# Patient Record
Sex: MALE | Race: BLACK OR AFRICAN AMERICAN
[De-identification: names, ages, dates, MRNs, and addresses within clinical notes are randomized per-mention and may not be internally consistent; named-entity substitution may affect disease eponyms.]

---

## 2018-04-25 ENCOUNTER — HOSPITAL ENCOUNTER (INPATIENT)
Dept: HOSPITAL 74 - JER | Age: 63
LOS: 5 days | Discharge: HOME | DRG: 545 | End: 2018-04-30
Attending: INTERNAL MEDICINE | Admitting: INTERNAL MEDICINE
Payer: COMMERCIAL

## 2018-04-25 VITALS — BODY MASS INDEX: 25.2 KG/M2

## 2018-04-25 DIAGNOSIS — I25.10: ICD-10-CM

## 2018-04-25 DIAGNOSIS — M05.79: ICD-10-CM

## 2018-04-25 DIAGNOSIS — M05.772: Primary | ICD-10-CM

## 2018-04-25 DIAGNOSIS — J43.9: ICD-10-CM

## 2018-04-25 DIAGNOSIS — Z91.14: ICD-10-CM

## 2018-04-25 DIAGNOSIS — Z95.1: ICD-10-CM

## 2018-04-25 DIAGNOSIS — J96.21: ICD-10-CM

## 2018-04-25 DIAGNOSIS — E78.5: ICD-10-CM

## 2018-04-25 DIAGNOSIS — J45.901: ICD-10-CM

## 2018-04-25 DIAGNOSIS — I11.0: ICD-10-CM

## 2018-04-25 DIAGNOSIS — Z87.891: ICD-10-CM

## 2018-04-25 DIAGNOSIS — I50.33: ICD-10-CM

## 2018-04-25 DIAGNOSIS — I25.2: ICD-10-CM

## 2018-04-25 DIAGNOSIS — I45.2: ICD-10-CM

## 2018-04-25 DIAGNOSIS — M05.771: ICD-10-CM

## 2018-04-25 DIAGNOSIS — F41.9: ICD-10-CM

## 2018-04-25 LAB
ALBUMIN SERPL-MCNC: 3.1 G/DL (ref 3.4–5)
ALP SERPL-CCNC: 160 U/L (ref 45–117)
ALT SERPL-CCNC: 19 U/L (ref 12–78)
ANION GAP SERPL CALC-SCNC: 6 MMOL/L (ref 8–16)
AST SERPL-CCNC: 19 U/L (ref 15–37)
BASOPHILS # BLD: 0.3 % (ref 0–2)
BILIRUB SERPL-MCNC: 0.8 MG/DL (ref 0.2–1)
BNP SERPL-MCNC: 110.88 PG/ML (ref 5–125)
BUN SERPL-MCNC: 10 MG/DL (ref 7–18)
CALCIUM SERPL-MCNC: 8.8 MG/DL (ref 8.5–10.1)
CHLORIDE SERPL-SCNC: 99 MMOL/L (ref 98–107)
CO2 SERPL-SCNC: 31 MMOL/L (ref 21–32)
CREAT SERPL-MCNC: 0.9 MG/DL (ref 0.7–1.3)
DEPRECATED RDW RBC AUTO: 14.9 % (ref 11.9–15.9)
EOSINOPHIL # BLD: 0.3 % (ref 0–4.5)
GLUCOSE SERPL-MCNC: 73 MG/DL (ref 74–106)
HCT VFR BLD CALC: 34.8 % (ref 35.4–49)
HGB BLD-MCNC: 11.5 GM/DL (ref 11.7–16.9)
INR BLD: 1.12 (ref 0.82–1.09)
LYMPHOCYTES # BLD: 12.7 % (ref 8–40)
MAGNESIUM SERPL-MCNC: 2 MG/DL (ref 1.8–2.4)
MCH RBC QN AUTO: 26.9 PG (ref 25.7–33.7)
MCHC RBC AUTO-ENTMCNC: 32.9 G/DL (ref 32–35.9)
MCV RBC: 81.6 FL (ref 80–96)
MONOCYTES # BLD AUTO: 7.5 % (ref 3.8–10.2)
NEUTROPHILS # BLD: 79.2 % (ref 42.8–82.8)
PLATELET # BLD AUTO: 469 K/MM3 (ref 134–434)
PMV BLD: 7 FL (ref 7.5–11.1)
POTASSIUM SERPLBLD-SCNC: 4.5 MMOL/L (ref 3.5–5.1)
PROT SERPL-MCNC: 7.7 G/DL (ref 6.4–8.2)
PT PNL PPP: 12.7 SEC (ref 9.7–13)
RBC # BLD AUTO: 4.27 M/MM3 (ref 4–5.6)
SODIUM SERPL-SCNC: 136 MMOL/L (ref 136–145)
WBC # BLD AUTO: 12.3 K/MM3 (ref 4–10)

## 2018-04-25 PROCEDURE — G0378 HOSPITAL OBSERVATION PER HR: HCPCS

## 2018-04-25 NOTE — PDOC
History of Present Illness





- General


Chief Complaint: Edema


Stated Complaint: EDEMA


Time Seen by Provider: 04/25/18 16:46





- History of Present Illness


Initial Comments: 





04/25/18 20:12


The patient is a 62 year old male with a history of HTN, HLD, COPD, CHF who 

presents for evaluation of lower extremity swelling and pain.  The patient 

reports worsening lower extremity edema and pain with difficulty walking over 

the past few days prompting his presentation to the ED for evaluation.  The 

patient reports that he is on 40mg Lasix for chronic lower extremity edema, but 

notes that it has been helping to improve his symptoms.  He otherwise denies 

fevers, chills, SOB, chest pain, nausea, vomiting, abdominal pain, or changes 

with urination or bowel movements.





Past History





- Past Medical History


Allergies/Adverse Reactions: 


 Allergies











Allergy/AdvReac Type Severity Reaction Status Date / Time


 


Penicillins Allergy Severe Rash Verified 04/25/18 16:46


 


seafood Allergy Severe Rash Uncoded 04/25/18 16:46











Home Medications: 


Ambulatory Orders





Albuterol 2.5/Ipratropium 0.5 [Duoneb -] 1 amp NEB QIDR  amp 11/30/16 


Budesonide/Formeterol Fumarate [SYMBICORT 80/4.5mcg -] 1 puff IH BID  inhaler 11 /30/16 


Clopidogrel Bisulfate [Plavix -] 75 mg PO DAILY  tablet 11/30/16 


Furosemide [Lasix -] 40 mg PO DAILY  tablet 11/30/16 


predniSONE [Deltasone -] 10 mg PO DAILY 02/28/17 


Ramipril 5 mg PO DAILY 07/02/17 


Simvastatin 40 mg PO DAILY 07/02/17 


Acetaminophen W/ Codeine #3 [Tylenol # 3 -] 1 tab PO BID 04/25/18 








Anemia: No


Asthma: No


Cancer: No


Cardiac Disorders: Yes (MI, stent, BYPASS)


CVA: No


COPD: Yes (2L)


CHF: Yes


Dementia: No


Diabetes: No


GI Disorders: No


 Disorders: No


HTN: Yes


Hypercholesterolemia: Yes


Liver Disease: No


Psychiatric Problems: Yes (anxiety)


Seizures: No


Thyroid Disease: No





- Surgical History


Abdominal Surgery: No


Appendectomy: No


Cardiac Surgery: Yes (Stent, cardiaccath)


Cholecystectomy: No


Lung Surgery: No


Neurologic Surgery: No


Orthopedic Surgery: Yes (KNEE)





- Immunization History


Immunization Up to Date: Yes





- Suicide/Smoking/Psychosocial Hx


Smoking Status: No


Smoking History: Former smoker


Have you smoked in the past 12 months: No


Number of Cigarettes Smoked Daily: 0


If you are a former smoker, when did you quit?: 3YRS


Information on smoking cessation initiated: No


Hx Alcohol Use: No


Drug/Substance Use Hx: No


Substance Use Type: None


Hx Substance Use Treatment: No





**Review of Systems





- Review of Systems


Comments:: 





04/25/18 20:15


Constitutional: No fevers, chills, fatigue, malaise


HEENT: No Rhinorrhea, nasal congestion, visual changes


Cardiovascular: No chest pain, syncope, palpitations, lightheadedness


Respiratory: No Cough, SOB, Hemoptysis,


Gastrointestinal: No Abdominal pain, Nausea, Vomiting, Constipation, Diarrhea, 

Melena


Genitourinary: No Dysuria, Frequency, Urgency, Hesitancy, Hematuria, Flank pain


Musculoskeletal: No Myalgia, arthralgia


Skin: Lower Extremity Swelling and Pain.  No rashes, itching, bruising, pallor


Neurologic: No Headache, Dizziness, Numbness, Weakness, or Tingling


Psychiatric: No Hallucinations. No SI or HI








*Physical Exam





- Vital Signs


 Last Vital Signs











Temp Pulse Resp BP Pulse Ox


 


 98.3 F   122 H  22   140/45   98 


 


 04/25/18 16:47  04/25/18 16:47  04/25/18 16:47  04/25/18 16:47  04/25/18 16:47














- Physical Exam


Comments: 





04/25/18 20:16


General Appearance: Nourished. No Apparent Distress


HEENT: EOMI, DESTINY. No Pharyngeal Erythema, Tonsillar Exudate, Tonsillar Erythema


Neck: No Cervical Lymphadenopathy


Respiratory/Chest: Lungs Clear, Normal Breath Sounds. No Crackles, Rales, 

Rhonchi, Wheezing


Cardiovascular: Regular Rhythm, Regular Rate. No Murmur, Gallops, Rubs


Gastrointestinal/Abdominal: Normal Bowel Sounds, Soft. No Guarding, Rebound, 

Tenderness


Musculoskeletal: No CVA Tenderness


Extremity: 2+ Pitting Edema in the lower extremities right leg worse then the 

left leg.  Tenderness to palpation along the right lower extremity. Normal 

Capillary Refill


Integumentary: Normal Color, Dry, Warm


Neurologic: Fully Oriented, Alert, Normal Mood/Affect, Normal Response,





**Heart Score/ECG Review


  ** #1


ECG reviewed & interpreted by me at: 20:19 (Sinus Tachycardia to 116 with a 

RBBB and Left posterior fascicular block)


General ECG Interpretation: Sinus Rhythm, No acute ischemic changes


Compared to previous ECG there are: No significant change





ED Treatment Course





- LABORATORY


CBC & Chemistry Diagram: 


 04/25/18 18:10





 04/25/18 18:10





- RADIOLOGY


Radiology Studies Ordered: 














 Category Date Time Status


 


 DUPLEX VASCUL US-2LEGS [US] Stat Ultrasound  04/25/18 18:52 Ordered














Medical Decision Making





- Medical Decision Making





04/25/18 20:20


The patient is a 62 year old male with a history of HTN, HLD, COPD, CHF who 

presents for evaluation of lower extremity swelling and pain.  Differential 

includes but is not limited to: ACS, CHF, DVT, Infectious, Metabolic 

Derangement.  Given the patient's history we will obtain a cbc, cmp, bnp, 

troponin, chest plain film, ekg, and DVT US  to evaluate further for possible 

etiologies.  We will treat in the meantime with lasix here in the ED.  We will 

continue to monitor and reassess.  Likely observation admission for chf vs. 

fluid overload.





04/25/18 22:41


CBC demonstrates an elevated wbc to 12.5.  CMP, bnp, troponin, are 

unremarkable.  Chest plain film demonstrates hyperinflation, but no acute 

pathology as preliminarily read by ER physician.  DVT US does not demonstrate 

evidence of DVT as read by our radiologist.  Given the patient's worsening 

lower extremity edema with pain, we believe he requires observation admission 

for further management.  We discussed the case with Dr. Whitaker who accepted the 

patient for admission.  We discussed the results and plan with the patient who 

voiced understanding and is agreeable.





*DC/Admit/Observation/Transfer


Diagnosis at time of Disposition: 


Edema


Qualifiers:


 Edema type: unspecified Qualified Code(s): R60.9 - Edema, unspecified





Diastolic CHF


Qualifiers:


 Heart failure chronicity: unspecified Qualified Code(s): I50.30 - Unspecified 

diastolic (congestive) heart failure








- Discharge Dispostion


Condition at time of disposition: Stable


Admit: Yes





- Referrals


Referrals: 


Keanu Whitaker MD [Primary Care Provider] - 





- Patient Instructions





- Post Discharge Activity

## 2018-04-25 NOTE — PDOC
Rapid Medical Evaluation


Time Seen by Provider: 04/25/18 16:46


Medical Evaluation: 


 Allergies











Allergy/AdvReac Type Severity Reaction Status Date / Time


 


Penicillins Allergy Severe Rash Verified 07/08/17 16:34


 


seafood Allergy Severe Rash Uncoded 07/08/17 16:34











04/25/18 16:46


I have performed a brief in-person evaluation of this patient. 


The patient presents with a chief complaint of: hx of HTN, COPD, emphysema, CAD

, CHF, having b/l leg swelling, R leg pain "feels like there's a nail in my foot

" x 2 days 


Pertinent physical exam findings: audible wheezing, dyspnea on exertion, tachy 

to 122


I have ordered the following: labs, ekg, cxr


The patient will proceed to the ED for further evaluation.








**Discharge Disposition





- Diagnosis


 Edema








- Referrals





- Patient Instructions





- Post Discharge Activity

## 2018-04-26 RX ADMIN — RAMIPRIL SCH MG: 5 CAPSULE ORAL at 10:18

## 2018-04-26 RX ADMIN — ALBUTEROL SULFATE PRN AMP: 2.5 SOLUTION RESPIRATORY (INHALATION) at 16:39

## 2018-04-26 RX ADMIN — ACETAMINOPHEN PRN MG: 325 TABLET ORAL at 17:09

## 2018-04-26 RX ADMIN — CEFAZOLIN SCH MLS/HR: 1 INJECTION, POWDER, FOR SOLUTION INTRAVENOUS at 21:48

## 2018-04-26 RX ADMIN — CEFAZOLIN SCH MLS/HR: 1 INJECTION, POWDER, FOR SOLUTION INTRAVENOUS at 10:18

## 2018-04-26 RX ADMIN — FUROSEMIDE SCH MG: 10 INJECTION, SOLUTION INTRAVENOUS at 10:18

## 2018-04-26 RX ADMIN — ALBUTEROL SULFATE PRN AMP: 2.5 SOLUTION RESPIRATORY (INHALATION) at 09:58

## 2018-04-26 RX ADMIN — HYDROCORTISONE SODIUM SUCCINATE SCH MG: 100 INJECTION, POWDER, FOR SOLUTION INTRAMUSCULAR; INTRAVENOUS at 10:18

## 2018-04-26 RX ADMIN — ALBUTEROL SULFATE PRN AMP: 2.5 SOLUTION RESPIRATORY (INHALATION) at 20:20

## 2018-04-26 RX ADMIN — ACETAMINOPHEN PRN MG: 325 TABLET ORAL at 13:12

## 2018-04-26 RX ADMIN — ATORVASTATIN CALCIUM SCH MG: 20 TABLET, FILM COATED ORAL at 21:48

## 2018-04-26 RX ADMIN — HYDROCORTISONE SODIUM SUCCINATE SCH MG: 100 INJECTION, POWDER, FOR SOLUTION INTRAMUSCULAR; INTRAVENOUS at 17:10

## 2018-04-26 NOTE — EKG
Test Reason : 

Blood Pressure : ***/*** mmHG

Vent. Rate : 116 BPM     Atrial Rate : 116 BPM

   P-R Int : 140 ms          QRS Dur : 126 ms

    QT Int : 354 ms       P-R-T Axes : 113 123 095 degrees

   QTc Int : 492 ms

 

*** SUSPECT ARM LEAD REVERSAL, INTERPRETATION ASSUMES NO REVERSAL

SINUS TACHYCARDIA

RIGHT BUNDLE BRANCH BLOCK

LEFT POSTERIOR FASCICULAR BLOCK

*** BIFASCICULAR BLOCK ***

ABNORMAL ECG

WHEN COMPARED WITH ECG OF 08-JUL-2017 16:50,

NO SIGNIFICANT CHANGE WAS FOUND

Confirmed by MIRIAM RODRIGUEZ MD ([id number]) on 4/26/2018 12:58:21 PM

 

Referred By:             Confirmed By:MIRIAM RODRIGUEZ MD

## 2018-04-26 NOTE — HP
DATE OF ADMISSION:  04/26/2018

 

This is a 62-year-old male known to have COPD, CHF, coronary artery disease,

diabetes, and arthritis.  Came to the emergency room yesterday with complaints of

short of breath, right leg swelling, and inability to walk.  All the symptoms are

getting progressively worse.  He had multiple admissions in the past, coronary artery

bypass graft which was done about 4 years ago.  He lives alone, has grown-up

children.

 

ALLERGIES:  No known allergies.

 

SOCIAL HISTORY:  Not a smoker now.

 

PHYSICAL EXAMINATION:

General:  At present, he is awake and talking, in severe distress.

Vital Signs:  Blood pressure is 120/70, temperature 98, pulse 106, respirations 20.

HEENT:  Unremarkable.

Lungs:  Bilateral wheeze present.  Air entry is poor.

Heart:  S1, S2 normal.  No S3 or S4.

Abdomen:  Soft.

Musculoskeletal:  There is tenderness all over the body.  Right leg is swollen. 

There is no calf tenderness.

Neurological:  Examination is grossly normal.

 

Chest x-ray negative.

 

DVT studies negative.

 

LABORATORY REPORTS:  WBC 13.3, hemoglobin 11.5.  Chemistry:  Electrolytes are normal.

 Blood sugar 73.  B peptide is normal, 110.

 

IMPRESSION:

1.  Acute exacerbation of chronic obstructive pulmonary disease.

2.  Congestive heart failure.

3.  Exacerbation of arthritis.

 

PLAN:  IV steroids, rheumatology consult to Dr. Chambers, continue his present

medications.  Will follow.

 

 

ARNOL SAPP M.D.

 

[id number]

DD: 04/26/2018 09:20

DT: 04/26/2018 10:36

Job #:  [id number]

## 2018-04-27 RX ADMIN — HYDROCORTISONE SODIUM SUCCINATE SCH MG: 100 INJECTION, POWDER, FOR SOLUTION INTRAMUSCULAR; INTRAVENOUS at 01:05

## 2018-04-27 RX ADMIN — ALBUTEROL SULFATE PRN AMP: 2.5 SOLUTION RESPIRATORY (INHALATION) at 19:41

## 2018-04-27 RX ADMIN — HYDROCORTISONE SODIUM SUCCINATE SCH MG: 100 INJECTION, POWDER, FOR SOLUTION INTRAMUSCULAR; INTRAVENOUS at 18:20

## 2018-04-27 RX ADMIN — CEFAZOLIN SCH MLS/HR: 1 INJECTION, POWDER, FOR SOLUTION INTRAVENOUS at 11:36

## 2018-04-27 RX ADMIN — FUROSEMIDE SCH MG: 10 INJECTION, SOLUTION INTRAVENOUS at 11:36

## 2018-04-27 RX ADMIN — CLOPIDOGREL BISULFATE SCH MG: 75 TABLET, FILM COATED ORAL at 11:36

## 2018-04-27 RX ADMIN — RAMIPRIL SCH MG: 5 CAPSULE ORAL at 11:36

## 2018-04-27 RX ADMIN — ACETAMINOPHEN PRN MG: 325 TABLET ORAL at 01:06

## 2018-04-27 RX ADMIN — ALBUTEROL SULFATE PRN AMP: 2.5 SOLUTION RESPIRATORY (INHALATION) at 07:50

## 2018-04-27 RX ADMIN — CEFAZOLIN SCH MLS/HR: 1 INJECTION, POWDER, FOR SOLUTION INTRAVENOUS at 21:59

## 2018-04-27 RX ADMIN — ATORVASTATIN CALCIUM SCH MG: 20 TABLET, FILM COATED ORAL at 21:59

## 2018-04-27 RX ADMIN — ALBUTEROL SULFATE PRN AMP: 2.5 SOLUTION RESPIRATORY (INHALATION) at 23:17

## 2018-04-27 RX ADMIN — HYDROCORTISONE SODIUM SUCCINATE SCH MG: 100 INJECTION, POWDER, FOR SOLUTION INTRAMUSCULAR; INTRAVENOUS at 11:37

## 2018-04-27 RX ADMIN — ACETAMINOPHEN AND CODEINE PHOSPHATE PRN TAB: 300; 30 TABLET ORAL at 14:51

## 2018-04-27 NOTE — PN
Progress Note, Physician


Chief Complaint: 





SOB better


History of Present Illness: 





Admitted with SOB and difficulty in walking


SOB better,Rt calf pain and tenderness persists





- Current Medication List


Current Medications: 


Active Medications





Acetaminophen (Tylenol -)  650 mg PO Q4H PRN


   PRN Reason: PAIN LEVEL 6-10


   Stop: 04/29/18 11:48


   Last Admin: 04/27/18 01:06 Dose:  650 mg


Albuterol Sulfate (Ventolin 0.083% Nebulizer Soln -)  1 amp NEB Q4H PRN


   PRN Reason: SHORT OF BREATH/WHEEZING


   Last Admin: 04/26/18 20:20 Dose:  1 amp


Atorvastatin Calcium (Lipitor -)  20 mg PO HS [facility]


   Last Admin: 04/26/18 21:48 Dose:  20 mg


Clopidogrel Bisulfate (Plavix -)  75 mg PO DAILY [facility]


Furosemide (Lasix Injection -)  80 mg IVPB DAILY [facility]


   Last Admin: 04/26/18 10:18 Dose:  80 mg


Hydrocortisone Sodium Succinate (Solu-Cortef -)  100 mg IVPB Q8H [facility]


   Last Admin: 04/27/18 01:05 Dose:  100 mg


Cefazolin Sodium 1 gm/ (Dextrose)  50 mls @ 100 mls/hr IVPB BID [facility]


   Last Admin: 04/26/18 21:48 Dose:  100 mls/hr


Oxycodone HCl (Roxicodone -)  10 mg PO Q4H PRN


   PRN Reason: PAIN LEVEL 6-10


   Last Admin: 04/27/18 01:05 Dose:  10 mg


Ramipril (Altace -)  5 mg PO DAILY [facility]


   Last Admin: 04/26/18 10:18 Dose:  5 mg











- Objective


Vital Signs: 


 Vital Signs











Temperature  98.1 F   04/27/18 08:29


 


Pulse Rate  108 H  04/27/18 08:29


 


Respiratory Rate  20   04/27/18 08:29


 


Blood Pressure  130/69   04/27/18 08:29


 


O2 Sat by Pulse Oximetry (%)  98   04/26/18 21:00











Constitutional: Yes: Mild Distress


Eyes: Yes: WNL


HENT: Yes: WNL, Other


Cardiovascular: Yes: WNL


Respiratory: Yes: On Nasal O2


Gastrointestinal: Yes: WNL


...Rectal Exam: Yes: Deferred


Genitourinary: Yes: WNL


Musculoskeletal: Yes: Muscle Pain


Extremities: Yes: Calf Tenderness


Edema: No


Integumentary: Yes: WNL


Neurological: Yes: Alert


Labs: 


 CBC, BMP





 04/25/18 18:10 





 04/25/18 18:10 





 INR, PTT











INR  1.12  (0.82-1.09)   04/25/18  18:10    














Assessment/Plan





DC percoset


Tylenol #3 for pain

## 2018-04-27 NOTE — CONSULT
Consult


Consult Specialty:: Rheumatology





- History of Present Illness


History of Present Illness: 


62 year old male with a history of HTN, HLD, COPD, chronic low back paion, 

coronary artery disease (s/p CABG), CHF  and rheumatoid arthritis, admitted 

with  pain and edema and pain in lower limbs. 





The patient has history of sero-positive rheumatoid arthritis since 2006.  I 

saw him once in my office in 2008,  at that time he had very active disease, 

however he could not follow-up with me due to insurance problems.  Apparently 

he did not follow-up with other rheumatologists and was not treated with 

DMARDs.  The patient has been coping with the pain, however in the last few 

months  he has had progression of pain in knees and hands and difficulty in 

walking.  The pain is accompanied by 1  hour morning stiffness. 








- History Source


History Provided By: Patient, Medical Record





- Past Medical History


Cardio/Vascular: Yes: CAD, HTN, Hyperlipdemia


Pulmonary: Yes: COPD


Musculoskeletal: Yes: Chronic low back pain


Rheumatology: Yes: Rheumatoid Arthritis





- Past Surgical History


Past Surgical History: Yes: CABG, Stent





- Alcohol/Substance Use


Hx Alcohol Use: No


History of Substance Use: reports: None





- Smoking History


Smoking history: Former smoker


Have you smoked in the past 12 months: No


Aproximately how many cigarettes per day: 0


If you are a former smoker, when did you quit?: 3YRS





Home Medications





- Allergies


Allergies/Adverse Reactions: 


 Allergies











Allergy/AdvReac Type Severity Reaction Status Date / Time


 


Penicillins Allergy Severe Rash Verified 04/25/18 16:46


 


seafood Allergy Severe Rash Uncoded 04/25/18 16:46














- Home Medications


Home Medications: 


Ambulatory Orders





Albuterol 2.5/Ipratropium 0.5 [Duoneb -] 1 amp NEB QIDR  amp 11/30/16 


Budesonide/Formeterol Fumarate [SYMBICORT 80/4.5mcg -] 1 puff IH BID  inhaler 11 /30/16 


Clopidogrel Bisulfate [Plavix -] 75 mg PO DAILY  tablet 11/30/16 


Furosemide [Lasix -] 40 mg PO DAILY  tablet 11/30/16 


predniSONE [Deltasone -] 10 mg PO DAILY 02/28/17 


Ramipril 5 mg PO DAILY 07/02/17 


Simvastatin 40 mg PO DAILY 07/02/17 


Acetaminophen W/ Codeine #3 [Tylenol # 3 -] 1 tab PO BID 04/25/18 











Family Disease History





- Family Disease History


Family Disease History: Heart Disease: Grandparent, Other: Brother (Lupus)





Review of Systems





- Review of Systems


Constitutional: reports: Malaise


Eyes: reports: No Symptoms


HENT: reports: No Symptoms


Neck: reports: No Symptoms


Cardiovascular: reports: Shortness of Breath


Respiratory: reports: SOB


Gastrointestinal: reports: No Symptoms


Musculoskeletal: reports: Other (See HPI)





Physical Exam


Vital Signs: 


 Vital Signs











Temperature  97.8 F   04/27/18 15:23


 


Pulse Rate  116 H  04/27/18 15:23


 


Respiratory Rate  20   04/27/18 15:23


 


Blood Pressure  110/60   04/27/18 15:23


 


O2 Sat by Pulse Oximetry (%)  98   04/27/18 09:00











Constitutional: Yes: Moderate Distress


Eyes: Yes: WNL


HENT: Yes: WNL


Neck: Yes: WNL


Cardiovascular: Yes: WNL


Respiratory: Yes: Rales


Gastrointestinal: Yes: WNL


Musculoskeletal: Yes: Other (15 swollen joints  (Swelling of both wrists, in 

the right hand swelling of the 2nd, 3rd and 5th MCPs and 1st, 2nd and 3rd PIPs.

  In the left hand swelling of the 2nd MPC and all PIPs.  Both knees were 

swollen))


Labs: 


 CBC, BMP





 04/25/18 18:10 





 04/25/18 18:10 











Problem List





- Problems


(1) Rheumatoid arthritis


Assessment/Plan: 


Sero-positive rheumatoid arthritis, disease very active/


As the patient has significant COPD, and CHF, he is not candidate for 

Methotrextae of biological DMARDs.


Plan:  X rays hands and kenes.


Start Sulfasalazine 500 gm PO BID for 1 week, then 1 gr BID.











Code(s): M06.9 - RHEUMATOID ARTHRITIS, UNSPECIFIED   





(2) Rheumatoid arthritis involving ankle with positive rheumatoid factor


Code(s): M05.779 - RHEU ARTHRIT W RHEU FACTOR OF UNSP ANK/FT W/O ORG/SYS INVOLV

   





(3) Rheumatoid arthritis of multiple sites without organ or system involvement 

with positive rheumatoid factor


Code(s): M05.79 - RHEU ARTHRITIS W RHEU FACTOR MULT SITE W/O ORG/SYS INVOLV

## 2018-04-28 LAB
ALBUMIN SERPL-MCNC: 2.7 G/DL (ref 3.4–5)
ALP SERPL-CCNC: 134 U/L (ref 45–117)
ALT SERPL-CCNC: 13 U/L (ref 12–78)
ANION GAP SERPL CALC-SCNC: 3 MMOL/L (ref 8–16)
AST SERPL-CCNC: 15 U/L (ref 15–37)
BILIRUB SERPL-MCNC: 0.4 MG/DL (ref 0.2–1)
BUN SERPL-MCNC: 19 MG/DL (ref 7–18)
CALCIUM SERPL-MCNC: 8.8 MG/DL (ref 8.5–10.1)
CHLORIDE SERPL-SCNC: 97 MMOL/L (ref 98–107)
CO2 SERPL-SCNC: 36 MMOL/L (ref 21–32)
CREAT SERPL-MCNC: 0.9 MG/DL (ref 0.7–1.3)
DEPRECATED RDW RBC AUTO: 14.5 % (ref 11.9–15.9)
GLUCOSE SERPL-MCNC: 114 MG/DL (ref 74–106)
HCT VFR BLD CALC: 32.7 % (ref 35.4–49)
HGB BLD-MCNC: 11.1 GM/DL (ref 11.7–16.9)
MCH RBC QN AUTO: 27.6 PG (ref 25.7–33.7)
MCHC RBC AUTO-ENTMCNC: 33.9 G/DL (ref 32–35.9)
MCV RBC: 81.4 FL (ref 80–96)
PLATELET # BLD AUTO: 471 K/MM3 (ref 134–434)
PMV BLD: 6.9 FL (ref 7.5–11.1)
POTASSIUM SERPLBLD-SCNC: 4.6 MMOL/L (ref 3.5–5.1)
PROT SERPL-MCNC: 7.2 G/DL (ref 6.4–8.2)
RBC # BLD AUTO: 4.02 M/MM3 (ref 4–5.6)
SODIUM SERPL-SCNC: 136 MMOL/L (ref 136–145)
WBC # BLD AUTO: 16.7 K/MM3 (ref 4–10)

## 2018-04-28 RX ADMIN — CEFAZOLIN SCH MLS/HR: 1 INJECTION, POWDER, FOR SOLUTION INTRAVENOUS at 10:17

## 2018-04-28 RX ADMIN — METHYLPREDNISOLONE SODIUM SUCCINATE SCH MG: 40 INJECTION, POWDER, FOR SOLUTION INTRAMUSCULAR; INTRAVENOUS at 17:29

## 2018-04-28 RX ADMIN — CLOPIDOGREL BISULFATE SCH MG: 75 TABLET, FILM COATED ORAL at 10:21

## 2018-04-28 RX ADMIN — FUROSEMIDE SCH MG: 10 INJECTION, SOLUTION INTRAVENOUS at 10:40

## 2018-04-28 RX ADMIN — ATORVASTATIN CALCIUM SCH MG: 20 TABLET, FILM COATED ORAL at 21:20

## 2018-04-28 RX ADMIN — HYDROCORTISONE SODIUM SUCCINATE SCH MG: 100 INJECTION, POWDER, FOR SOLUTION INTRAMUSCULAR; INTRAVENOUS at 01:40

## 2018-04-28 RX ADMIN — ACETAMINOPHEN AND CODEINE PHOSPHATE PRN TAB: 300; 30 TABLET ORAL at 07:07

## 2018-04-28 RX ADMIN — ALBUTEROL SULFATE PRN AMP: 2.5 SOLUTION RESPIRATORY (INHALATION) at 08:30

## 2018-04-28 RX ADMIN — CEFAZOLIN SCH MLS/HR: 1 INJECTION, POWDER, FOR SOLUTION INTRAVENOUS at 21:22

## 2018-04-28 RX ADMIN — ACETAMINOPHEN AND CODEINE PHOSPHATE PRN TAB: 300; 30 TABLET ORAL at 17:29

## 2018-04-28 RX ADMIN — ALBUTEROL SULFATE PRN AMP: 2.5 SOLUTION RESPIRATORY (INHALATION) at 20:05

## 2018-04-28 RX ADMIN — METHYLPREDNISOLONE SODIUM SUCCINATE SCH MG: 40 INJECTION, POWDER, FOR SOLUTION INTRAMUSCULAR; INTRAVENOUS at 10:32

## 2018-04-28 RX ADMIN — RAMIPRIL SCH MG: 5 CAPSULE ORAL at 10:25

## 2018-04-28 NOTE — PN
Progress Note, Physician


Chief Complaint: 





Feels better





- Current Medication List


Current Medications: 


Active Medications





Acetaminophen (Tylenol -)  650 mg PO Q4H PRN


   PRN Reason: PAIN LEVEL 6-10


   Stop: 04/29/18 11:48


   Last Admin: 04/27/18 01:06 Dose:  650 mg


Acetaminophen/Codeine Phosphate (Tylenol # 3 -)  2 tab PO Q4H PRN


   PRN Reason: PAIN LEVEL 4 - 6


   Last Admin: 04/28/18 07:07 Dose:  2 tab


Albuterol Sulfate (Ventolin 0.083% Nebulizer Soln -)  1 amp NEB Q4H PRN


   PRN Reason: SHORT OF BREATH/WHEEZING


   Last Admin: 04/28/18 08:30 Dose:  1 amp


Atorvastatin Calcium (Lipitor -)  20 mg PO HS [facility]


   Last Admin: 04/27/18 21:59 Dose:  20 mg


Clopidogrel Bisulfate (Plavix -)  75 mg PO DAILY [facility]


   Last Admin: 04/27/18 11:36 Dose:  75 mg


Furosemide (Lasix Injection -)  80 mg IVPB DAILY [facility]


   Last Admin: 04/27/18 11:36 Dose:  80 mg


Cefazolin Sodium 1 gm/ (Dextrose)  50 mls @ 100 mls/hr IVPB BID [facility]


   Last Admin: 04/27/18 21:59 Dose:  100 mls/hr


Methylprednisolone Sodium Succinate (Solu-Medrol -)  40 mg IVPUSH Q8H-IV [facility]


Ramipril (Altace -)  5 mg PO DAILY [facility]


   Last Admin: 04/27/18 11:36 Dose:  5 mg


Sulfasalazine (Azulfidine En-Tabs -)  500 mg PO BID [facility]


   Last Admin: 04/27/18 21:59 Dose:  500 mg











- Objective


Vital Signs: 


 Vital Signs











Temperature  97.7 F   04/28/18 06:46


 


Pulse Rate  99 H  04/28/18 06:46


 


Respiratory Rate  19   04/28/18 06:46


 


Blood Pressure  100/60   04/28/18 06:46


 


O2 Sat by Pulse Oximetry (%)  98   04/27/18 21:00











Constitutional: Yes: No Distress


Eyes: Yes: WNL


HENT: Yes: WNL


Neck: Yes: WNL


Cardiovascular: Yes: WNL


Respiratory: Yes: WNL, On Nasal O2


Gastrointestinal: Yes: WNL


...Rectal Exam: Yes: Deferred


Edema: LLE: 1+, RLE: 1+


Neurological: Yes: Alert


Psychiatric: Yes: Alert


Labs: 


 CBC, BMP





 04/28/18 06:27 





 04/28/18 06:27 





 INR, PTT











INR  1.12  (0.82-1.09)   04/25/18  18:10    














Assessment/Plan





reduce IV steroids

## 2018-04-29 LAB
APPEARANCE UR: CLEAR
BILIRUB UR STRIP.AUTO-MCNC: NEGATIVE MG/DL
COLOR UR: (no result)
KETONES UR QL STRIP: NEGATIVE
LEUKOCYTE ESTERASE UR QL STRIP.AUTO: NEGATIVE
NITRITE UR QL STRIP: NEGATIVE
PH UR: 6 [PH] (ref 5–8)
PROT UR QL STRIP: NEGATIVE
PROT UR QL STRIP: NEGATIVE
RBC # UR STRIP: NEGATIVE /UL
SP GR UR: 1.02 (ref 1–1.03)
UROBILINOGEN UR STRIP-MCNC: (no result) MG/DL (ref 0.2–1)

## 2018-04-29 RX ADMIN — RAMIPRIL SCH MG: 5 CAPSULE ORAL at 11:08

## 2018-04-29 RX ADMIN — METHYLPREDNISOLONE SODIUM SUCCINATE SCH MG: 40 INJECTION, POWDER, FOR SOLUTION INTRAMUSCULAR; INTRAVENOUS at 17:21

## 2018-04-29 RX ADMIN — CEFAZOLIN SCH MLS/HR: 1 INJECTION, POWDER, FOR SOLUTION INTRAVENOUS at 23:00

## 2018-04-29 RX ADMIN — ALBUTEROL SULFATE PRN AMP: 2.5 SOLUTION RESPIRATORY (INHALATION) at 23:19

## 2018-04-29 RX ADMIN — ACETAMINOPHEN AND CODEINE PHOSPHATE PRN TAB: 300; 30 TABLET ORAL at 18:39

## 2018-04-29 RX ADMIN — ALBUTEROL SULFATE PRN AMP: 2.5 SOLUTION RESPIRATORY (INHALATION) at 17:13

## 2018-04-29 RX ADMIN — NAPROXEN SCH: 250 TABLET ORAL at 17:11

## 2018-04-29 RX ADMIN — RANITIDINE HYDROCHLORIDE SCH MG: 150 SOLUTION ORAL at 11:43

## 2018-04-29 RX ADMIN — METHYLPREDNISOLONE SODIUM SUCCINATE SCH MG: 40 INJECTION, POWDER, FOR SOLUTION INTRAMUSCULAR; INTRAVENOUS at 02:16

## 2018-04-29 RX ADMIN — ACETAMINOPHEN AND CODEINE PHOSPHATE PRN TAB: 300; 30 TABLET ORAL at 11:23

## 2018-04-29 RX ADMIN — CLOPIDOGREL BISULFATE SCH MG: 75 TABLET, FILM COATED ORAL at 11:08

## 2018-04-29 RX ADMIN — CEFAZOLIN SCH MLS/HR: 1 INJECTION, POWDER, FOR SOLUTION INTRAVENOUS at 11:09

## 2018-04-29 RX ADMIN — NAPROXEN SCH MG: 250 TABLET ORAL at 11:43

## 2018-04-29 RX ADMIN — ATORVASTATIN CALCIUM SCH MG: 20 TABLET, FILM COATED ORAL at 22:59

## 2018-04-29 RX ADMIN — FUROSEMIDE SCH MG: 10 INJECTION, SOLUTION INTRAVENOUS at 11:09

## 2018-04-29 RX ADMIN — ALBUTEROL SULFATE PRN AMP: 2.5 SOLUTION RESPIRATORY (INHALATION) at 02:52

## 2018-04-29 RX ADMIN — ALBUTEROL SULFATE PRN AMP: 2.5 SOLUTION RESPIRATORY (INHALATION) at 08:10

## 2018-04-29 RX ADMIN — METHYLPREDNISOLONE SODIUM SUCCINATE SCH MG: 40 INJECTION, POWDER, FOR SOLUTION INTRAMUSCULAR; INTRAVENOUS at 11:09

## 2018-04-29 NOTE — PN
Progress Note, Physician


Chief Complaint: 





Patient still c/o Rt  knee and calf pain , less SOB





- Current Medication List


Current Medications: 


Active Medications





Acetaminophen (Tylenol -)  650 mg PO Q4H PRN


   PRN Reason: PAIN LEVEL 6-10


   Stop: 04/29/18 11:48


   Last Admin: 04/27/18 01:06 Dose:  650 mg


Acetaminophen/Codeine Phosphate (Tylenol # 3 -)  2 tab PO Q4H PRN


   PRN Reason: PAIN LEVEL 4 - 6


   Last Admin: 04/28/18 17:29 Dose:  2 tab


Albuterol Sulfate (Ventolin 0.083% Nebulizer Soln -)  1 amp NEB Q4H PRN


   PRN Reason: SHORT OF BREATH/WHEEZING


   Last Admin: 04/29/18 02:52 Dose:  1 amp


Atorvastatin Calcium (Lipitor -)  20 mg PO HS [facility]


   Last Admin: 04/28/18 21:20 Dose:  20 mg


Clopidogrel Bisulfate (Plavix -)  75 mg PO DAILY [facility]


   Last Admin: 04/28/18 10:21 Dose:  75 mg


Furosemide (Lasix Injection -)  80 mg IVPB DAILY [facility]


   Last Admin: 04/28/18 10:40 Dose:  80 mg


Cefazolin Sodium 1 gm/ (Dextrose)  50 mls @ 100 mls/hr IVPB BID [facility]


   Last Admin: 04/28/18 21:22 Dose:  100 mls/hr


Methylprednisolone Sodium Succinate (Solu-Medrol -)  40 mg IVPUSH Q8H-IV [facility]


   Last Admin: 04/29/18 02:16 Dose:  40 mg


Ramipril (Altace -)  5 mg PO DAILY [facility]


   Last Admin: 04/28/18 10:25 Dose:  5 mg


Sulfasalazine (Azulfidine En-Tabs -)  500 mg PO BID [facility]


   Last Admin: 04/28/18 21:26 Dose:  500 mg











- Objective


Vital Signs: 


 Vital Signs











Temperature  98.2 F   04/29/18 06:00


 


Pulse Rate  90   04/29/18 06:00


 


Respiratory Rate  20   04/29/18 06:00


 


Blood Pressure  106/65   04/29/18 06:00


 


O2 Sat by Pulse Oximetry (%)  95   04/28/18 21:00











Labs: 


 CBC, BMP





 04/28/18 06:27 





 04/28/18 06:27 





 INR, PTT











INR  1.12  (0.82-1.09)   04/25/18  18:10

## 2018-04-29 NOTE — PN
Progress Note, Physician


Chief Complaint: 





Patient still c/o Rt  knee and calf pain , less SOB


History of Present Illness: 





62 yrs old man multiple Co-morbidities including advanced COPD on Home O2, HTN, 

CAD s/p CABG, RA non complint with meds due to insurance issue, lives alone 

admitted with worsening joints swelling and pain with SOB so far w/u consistent 

with RA flare and imagong shows Rt LE Hematoma.





- Current Medication List


Current Medications: 


Active Medications





Acetaminophen (Tylenol -)  650 mg PO Q4H PRN


   PRN Reason: PAIN LEVEL 6-10


   Stop: 04/29/18 11:48


   Last Admin: 04/27/18 01:06 Dose:  650 mg


Acetaminophen/Codeine Phosphate (Tylenol # 3 -)  2 tab PO Q4H PRN


   PRN Reason: PAIN LEVEL 4 - 6


   Last Admin: 04/28/18 17:29 Dose:  2 tab


Albuterol Sulfate (Ventolin 0.083% Nebulizer Soln -)  1 amp NEB Q4H PRN


   PRN Reason: SHORT OF BREATH/WHEEZING


   Last Admin: 04/29/18 02:52 Dose:  1 amp


Atorvastatin Calcium (Lipitor -)  20 mg PO HS [facility]


   Last Admin: 04/28/18 21:20 Dose:  20 mg


Clopidogrel Bisulfate (Plavix -)  75 mg PO DAILY [facility]


   Last Admin: 04/28/18 10:21 Dose:  75 mg


Furosemide (Lasix Injection -)  80 mg IVPB DAILY [facility]


   Last Admin: 04/28/18 10:40 Dose:  80 mg


Cefazolin Sodium 1 gm/ (Dextrose)  50 mls @ 100 mls/hr IVPB BID [facility]


   Last Admin: 04/28/18 21:22 Dose:  100 mls/hr


Methylprednisolone Sodium Succinate (Solu-Medrol -)  40 mg IVPUSH Q8H-IV [facility]


   Last Admin: 04/29/18 02:16 Dose:  40 mg


Ramipril (Altace -)  5 mg PO DAILY [facility]


   Last Admin: 04/28/18 10:25 Dose:  5 mg


Sulfasalazine (Azulfidine En-Tabs -)  500 mg PO BID [facility]


   Last Admin: 04/28/18 21:26 Dose:  500 mg











- Objective


Vital Signs: 


 Vital Signs











Temperature  98.2 F   04/29/18 06:00


 


Pulse Rate  90   04/29/18 06:00


 


Respiratory Rate  20   04/29/18 06:00


 


Blood Pressure  106/65   04/29/18 06:00


 


O2 Sat by Pulse Oximetry (%)  95   04/28/18 21:00

















Middle aged man sick looking c/o Rt calf pain





HEENT: Mm moist, no anemia, PERRLA EOMI





NECK: No JVd No Bruit





CHEST: CTA B/L





CVS: S1S2 r no m/g/r





ABD: No distention non tender Bs +





EXT: Rt ankle, knee swelling + Tender, Rt Calf tenderness and swelling





CNS: AOX3 non focal 














Labs: 


 CBC, BMP





 04/28/18 06:27 





 04/28/18 06:27 





 INR, PTT











INR  1.12  (0.82-1.09)   04/25/18  18:10    














- ....Imaging


Other: Report Reviewed (LE Doppler Suspected Rt Calf hematoma)





Problem List





- Problems


(1) Rheumatoid arthritis involving ankle with positive rheumatoid factor


Assessment/Plan: 


admitted with RA flare evaluted by Rhematologist will cont current management.


Code(s): M05.779 - RHEU ARTHRIT W RHEU FACTOR OF UNSP ANK/FT W/O ORG/SYS INVOLV

   


Qualifiers: 


   Laterality: bilateral   Qualified Code(s): M05.771 - Rheumatoid arthritis 

with rheumatoid factor of right ankle and foot without organ or systems 

involvement; M05.772 - Rheumatoid arthritis with rheumatoid factor of left 

ankle and foot without organ or systems involvement; M05.772 - Rheumatoid 

arthritis with rheumatoid factor of left ankle and foot without organ or 

systems involvement; M05.772 - Rheumatoid arthritis with rheumatoid factor of 

left ankle and foot without organ or systems involvement; M05.772 - Rheumatoid 

arthritis with rheumatoid factor of left ankle and foot without organ or 

systems involvement   





(2) Acute on chronic respiratory failure with hypoxemia


Assessment/Plan: 


Advance COPD  worsening SOB improving with Nebs and V Steroids


Code(s): J96.21 - ACUTE AND CHRONIC RESPIRATORY FAILURE WITH HYPOXIA   





(3) History of coronary artery bypass graft


Assessment/Plan: 


At present compensated denies any chest pain  no acute St T changes


Code(s): Z95.1 - PRESENCE OF AORTOCORONARY BYPASS GRAFT   





(4) HTN (hypertension)


Assessment/Plan: 


Well controlled cont Home meds


Code(s): I10 - ESSENTIAL (PRIMARY) HYPERTENSION   


Qualifiers: 


   Hypertension type: essential hypertension   Qualified Code(s): I10 - 

Essential (primary) hypertension   





(5) Chronic pain


Assessment/Plan: 


Optimize pain control


Code(s): G89.29 - OTHER CHRONIC PAIN   





(6) Lower extremity edema


Assessment/Plan: 


Rt LE swelling ultrasound suspected hematoma will F/U with Vascular consult 


Code(s): R60.0 - LOCALIZED EDEMA

## 2018-04-30 VITALS — HEART RATE: 113 BPM | DIASTOLIC BLOOD PRESSURE: 74 MMHG | TEMPERATURE: 98.5 F | SYSTOLIC BLOOD PRESSURE: 131 MMHG

## 2018-04-30 RX ADMIN — METHYLPREDNISOLONE SODIUM SUCCINATE SCH MG: 40 INJECTION, POWDER, FOR SOLUTION INTRAMUSCULAR; INTRAVENOUS at 02:32

## 2018-04-30 RX ADMIN — NAPROXEN SCH MG: 250 TABLET ORAL at 10:12

## 2018-04-30 RX ADMIN — RANITIDINE HYDROCHLORIDE SCH MG: 150 SOLUTION ORAL at 10:15

## 2018-04-30 RX ADMIN — METHYLPREDNISOLONE SODIUM SUCCINATE SCH MG: 40 INJECTION, POWDER, FOR SOLUTION INTRAMUSCULAR; INTRAVENOUS at 10:16

## 2018-04-30 RX ADMIN — FUROSEMIDE SCH MG: 10 INJECTION, SOLUTION INTRAVENOUS at 10:16

## 2018-04-30 RX ADMIN — RAMIPRIL SCH MG: 5 CAPSULE ORAL at 10:14

## 2018-04-30 RX ADMIN — CLOPIDOGREL BISULFATE SCH MG: 75 TABLET, FILM COATED ORAL at 10:15

## 2018-04-30 RX ADMIN — CEFAZOLIN SCH MLS/HR: 1 INJECTION, POWDER, FOR SOLUTION INTRAVENOUS at 11:32

## 2018-04-30 RX ADMIN — ALBUTEROL SULFATE PRN AMP: 2.5 SOLUTION RESPIRATORY (INHALATION) at 10:40

## 2018-04-30 RX ADMIN — ALBUTEROL SULFATE PRN AMP: 2.5 SOLUTION RESPIRATORY (INHALATION) at 07:37

## 2018-04-30 NOTE — DS
Physical Examination


Vital Signs: 


 Vital Signs











Temperature  97.8 F   04/30/18 05:30


 


Pulse Rate  87   04/30/18 05:30


 


Respiratory Rate  20   04/29/18 21:16


 


Blood Pressure  120/68   04/30/18 05:30


 


O2 Sat by Pulse Oximetry (%)  100   04/29/18 21:00











Findings/Remarks: 





Admitted with exacerbation of br asthma and joint pains


Doing well,DC home on PO prednisone


Constitutional: Yes: No Distress


Eyes: Yes: WNL


HENT: Yes: WNL


Neck: Yes: WNL


Cardiovascular: Yes: WNL


Respiratory: Yes: WNL


Gastrointestinal: Yes: WNL


...Rectal Exam: Yes: Deferred


Renal/: Yes: WNL


Breast(s): Yes: WNL


Musculoskeletal: Yes: WNL


Edema: Yes


Edema: RLE: 1+


Neurological: Yes: Alert


Psychiatric: Yes: Alert


Labs: 


 CBC, BMP





 04/28/18 06:27 





 04/28/18 06:27 











Discharge Summary


Reason For Visit: CONGESTIVE HEART FAILURE, EDEMA


Current Active Problems





Chronic pain (Acute)


Congestive cardiac failure (Acute)


Diastolic CHF (Acute)


Edema (Acute)


Lower extremity edema (Acute)


Rheumatoid arthritis (Acute)


Rheumatoid arthritis involving ankle with positive rheumatoid factor (Acute)


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 

positive rheumatoid factor (Acute)








Condition: Stable





- Instructions


Referrals: 


Keanu Whitaker MD [Primary Care Provider] - 





- Home Medications


Comprehensive Discharge Medication List: 


Ambulatory Orders





Albuterol 2.5/Ipratropium 0.5 [Duoneb -] 1 amp NEB QIDR  amp 11/30/16 


Budesonide/Formeterol Fumarate [SYMBICORT 80/4.5mcg -] 1 puff IH BID  inhaler 11 /30/16 


Clopidogrel Bisulfate [Plavix -] 75 mg PO DAILY  tablet 11/30/16 


Furosemide [Lasix -] 40 mg PO DAILY  tablet 11/30/16 


predniSONE [Deltasone -] 10 mg PO DAILY 02/28/17 


Ramipril 5 mg PO DAILY 07/02/17 


Simvastatin 40 mg PO DAILY 07/02/17 


Acetaminophen W/ Codeine #3 [Tylenol # 3 -] 1 tab PO BID 04/25/18

## 2018-05-11 ENCOUNTER — HOSPITAL ENCOUNTER (INPATIENT)
Dept: HOSPITAL 74 - JER | Age: 63
LOS: 6 days | Discharge: HOME | DRG: 545 | End: 2018-05-17
Attending: INTERNAL MEDICINE | Admitting: INTERNAL MEDICINE
Payer: COMMERCIAL

## 2018-05-11 VITALS — BODY MASS INDEX: 24.3 KG/M2

## 2018-05-11 DIAGNOSIS — L02.415: ICD-10-CM

## 2018-05-11 DIAGNOSIS — F41.9: ICD-10-CM

## 2018-05-11 DIAGNOSIS — M06.9: Primary | ICD-10-CM

## 2018-05-11 DIAGNOSIS — I25.10: ICD-10-CM

## 2018-05-11 DIAGNOSIS — Z99.81: ICD-10-CM

## 2018-05-11 DIAGNOSIS — M25.461: ICD-10-CM

## 2018-05-11 DIAGNOSIS — J43.9: ICD-10-CM

## 2018-05-11 DIAGNOSIS — Z95.1: ICD-10-CM

## 2018-05-11 DIAGNOSIS — M23.8X1: ICD-10-CM

## 2018-05-11 DIAGNOSIS — E78.5: ICD-10-CM

## 2018-05-11 DIAGNOSIS — M54.9: ICD-10-CM

## 2018-05-11 DIAGNOSIS — M66.0: ICD-10-CM

## 2018-05-11 DIAGNOSIS — J96.21: ICD-10-CM

## 2018-05-11 DIAGNOSIS — I50.32: ICD-10-CM

## 2018-05-11 DIAGNOSIS — Z87.891: ICD-10-CM

## 2018-05-11 DIAGNOSIS — I11.0: ICD-10-CM

## 2018-05-11 LAB
ALBUMIN SERPL-MCNC: 2.9 G/DL (ref 3.4–5)
ALP SERPL-CCNC: 216 U/L (ref 45–117)
ALT SERPL-CCNC: 30 U/L (ref 12–78)
ANION GAP SERPL CALC-SCNC: 7 MMOL/L (ref 8–16)
AST SERPL-CCNC: 33 U/L (ref 15–37)
BASOPHILS # BLD: 0.3 % (ref 0–2)
BILIRUB SERPL-MCNC: 0.5 MG/DL (ref 0.2–1)
BUN SERPL-MCNC: 17 MG/DL (ref 7–18)
CALCIUM SERPL-MCNC: 8.4 MG/DL (ref 8.5–10.1)
CHLORIDE SERPL-SCNC: 101 MMOL/L (ref 98–107)
CO2 SERPL-SCNC: 29 MMOL/L (ref 21–32)
CREAT SERPL-MCNC: 0.9 MG/DL (ref 0.7–1.3)
DEPRECATED RDW RBC AUTO: 15.3 % (ref 11.9–15.9)
EOSINOPHIL # BLD: 0.5 % (ref 0–4.5)
GLUCOSE SERPL-MCNC: 108 MG/DL (ref 74–106)
HCT VFR BLD CALC: 33.4 % (ref 35.4–49)
HGB BLD-MCNC: 11.1 GM/DL (ref 11.7–16.9)
INR BLD: 1.1 (ref 0.82–1.09)
LYMPHOCYTES # BLD: 8.9 % (ref 8–40)
MCH RBC QN AUTO: 27 PG (ref 25.7–33.7)
MCHC RBC AUTO-ENTMCNC: 33.3 G/DL (ref 32–35.9)
MCV RBC: 81.1 FL (ref 80–96)
MONOCYTES # BLD AUTO: 6.7 % (ref 3.8–10.2)
NEUTROPHILS # BLD: 83.6 % (ref 42.8–82.8)
PLATELET # BLD AUTO: 391 K/MM3 (ref 134–434)
PMV BLD: 7.1 FL (ref 7.5–11.1)
POTASSIUM SERPLBLD-SCNC: 4.2 MMOL/L (ref 3.5–5.1)
PROT SERPL-MCNC: 6.7 G/DL (ref 6.4–8.2)
PT PNL PPP: 12.4 SEC (ref 9.7–13)
RBC # BLD AUTO: 4.12 M/MM3 (ref 4–5.6)
SODIUM SERPL-SCNC: 137 MMOL/L (ref 136–145)
WBC # BLD AUTO: 12.6 K/MM3 (ref 4–10)

## 2018-05-11 NOTE — PDOC
Rapid Medical Evaluation


Chief Complaint: Wound


Time Seen by Provider: 05/11/18 20:45


Medical Evaluation: 


 Allergies











Allergy/AdvReac Type Severity Reaction Status Date / Time


 


Penicillins Allergy Severe Rash Verified 04/25/18 16:46


 


seafood Allergy Severe Rash Uncoded 04/25/18 16:46











05/11/18 20:45


I have performed a brief-in person evaluation of this patient.





The patient presents with a chief complaint of:Right leg abscess





Rx by Dr. Keanu Whitaker [phone]


Pt seen by Dr. Whitaker today @1730 hrs and was sent to the ER to have 


abscess I&D by Dr. Mi





RX: Abscess right leg calf. Needs surgical consult by Dr. Mi. May need I&D 

under anesthesia. Pt is on Plavix.





Pertinent physical exam findings: Right Calf swelling/erythema/+pain





I have ordered the following: US duplex right calf, CBC/cmp/Pt





The patient will proceed to the ED for further evaluation.





05/11/18 20:51


I called Dr Mi from triage to ascertain if Dr Mi has been notified

## 2018-05-12 PROCEDURE — 0Y9H3ZX DRAINAGE OF RIGHT LOWER LEG, PERCUTANEOUS APPROACH, DIAGNOSTIC: ICD-10-PCS | Performed by: SPECIALIST

## 2018-05-12 RX ADMIN — ACETAMINOPHEN AND CODEINE PHOSPHATE PRN TAB: 300; 30 TABLET ORAL at 17:33

## 2018-05-12 RX ADMIN — PREDNISONE SCH MG: 10 TABLET ORAL at 21:04

## 2018-05-12 RX ADMIN — ALBUTEROL SULFATE PRN AMP: 2.5 SOLUTION RESPIRATORY (INHALATION) at 13:14

## 2018-05-12 RX ADMIN — CLOPIDOGREL BISULFATE SCH MG: 75 TABLET, FILM COATED ORAL at 21:04

## 2018-05-12 RX ADMIN — ALBUTEROL SULFATE PRN AMP: 2.5 SOLUTION RESPIRATORY (INHALATION) at 23:00

## 2018-05-12 RX ADMIN — FUROSEMIDE SCH MG: 40 TABLET ORAL at 21:04

## 2018-05-12 RX ADMIN — BUDESONIDE AND FORMOTEROL FUMARATE DIHYDRATE SCH PUFF: 80; 4.5 AEROSOL RESPIRATORY (INHALATION) at 21:05

## 2018-05-12 RX ADMIN — ACETAMINOPHEN AND CODEINE PHOSPHATE PRN TAB: 300; 30 TABLET ORAL at 11:57

## 2018-05-12 RX ADMIN — RAMIPRIL SCH MG: 5 CAPSULE ORAL at 21:04

## 2018-05-12 NOTE — HP
DATE OF ADMISSION:

 

DATE OF DICTATION:  05/12/2018

 

HISTORY:  This is a 62-year-old male known to have advanced COPD, coronary artery

disease, status post _____ who was recently admitted here with exacerbation of

bronchial asthma and pneumonia now came to the office with swelling of the right leg.

 Needle aspiration showed pus so admitted with a diagnosis of abscess right leg.

 

PHYSICAL EXAMINATION: 

Vital Signs:  His blood pressure is 120/80, pulse 100, respirations 20, temperature

98.

HEENT:  Unremarkable.

Neck:  Supple.  No JVD.

Lungs:  No wheezing.

Heart:  S1, S2 normal.  No S3 or S4.

Abdomen:  Soft. 

Extremities:  Right leg, there is tenderness and swelling mostly in the calf area.

Neurologic:  Grossly normal.

 

LAB REPORTS:  WBC 12.6, hemoglobin 11.1.  Chemistry and electrolytes are normal. 

Blood sugar 108.  Creatinine 0.9.

 

IMPRESSION:  

1.  Abscess right calf.

2.  Chronic obstructive pulmonary disease.

 

PLAN:  IV antibiotics.  Surgical consult Dr. Mi.

 

 

 

ARNOL SAPP M.D.

 

[id number]

DD: 05/12/2018 08:56

DT: 05/12/2018 09:23

Job #:  [id number]

## 2018-05-12 NOTE — PDOC
History of Present Illness





- General


History Source: Patient


Exam Limitations: No Limitations





- History of Present Illness


Initial Comments: 





05/12/18 01:54


Patient is a 62 year old male with a significant past medical history of HTN, 

CAD, Hyperlipidemia, COPD Emphysema GOLD stage 4 on Home O2 CAD chronic back 

pain, who was sent to the ED by his PCP for surgical consult. Patient reports 

experiencing right calf edema that began earlier this week.  He reports going 

to his PCP for evaluation, who randell puss from his right calf using a needle.  

Patient reports PCP advised going to the ED to be admitted and be evaluation by 

surgery to receive an Incision and drainage for  his right leg. 





Denies chest pain, Sob. Denies fevers, chills. Denies fevers, chills. Denies 

contact with sick individuals, out of state travelling. Denies any other 

symptoms. 





Allergies: Seafood. 


Social History: Former smoker. No alcohol. No illicit drugs. 


Surgical History: CABG, Stent


PMD: Dr. Whitaker, Dr. Mi








<Fly Arrington - Last Filed: 05/12/18 01:54>





<Trish Ramos - Last Filed: 05/12/18 06:43>





- General


Chief Complaint: Wound


Stated Complaint: ABCESS ON LEG


Time Seen by Provider: 05/11/18 20:45





Past History





<Fly Arrington - Last Filed: 05/12/18 01:54>





- Past Medical History


Anemia: No


Asthma: No


Cancer: No


Cardiac Disorders: Yes (MI, stent, BYPASS)


CVA: No


COPD: Yes (2L)


CHF: Yes


Dementia: No


Diabetes: No


GI Disorders: No


 Disorders: No


HTN: Yes


Hypercholesterolemia: Yes


Liver Disease: No


Psychiatric Problems: Yes (anxiety)


Seizures: No


Thyroid Disease: No





- Surgical History


Abdominal Surgery: No


Appendectomy: No


Cardiac Surgery: Yes (Stent, cardiaccath)


Cholecystectomy: No


Lung Surgery: No


Neurologic Surgery: No


Orthopedic Surgery: Yes (KNEE)





- Immunization History


Immunization Up to Date: Yes





- Suicide/Smoking/Psychosocial Hx


Smoking Status: No


Smoking History: Never smoked


Have you smoked in the past 12 months: No


Number of Cigarettes Smoked Daily: 0


If you are a former smoker, when did you quit?: 3YRS


Information on smoking cessation initiated: No


Hx Alcohol Use: No


Drug/Substance Use Hx: No


Substance Use Type: None


Hx Substance Use Treatment: No





<RamosTrish - Last Filed: 05/12/18 06:43>





- Past Medical History


Allergies/Adverse Reactions: 


 Allergies











Allergy/AdvReac Type Severity Reaction Status Date / Time


 


Penicillins Allergy Severe Rash Verified 05/11/18 20:51


 


seafood Allergy Severe Rash Uncoded 05/11/18 20:51











Home Medications: 


Ambulatory Orders





Albuterol 2.5/Ipratropium 0.5 [Duoneb -] 1 amp NEB QIDR  amp 11/30/16 


Budesonide/Formeterol Fumarate [SYMBICORT 80/4.5mcg -] 1 puff IH BID  inhaler 11 /30/16 


Clopidogrel Bisulfate [Plavix -] 75 mg PO DAILY  tablet 11/30/16 


Furosemide [Lasix -] 40 mg PO DAILY  tablet 11/30/16 


predniSONE [Deltasone -] 10 mg PO DAILY 02/28/17 


Ramipril 5 mg PO DAILY 07/02/17 


Simvastatin 40 mg PO DAILY 07/02/17 


Acetaminophen W/ Codeine #3 [Tylenol # 3 -] 1 tab PO BID 04/25/18 











**Review of Systems





- Review of Systems


Able to Perform ROS?: Yes


Comments:: 





05/12/18 01:54


GENERAL/CONSTITUTIONAL: No fever or chills. No weakness.


HEAD, EYES, EARS, NOSE AND THROAT: No change in vision. No ear pain or 

discharge. No sore throat.


CARDIOVASCULAR: No chest pain or shortness of breath.


RESPIRATORY: No cough, wheezing, or hemoptysis.


GASTROINTESTINAL: No nausea, vomiting, diarrhea or constipation.


GENITOURINARY: No dysuria, frequency, or change in urination.


MUSCULOSKELETAL: +Right calf pain. 


 No neck or back pain.


SKIN: No rash


NEUROLOGIC: No headache, vertigo, loss of consciousness, or change in strength/

sensation.


ENDOCRINE: No increased thirst. No abnormal weight change.


HEMATOLOGIC/LYMPHATIC: No anemia, easy bleeding, or history of blood clots.


ALLERGIC/IMMUNOLOGIC: No hives or skin allergy.











<Fly Arrington - Last Filed: 05/12/18 01:54>





*Physical Exam





- Vital Signs


 Last Vital Signs











Temp Pulse Resp BP Pulse Ox


 


 97.8 F   122 H  16   101/53   96 


 


 05/11/18 20:44  05/11/18 20:44  05/11/18 20:44  05/11/18 20:44  05/11/18 20:44














- Physical Exam


Comments: 





05/12/18 01:55


GENERAL: Awake, alert, and fully oriented, in no acute distress


HEAD: No signs of trauma


EYES: PERRLA, EOMI, sclera anicteric, conjunctiva clear


ENT: Auricles normal inspection, hearing grossly normal, nares patent, 

oropharynx clear without exudates. Moist mucosa


NECK: Normal ROM, supple, no lymphadenopathy, JVD, or masses


LUNGS: Breath sounds equal, clear to auscultation bilaterally.  No wheezes, and 

no crackles


HEART: Regular rate and rhythm, normal S1 and S2, no murmurs, rubs or gallops


ABDOMEN: Soft, nontender, normoactive bowel sounds.  No guarding, no rebound.  

No masses


EXTREMITIES: +Right calf edema. +Right calf tenderness. No redness. +No hematoma


Normal range of motion, no edema.  No clubbing or cyanosis. No cords, erythema, 

or tenderness


NEUROLOGICAL: Cranial nerves II through XII grossly intact.  Normal speech, 

normal gait


SKIN: Warm, Dry, normal turgor, no rashes or lesions noted.








<Fly Arrington - Last Filed: 05/12/18 01:54>





- Vital Signs


 Last Vital Signs











Temp Pulse Resp BP Pulse Ox


 


 97.8 F   122 H  16   101/53   96 


 


 05/11/18 20:44  05/11/18 20:44  05/11/18 20:44  05/11/18 20:44  05/11/18 20:44














<Trish Ramos - Last Filed: 05/12/18 06:43>





ED Treatment Course





- LABORATORY


CBC & Chemistry Diagram: 


 05/11/18 21:13





 05/11/18 21:13





- ADDITIONAL ORDERS


Additional order review: 


 Laboratory  Results











  05/11/18 05/11/18





  21:13 21:13


 


PT with INR   12.40


 


INR   1.10


 


Sodium  137 


 


Potassium  4.2 


 


Chloride  101 


 


Carbon Dioxide  29 


 


Anion Gap  7 L 


 


BUN  17 


 


Creatinine  0.9 


 


Creat Clearance w eGFR  > 60 


 


Random Glucose  108 H 


 


Calcium  8.4 L 


 


Total Bilirubin  0.5  D 


 


AST  33  D 


 


ALT  30  D 


 


Alkaline Phosphatase  216 H D 


 


Total Protein  6.7 


 


Albumin  2.9 L 








 











  05/11/18





  21:13


 


RBC  4.12


 


MCV  81.1


 


MCHC  33.3


 


RDW  15.3


 


MPV  7.1 L


 


Neutrophils %  83.6 H


 


Lymphocytes %  8.9  D


 


Monocytes %  6.7


 


Eosinophils %  0.5


 


Basophils %  0.3














<Fly Arrington - Last Filed: 05/12/18 01:54>





- LABORATORY


CBC & Chemistry Diagram: 


 05/11/18 21:13





 05/11/18 21:13





- ADDITIONAL ORDERS


Additional order review: 


 Laboratory  Results











  05/11/18 05/11/18





  21:13 21:13


 


PT with INR   12.40


 


INR   1.10


 


Sodium  137 


 


Potassium  4.2 


 


Chloride  101 


 


Carbon Dioxide  29 


 


Anion Gap  7 L 


 


BUN  17 


 


Creatinine  0.9 


 


Creat Clearance w eGFR  > 60 


 


Random Glucose  108 H 


 


Calcium  8.4 L 


 


Total Bilirubin  0.5  D 


 


AST  33  D 


 


ALT  30  D 


 


Alkaline Phosphatase  216 H D 


 


Total Protein  6.7 


 


Albumin  2.9 L 








 











  05/11/18





  21:13


 


RBC  4.12


 


MCV  81.1


 


MCHC  33.3


 


RDW  15.3


 


MPV  7.1 L


 


Neutrophils %  83.6 H


 


Lymphocytes %  8.9  D


 


Monocytes %  6.7


 


Eosinophils %  0.5


 


Basophils %  0.3














<Trish Ramos - Last Filed: 05/12/18 06:43>





Medical Decision Making





- Medical Decision Making





05/12/18 06:42


Pt comes with abscess in his calf.  Sent by PMD to get it drained by surgery 

service. Consult placed for surg and pt started on clindamycin as he is PCN 

allergic.





<Trish Ramos - Last Filed: 05/12/18 06:43>





*DC/Admit/Observation/Transfer





- Attestations


Scribe Attestion: 





05/12/18 01:55





Documentation prepared by Fly Arrington, acting as medical scribe for Trish Ramos MD/DO.





<Fly Arrington - Last Filed: 05/12/18 01:54>





- Discharge Dispostion


Decision to Admit order: Yes





<Trish Ramos - Last Filed: 05/12/18 06:43>


Diagnosis at time of Disposition: 


 Abscess of calf








- Discharge Dispostion


Condition at time of disposition: Guarded

## 2018-05-12 NOTE — PROC
Procedure Note


Procedure: 


 Date : 5/11/2018


 Place : Bed side.


 Planned : Aspiration of ? abscess right leg .


 Anesdthesia: Local 1%b lidocaine


  Surgeon : Dr. Shmuel Marquez


 Date 5/11/2018


 Procedure:


 After explaining to the patient, consent was obtained for incision and 

drainage of ? abscess right leg.


 Time out was called.


 The right calf was cleansed with betadine. 1% lidocaine 10 ml was injected 

around the swelling in the right calf,with a no. 15 gauge needle.


   With a no.18 gauge needle, inserted through the anesthetised skin, was 

introduced into the swelling in the calf , in different direction.


 No pus was obtained. The aspirated fluid  ( minimal , clear) was sent for 

culture and antibiotic sensitivity.


 Patient tolerated well. Tape applied.


 Impression : No acute purulent infection .


 Will request MRI as suggested by radiologist.

## 2018-05-12 NOTE — CONSULT
Consult


Consult Specialty:: Surgery


Referred by:: Sherman





- History of Present Illness


Chief Complaint: c/O swelling in right leg for one week,.  H/O CAD, chronic 

emphsema, hyperlipidemia, hypertension , oxygen dependent.





- History Source


History Provided By: Patient


Limitations to Obtaining History: No Limitations





- Past Medical History


Cardio/Vascular: Yes: CAD, HTN, Hyperlipdemia


Pulmonary: Yes: COPD


Musculoskeletal: Yes: Chronic low back pain


Rheumatology: Yes: Rheumatoid Arthritis





- Past Surgical History


Past Surgical History: Yes: CABG, Stent





- Alcohol/Substance Use


Hx Alcohol Use: No


History of Substance Use: reports: None





- Smoking History


Smoking history: Never smoked


Have you smoked in the past 12 months: No


Aproximately how many cigarettes per day: 0


If you are a former smoker, when did you quit?: 3YRS





Home Medications





- Allergies


Allergies/Adverse Reactions: 


 Allergies











Allergy/AdvReac Type Severity Reaction Status Date / Time


 


Penicillins Allergy Severe Rash Verified 05/11/18 20:51


 


seafood Allergy Severe Rash Uncoded 05/11/18 20:51














- Home Medications


Home Medications: 


Ambulatory Orders





Albuterol 2.5/Ipratropium 0.5 [Duoneb -] 1 amp NEB QIDR  amp 11/30/16 


Budesonide/Formeterol Fumarate [SYMBICORT 80/4.5mcg -] 1 puff IH BID  inhaler 11 /30/16 


Clopidogrel Bisulfate [Plavix -] 75 mg PO DAILY  tablet 11/30/16 


Furosemide [Lasix -] 40 mg PO DAILY  tablet 11/30/16 


predniSONE [Deltasone -] 10 mg PO DAILY 02/28/17 


Ramipril 5 mg PO DAILY 07/02/17 


Simvastatin 40 mg PO DAILY 07/02/17 


Acetaminophen W/ Codeine #3 [Tylenol # 3 -] 1 tab PO BID 04/25/18 











Family Disease History





- Family Disease History


Family Disease History: Heart Disease: Grandparent, Other: Brother (Lupus)





Review of Systems





- Review of Systems


Musculoskeletal: reports: Other (Pain in right calf)





Physical Exam


Vital Signs: 


 Vital Signs











Temperature  98 F   05/12/18 09:44


 


Pulse Rate  93 H  05/12/18 09:44


 


Respiratory Rate  20   05/12/18 09:44


 


Blood Pressure  107/64   05/12/18 09:44


 


O2 Sat by Pulse Oximetry (%)  100   05/12/18 03:40











Extremities: Yes: Other (There is a soft swelling over his right calf,   No 

redness , no induration.  ? abscess)


Labs: 


 CBC, BMP





 05/11/18 21:13 





 05/11/18 21:13 











Imaging





- Results


Ultrasound: Report Reviewed, Image Reviewed (Heterogeneous mass in right calf.)





Problem List





- Problems


(1) Pain and swelling of right lower leg


Code(s): M79.661 - PAIN IN RIGHT LOWER LEG; M79.89 - OTHER SPECIFIED SOFT 

TISSUE DISORDERS   





(2) COPD (chronic obstructive pulmonary disease)


Code(s): J44.9 - CHRONIC OBSTRUCTIVE PULMONARY DISEASE, UNSPECIFIED   


Qualifiers: 


   COPD type: emphysema   Emphysema type: unspecified   Qualified Code(s): 

J43.9 - Emphysema, unspecified   





(3) CAD (coronary artery disease)


Code(s): I25.10 - ATHSCL HEART DISEASE OF NATIVE CORONARY ARTERY W/O ANG PCTRS 

  


Qualifiers: 


   Coronary Disease-Associated Artery/Lesion type: native artery   Native vs. 

transplanted heart: native heart   Associated angina: without angina   

Qualified Code(s): I25.10 - Atherosclerotic heart disease of native coronary 

artery without angina pectoris   





(4) HTN (hypertension)


Code(s): I10 - ESSENTIAL (PRIMARY) HYPERTENSION   


Qualifiers: 


   Hypertension type: essential hypertension   Qualified Code(s): I10 - 

Essential (primary) hypertension   





Assessment/Plan





Mass in right calf


 Heterogeneous mass as per ultrasound report , MRI is suggested.


 Will request MRI.


 Procedure:


 After explaining to the patient, consent was obtained for incision and 

drainage of ? abscess right leg.


 Time out was called.


 The right calf was cleansed with betadine. 1% lidocaine 10 ml was injected 

around the swelling in the right calf,with a no. 15 gauge needle.


   With a no.18 gauge needle, inserted through the anesthetised skin, was 

introduced into the swelling in the calf , in different direction.


 No pus was obtained. The aspirated fluid  ( minimal , clear) was sent for 

culture and antibiotic sensitivity.


 Patient tolerated well. Tape applied.


 Impression : Mo acute purulent infection .


 Will request MRI as suggested by radiologist.

## 2018-05-13 RX ADMIN — ACETAMINOPHEN AND CODEINE PHOSPHATE PRN TAB: 300; 30 TABLET ORAL at 17:32

## 2018-05-13 RX ADMIN — RAMIPRIL SCH MG: 5 CAPSULE ORAL at 09:35

## 2018-05-13 RX ADMIN — BUDESONIDE AND FORMOTEROL FUMARATE DIHYDRATE SCH PUFF: 80; 4.5 AEROSOL RESPIRATORY (INHALATION) at 09:35

## 2018-05-13 RX ADMIN — PREDNISONE SCH MG: 10 TABLET ORAL at 09:35

## 2018-05-13 RX ADMIN — ACETAMINOPHEN AND CODEINE PHOSPHATE PRN TAB: 300; 30 TABLET ORAL at 09:35

## 2018-05-13 RX ADMIN — ALBUTEROL SULFATE PRN AMP: 2.5 SOLUTION RESPIRATORY (INHALATION) at 08:31

## 2018-05-13 RX ADMIN — FUROSEMIDE SCH MG: 40 TABLET ORAL at 09:35

## 2018-05-13 RX ADMIN — BUDESONIDE AND FORMOTEROL FUMARATE DIHYDRATE SCH PUFF: 80; 4.5 AEROSOL RESPIRATORY (INHALATION) at 21:12

## 2018-05-13 RX ADMIN — CLOPIDOGREL BISULFATE SCH MG: 75 TABLET, FILM COATED ORAL at 09:35

## 2018-05-13 NOTE — PN
Progress Note, Physician





- Current Medication List


Current Medications: 


Active Medications





Acetaminophen/Codeine Phosphate (Tylenol # 3 -)  1 tab PO Q6H PRN


   PRN Reason: PAIN LEVEL 6-10


   Last Admin: 05/13/18 09:35 Dose:  1 tab


Albuterol Sulfate (Ventolin 0.083% Nebulizer Soln -)  1 amp NEB Q6H PRN


   PRN Reason: SHORT OF BREATH/WHEEZING


   Last Admin: 05/13/18 08:31 Dose:  1 amp


Budesonide/Formoterol Fumarate (Symbicort 80/4.5mcg -)  1 puff IH BID [facility]


   Last Admin: 05/13/18 09:35 Dose:  1 puff


Clopidogrel Bisulfate (Plavix -)  75 mg PO DAILY [facility]


   Last Admin: 05/13/18 09:35 Dose:  75 mg


Furosemide (Lasix -)  40 mg PO DAILY [facility]


   Last Admin: 05/13/18 09:35 Dose:  40 mg


Prednisone (Deltasone -)  10 mg PO DAILY [facility]


   Last Admin: 05/13/18 09:35 Dose:  10 mg


Ramipril (Altace -)  5 mg PO DAILY [facility]


   Last Admin: 05/13/18 09:35 Dose:  5 mg











- Objective


Vital Signs: 


 Vital Signs











Temperature  98 F   05/13/18 09:31


 


Pulse Rate  112 H  05/13/18 09:31


 


Respiratory Rate  24   05/13/18 09:31


 


Blood Pressure  121/70   05/13/18 09:31


 


O2 Sat by Pulse Oximetry (%)  100   05/12/18 21:00











Labs: 


 CBC, BMP





 05/11/18 21:13 





 05/11/18 21:13 





 INR, PTT











INR  1.10  (0.82-1.09)   05/11/18  21:13    














Problem List





- Problems


(1) Pain and swelling of right lower leg


Code(s): M79.661 - PAIN IN RIGHT LOWER LEG; M79.89 - OTHER SPECIFIED SOFT 

TISSUE DISORDERS   





(2) COPD (chronic obstructive pulmonary disease)


Code(s): J44.9 - CHRONIC OBSTRUCTIVE PULMONARY DISEASE, UNSPECIFIED   


Qualifiers: 


   COPD type: emphysema   Emphysema type: unspecified   Qualified Code(s): 

J43.9 - Emphysema, unspecified   





(3) CAD (coronary artery disease)


Code(s): I25.10 - ATHSCL HEART DISEASE OF NATIVE CORONARY ARTERY W/O ANG PCTRS 

  


Qualifiers: 


   Coronary Disease-Associated Artery/Lesion type: native artery   Native vs. 

transplanted heart: native heart   Associated angina: without angina   

Qualified Code(s): I25.10 - Atherosclerotic heart disease of native coronary 

artery without angina pectoris   





(4) HTN (hypertension)


Code(s): I10 - ESSENTIAL (PRIMARY) HYPERTENSION   


Qualifiers: 


   Hypertension type: essential hypertension   Qualified Code(s): I10 - 

Essential (primary) hypertension   





Assessment/Plan





  Surgery: MRI is pending. 


      He has no calf tenderness, no induration of calf.


       Continue antibiotics. Will follow MRI.

## 2018-05-13 NOTE — PN
Progress Note, Physician


Chief Complaint: 





Leg pain better


History of Present Illness: 





Case discussed with Dr Mi MRI ordered


Will discuss further after MRI





- Current Medication List


Current Medications: 


Active Medications





Acetaminophen/Codeine Phosphate (Tylenol # 3 -)  1 tab PO Q6H PRN


   PRN Reason: PAIN LEVEL 6-10


   Last Admin: 05/13/18 09:35 Dose:  1 tab


Albuterol Sulfate (Ventolin 0.083% Nebulizer Soln -)  1 amp NEB Q6H PRN


   PRN Reason: SHORT OF BREATH/WHEEZING


   Last Admin: 05/13/18 08:31 Dose:  1 amp


Budesonide/Formoterol Fumarate (Symbicort 80/4.5mcg -)  1 puff IH BID [facility]


   Last Admin: 05/13/18 09:35 Dose:  1 puff


Clopidogrel Bisulfate (Plavix -)  75 mg PO DAILY [facility]


   Last Admin: 05/13/18 09:35 Dose:  75 mg


Furosemide (Lasix -)  40 mg PO DAILY [facility]


   Last Admin: 05/13/18 09:35 Dose:  40 mg


Prednisone (Deltasone -)  10 mg PO DAILY [facility]


   Last Admin: 05/13/18 09:35 Dose:  10 mg


Ramipril (Altace -)  5 mg PO DAILY [facility]


   Last Admin: 05/13/18 09:35 Dose:  5 mg











- Objective


Vital Signs: 


 Vital Signs











Temperature  98.3 F   05/13/18 14:43


 


Pulse Rate  109 H  05/13/18 14:43


 


Respiratory Rate  24   05/13/18 14:43


 


Blood Pressure  102/67   05/13/18 14:43


 


O2 Sat by Pulse Oximetry (%)  98   05/13/18 09:00











Constitutional: Yes: No Distress


Eyes: Yes: WNL


HENT: Yes: WNL


Neck: Yes: WNL


Cardiovascular: Yes: Regular Rate and Rhythm


Respiratory: Yes: On Nasal O2, Poor Air Entry


Gastrointestinal: Yes: WNL


...Rectal Exam: Yes: Deferred


Edema: No


Edema: LLE: 1+


Neurological: Yes: Alert


Labs: 


 CBC, BMP





 05/11/18 21:13 





 05/11/18 21:13 





 INR, PTT











INR  1.10  (0.82-1.09)   05/11/18  21:13    














Assessment/Plan





Continue same trt

## 2018-05-14 RX ADMIN — ACETAMINOPHEN AND CODEINE PHOSPHATE PRN TAB: 300; 30 TABLET ORAL at 09:06

## 2018-05-14 RX ADMIN — ALBUTEROL SULFATE PRN AMP: 2.5 SOLUTION RESPIRATORY (INHALATION) at 18:34

## 2018-05-14 RX ADMIN — PREDNISONE SCH MG: 10 TABLET ORAL at 09:06

## 2018-05-14 RX ADMIN — BUDESONIDE AND FORMOTEROL FUMARATE DIHYDRATE SCH PUFF: 80; 4.5 AEROSOL RESPIRATORY (INHALATION) at 22:48

## 2018-05-14 RX ADMIN — RAMIPRIL SCH MG: 5 CAPSULE ORAL at 09:07

## 2018-05-14 RX ADMIN — ALBUTEROL SULFATE PRN AMP: 2.5 SOLUTION RESPIRATORY (INHALATION) at 08:40

## 2018-05-14 RX ADMIN — BUDESONIDE AND FORMOTEROL FUMARATE DIHYDRATE SCH PUFF: 80; 4.5 AEROSOL RESPIRATORY (INHALATION) at 09:06

## 2018-05-14 RX ADMIN — ACETAMINOPHEN AND CODEINE PHOSPHATE PRN TAB: 300; 30 TABLET ORAL at 18:10

## 2018-05-14 RX ADMIN — FUROSEMIDE SCH MG: 40 TABLET ORAL at 09:07

## 2018-05-14 NOTE — CON.CARD
Consult


Consult Specialty:: Cardiology 





- History of Present Illness


History of Present Illness: 





Patient is a 62 year old male with a significant past medical history of HTN, 

CAD, Hyperlipidemia, COPD Emphysema GOLD stage 4 on Home O2 CAD chronic back 

pain, who was sent to the ED by his PCP for surgical consult. Patient reports 

experiencing right calf edema that began earlier this week.  He reports going 

to his PCP for evaluation, who randell puss from his right calf using a needle.  

Patient reports PCP advised going to the ED to be admitted and be evaluation by 

surgery to receive an Incision and drainage for  his right leg. 





Denies chest pain, Sob. Denies fevers, chills. Denies fevers, chills. Denies 

contact with sick individuals, out of state travelling. Denies any other 

symptoms. 





Allergies: Seafood. 


Social History: Former smoker. No alcohol. No illicit drugs. 


Surgical History: CABG, Stent


PMD: Dr. Whitaker, Dr. Mi








[facility]





Abnormal stress test showing inferior wall ischemia   October 13, 2014   [facility]      


CAD- Stent oRCA   2008   [facility]      


COPD            


 oRCA prior stent site, o/w nonobstructive ds   Oct. 2014   Dr. France 

at [facility]      


Rheumatoid Arthritis            


unsucesful  attempt   Dec. 3 2014   [facility]





- History Source


History Provided By: Patient, Medical Record





- Past Medical History


Cardio/Vascular: Yes: CAD, HTN, Hyperlipdemia


Pulmonary: Yes: COPD


Musculoskeletal: Yes: Chronic low back pain


Rheumatology: Yes: Rheumatoid Arthritis





- Past Surgical History


Past Surgical History: Yes: CABG, Stent





- Alcohol/Substance Use


Hx Alcohol Use: No


History of Substance Use: reports: None





- Smoking History


Smoking history: Never smoked


Have you smoked in the past 12 months: No


Aproximately how many cigarettes per day: 0


If you are a former smoker, when did you quit?: 3YRS





Home Medications





- Allergies


Allergies/Adverse Reactions: 


 Allergies











Allergy/AdvReac Type Severity Reaction Status Date / Time


 


Penicillins Allergy Severe Rash Verified 05/11/18 20:51


 


seafood Allergy Severe Rash Uncoded 05/11/18 20:51














- Home Medications


Home Medications: 


Ambulatory Orders





Albuterol 2.5/Ipratropium 0.5 [Duoneb -] 1 amp NEB QIDR  amp 11/30/16 


Budesonide/Formeterol Fumarate [SYMBICORT 80/4.5mcg -] 1 puff IH BID  inhaler 11 /30/16 


Clopidogrel Bisulfate [Plavix -] 75 mg PO DAILY  tablet 11/30/16 


Furosemide [Lasix -] 40 mg PO DAILY  tablet 11/30/16 


predniSONE [Deltasone -] 10 mg PO DAILY 02/28/17 


Ramipril 5 mg PO DAILY 07/02/17 


Simvastatin 40 mg PO DAILY 07/02/17 


Acetaminophen W/ Codeine #3 [Tylenol # 3 -] 1 tab PO BID 04/25/18 











Family Disease History





- Family Disease History


Family Disease History: Heart Disease: Grandparent, Other: Brother (Lupus)





Review of Systems





- Review of Systems


Constitutional: reports: No Symptoms


Eyes: reports: No Symptoms


HENT: reports: No Symptoms


Neck: reports: No Symptoms


Cardiovascular: reports: Edema


Respiratory: reports: SOB, SOB on Exertion


Gastrointestinal: reports: No Symptoms


Genitourinary: reports: No Symptoms


Breasts: reports: No Symptoms Reported


Musculoskeletal: reports: No Symptoms


Integumentary: reports: No Symptoms


Neurological: reports: No Symptoms


Endocrine: reports: No Symptoms


Hematology/Lymphatic: reports: No Symptoms


Psychiatric: reports: No Symptoms


Vital Signs: 


 Vital Signs











Temperature  98.3 F   05/14/18 10:00


 


Pulse Rate  102 H  05/14/18 10:00


 


Respiratory Rate  20   05/14/18 10:00


 


Blood Pressure  137/75   05/14/18 10:00


 


O2 Sat by Pulse Oximetry (%)  98   05/14/18 09:00











Constitutional: Yes: Well Nourished, No Distress, Calm


Eyes: Yes: WNL, Conjunctiva Clear, EOM Intact


HENT: Yes: WNL, Atraumatic, Normocephalic


Neck: Yes: WNL, Supple, Trachea Midline


Respiratory: Yes: WNL, Regular, CTA Bilaterally


Gastrointestinal: Yes: WNL, Normal Bowel Sounds


Renal/: Yes: WNL


Cardiovascular: Yes: WNL, Regular Rate and Rhythm


Heart Sounds: Yes: S1, S2


Musculoskeletal: Yes: WNL


Extremities: Yes: WNL


Edema: LLE: 1+, RLE: 2+


Integumentary: Yes: WNL


Neurological: Yes: WNL, Alert, Oriented


...Motor Strength: WNL


Psychiatric: Yes: WNL, Alert, Oriented





- Other Data


Labs, Other Data: 


 CBC, BMP





 05/11/18 21:13 





 05/11/18 21:13 





 INR, PTT











INR  1.10  (0.82-1.09)   05/11/18  21:13    








 Laboratory Tests











  05/11/18 05/11/18 05/11/18





  21:13 21:13 21:13


 


WBC  12.6 H  


 


RBC  4.12  


 


Hgb  11.1 L  


 


Hct  33.4 L  


 


MCV  81.1  


 


MCH  27.0  


 


MCHC  33.3  


 


RDW  15.3  


 


Plt Count  391  


 


MPV  7.1 L  


 


Neutrophils %  83.6 H  


 


Lymphocytes %  8.9  D  


 


Monocytes %  6.7  


 


Eosinophils %  0.5  


 


Basophils %  0.3  


 


PT with INR   12.40 


 


INR   1.10 


 


Sodium    137


 


Potassium    4.2


 


Chloride    101


 


Carbon Dioxide    29


 


Anion Gap    7 L


 


BUN    17


 


Creatinine    0.9


 


Creat Clearance w eGFR    > 60


 


Random Glucose    108 H


 


Calcium    8.4 L


 


Total Bilirubin    0.5  D


 


AST    33  D


 


ALT    30  D


 


Alkaline Phosphatase    216 H D


 


Total Protein    6.7


 


Albumin    2.9 L














Imaging





- Results


Chest X-ray: Image Reviewed (hyperinflated lungs no effusion)


EKG: Pending





Problem List





- Problems


(1) Abscess of calf


Code(s): L02.419 - CUTANEOUS ABSCESS OF LIMB, UNSPECIFIED   





(2) Pain and swelling of right lower leg


Code(s): M79.661 - PAIN IN RIGHT LOWER LEG; M79.89 - OTHER SPECIFIED SOFT 

TISSUE DISORDERS   





(3) Abnormal LFTs


Code(s): R79.89 - OTHER SPECIFIED ABNORMAL FINDINGS OF BLOOD CHEMISTRY   





(4) Acute on chronic respiratory failure with hypoxemia


Code(s): J96.21 - ACUTE AND CHRONIC RESPIRATORY FAILURE WITH HYPOXIA   





(5) Acute on chronic respiratory failure with hypoxia and hypercapnia


Code(s): J96.21 - ACUTE AND CHRONIC RESPIRATORY FAILURE WITH HYPOXIA; J96.22 - 

ACUTE AND CHRONIC RESPIRATORY FAILURE WITH HYPERCAPNIA   





(6) Anxiety


Code(s): F41.9 - ANXIETY DISORDER, UNSPECIFIED   





(7) CAD (coronary artery disease)


Code(s): I25.10 - ATHSCL HEART DISEASE OF NATIVE CORONARY ARTERY W/O ANG PCTRS 

  


Qualifiers: 


   Coronary Disease-Associated Artery/Lesion type: native artery   Native vs. 

transplanted heart: native heart   Associated angina: without angina   

Qualified Code(s): I25.10 - Atherosclerotic heart disease of native coronary 

artery without angina pectoris   





(8) COPD (chronic obstructive pulmonary disease)


Code(s): J44.9 - CHRONIC OBSTRUCTIVE PULMONARY DISEASE, UNSPECIFIED   


Qualifiers: 


   COPD type: emphysema   Emphysema type: unspecified   Qualified Code(s): 

J43.9 - Emphysema, unspecified   





(9) COPD exacerbation


Code(s): J44.1 - CHRONIC OBSTRUCTIVE PULMONARY DISEASE W (ACUTE) EXACERBATION   





(10) Chronic pain


Code(s): G89.29 - OTHER CHRONIC PAIN   





(11) Compression fracture of L1 lumbar vertebra


Code(s): S32.010A - WEDGE COMPRESSION FRACTURE OF FIRST LUMBAR VERTEBRA, INIT   


Qualifiers: 


   Encounter type: initial encounter   Fracture type: closed   Qualified Code(s)

: S32.010A - Wedge compression fracture of first lumbar vertebra, initial 

encounter for closed fracture   





(12) Congestive cardiac failure


Code(s): I50.9 - HEART FAILURE, UNSPECIFIED   





(13) Cough


Code(s): R05 - COUGH   





(14) Diastolic CHF


Code(s): I50.30 - UNSPECIFIED DIASTOLIC (CONGESTIVE) HEART FAILURE   


Qualifiers: 


   Heart failure chronicity: unspecified   Qualified Code(s): I50.30 - 

Unspecified diastolic (congestive) heart failure   





(15) Edema


Code(s): R60.9 - EDEMA, UNSPECIFIED   


Qualifiers: 


   Edema type: unspecified   Qualified Code(s): R60.9 - Edema, unspecified   





(16) HTN (hypertension)


Code(s): I10 - ESSENTIAL (PRIMARY) HYPERTENSION   


Qualifiers: 


   Hypertension type: essential hypertension   Qualified Code(s): I10 - 

Essential (primary) hypertension   





(17) History of coronary artery bypass graft


Code(s): Z95.1 - PRESENCE OF AORTOCORONARY BYPASS GRAFT   





(18) History of percutaneous coronary intervention


Code(s): Z98.89 - OTHER SPECIFIED POSTPROCEDURAL STATES * DO NOT USE *   





(19) Hypercholesterolemia


Code(s): E78.0 - PURE HYPERCHOLESTEROLEMIA * DO NOT USE *   





(20) Hyperkalemia


Code(s): E87.5 - HYPERKALEMIA   





(21) Inguinal hernia


Code(s): K40.90 - UNIL INGUINAL HERNIA, W/O OBST OR GANGR, NOT SPCF AS RECUR   





(22) Intractable pain


Code(s): R52 - PAIN, UNSPECIFIED   





(23) LLQ pain


Code(s): R10.32 - LEFT LOWER QUADRANT PAIN   





(24) Left groin pain


Code(s): R10.32 - LEFT LOWER QUADRANT PAIN   





(25) Lower extremity edema


Code(s): R60.0 - LOCALIZED EDEMA   





(26) Lumbar disc herniation


Code(s): M51.26 - OTHER INTERVERTEBRAL DISC DISPLACEMENT, LUMBAR REGION   





(27) Nosebleed


Code(s): R04.0 - EPISTAXIS   





(28) PSVT (paroxysmal supraventricular tachycardia)


Code(s): I47.1 - SUPRAVENTRICULAR TACHYCARDIA   





(29) Pneumonia


Code(s): J18.9 - PNEUMONIA, UNSPECIFIED ORGANISM   





(30) Radiculopathy


Code(s): M54.10 - RADICULOPATHY, SITE UNSPECIFIED   


Qualifiers: 


   Spinal region: lumbar   Qualified Code(s): M54.16 - Radiculopathy, lumbar 

region   





(31) Rheumatoid arthritis


Code(s): M06.9 - RHEUMATOID ARTHRITIS, UNSPECIFIED   





(32) Rheumatoid arthritis involving ankle with positive rheumatoid factor


Code(s): M05.779 - RHEU ARTHRIT W RHEU FACTOR OF UNSP ANK/FT W/O ORG/SYS INVOLV

   


Qualifiers: 


   Laterality: bilateral   Qualified Code(s): M05.771 - Rheumatoid arthritis 

with rheumatoid factor of right ankle and foot without organ or systems 

involvement; M05.772 - Rheumatoid arthritis with rheumatoid factor of left 

ankle and foot without organ or systems involvement; M05.772 - Rheumatoid 

arthritis with rheumatoid factor of left ankle and foot without organ or 

systems involvement; M05.772 - Rheumatoid arthritis with rheumatoid factor of 

left ankle and foot without organ or systems involvement; M05.772 - Rheumatoid 

arthritis with rheumatoid factor of left ankle and foot without organ or 

systems involvement   





(33) Rheumatoid arthritis of multiple sites without organ or system involvement 

with positive rheumatoid factor


Code(s): M05.79 - RHEU ARTHRITIS W RHEU FACTOR MULT SITE W/O ORG/SYS INVOLV   





(34) Sinus tachycardia


Code(s): R00.0 - TACHYCARDIA, UNSPECIFIED   





Assessment/Plan





ashd


cabg


pci


chf


severe copd


o2 dependent


edema


l ext abscess/cellulitis








Plan





check ekg


BNP


not sure patient in CHF will checks BNP

## 2018-05-14 NOTE — PN
Progress Note, Physician





- Current Medication List


Current Medications: 


Active Medications





Acetaminophen/Codeine Phosphate (Tylenol # 3 -)  1 tab PO Q6H PRN


   PRN Reason: PAIN LEVEL 6-10


   Last Admin: 05/14/18 18:10 Dose:  1 tab


Albuterol Sulfate (Ventolin 0.083% Nebulizer Soln -)  1 amp NEB Q6H PRN


   PRN Reason: SHORT OF BREATH/WHEEZING


   Last Admin: 05/14/18 18:34 Dose:  1 amp


Budesonide/Formoterol Fumarate (Symbicort 80/4.5mcg -)  1 puff IH BID [facility]


   Last Admin: 05/14/18 09:06 Dose:  1 puff


Furosemide (Lasix -)  40 mg PO DAILY [facility]


   Last Admin: 05/14/18 09:07 Dose:  40 mg


Prednisone (Deltasone -)  10 mg PO DAILY [facility]


   Last Admin: 05/14/18 09:06 Dose:  10 mg


Ramipril (Altace -)  5 mg PO DAILY [facility]


   Last Admin: 05/14/18 09:07 Dose:  5 mg











- Objective


Vital Signs: 


 Vital Signs











Temperature  99 F   05/14/18 16:20


 


Pulse Rate  109 H  05/14/18 16:20


 


Respiratory Rate  20   05/14/18 16:20


 


Blood Pressure  99/47   05/14/18 16:20


 


O2 Sat by Pulse Oximetry (%)  98   05/14/18 09:00











Labs: 


 CBC, BMP





 05/11/18 21:13 





 05/11/18 21:13 





 INR, PTT











INR  1.10  (0.82-1.09)   05/11/18  21:13    














Problem List





- Problems


(1) Pain and swelling of right lower leg


Code(s): M79.661 - PAIN IN RIGHT LOWER LEG; M79.89 - OTHER SPECIFIED SOFT 

TISSUE DISORDERS   





(2) COPD (chronic obstructive pulmonary disease)


Code(s): J44.9 - CHRONIC OBSTRUCTIVE PULMONARY DISEASE, UNSPECIFIED   


Qualifiers: 


   COPD type: emphysema   Emphysema type: unspecified   Qualified Code(s): 

J43.9 - Emphysema, unspecified   





(3) CAD (coronary artery disease)


Code(s): I25.10 - ATHSCL HEART DISEASE OF NATIVE CORONARY ARTERY W/O ANG PCTRS 

  


Qualifiers: 


   Coronary Disease-Associated Artery/Lesion type: native artery   Native vs. 

transplanted heart: native heart   Associated angina: without angina   

Qualified Code(s): I25.10 - Atherosclerotic heart disease of native coronary 

artery without angina pectoris   





(4) HTN (hypertension)


Code(s): I10 - ESSENTIAL (PRIMARY) HYPERTENSION   


Qualifiers: 


   Hypertension type: essential hypertension   Qualified Code(s): I10 - 

Essential (primary) hypertension   





Assessment/Plan





MRI of the leg shows a multiseptated learge cyst extending from the popliteal 

fossa, ? bakers cyst. among the differential diagnosis.


 MRI of the right knee is suggested.


  This is a complex problem and probably an orthopedic problem.


 I have discussed with Dr. Whitaker.


  Will obtain orthopedic consultation, with Dr. Adair.


 MRI of the right knee is requested.

## 2018-05-14 NOTE — PN
Progress Note, Physician


Chief Complaint: 





Rt leg pain persists


History of Present Illness: 





MRI showed large cystic mass calf muscle area





- Current Medication List


Current Medications: 


Active Medications





Acetaminophen/Codeine Phosphate (Tylenol # 3 -)  1 tab PO Q6H PRN


   PRN Reason: PAIN LEVEL 6-10


   Last Admin: 05/13/18 17:32 Dose:  1 tab


Albuterol Sulfate (Ventolin 0.083% Nebulizer Soln -)  1 amp NEB Q6H PRN


   PRN Reason: SHORT OF BREATH/WHEEZING


   Last Admin: 05/13/18 08:31 Dose:  1 amp


Budesonide/Formoterol Fumarate (Symbicort 80/4.5mcg -)  1 puff IH BID [facility]


   Last Admin: 05/13/18 21:12 Dose:  1 puff


Furosemide (Lasix -)  40 mg PO DAILY [facility]


   Last Admin: 05/13/18 09:35 Dose:  40 mg


Prednisone (Deltasone -)  10 mg PO DAILY [facility]


   Last Admin: 05/13/18 09:35 Dose:  10 mg


Ramipril (Altace -)  5 mg PO DAILY [facility]


   Last Admin: 05/13/18 09:35 Dose:  5 mg











- Objective


Vital Signs: 


 Vital Signs











Temperature  98.4 F   05/14/18 06:51


 


Pulse Rate  104 H  05/14/18 06:51


 


Respiratory Rate  20   05/14/18 06:51


 


Blood Pressure  117/59   05/14/18 06:51


 


O2 Sat by Pulse Oximetry (%)  98   05/13/18 21:00











Constitutional: Yes: No Distress


Eyes: Yes: WNL


HENT: Yes: WNL


Neck: Yes: WNL


Cardiovascular: Yes: WNL


Respiratory: Yes: WNL


Gastrointestinal: Yes: WNL


Musculoskeletal: Yes: Back Pain


Neurological: Yes: Alert


Labs: 


 CBC, BMP





 05/11/18 21:13 





 05/11/18 21:13 





 INR, PTT











INR  1.10  (0.82-1.09)   05/11/18  21:13    














- ....Imaging


MRI: Report Reviewed





Assessment/Plan





Will discuss with Dr Mi regarding surgical exicion

## 2018-05-15 LAB
BNP SERPL-MCNC: 66.47 PG/ML (ref 5–125)
CHOLEST SERPL-MCNC: 191 MG/DL (ref 50–200)
CRYSTALS SNV MICRO: NEGATIVE
GLUCOSE SNV-MCNC: 2 MG/DL
HDLC SERPL-MCNC: 86 MG/DL (ref 40–60)
LYMPHOCYTES # SNV MANUAL: 7 %
MACROPHAGES NFR SNV MANUAL: 11 %
NEUTROPHILS # SNV MANUAL: 82 %
PROT SNV-MCNC: 4 GM/DL
SYNOVIAL FLUID RBC: 0 /MM3
SYNOVIAL FLUID SOURCE: (no result)
TRIGL SERPL-MCNC: 79 MG/DL (ref 35–160)

## 2018-05-15 PROCEDURE — 0S9C3ZX DRAINAGE OF RIGHT KNEE JOINT, PERCUTANEOUS APPROACH, DIAGNOSTIC: ICD-10-PCS

## 2018-05-15 RX ADMIN — MIRTAZAPINE SCH MG: 15 TABLET, FILM COATED ORAL at 21:42

## 2018-05-15 RX ADMIN — BUDESONIDE AND FORMOTEROL FUMARATE DIHYDRATE SCH PUFF: 80; 4.5 AEROSOL RESPIRATORY (INHALATION) at 21:42

## 2018-05-15 RX ADMIN — RAMIPRIL SCH MG: 5 CAPSULE ORAL at 10:21

## 2018-05-15 RX ADMIN — FUROSEMIDE SCH MG: 40 TABLET ORAL at 10:20

## 2018-05-15 RX ADMIN — ALBUTEROL SULFATE PRN AMP: 2.5 SOLUTION RESPIRATORY (INHALATION) at 07:10

## 2018-05-15 RX ADMIN — ACETAMINOPHEN AND CODEINE PHOSPHATE PRN TAB: 300; 30 TABLET ORAL at 14:43

## 2018-05-15 RX ADMIN — PREDNISONE SCH MG: 10 TABLET ORAL at 10:21

## 2018-05-15 RX ADMIN — ACETAMINOPHEN AND CODEINE PHOSPHATE PRN TAB: 300; 30 TABLET ORAL at 06:55

## 2018-05-15 RX ADMIN — BUDESONIDE AND FORMOTEROL FUMARATE DIHYDRATE SCH PUFF: 80; 4.5 AEROSOL RESPIRATORY (INHALATION) at 10:22

## 2018-05-15 RX ADMIN — ALBUTEROL SULFATE PRN AMP: 2.5 SOLUTION RESPIRATORY (INHALATION) at 20:10

## 2018-05-15 NOTE — PN
Progress Note, Physician


Chief Complaint: 





Pt A&Ox3; c/o pain in right knee; no chest pain:; +hortness of breath on 

minimal exertion (chronic COPD).


History of Present Illness: 





Patient is a 62 year old male with a significant past medical history of HTN, 

CAD-->CABG), Hyperlipidemia, COPD Emphysema GOLD stage 4 on Home O2 CAD chronic 

back pain, who was sent to the ED by his PCP for surgical consult. Patient 

reports experiencing right calf edema that began earlier this week.  He reports 

going to his PCP for evaluation, who randell puss from his right calf using a 

needle.  Patient reports PCP advised going to the ED to be admitted and be 

evaluation by surgery to receive an Incision and drainage for  his right leg. 





Denies chest pain, Sob. Denies fevers, chills. Denies fevers, chills. Denies 

contact with sick individuals, out of state travelling. Denies any other 

symptoms. 





Allergies: Seafood. 


Social History: Former smoker (quit when underwent CABG several years ago). No 

alcohol. No illicit drugs. 


Surgical History: CABG, Stent


PMD: Dr. Whitaker, Dr. Mi





- Current Medication List


Current Medications: 


Active Medications





Albuterol Sulfate (Ventolin 0.083% Nebulizer Soln -)  1 amp NEB Q6H PRN


   PRN Reason: SHORT OF BREATH/WHEEZING


   Last Admin: 05/15/18 07:10 Dose:  1 amp


Budesonide/Formoterol Fumarate (Symbicort 80/4.5mcg -)  1 puff IH BID [facility]


   Last Admin: 05/15/18 10:22 Dose:  1 puff


Furosemide (Lasix -)  40 mg PO DAILY [facility]


   Last Admin: 05/15/18 10:20 Dose:  40 mg


Lidocaine HCl (Xylocaine 1%)  5 ml SQ ONCE ONE


   Stop: 05/15/18 12:43


Mirtazapine (Remeron -)  7.5 mg PO HS [facility]


Prednisone (Deltasone -)  10 mg PO DAILY [facility]


   Last Admin: 05/15/18 10:21 Dose:  10 mg


Ramipril (Altace -)  5 mg PO DAILY [facility]


   Last Admin: 05/15/18 10:21 Dose:  5 mg











- Objective


Vital Signs: 


 Vital Signs











Temperature  98.2 F   05/15/18 09:00


 


Pulse Rate  93 H  05/15/18 09:00


 


Respiratory Rate  20   05/15/18 09:00


 


Blood Pressure  119/65   05/15/18 09:00


 


O2 Sat by Pulse Oximetry (%)  100   05/15/18 09:00











Constitutional: Yes: Anxious


Eyes: Yes: WNL


HENT: Yes: WNL


Neck: Yes: WNL


Cardiovascular: Yes: Regular Rate and Rhythm


Respiratory: Yes: Diminished


Gastrointestinal: Yes: Soft


...Rectal Exam: Yes: Deferred


Genitourinary: No: Anuria


Breast(s): Yes: WNL


Musculoskeletal: Yes: Muscle Weakness


Extremities: Yes: Deformity (RLE bandaged (s/p right calf fluid removal))


Neurological: Yes: WNL


Psychiatric: Yes: Other (anxiety/depression)


Labs: 


 CBC, BMP





 05/11/18 21:13 





 05/11/18 21:13 





 INR, PTT











INR  1.10  (0.82-1.09)   05/11/18  21:13    














- ....Imaging


EKG: Image Reviewed (NSR; RBBB)





Problem List





- Problems


(1) Abscess of calf


Assessment/Plan: 


s/p drainage today.


F/u with surgeon; f/u cultures.


Code(s): L02.419 - CUTANEOUS ABSCESS OF LIMB, UNSPECIFIED   





(2) Acute on chronic respiratory failure with hypoxemia


Assessment/Plan: 


Pt has been attending pulmonary clinic (Dr. Cain).


F?u ECHO for LVEF


Code(s): J96.21 - ACUTE AND CHRONIC RESPIRATORY FAILURE WITH HYPOXIA   





(3) Anxiety


Code(s): F41.9 - ANXIETY DISORDER, UNSPECIFIED   





(4) CAD (coronary artery disease)


Assessment/Plan: 


s/p CABG ?2014 after coronary stents.


Denies hx MI.


Aggressive lipid control with statin, diet, exercise.





Code(s): I25.10 - ATHSCL HEART DISEASE OF NATIVE CORONARY ARTERY W/O ANG PCTRS 

  


Qualifiers: 


   Coronary Disease-Associated Artery/Lesion type: native artery   Native vs. 

transplanted heart: native heart   Associated angina: without angina   

Qualified Code(s): I25.10 - Atherosclerotic heart disease of native coronary 

artery without angina pectoris   





(5) COPD (chronic obstructive pulmonary disease)


Assessment/Plan: 


BNP 66.47


Quit cigarettes when had CABG ? 2014.


Code(s): J44.9 - CHRONIC OBSTRUCTIVE PULMONARY DISEASE, UNSPECIFIED   


Qualifiers: 


   COPD type: emphysema   Emphysema type: unspecified   Qualified Code(s): 

J43.9 - Emphysema, unspecified

## 2018-05-15 NOTE — PN
Progress Note, Physician


Chief Complaint: 





C/O pain Rt knee and leg


History of Present Illness: 





Case discussed with Dr Mi ,advised MRI of Rt knee


Also ortho consult to Dr Adair





- Current Medication List


Current Medications: 


Active Medications





Acetaminophen/Codeine Phosphate (Tylenol # 3 -)  1 tab PO Q6H PRN


   PRN Reason: PAIN LEVEL 6-10


   Last Admin: 05/15/18 06:55 Dose:  1 tab


Albuterol Sulfate (Ventolin 0.083% Nebulizer Soln -)  1 amp NEB Q6H PRN


   PRN Reason: SHORT OF BREATH/WHEEZING


   Last Admin: 05/15/18 07:10 Dose:  1 amp


Budesonide/Formoterol Fumarate (Symbicort 80/4.5mcg -)  1 puff IH BID [facility]


   Last Admin: 05/14/18 22:48 Dose:  1 puff


Furosemide (Lasix -)  40 mg PO DAILY [facility]


   Last Admin: 05/14/18 09:07 Dose:  40 mg


Prednisone (Deltasone -)  10 mg PO DAILY [facility]


   Last Admin: 05/14/18 09:06 Dose:  10 mg


Ramipril (Altace -)  5 mg PO DAILY [facility]


   Last Admin: 05/14/18 09:07 Dose:  5 mg











- Objective


Vital Signs: 


 Vital Signs











Temperature  98.6 F   05/15/18 06:52


 


Pulse Rate  95 H  05/15/18 06:52


 


Respiratory Rate  20   05/15/18 06:52


 


Blood Pressure  103/52   05/15/18 06:52


 


O2 Sat by Pulse Oximetry (%)  100   05/14/18 21:00











Constitutional: Yes: Anxious


Eyes: Yes: WNL


HENT: Yes: WNL


Neck: Yes: WNL


Cardiovascular: Yes: WNL


Respiratory: Yes: On Nasal O2


Genitourinary: Yes: WNL


Edema: RLE: 1+


Neurological: Yes: Alert


Labs: 


 CBC, BMP





 05/11/18 21:13 





 05/11/18 21:13 





 INR, PTT











INR  1.10  (0.82-1.09)   05/11/18  21:13    














Assessment/Plan





Will discuss with Dr Mi regarding surgical exicion





5/15/18 Start mertazepam for sleep

## 2018-05-15 NOTE — EKG
Test Reason : 

Blood Pressure : ***/*** mmHG

Vent. Rate : 106 BPM     Atrial Rate : 106 BPM

   P-R Int : 114 ms          QRS Dur : 128 ms

    QT Int : 346 ms       P-R-T Axes : 082 094 068 degrees

   QTc Int : 459 ms

 

SINUS TACHYCARDIA

RIGHT BUNDLE BRANCH BLOCK

ABNORMAL ECG

WHEN COMPARED WITH ECG OF 25-APR-2018 18:05,

NO SIGNIFICANT CHANGE WAS FOUND

Confirmed by MD SHAHRAM, KYLE ([id number]) on 5/15/2018 11:55:53 AM

 

Referred By:             Confirmed By:KYLE OMALLEY MD

## 2018-05-15 NOTE — EKG
Test Reason : 

Blood Pressure : ***/*** mmHG

Vent. Rate : 094 BPM     Atrial Rate : 094 BPM

   P-R Int : 118 ms          QRS Dur : 148 ms

    QT Int : 374 ms       P-R-T Axes : 082 077 065 degrees

   QTc Int : 467 ms

 

NORMAL SINUS RHYTHM

RIGHT BUNDLE BRANCH BLOCK

ABNORMAL ECG

WHEN COMPARED WITH ECG OF 14-MAY-2018 14:13,

NO SIGNIFICANT CHANGE WAS FOUND

Confirmed by MD SHAHRAM, KYLE ([id number]) on 5/15/2018 11:50:18 AM

 

Referred By: GERSON PAYTON DR           Confirmed By:KYLE OMALLEY MD

## 2018-05-15 NOTE — CON.ORTH
Consult


Reason for Consultation:: right calf swelling, knee pain





- Past Medical History


Cardio/Vascular: Yes: CAD, HTN, Hyperlipdemia


Pulmonary: Yes: COPD


Musculoskeletal: Yes: Chronic low back pain


Rheumatology: Yes: Rheumatoid Arthritis





- Past Surgical History


Past Surgical History: Yes: CABG, Stent





- Alcohol/Substance Use


Hx Alcohol Use: No


History of Substance Use: reports: None





- Smoking History


Smoking history: Never smoked


Have you smoked in the past 12 months: No


Aproximately how many cigarettes per day: 0


If you are a former smoker, when did you quit?: 3YRS





Home Medications





- Allergies


Allergies/Adverse Reactions: 


 Allergies











Allergy/AdvReac Type Severity Reaction Status Date / Time


 


Penicillins Allergy Severe Rash Verified 05/11/18 20:51


 


seafood Allergy Severe Rash Uncoded 05/11/18 20:51














- Home Medications


Home Medications: 


Ambulatory Orders





Albuterol 2.5/Ipratropium 0.5 [Duoneb -] 1 amp NEB QIDR  amp 11/30/16 


Budesonide/Formeterol Fumarate [SYMBICORT 80/4.5mcg -] 1 puff IH BID  inhaler 11 /30/16 


Clopidogrel Bisulfate [Plavix -] 75 mg PO DAILY  tablet 11/30/16 


Furosemide [Lasix -] 40 mg PO DAILY  tablet 11/30/16 


predniSONE [Deltasone -] 10 mg PO DAILY 02/28/17 


Ramipril 5 mg PO DAILY 07/02/17 


Simvastatin 40 mg PO DAILY 07/02/17 


Acetaminophen W/ Codeine #3 [Tylenol # 3 -] 1 tab PO BID 04/25/18 











Family Disease History





- Family Disease History


Family Disease History: Heart Disease: Grandparent, Other: Brother (Lupus)





Physical Exam for Ortho


Vital Signs: 


 Vital Signs











Temperature  98.2 F   05/15/18 09:00


 


Pulse Rate  93 H  05/15/18 09:00


 


Respiratory Rate  20   05/15/18 09:00


 


Blood Pressure  119/65   05/15/18 09:00


 


O2 Sat by Pulse Oximetry (%)  100   05/15/18 09:00











Labs: 


 CBC, BMP





 05/11/18 21:13 





 05/11/18 21:13 





 INR, PTT











INR  1.10  (0.82-1.09)   05/11/18  21:13    














- Lower Extremity


Knee: Yes: Right, Pain, Swelling, Tenderness, Other (+ ttp medially, rom 0-130,

+ mcmurrays, calf tense no erythema, nvi)





Imaging





- Results


X-ray: Report Reviewed, Image Reviewed


MRI: Report Reviewed, Image Reviewed





Assessment/Plan





62 year old male with a significant past medical history of HTN, CAD, 

Hyperlipidemia, COPD Emphysema GOLD stage 4 on Home O2 CAD chronic back pain, 

who was sent to the ED by his PCP for surgical consult. Patient reports 

experiencing right calf edema that began earlier this week.  





a/p Right knee internal derangement with ruptured bakers cyst


After consent obtained, time out performed, under sterile technique calf was 

injected with 1% lidocaine, calf was aspirated and 30 cc of cloudy fluid was 

aspirated, aspiration tolerated well, compression dressing applied.


fluid sent for analysis


MRI was ordered for right knee


wbat


elevation


ROM execises


f/u with MRI


d/w Dr. Adair

## 2018-05-16 RX ADMIN — PREDNISONE SCH MG: 10 TABLET ORAL at 09:16

## 2018-05-16 RX ADMIN — ACETAMINOPHEN AND CODEINE PHOSPHATE PRN TAB: 300; 30 TABLET ORAL at 11:14

## 2018-05-16 RX ADMIN — ALBUTEROL SULFATE PRN AMP: 2.5 SOLUTION RESPIRATORY (INHALATION) at 13:12

## 2018-05-16 RX ADMIN — ALBUTEROL SULFATE PRN AMP: 2.5 SOLUTION RESPIRATORY (INHALATION) at 07:20

## 2018-05-16 RX ADMIN — FUROSEMIDE SCH MG: 40 TABLET ORAL at 09:16

## 2018-05-16 RX ADMIN — BUDESONIDE AND FORMOTEROL FUMARATE DIHYDRATE SCH PUFF: 80; 4.5 AEROSOL RESPIRATORY (INHALATION) at 21:29

## 2018-05-16 RX ADMIN — ACETAMINOPHEN AND CODEINE PHOSPHATE PRN TAB: 300; 30 TABLET ORAL at 21:28

## 2018-05-16 RX ADMIN — MIRTAZAPINE SCH MG: 15 TABLET, FILM COATED ORAL at 21:29

## 2018-05-16 RX ADMIN — BUDESONIDE AND FORMOTEROL FUMARATE DIHYDRATE SCH PUFF: 80; 4.5 AEROSOL RESPIRATORY (INHALATION) at 09:16

## 2018-05-16 RX ADMIN — RAMIPRIL SCH MG: 5 CAPSULE ORAL at 09:20

## 2018-05-16 NOTE — PN
Progress Note, Physician


History of Present Illness: 





Patient is a 62 year old male with a significant past medical history of HTN, 

CAD, Hyperlipidemia, COPD Emphysema GOLD stage 4 on Home O2 CAD chronic back 

pain, who was sent to the ED by his PCP for surgical consult. Patient reports 

experiencing right calf edema that began earlier this week.  He reports going 

to his PCP for evaluation, who randell puss from his right calf using a needle.  

Patient reports PCP advised going to the ED to be admitted and be evaluation by 

surgery to receive an Incision and drainage for  his right leg. 





Denies chest pain, Sob. Denies fevers, chills. Denies fevers, chills. Denies 

contact with sick individuals, out of state travelling. Denies any other 

symptoms. 





Allergies: Seafood. 


Social History: Former smoker. No alcohol. No illicit drugs. 


Surgical History: CABG, Stent


PMD: Dr. Whitaker, Dr. Mi








[facility]





Abnormal stress test showing inferior wall ischemia   October 13, 2014   [facility]      


CAD- Stent oRCA   2008   [facility]      


COPD            


 oRCA prior stent site, o/w nonobstructive ds   Oct. 2014   Dr. France 

at [facility]      


Rheumatoid Arthritis            


unsucesful  attempt   Dec. 3 2014   [facility]





- Current Medication List


Current Medications: 


Active Medications





Acetaminophen/Codeine Phosphate (Tylenol # 3 -)  1 tab PO Q6H PRN


   PRN Reason: PAIN 5-10


   Last Admin: 05/16/18 11:14 Dose:  1 tab


Albuterol Sulfate (Ventolin 0.083% Nebulizer Soln -)  1 amp NEB Q6H PRN


   PRN Reason: SHORT OF BREATH/WHEEZING


   Last Admin: 05/16/18 07:20 Dose:  1 amp


Budesonide/Formoterol Fumarate (Symbicort 80/4.5mcg -)  1 puff IH BID [facility]


   Last Admin: 05/16/18 09:16 Dose:  1 puff


Furosemide (Lasix -)  40 mg PO DAILY [facility]


   Last Admin: 05/16/18 09:16 Dose:  40 mg


Mirtazapine (Remeron -)  7.5 mg PO HS [facility]


   Last Admin: 05/15/18 21:42 Dose:  7.5 mg


Prednisone (Deltasone -)  10 mg PO DAILY [facility]


   Last Admin: 05/16/18 09:16 Dose:  10 mg


Ramipril (Altace -)  5 mg PO DAILY [facility]


   Last Admin: 05/16/18 09:20 Dose:  5 mg











- Objective


Vital Signs: 


 Vital Signs











Temperature  98.6 F   05/16/18 06:00


 


Pulse Rate  92 H  05/16/18 06:00


 


Respiratory Rate  20   05/16/18 09:00


 


Blood Pressure  122/72   05/16/18 06:00


 


O2 Sat by Pulse Oximetry (%)  100   05/16/18 09:00











Eyes: Yes: WNL, Conjunctiva Clear, EOM Intact


HENT: Yes: WNL, Atraumatic, Normocephalic


Neck: Yes: WNL, Supple, Trachea Midline


Cardiovascular: Yes: WNL, Regular Rate and Rhythm


Respiratory: Yes: WNL, Regular, CTA Bilaterally


Gastrointestinal: Yes: WNL, Normal Bowel Sounds


Genitourinary: Yes: WNL


Musculoskeletal: Yes: WNL


Extremities: Yes: WNL


Edema: Yes


Integumentary: Yes: WNL


Neurological: Yes: WNL, Alert, Oriented


...Motor Strength: WNL


Psychiatric: Yes: WNL


Labs: 


 CBC, BMP





 05/11/18 21:13 





 05/11/18 21:13 





 INR, PTT











INR  1.10  (0.82-1.09)   05/11/18  21:13    














Problem List





- Problems


(1) Abscess of calf


Code(s): L02.419 - CUTANEOUS ABSCESS OF LIMB, UNSPECIFIED   





(2) Pain and swelling of right lower leg


Code(s): M79.661 - PAIN IN RIGHT LOWER LEG; M79.89 - OTHER SPECIFIED SOFT 

TISSUE DISORDERS   





(3) Abnormal LFTs


Code(s): R79.89 - OTHER SPECIFIED ABNORMAL FINDINGS OF BLOOD CHEMISTRY   





(4) Acute on chronic respiratory failure with hypoxemia


Code(s): J96.21 - ACUTE AND CHRONIC RESPIRATORY FAILURE WITH HYPOXIA   





(5) Acute on chronic respiratory failure with hypoxia and hypercapnia


Code(s): J96.21 - ACUTE AND CHRONIC RESPIRATORY FAILURE WITH HYPOXIA; J96.22 - 

ACUTE AND CHRONIC RESPIRATORY FAILURE WITH HYPERCAPNIA   





(6) Anxiety


Code(s): F41.9 - ANXIETY DISORDER, UNSPECIFIED   





(7) CAD (coronary artery disease)


Code(s): I25.10 - ATHSCL HEART DISEASE OF NATIVE CORONARY ARTERY W/O ANG PCTRS 

  


Qualifiers: 


   Coronary Disease-Associated Artery/Lesion type: native artery   Native vs. 

transplanted heart: native heart   Associated angina: without angina   

Qualified Code(s): I25.10 - Atherosclerotic heart disease of native coronary 

artery without angina pectoris   





(8) COPD (chronic obstructive pulmonary disease)


Code(s): J44.9 - CHRONIC OBSTRUCTIVE PULMONARY DISEASE, UNSPECIFIED   


Qualifiers: 


   COPD type: emphysema   Emphysema type: unspecified   Qualified Code(s): 

J43.9 - Emphysema, unspecified   





(9) COPD exacerbation


Code(s): J44.1 - CHRONIC OBSTRUCTIVE PULMONARY DISEASE W (ACUTE) EXACERBATION   





(10) Chronic pain


Code(s): G89.29 - OTHER CHRONIC PAIN   





(11) Compression fracture of L1 lumbar vertebra


Code(s): S32.010A - WEDGE COMPRESSION FRACTURE OF FIRST LUMBAR VERTEBRA, INIT   


Qualifiers: 


   Encounter type: initial encounter   Fracture type: closed   Qualified Code(s)

: S32.010A - Wedge compression fracture of first lumbar vertebra, initial 

encounter for closed fracture   





(12) Congestive cardiac failure


Code(s): I50.9 - HEART FAILURE, UNSPECIFIED   





(13) Cough


Code(s): R05 - COUGH   





(14) Diastolic CHF


Code(s): I50.30 - UNSPECIFIED DIASTOLIC (CONGESTIVE) HEART FAILURE   


Qualifiers: 


   Heart failure chronicity: unspecified   Qualified Code(s): I50.30 - 

Unspecified diastolic (congestive) heart failure   





(15) Edema


Code(s): R60.9 - EDEMA, UNSPECIFIED   


Qualifiers: 


   Edema type: unspecified   Qualified Code(s): R60.9 - Edema, unspecified   





(16) HTN (hypertension)


Code(s): I10 - ESSENTIAL (PRIMARY) HYPERTENSION   


Qualifiers: 


   Hypertension type: essential hypertension   Qualified Code(s): I10 - 

Essential (primary) hypertension   





(17) History of coronary artery bypass graft


Code(s): Z95.1 - PRESENCE OF AORTOCORONARY BYPASS GRAFT   





(18) History of percutaneous coronary intervention


Code(s): Z98.89 - OTHER SPECIFIED POSTPROCEDURAL STATES * DO NOT USE *   





(19) Hypercholesterolemia


Code(s): E78.0 - PURE HYPERCHOLESTEROLEMIA * DO NOT USE *   





(20) Hyperkalemia


Code(s): E87.5 - HYPERKALEMIA   





(21) Inguinal hernia


Code(s): K40.90 - UNIL INGUINAL HERNIA, W/O OBST OR GANGR, NOT SPCF AS RECUR   





(22) Intractable pain


Code(s): R52 - PAIN, UNSPECIFIED   





(23) LLQ pain


Code(s): R10.32 - LEFT LOWER QUADRANT PAIN   





(24) Left groin pain


Code(s): R10.32 - LEFT LOWER QUADRANT PAIN   





(25) Lower extremity edema


Code(s): R60.0 - LOCALIZED EDEMA   





(26) Lumbar disc herniation


Code(s): M51.26 - OTHER INTERVERTEBRAL DISC DISPLACEMENT, LUMBAR REGION   





(27) Nosebleed


Code(s): R04.0 - EPISTAXIS   





(28) PSVT (paroxysmal supraventricular tachycardia)


Code(s): I47.1 - SUPRAVENTRICULAR TACHYCARDIA   





(29) Pneumonia


Code(s): J18.9 - PNEUMONIA, UNSPECIFIED ORGANISM   





(30) Radiculopathy


Code(s): M54.10 - RADICULOPATHY, SITE UNSPECIFIED   


Qualifiers: 


   Spinal region: lumbar   Qualified Code(s): M54.16 - Radiculopathy, lumbar 

region   





(31) Rheumatoid arthritis


Code(s): M06.9 - RHEUMATOID ARTHRITIS, UNSPECIFIED   





(32) Rheumatoid arthritis involving ankle with positive rheumatoid factor


Code(s): M05.779 - RHEU ARTHRIT W RHEU FACTOR OF UNSP ANK/FT W/O ORG/SYS INVOLV

   


Qualifiers: 


   Laterality: bilateral   Qualified Code(s): M05.771 - Rheumatoid arthritis 

with rheumatoid factor of right ankle and foot without organ or systems 

involvement; M05.772 - Rheumatoid arthritis with rheumatoid factor of left 

ankle and foot without organ or systems involvement; M05.772 - Rheumatoid 

arthritis with rheumatoid factor of left ankle and foot without organ or 

systems involvement; M05.772 - Rheumatoid arthritis with rheumatoid factor of 

left ankle and foot without organ or systems involvement; M05.772 - Rheumatoid 

arthritis with rheumatoid factor of left ankle and foot without organ or 

systems involvement   





(33) Rheumatoid arthritis of multiple sites without organ or system involvement 

with positive rheumatoid factor


Code(s): M05.79 - RHEU ARTHRITIS W RHEU FACTOR MULT SITE W/O ORG/SYS INVOLV   





(34) Sinus tachycardia


Code(s): R00.0 - TACHYCARDIA, UNSPECIFIED   





Assessment/Plan





- Problems


(1) Abscess of calf


Assessment/Plan: 


s/p drainage POD #1


F/u with surgeon; f/u cultures.


Code(s): L02.419 - CUTANEOUS ABSCESS OF LIMB, UNSPECIFIED   





(2) Acute on chronic respiratory failure with hypoxemia


Assessment/Plan: 


Pt has been attending pulmonary clinic (Dr. Cain).


F?u ECHO for LVEF BNP nl. doubt CHF


Code(s): J96.21 - ACUTE AND CHRONIC RESPIRATORY FAILURE WITH HYPOXIA   





(3) Anxiety


Code(s): F41.9 - ANXIETY DISORDER, UNSPECIFIED   





(4) CAD (coronary artery disease)


Assessment/Plan: 


s/p CABG ?2014 after coronary stents.


Denies hx MI.


Aggressive lipid control with statin, diet, exercise.





Code(s): I25.10 - ATHSCL HEART DISEASE OF NATIVE CORONARY ARTERY W/O ANG PCTRS 

  


Qualifiers: 


   Coronary Disease-Associated Artery/Lesion type: native artery   Native vs. 

transplanted heart: native heart   Associated angina: without angina   

Qualified Code(s): I25.10 - Atherosclerotic heart disease of native coronary 

artery without angina pectoris   





(5) COPD (chronic obstructive pulmonary disease)


Assessment/Plan: 


BNP 66.47


Quit cigarettes when had CABG ? 2014.


Code(s): J44.9 - CHRONIC OBSTRUCTIVE PULMONARY DISEASE, UNSPECIFIED   


Qualifiers: 


   COPD type: emphysema   Emphysema type: unspecified   Qualified Code(s): 

J43.9 - Emphysema, unspecified

## 2018-05-16 NOTE — PN
Progress Note (short form)





- Note


Progress Note: 





Ortho





Pt seen and examined s/p aspiration of calf. feeling better today. h/o of RA


 Selected Entries











  05/16/18





  06:00


 


Temperature 98.6 F


 


Pulse Rate 92 H


 


Respiratory 20





Rate 


 


Blood Pressure 122/72








 Laboratory Tests











  05/15/18





  13:00


 


Synovial Source  Synovial


 


Synovial WBC  62031


 


Synovial Neutrophils  82


 


Synovial Crystals  Negative








cultures neg to date








1+ effusion, +ttp (improved), rom 0-110, calf less tense, nvi





MRI + large joint effusion and bakers cyst





a/p


Fluid accumulation probably from RA, recent cultures neg


No surgical intervention at this time


elevation


wbat


ok to d/c from ortho pov


can f/u as outpt


d/w Dr. Lizarraga

## 2018-05-16 NOTE — PN
Progress Note, Physician


Chief Complaint: 





feels better


History of Present Illness: 





30 cc fluid removed from area


MRI of the knee showed large amount of fluid in the knee








- Current Medication List


Current Medications: 


Active Medications





Acetaminophen/Codeine Phosphate (Tylenol # 3 -)  1 tab PO Q6H PRN


   PRN Reason: PAIN 5-10


   Last Admin: 05/16/18 11:14 Dose:  1 tab


Albuterol Sulfate (Ventolin 0.083% Nebulizer Soln -)  1 amp NEB Q6H PRN


   PRN Reason: SHORT OF BREATH/WHEEZING


   Last Admin: 05/16/18 07:20 Dose:  1 amp


Budesonide/Formoterol Fumarate (Symbicort 80/4.5mcg -)  1 puff IH BID [facility]


   Last Admin: 05/16/18 09:16 Dose:  1 puff


Furosemide (Lasix -)  40 mg PO DAILY [facility]


   Last Admin: 05/16/18 09:16 Dose:  40 mg


Mirtazapine (Remeron -)  7.5 mg PO HS [facility]


   Last Admin: 05/15/18 21:42 Dose:  7.5 mg


Prednisone (Deltasone -)  10 mg PO DAILY [facility]


   Last Admin: 05/16/18 09:16 Dose:  10 mg


Ramipril (Altace -)  5 mg PO DAILY [facility]


   Last Admin: 05/16/18 09:20 Dose:  5 mg











- Objective


Vital Signs: 


 Vital Signs











Temperature  97.8 F   05/16/18 10:00


 


Pulse Rate  108 H  05/16/18 10:00


 


Respiratory Rate  20   05/16/18 10:00


 


Blood Pressure  126/74   05/16/18 10:00


 


O2 Sat by Pulse Oximetry (%)  100   05/16/18 09:00











Constitutional: Yes: Mild Distress


Eyes: Yes: WNL


HENT: Yes: WNL


Neck: Yes: WNL


Cardiovascular: Yes: Regular Rate and Rhythm


Respiratory: Yes: On Nasal O2


Genitourinary: Yes: WNL


Musculoskeletal: Yes: Joint Stiffness


Labs: 


 CBC, BMP





 05/11/18 21:13 





 05/11/18 21:13 





 INR, PTT











INR  1.10  (0.82-1.09)   05/11/18  21:13    














Assessment/Plan





Will discuss with Dr Mi

## 2018-05-17 VITALS — HEART RATE: 120 BPM | SYSTOLIC BLOOD PRESSURE: 120 MMHG | DIASTOLIC BLOOD PRESSURE: 77 MMHG | TEMPERATURE: 98.8 F

## 2018-05-17 RX ADMIN — FUROSEMIDE SCH MG: 40 TABLET ORAL at 09:58

## 2018-05-17 RX ADMIN — ACETAMINOPHEN AND CODEINE PHOSPHATE PRN TAB: 300; 30 TABLET ORAL at 10:04

## 2018-05-17 RX ADMIN — PREDNISONE SCH MG: 10 TABLET ORAL at 09:58

## 2018-05-17 RX ADMIN — RAMIPRIL SCH MG: 5 CAPSULE ORAL at 09:58

## 2018-05-17 RX ADMIN — BUDESONIDE AND FORMOTEROL FUMARATE DIHYDRATE SCH PUFF: 80; 4.5 AEROSOL RESPIRATORY (INHALATION) at 09:58

## 2018-05-17 RX ADMIN — ALBUTEROL SULFATE PRN AMP: 2.5 SOLUTION RESPIRATORY (INHALATION) at 08:30

## 2018-05-17 NOTE — DS
Physical Examination


Vital Signs: 


 Vital Signs











Temperature  98 F   05/17/18 06:58


 


Pulse Rate  99 H  05/17/18 06:58


 


Respiratory Rate  20   05/17/18 06:58


 


Blood Pressure  114/68   05/17/18 06:58


 


O2 Sat by Pulse Oximetry (%)  100   05/16/18 21:00











Constitutional: Yes: Anxious, Pallor


HENT: Yes: WNL


Neck: Yes: WNL


Cardiovascular: Yes: WNL


Respiratory: Yes: On Nasal O2


...Rectal Exam: Yes: WNL


Breast(s): Yes: WNL


Musculoskeletal: Yes: Joint Swelling


Extremities: Yes: Calf Tenderness


Edema: RLE: 1+


Neurological: Yes: Alert


Psychiatric: Yes: Alert


Labs: 


 CBC, BMP





 05/11/18 21:13 





 05/11/18 21:13 











Discharge Summary


Reason For Visit: ABSCELL OF CALF


Current Active Problems





Abscess of calf (Acute)


Pain and swelling of right lower leg (Acute)








Condition: Guarded





- Instructions


Referrals: 


Keanu Whitaker MD [Primary Care Provider] - 





- Home Medications


Comprehensive Discharge Medication List: 


Ambulatory Orders





Albuterol 2.5/Ipratropium 0.5 [Duoneb -] 1 amp NEB QIDR  amp 11/30/16 


Budesonide/Formeterol Fumarate [SYMBICORT 80/4.5mcg -] 1 puff IH BID  inhaler 11 /30/16 


Clopidogrel Bisulfate [Plavix -] 75 mg PO DAILY  tablet 11/30/16 


Furosemide [Lasix -] 40 mg PO DAILY  tablet 11/30/16 


predniSONE [Deltasone -] 10 mg PO DAILY 02/28/17 


Ramipril 5 mg PO DAILY 07/02/17 


Simvastatin 40 mg PO DAILY 07/02/17 


Acetaminophen W/ Codeine #3 [Tylenol # 3 -] 1 tab PO BID 04/25/18

## 2018-05-17 NOTE — PN
Progress Note (short form)





- Note


Progress Note: 





Pt seen and examined. He states his left calf/lower leg still hurts, but is 

improving.


He is able to ambulate and fully weight bear.


NVI


Good ROM throughout


Still has moderate swelling of the left calf, ankle, lower leg.


No drainage, no tense compartments.





Fluid more consistent with RA inflammatory fluid than acute infection





Imp   Overall doing better, still with some pain and swelling.





Rec   Better elevation more of the day


      Can ambulate, WBAT


      No surgical intervention needed at this time

## 2018-11-28 ENCOUNTER — HOSPITAL ENCOUNTER (EMERGENCY)
Dept: HOSPITAL 74 - JER | Age: 63
Discharge: HOME | End: 2018-11-28
Payer: COMMERCIAL

## 2018-11-28 VITALS — BODY MASS INDEX: 22.3 KG/M2

## 2018-11-28 VITALS — DIASTOLIC BLOOD PRESSURE: 66 MMHG | SYSTOLIC BLOOD PRESSURE: 115 MMHG | TEMPERATURE: 98.6 F

## 2018-11-28 VITALS — HEART RATE: 118 BPM

## 2018-11-28 DIAGNOSIS — M79.606: ICD-10-CM

## 2018-11-28 DIAGNOSIS — R00.0: Primary | ICD-10-CM

## 2018-11-28 DIAGNOSIS — J44.9: ICD-10-CM

## 2018-11-28 DIAGNOSIS — E78.5: ICD-10-CM

## 2018-11-28 DIAGNOSIS — I25.10: ICD-10-CM

## 2018-11-28 DIAGNOSIS — M06.9: ICD-10-CM

## 2018-11-28 DIAGNOSIS — Z95.1: ICD-10-CM

## 2018-11-28 DIAGNOSIS — I10: ICD-10-CM

## 2018-11-28 LAB
ALBUMIN SERPL-MCNC: 3.2 G/DL (ref 3.4–5)
ALP SERPL-CCNC: 187 U/L (ref 45–117)
ALT SERPL-CCNC: 18 U/L (ref 13–61)
ANION GAP SERPL CALC-SCNC: 8 MMOL/L (ref 8–16)
APTT BLD: 28.3 SECONDS (ref 25.2–36.5)
AST SERPL-CCNC: 23 U/L (ref 15–37)
BASOPHILS # BLD: 0.4 % (ref 0–2)
BILIRUB SERPL-MCNC: 0.9 MG/DL (ref 0.2–1)
BUN SERPL-MCNC: 14 MG/DL (ref 7–18)
CALCIUM SERPL-MCNC: 9.3 MG/DL (ref 8.5–10.1)
CHLORIDE SERPL-SCNC: 95 MMOL/L (ref 98–107)
CO2 SERPL-SCNC: 31 MMOL/L (ref 21–32)
CREAT SERPL-MCNC: 0.9 MG/DL (ref 0.55–1.3)
DEPRECATED RDW RBC AUTO: 16.2 % (ref 11.9–15.9)
EOSINOPHIL # BLD: 0.4 % (ref 0–4.5)
GLUCOSE SERPL-MCNC: 84 MG/DL (ref 74–106)
HCT VFR BLD CALC: 32.9 % (ref 35.4–49)
HGB BLD-MCNC: 11.4 GM/DL (ref 11.7–16.9)
INR BLD: 1.19 (ref 0.83–1.09)
LYMPHOCYTES # BLD: 13.3 % (ref 8–40)
MCH RBC QN AUTO: 28.6 PG (ref 25.7–33.7)
MCHC RBC AUTO-ENTMCNC: 34.6 G/DL (ref 32–35.9)
MCV RBC: 82.8 FL (ref 80–96)
MONOCYTES # BLD AUTO: 9 % (ref 3.8–10.2)
NEUTROPHILS # BLD: 76.9 % (ref 42.8–82.8)
PLATELET # BLD AUTO: 554 K/MM3 (ref 134–434)
PMV BLD: 7.4 FL (ref 7.5–11.1)
POTASSIUM SERPLBLD-SCNC: 5.2 MMOL/L (ref 3.5–5.1)
PROT SERPL-MCNC: 7.8 G/DL (ref 6.4–8.2)
PT PNL PPP: 14.1 SEC (ref 9.7–13)
RBC # BLD AUTO: 3.98 M/MM3 (ref 4–5.6)
SODIUM SERPL-SCNC: 133 MMOL/L (ref 136–145)
WBC # BLD AUTO: 10 K/MM3 (ref 4–10)

## 2018-11-28 PROCEDURE — 3E0F7GC INTRODUCTION OF OTHER THERAPEUTIC SUBSTANCE INTO RESPIRATORY TRACT, VIA NATURAL OR ARTIFICIAL OPENING: ICD-10-PCS

## 2018-11-28 PROCEDURE — 3E0337Z INTRODUCTION OF ELECTROLYTIC AND WATER BALANCE SUBSTANCE INTO PERIPHERAL VEIN, PERCUTANEOUS APPROACH: ICD-10-PCS

## 2018-11-28 NOTE — PDOC
Attending Attestation





- Resident


Resident Name: SudeepLolita





- ED Attending Attestation


I have performed the following: I have examined & evaluated the patient, The 

case was reviewed & discussed with the resident, I agree w/resident's findings 

& plan





- HPI


HPI: 





11/28/18 20:03





63YOM, with a significant past medical history of HIV (according to NP note 

from today, viral load controlled) HTN, DM, COPD, CAD, CVA and prostate cancer, 

who presents to the emergency department with, tachycardia. Patient was being 

seen at his PCPs office for slight hemoptysis and blood tinged nasal 

congestion when he blows his nose and was noted to be tachycardic. Patient 

endorses right calf pain which he believes is chronic, which he attributed to 

RA.





He denies any recent fevers, chills, headache or dizziness. He denies any 

recent nausea, vomit, diarrhea or constipation. He denies any recent chest pain 

or shortness of breath. He denies any recent dysuria, frequency, urgency or 

hematuria.





Allergies: Seafood. 


Social History: Former smoker. No alcohol. No illicit drugs. 


Surgical History: CABG, Stent


PMD: Dr. Whitaker, Dr. Mi











- Physicial Exam


PE: 





11/28/18 20:03





NAD, well appearing, speaking clear sentences. PERRL, EOMI, MMM, nl conjunctiva

, anicteric; neck supple. on supp O2. lungs clear, RRR, abdomen soft nontender. 

LECHUGA x4, no focal neuro deficits. No peripheral edema. +RLE calf tenderness, 

popliteal tenderness. normal color for ethnicity, WWP. NVI








- Medical Decision Making





11/28/18 20:04





DDx SOB: ACS, DVT, PE, CHF, pulmonary edema, pleurisy, pneumonia, viral 

syndrome. effusion. anemia, electrolyte/metabolic derangements.


vitals with tachycardia in 130s (always elevated, baseline in high 90s to 120s)

. 


on baseline O2 supplementation. spO2 94% on O2 but comfortable, speaking full 

sentences.





labs and lytes normal, flu neg. coags normal. 


CXR with copd hyperinflated lungs, no focal infiltrate.


EKG with sinus tachy 122 bpm, also old RBBB, no change from prior.


Duplex and CTA to r/o DVt/PE respectively as he is tachycardic and has risk 

factors and higher tachycardia than usual.


dispo ending per results. 





11/28/18 20:05





11/28/18 20:06





11/28/18 20:08








**Heart Score/ECG Review





- ECG Impressions


Normal ECG: Yes


Tachycardia: Sinus


Comment:: 





11/28/18 20:07





EKG sinus tachycardia, no interval abnormalities, wide QRS, RBBB, ST and T wave 

segments and morphology normal. Nonspecific T wave abnormalities - unchanged 

from prior.

## 2018-11-28 NOTE — PDOC
History of Present Illness





- History of Present Illness


Initial Comments: 


Oh Morales Jr is a 62yo man with a PMH of CAD s/p CABG, COPD on home O2, HTN, 

HLD, CHF, RA on methotrexate, and anxiety who was sent to the ED by his primary 

physician for tachycardia and report of blood-tinged sputum and nasal 

secretions recently. 





Mr Morales reports that he has noticed some blood 'sometimes" when he coughs or 

blows his nose. He denies any significant episodes of bleeding at any point. He 

says that he actually feels at his baseline state of health, but he went to see 

his PMD about the blood. He reports that he was told that his heartbeat is very 

fast and that he needed to be seen in the ED because it might be something 

serious. He says that he was a little confused about the need to be seen 

because his heart rate is "always fast" and he believes it is due to his 

frequent albuterol use. 





Mr Morales additionally notes that he had been on 10mg prednisone for "a long time

" for his COPD and RA, but he was recently taken off and started on 

methotrexate by his rheumatologist. 








<Lolita Sheets - Last Filed: 11/28/18 19:25>





<Marian Centeno - Last Filed: 11/28/18 20:09>





- General


Chief Complaint: Tachycardia


Stated Complaint: TACHYCARDIA (PCP SENT)





Past History





- Past Medical History


Anemia: No


Asthma: No


Cancer: No


Cardiac Disorders: Yes (MI, stent, BYPASS)


CVA: No


COPD: Yes (2L)


CHF: Yes


Dementia: No


Diabetes: No


GI Disorders: No


 Disorders: No


HTN: Yes


Hypercholesterolemia: Yes


Liver Disease: No


Psychiatric Problems: Yes (anxiety)


Seizures: No


Thyroid Disease: No





- Surgical History


Abdominal Surgery: No


Appendectomy: No


Cardiac Surgery: Yes (Stent, cardiaccath)


Cholecystectomy: No


Lung Surgery: No


Neurologic Surgery: No


Orthopedic Surgery: Yes (KNEE)





- Immunization History


Immunization Up to Date: Yes





- Suicide/Smoking/Psychosocial Hx


Smoking Status: No


Smoking History: Unknown if ever smoked


Have you smoked in the past 12 months: No


Number of Cigarettes Smoked Daily: 0


If you are a former smoker, when did you quit?: 3YRS


Hx Alcohol Use: No


Drug/Substance Use Hx: No


Substance Use Type: None


Hx Substance Use Treatment: No





<Lolita Sheets - Last Filed: 11/28/18 19:25>





<Marian Centeno Tarynsylvie - Last Filed: 11/28/18 20:09>





- Past Medical History


Allergies/Adverse Reactions: 


 Allergies











Allergy/AdvReac Type Severity Reaction Status Date / Time


 


Penicillins Allergy Severe Rash Verified 11/28/18 13:31


 


seafood Allergy Severe Rash Uncoded 11/28/18 13:31











Home Medications: 


Ambulatory Orders





Albuterol Sulfate Inhaler - [Ventolin Hfa Inhaler -] 1 - 2 inh PO Q4H PRN 11/28/ 18 


Budesonide/Formeterol Fumarate [SYMBICORT 160/4.5mcg -] 1 inh PO BID 11/28/18 


Clopidogrel Bisulfate [Plavix] 75 mg PO DAILY 11/28/18 


Folic Acid 1 mg PO DAILY 11/28/18 


Furosemide [Lasix] 40 mg PO DAILY 11/28/18 


Hydroxychloroquine Sulfate [Plaquenil] 200 mg PO BID 11/28/18 


Methotrexate [Mexate -] 15 mg PO Q7D 11/28/18 


Ramipril [Altace] 5 mg PO DAILY 11/28/18 


Simvastatin [Zocor -] 40 mg PO HS 11/28/18 











**Review of Systems





- Review of Systems


Comments:: 


General: No fevers, no chills, no weight or appetite change, no malaise


HEENT: No changes in vision, no changes in hearing, no congestion, no sore 

throat. +blood-tinged rhinorrhea


CV: No chest pain, no palpitations, +chronic LE edema


Pulm: +COPD, on home O2, no changes


GI: No nausea or vomiting, no change in bowel habits, no melena. +heartburn


: No frequency, no urgency, no dysuria


Musc: +RA involving multiple joints. No recent injury


Skin: No rash, no lesions, no erythema


Endo: No excessive thirst, no heat/cold intolerance


Heme: No unusual bruising or bleeding, no swollen glands


Neuro: No syncope, no numbness/tingling, no focal weakness


Vasc: No claudication


Psych: No recent change in mood, no SI or HI











<DamianLolita gunn - Last Filed: 11/28/18 19:25>





*Physical Exam





- Vital Signs


 Last Vital Signs











Temp Pulse Resp BP Pulse Ox


 


 98.6 F   130 H  18   115/66   94 L


 


 11/28/18 13:28  11/28/18 13:28  11/28/18 13:28  11/28/18 13:28  11/28/18 13:28














- Physical Exam


Comments: 


General: Comfortable, no acute distress


HEENT: PERRL, EOMI, voice normal, normal neck ROM, no LAD


Cards: Tachycardic, regular, no murmur appreciated


Pulm: Comfortable on supplemental O2 by NC. Poor air movement diffusely. No 

wheezing, crackles appreciated. 


Abd: Soft, nontender, nondistended


Ext: Atraumatic. BLE 2+ pitting edema. Multiple joints warm, swollen (baseline 

per pt). ROM intact. Strength 5/5 and equal bilaterally


Vasc: Extremities WWP. 


Skin: Normal color, no rashes or lesions


Neuro: A&Ox3, CN grossly intact, normal speech, motor/sensory grossly intact 

and symmetric


Psych: Mood appropriate to situation











<Lolita Sheets - Last Filed: 11/28/18 19:25>





- Vital Signs


 Last Vital Signs











Temp Pulse Resp BP Pulse Ox


 


 98.6 F   118 H  20   115/66   100 


 


 11/28/18 13:28  11/28/18 17:27  11/28/18 17:27  11/28/18 13:28  11/28/18 17:27














<Marian Centeno - Last Filed: 11/28/18 20:09>





Moderate Sedation





- Procedure Monitoring


Vital Signs: 


Procedure Monitoring Vital Signs











Temperature  98.6 F   11/28/18 13:28


 


Pulse Rate  130 H  11/28/18 13:28


 


Respiratory Rate  18   11/28/18 13:28


 


Blood Pressure  115/66   11/28/18 13:28


 


O2 Sat by Pulse Oximetry (%)  94 L  11/28/18 13:28











<Lolita Sheets - Last Filed: 11/28/18 19:25>





- Procedure Monitoring


Vital Signs: 


Procedure Monitoring Vital Signs











Temperature  98.6 F   11/28/18 13:28


 


Pulse Rate  118 H  11/28/18 17:27


 


Respiratory Rate  20   11/28/18 17:27


 


Blood Pressure  115/66   11/28/18 13:28


 


O2 Sat by Pulse Oximetry (%)  100   11/28/18 17:27











<Marian Centeno - Last Filed: 11/28/18 20:09>





ED Treatment Course





- LABORATORY


CBC & Chemistry Diagram: 


 11/28/18 16:42





 11/28/18 16:42





- RADIOLOGY


Radiology Studies Ordered: 














 Category Date Time Status


 


 CHEST PA & LAT [RAD] Stat Radiology  11/28/18 15:58 Ordered














<Lolita Sheets - Last Filed: 11/28/18 19:25>





- LABORATORY


CBC & Chemistry Diagram: 


 11/28/18 16:42





 11/28/18 16:42





- ADDITIONAL ORDERS


Additional order review: 


 Laboratory  Results











  11/28/18 11/28/18 11/28/18





  18:17 18:17 16:42


 


PT with INR   14.10 H 


 


INR   1.19 H 


 


PTT (Actin FS)   28.3 


 


Sodium    133 L


 


Potassium    5.2 H


 


Chloride    95 L


 


Carbon Dioxide    31


 


Anion Gap    8


 


BUN    14


 


Creatinine    0.9


 


Creat Clearance w eGFR    > 60


 


Random Glucose    84


 


Calcium    9.3


 


Total Bilirubin    0.9


 


AST    23


 


ALT    18


 


Alkaline Phosphatase    187 H


 


Total Protein    7.8


 


Albumin    3.2 L


 


TSH  1.26  D  








 











  11/28/18





  16:42


 


RBC  3.98 L


 


MCV  82.8


 


MCHC  34.6


 


RDW  16.2 H


 


MPV  7.4 L


 


Neutrophils %  76.9


 


Lymphocytes %  13.3  D


 


Monocytes %  9.0


 


Eosinophils %  0.4


 


Basophils %  0.4














- RADIOLOGY


Radiology Studies Ordered: 














 Category Date Time Status


 


 CHEST CTA [CT] Stat CT Scan  11/28/18 18:13 Ordered


 


 DUPLEX VASCUL US-1 LEG [US] Stat Ultrasound  11/28/18 18:13 Completed














- Medications


Given in the ED: 


ED Medications














Discontinued Medications














Generic Name Dose Route Start Last Admin





  Trade Name Ninoq  PRN Reason Stop Dose Admin


 


Albuterol/Ipratropium  1 amp  11/28/18 16:05  11/28/18 17:00





  Duoneb -  NEB  11/28/18 16:06  1 amp





  ONCE ONE   Administration





     





     





     





     


 


Sodium Chloride  1,000 ml  11/28/18 16:35  11/28/18 18:35





  Normal Saline -  IV  11/28/18 16:36  Not Given





  ONCE ONE   





     





     





     





     


 


Sodium Chloride  500 ml  11/28/18 17:05  11/28/18 18:35





  Normal Saline -  IV  11/28/18 17:06  500 ml





  ONCE ONE   Administration





     





     





     





     














<Marian Centeno - Last Filed: 11/28/18 20:09>





Medical Decision Making





- Medical Decision Making





11/28/18 16:24


Oh Morales Jr is a 62yo......


- Per pt, feels at baseline. Vitals initially with tachycardia to 130, but HR 

122 on EKG similar to previously recorded vitals. BP appears to be at pt's 

baseline


- EKG with RBBB, similar to previous in chart


- CBC, CMP, CXR, influenza swab for evaluation





11/28/18 19:27


- Labs reviewed, no concerning abnormalities


- CXR without acute pathology


- Tachycardia down to 118 per minute


- Seen by Dr Centeno. Patient reporting rt calf pain. Ordered right duplex 

ultrasound to r/o DVT and CTA to evaluate for PE given tachycardia.





Patient signed out to Dr Wade.





Discussed with Dr Centeno.





Lolita Sheets


PGY1





<Lolita Sheets - Last Filed: 11/28/18 19:25>





*DC/Admit/Observation/Transfer





<Lolita Sheets - Last Filed: 11/28/18 19:25>





<Marian Centeno - Last Filed: 11/28/18 20:09>


Diagnosis at time of Disposition: 


 Sinus tachycardia, Leg pain








- Referrals


Referrals: 


Keanu Whitaker MD [Primary Care Provider] - 





- Patient Instructions





- Post Discharge Activity

## 2018-11-29 NOTE — EKG
Test Reason : 

Blood Pressure : ***/*** mmHG

Vent. Rate : 122 BPM     Atrial Rate : 122 BPM

   P-R Int : 134 ms          QRS Dur : 122 ms

    QT Int : 324 ms       P-R-T Axes : 086 109 074 degrees

   QTc Int : 461 ms

 

SINUS TACHYCARDIA

POSSIBLE LEFT ATRIAL ENLARGEMENT

RIGHT BUNDLE BRANCH BLOCK

ABNORMAL ECG

WHEN COMPARED WITH ECG OF 15-MAY-2018 09:09,

QRS DURATION HAS DECREASED

Confirmed by MIRIAM RODRIGUEZ MD ([id number]) on 11/29/2018 11:59:15 AM

 

Referred By:             Confirmed By:MIRIAM RODRIGUEZ MD

## 2019-12-08 ENCOUNTER — HOSPITAL ENCOUNTER (INPATIENT)
Dept: HOSPITAL 74 - JER | Age: 64
LOS: 12 days | Discharge: NURSING, REHAB OR LONG-TERM CARE | DRG: 208 | End: 2019-12-20
Attending: INTERNAL MEDICINE | Admitting: INTERNAL MEDICINE
Payer: COMMERCIAL

## 2019-12-08 VITALS — BODY MASS INDEX: 22.1 KG/M2

## 2019-12-08 DIAGNOSIS — I11.0: ICD-10-CM

## 2019-12-08 DIAGNOSIS — J44.1: ICD-10-CM

## 2019-12-08 DIAGNOSIS — I47.1: ICD-10-CM

## 2019-12-08 DIAGNOSIS — Z99.81: ICD-10-CM

## 2019-12-08 DIAGNOSIS — J96.22: ICD-10-CM

## 2019-12-08 DIAGNOSIS — R79.89: ICD-10-CM

## 2019-12-08 DIAGNOSIS — Z86.73: ICD-10-CM

## 2019-12-08 DIAGNOSIS — E78.5: ICD-10-CM

## 2019-12-08 DIAGNOSIS — Z85.46: ICD-10-CM

## 2019-12-08 DIAGNOSIS — I25.10: ICD-10-CM

## 2019-12-08 DIAGNOSIS — J96.21: Primary | ICD-10-CM

## 2019-12-08 DIAGNOSIS — I45.2: ICD-10-CM

## 2019-12-08 DIAGNOSIS — M06.9: ICD-10-CM

## 2019-12-08 DIAGNOSIS — D64.9: ICD-10-CM

## 2019-12-08 DIAGNOSIS — Z95.5: ICD-10-CM

## 2019-12-08 DIAGNOSIS — Z95.1: ICD-10-CM

## 2019-12-08 DIAGNOSIS — F41.8: ICD-10-CM

## 2019-12-08 DIAGNOSIS — I50.30: ICD-10-CM

## 2019-12-08 LAB
ALBUMIN SERPL-MCNC: 3.9 G/DL (ref 3.4–5)
ALP SERPL-CCNC: 102 U/L (ref 45–117)
ALT SERPL-CCNC: 34 U/L (ref 13–61)
ANION GAP SERPL CALC-SCNC: 6 MMOL/L (ref 8–16)
ANISOCYTOSIS BLD QL: (no result)
ARTERIAL BLOOD GAS PCO2: > 100 MMHG (ref 35–45)
ARTERIAL PATENCY WRIST A: POSITIVE
AST SERPL-CCNC: 29 U/L (ref 15–37)
BASOPHILS # BLD: 0.2 % (ref 0–2)
BILIRUB SERPL-MCNC: 1.2 MG/DL (ref 0.2–1)
BUN SERPL-MCNC: 23.9 MG/DL (ref 7–18)
CALCIUM SERPL-MCNC: 8.8 MG/DL (ref 8.5–10.1)
CHLORIDE SERPL-SCNC: 96 MMOL/L (ref 98–107)
CO2 SERPL-SCNC: 35 MMOL/L (ref 21–32)
COHGB MFR BLD: 0.7 % (ref 0–2)
CREAT SERPL-MCNC: 1 MG/DL (ref 0.55–1.3)
DEPRECATED RDW RBC AUTO: 15.8 % (ref 11.9–15.9)
EOSINOPHIL # BLD: 0 % (ref 0–4.5)
GLUCOSE SERPL-MCNC: 123 MG/DL (ref 74–106)
HCT VFR BLD CALC: 44.2 % (ref 35.4–49)
HGB BLD-MCNC: 14 GM/DL (ref 11.7–16.9)
LYMPHOCYTES # BLD: 2.4 % (ref 8–40)
MACROCYTES BLD QL: (no result)
MCH RBC QN AUTO: 29.6 PG (ref 25.7–33.7)
MCHC RBC AUTO-ENTMCNC: 31.7 G/DL (ref 32–35.9)
MCV RBC: 93.3 FL (ref 80–96)
MONOCYTES # BLD AUTO: 9.5 % (ref 3.8–10.2)
NEUTROPHILS # BLD: 87.9 % (ref 42.8–82.8)
OVALOCYTES BLD QL SMEAR: (no result)
PLATELET # BLD AUTO: 367 K/MM3 (ref 134–434)
PLATELET BLD QL SMEAR: ADEQUATE
PMV BLD: 7.5 FL (ref 7.5–11.1)
PO2 BLDA: 323 MMHG (ref 80–100)
POTASSIUM SERPLBLD-SCNC: 5.2 MMOL/L (ref 3.5–5.1)
PROT SERPL-MCNC: 7.5 G/DL (ref 6.4–8.2)
RBC # BLD AUTO: 4.74 M/MM3 (ref 4–5.6)
SAO2 % BLDA: 99.3 % (ref 95–98)
SODIUM SERPL-SCNC: 137 MMOL/L (ref 136–145)
WBC # BLD AUTO: 22.7 K/MM3 (ref 4–10)

## 2019-12-08 PROCEDURE — 5A1935Z RESPIRATORY VENTILATION, LESS THAN 24 CONSECUTIVE HOURS: ICD-10-PCS | Performed by: EMERGENCY MEDICINE

## 2019-12-08 PROCEDURE — 0BH17EZ INSERTION OF ENDOTRACHEAL AIRWAY INTO TRACHEA, VIA NATURAL OR ARTIFICIAL OPENING: ICD-10-PCS | Performed by: EMERGENCY MEDICINE

## 2019-12-08 RX ADMIN — PROPOFOL SCH MLS/HR: 10 INJECTION, EMULSION INTRAVENOUS at 23:09

## 2019-12-08 NOTE — CONSULT
Consultation: 


REQUESTING PROVIDER:








CONSULT REQUEST:


We have been asked to medically evaluate this patient for (specify).





HISTORY OF PRESENT ILLNESS:


This is a 64 year old male with past medical history significant for HTN, HLD, 

DM, COPD (on home O2 and awaiting lung transplant), CAD (s/p CABG), CHF, RA (on 

methotrexate), CVA and prostate CA. Patient was unconscious and therefore 

unable to provide a history. As per ER team, he presented to the ER from home 

with complaints of gradually worsening SOB and chest pain over the past 24 

hours. Upon presentation, he was lethargic and speaking in 1 word sentences. He 

denied any fevers, chills, nausea, vomiting, dysuria, or abdominal pain. He is 

allergic to Penicillins. 








REVIEW OF SYSTEMS:


Unable to assess, patient sedated.











PHYSICAL EXAMINATION





 Vital Signs - 24 hr











  12/08/19 12/08/19 12/08/19





  19:45 20:12 20:42


 


Temperature 98.7 F  


 


Pulse Rate 103 H  


 


Pulse Rate [  108 H 





Left Radial]   


 


Respiratory 27 H 24 H 





Rate   


 


Blood Pressure 147/56 L  


 


Blood Pressure  123/60 





[Left Arm]   


 


O2 Sat by Pulse 100 99 99





Oximetry (%)   














  12/08/19





  21:32


 


Temperature 


 


Pulse Rate 


 


Pulse Rate [ 





Left Radial] 


 


Respiratory 18





Rate 


 


Blood Pressure 


 


Blood Pressure 





[Left Arm] 


 


O2 Sat by Pulse 





Oximetry (%) 














GENERAL: Intubated and sedated


EYES: CHRIS


LUNGS: Decreased breath sounds B/L with audible rhonchi


HEART: RRR, S1, S2, no wheezes or murmurs


ABDOMEN: Soft, tender


LOWER EXTREMITIES: Warm, well-perfused. No calf tenderness. No peripheral 

edema. 


NEUROLOGICAL: Intubated and sedated


SKIN: Dry











 Laboratory Results - last 24 hr











  12/08/19 12/08/19 12/08/19





  20:20 20:30 20:30


 


WBC   22.7 H 


 


RBC   4.74 


 


Hgb   14.0 


 


Hct   44.2  D 


 


MCV   93.3 


 


MCH   29.6 


 


MCHC   31.7 L 


 


RDW   15.8 


 


Plt Count   367  D 


 


MPV   7.5 


 


Absolute Neuts (auto)   20.0 H 


 


Neutrophils %   87.9 H 


 


Lymphocytes %   2.4 L D 


 


Monocytes %   9.5 


 


Eosinophils %   0.0  D 


 


Basophils %   0.2 


 


Nucleated RBC %   0 


 


Anticoagulation Therapy   


 


Puncture Site   


 


Patient Temperature   


 


ABG pH   


 


ABG pCO2 at Pt Temp   


 


ABG pO2 at Pt Temp   


 


ABG HCO3   


 


ABG O2 Sat (Measured)   


 


ABG O2 Content   


 


ABG Base Excess   


 


Mick Test   


 


Carboxyhemoglobin   


 


Methemoglobin   


 


O2 Delivery Device   


 


Oxygen Flow Rate   


 


Vent Mode   


 


Vent Rate   


 


Mechanical Rate   


 


PEEP   


 


Pressure Support Vent   


 


Sodium    137


 


Potassium    5.2 H


 


Chloride    96 L


 


Carbon Dioxide    35 H


 


Anion Gap    6 L


 


BUN    23.9 H


 


Creatinine    1.0


 


Est GFR (CKD-EPI)AfAm    93.08


 


Est GFR (CKD-EPI)NonAf    80.31


 


Random Glucose    123 H


 


Lactic Acid  4.0 H*  


 


Calcium    8.8


 


Total Bilirubin    1.2 H


 


AST    29


 


ALT    34


 


Alkaline Phosphatase    102


 


Total Protein    7.5


 


Albumin    3.9














  12/08/19 12/08/19





  20:30 21:35


 


WBC  


 


RBC  


 


Hgb  


 


Hct  


 


MCV  


 


MCH  


 


MCHC  


 


RDW  


 


Plt Count  


 


MPV  


 


Absolute Neuts (auto)  


 


Neutrophils %  


 


Lymphocytes %  


 


Monocytes %  


 


Eosinophils %  


 


Basophils %  


 


Nucleated RBC %  


 


Anticoagulation Therapy  Cancelled  No Result Required.


 


Puncture Site  Cancelled  Right radial


 


Patient Temperature  Cancelled 


 


ABG pH  Cancelled  7.09 L*


 


ABG pCO2 at Pt Temp  Cancelled  > 100 H*


 


ABG pO2 at Pt Temp  Cancelled  323 H


 


ABG HCO3  Cancelled  No Result Required.


 


ABG O2 Sat (Measured)  Cancelled  99.3 H


 


ABG O2 Content  Cancelled  17.8


 


ABG Base Excess  Cancelled  No Result Required.


 


Mick Test  Cancelled  Positive


 


Carboxyhemoglobin  Cancelled  0.7


 


Methemoglobin  Cancelled  < 1.0


 


O2 Delivery Device  Cancelled  No Result Required.


 


Oxygen Flow Rate  Cancelled  80%


 


Vent Mode  Cancelled  A/c


 


Vent Rate  Cancelled  18


 


Mechanical Rate  Cancelled  No Result Required.


 


PEEP  Cancelled  5.0


 


Pressure Support Vent  Cancelled  400


 


Sodium  


 


Potassium  


 


Chloride  


 


Carbon Dioxide  


 


Anion Gap  


 


BUN  


 


Creatinine  


 


Est GFR (CKD-EPI)AfAm  


 


Est GFR (CKD-EPI)NonAf  


 


Random Glucose  


 


Lactic Acid  


 


Calcium  


 


Total Bilirubin  


 


AST  


 


ALT  


 


Alkaline Phosphatase  


 


Total Protein  


 


Albumin  








Active Medications











Generic Name Dose Route Start Last Admin





  Trade Name Freq  PRN Reason Stop Dose Admin


 


Midazolam HCl 100 mg/ Sodium  100 mls @ 2.8 mls/hr  12/08/19 22:15  





  Chloride  IVPB   





  TITR ISAIAH   





     





     





  Protocol   





  2.8 MG/HR   











ASSESSMENT/PLAN:


64M with PMH of [place], HLD, DM, COPD, CAD, CHF, RA, CVA and prostate CA. Presented 

to the ER from home on 12/8 with complaints of gradually worsening SOB and 

chest pain over the past 24 hours. He was lethargic on presentation and 

subsequently intubated.





#CNS


- Sedated and intubated


- Head CT ordered





#Respiratory


- Acute on chronic respiratory failure with hypoxia and hypercapnia


- ABG: pH 7.09, pCO2 >100, pO2 323


- CT Chest pending


- CXR: Patchy infiltrates in Rt lower lobe, awaiting official reading


- Duonebs, Solu-Medrol, Mag, 





#CVS


- Hx of HTN: Will confirm and resume home meds when appropriate


- POC US in ER: decreased EF with LV lateral wall motion abnormality, no b lines

, % collapsible, no pericardial effusion, no pleural effusion 





#ID


- WBC 22.7 with neutrophilia


- LA 4.0


- Blood, urine cx pending


- Aztreonam, Imipenem, Vanco given





#GI


- CT AP pending





#Renal


- BUN 23.9, Cr 1.0





#FEN


- N/S


- K+ 5.2, 





#Dispo


- Admit to ICU


- We will continue to follow the patient. Thank you for this consultative 

opportunity.











Visit type





- Emergency Visit


Emergency Visit: Yes


ED Registration Date: 12/08/19


Care time: The patient presented to the Emergency Department on the above date 

and was hospitalized for further evaluation of their emergent condition.





- New Patient


This patient is new to me today: Yes


Date on this admission: 12/11/19





- Critical Care


Critical Care patient: Yes


Total Critical Care Time (in minutes): 39


Critical Care Statement: The care of this patient involved high complexity 

decision making to prevent further life threatening deterioration of the patient

's condition and/or to evaluate & treat vital organ system(s) failure or risk 

of failure.





ATTENDING PHYSICIAN STATEMENT





I saw and evaluated the patient.


I reviewed the resident's note and discussed the case with the resident.


I agree with the resident's findings and plan as documented.








SUBJECTIVE:








OBJECTIVE:








ASSESSMENT AND PLAN:

## 2019-12-08 NOTE — PDOC
History of Present Illness





- General


Chief Complaint: Shortness of Breath


Stated Complaint: DIFFICULTY BREATHING


Time Seen by Provider: 12/08/19 20:00


History Source: Patient


Exam Limitations: No Limitations





- History of Present Illness


Initial Comments: 





12/08/19 22:40


62yo man with a PMH of CAD s/p CABG, COPD on home O2 (on a list for lung 

transplant), HTN, HLD, HIV, CHF, RA on methotrexate, and anxiety presents to 

the emergency department with respiratory distress from home. Per the patient's 

family member, he was having SOB today over the past 24 hours and was 

increasingly short of breath. Per the daughter, she states he wanted to present 

earlier but did not. On history intake, the patient was speaking in 1 word 

answers and lethargic. He stated he felt short of breath and had chest 

tightness. Denies fevers, nausea, vomiting, dysuria, abdominal pain. 








Allergies: PCN








Past History





- Past Medical History


Allergies/Adverse Reactions: 


 Allergies











Allergy/AdvReac Type Severity Reaction Status Date / Time


 


Penicillins Allergy Severe Rash Verified 12/08/19 20:16


 


seafood Allergy Severe Rash Uncoded 12/08/19 20:16











Home Medications: 


Ambulatory Orders





Albuterol Sulfate Inhaler - [Ventolin Hfa Inhaler -] 1 - 2 inh PO Q4H PRN 11/28/ 18 


Budesonide/Formeterol Fumarate [SYMBICORT 160/4.5mcg -] 1 inh PO BID 11/28/18 


Clopidogrel Bisulfate [Plavix] 75 mg PO DAILY 11/28/18 


Folic Acid 1 mg PO DAILY 11/28/18 


Furosemide [Lasix] 40 mg PO DAILY 11/28/18 


Hydroxychloroquine Sulfate [Plaquenil] 200 mg PO BID 11/28/18 


Methotrexate [Mexate -] 15 mg PO Q7D 11/28/18 


Ramipril [Altace] 5 mg PO DAILY 11/28/18 


Simvastatin [Zocor -] 40 mg PO HS 11/28/18 


Azithromycin 1 tab PO WEEKLY 12/08/19 


Clonazepam 0.5 mg PO BID 12/08/19 


Fluticasone/Umeclidin/Vilanter [Trelegy Ellipta 100-62.5-25] 1 puff 12/08/19 








Anemia: No


Asthma: No


Cancer: No


Cardiac Disorders: Yes (MI, stent, BYPASS)


CVA: No


COPD: Yes (2L)


CHF: Yes


Dementia: No


Diabetes: No


GI Disorders: No


 Disorders: No


HTN: Yes


Hypercholesterolemia: Yes


Liver Disease: No


Psychiatric Problems: Yes (anxiety)


Seizures: No


Thyroid Disease: No





- Surgical History


Abdominal Surgery: No


Appendectomy: No


Cardiac Surgery: Yes (Stent, cardiaccath)


Cholecystectomy: No


Lung Surgery: No


Neurologic Surgery: No


Orthopedic Surgery: Yes (KNEE)





- Immunization History


Immunization Up to Date: Yes





- Psycho Social/Smoking Cessation Hx


Smoking Status: No


Smoking History: Unknown if ever smoked


Have you smoked in the past 12 months: No


Number of Cigarettes Smoked Daily: 0


If you are a former smoker, when did you quit?: 3YRS


Hx Alcohol Use: No


Drug/Substance Use Hx: No


Substance Use Type: None


Hx Substance Use Treatment: No





**Review of Systems





- Review of Systems


Able to Perform ROS?: No (AMS)





*Physical Exam





- Vital Signs


 Last Vital Signs











Temp Pulse Resp BP Pulse Ox


 


    108 H  24 H  123/60   99 


 


    12/08/19 20:12  12/08/19 20:12  12/08/19 20:12  12/08/19 20:42














Procedures





- Intubation


Intubation Method: orotracheal


Blade used: Mac


Tube Size (Fr): 7.0


Medications: Etomidate, Succinylcholine


Tube position @ lip (cm): 24


Tube position confirmed by: Direct visualization, CO2 detector, Chest x-ray, 

Breath sounds


Breath Sounds after Intubation: equal


Intubation Complications: no complications


Post Intubation Xray: Yes





ED Treatment Course





- LABORATORY


CBC & Chemistry Diagram: 


 12/11/19 06:30





 12/11/19 06:30





- ADDITIONAL ORDERS


Additional order review: 


 Laboratory  Results











  12/08/19 12/08/19





  20:30 20:30


 


Anticoagulation Therapy  No Result Required. 


 


O2 Delivery Device  No Result Required. 


 


Oxygen Flow Rate  No Result Required. 


 


Vent Mode  No Result Required. 


 


Vent Rate  No Result Required. 


 


Mechanical Rate  No Result Required. 


 


Pressure Support Vent  No Result Required. 


 


Sodium   137


 


Potassium   5.2 H


 


Chloride   96 L


 


Carbon Dioxide   35 H


 


Anion Gap   6 L


 


BUN   23.9 H


 


Creatinine   1.0


 


Est GFR (CKD-EPI)AfAm   93.08


 


Est GFR (CKD-EPI)NonAf   80.31


 


Random Glucose   123 H


 


Calcium   8.8


 


Total Bilirubin   1.2 H


 


AST   29


 


ALT   34


 


Alkaline Phosphatase   102


 


Total Protein   7.5


 


Albumin   3.9














- RADIOLOGY


Radiology Studies Ordered: 














 Category Date Time Status


 


 HEAD CT WITHOUT CONTRAST [CT] Stat CT Scan  12/08/19 20:23 Ordered














- Medications


Given in the ED: 


ED Medications














Discontinued Medications














Generic Name Dose Route Start Last Admin





  Trade Name Qi  PRN Reason Stop Dose Admin


 


Albuterol/Ipratropium  3 amp  12/08/19 20:24  12/08/19 20:40





  Duoneb -  NEB  12/08/19 20:25  3 amp





  ONCE ONE   Administration





     





     





     





     


 


Magnesium Sulfate  2 gm  12/08/19 20:24  12/08/19 20:40





  Magnesium Sulfate  IVPB  12/08/19 20:25  2 gm





  ONCE ONE   Administration





     





     





     





     


 


Methylprednisolone Sodium Succinate  125 mg  12/08/19 20:24  12/08/19 20:40





  Solu-Medrol -  IVPB  12/08/19 20:25  125 mg





  ONCE ONE   Administration





     





     





     





     














Medical Decision Making





- Medical Decision Making





12/08/19 21:47


62yo man with a PMH of CAD s/p CABG, COPD on home O2 (on a list for lung 

transplant), HTN, HLD, HIV, CHF, RA on methotrexate, and anxiety presents to 

the emergency department with respiratory distress from home.





Initial vitals:


 Initial Vital Signs











Temp Pulse Resp BP Pulse Ox


 


 98.7 F   103 H  27 H  147/56 L  100 


 


 12/08/19 19:45  12/08/19 19:45  12/08/19 19:45  12/08/19 19:45  12/08/19 19:45








Work up:


COPD exacerbation likely given past medical history over CHF exacerbation based 

on POCUS and physical exam


PE: 


severe decreased breath sounds with audible rhonchi


cardiac: rrr without murmurs


abdomen: diffusely tender on palpation


PCOUS: echo shows decreased EF with LV lateral wall motion abnormality, no b 

lines on US, % collapsible. no pericardial effusion. no pleural effusion 





Patient was placed on bipap given suspected hypercarbia respiratory failure. 

the patient was placed on for 15 minutes and determined to have worsening 

mental status. initially, the patient would respond to verbal and physical 

stimulation. Currently, the patient is not responding to physical stimulation 

but maintaining 100% O2 on 100% FiO2 on bipap. Consent to intubate obtained 

from patient's daughter.





 Laboratory Tests











  12/08/19 12/08/19 12/08/19





  20:20 20:30 20:30


 


WBC   22.7 H 


 


RBC   4.74 


 


Hgb   14.0 


 


Hct   44.2  D 


 


MCV   93.3 


 


MCH   29.6 


 


MCHC   31.7 L 


 


RDW   15.8 


 


Plt Count   367  D 


 


MPV   7.5 


 


Absolute Neuts (auto)   20.0 H 


 


Total Counted   100 


 


Neutrophils %   87.9 H 


 


Neutrophils % (Manual)   87.0 H 


 


Band Neutrophils %   3.0 


 


Lymphocytes %   2.4 L D 


 


Lymphocytes % (Manual)   2.0 L 


 


Monocytes %   9.5 


 


Monocytes % (Manual)   8 


 


Eosinophils %   0.0  D 


 


Basophils %   0.2 


 


Nucleated RBC %   0 


 


Differential Comment   Man diff performed 


 


Platelet Estimate   Adequate 


 


Platelet Comment    


 


Poikilocytosis   1+ 


 


Anisocytosis   1+ 


 


Macrocytosis   1+ 


 


Ovalocytes   1+ 


 


Anticoagulation Therapy   


 


Puncture Site   


 


Patient Temperature   


 


ABG pH   


 


ABG pCO2 at Pt Temp   


 


ABG pO2 at Pt Temp   


 


ABG HCO3   


 


ABG O2 Sat (Measured)   


 


ABG O2 Content   


 


ABG Base Excess   


 


Mick Test   


 


Carboxyhemoglobin   


 


Methemoglobin   


 


O2 Delivery Device   


 


Oxygen Flow Rate   


 


Vent Mode   


 


Vent Rate   


 


Mechanical Rate   


 


PEEP   


 


Pressure Support Vent   


 


Sodium    137


 


Potassium    5.2 H


 


Chloride    96 L


 


Carbon Dioxide    35 H


 


Anion Gap    6 L


 


BUN    23.9 H


 


Creatinine    1.0


 


Est GFR (CKD-EPI)AfAm    93.08


 


Est GFR (CKD-EPI)NonAf    80.31


 


Random Glucose    123 H


 


Lactic Acid  4.0 H*  


 


Calcium    8.8


 


Magnesium    2.6 H


 


Total Bilirubin    1.2 H


 


AST    29


 


ALT    34


 


Alkaline Phosphatase    102


 


Creatine Kinase    196


 


Creatine Kinase Index    4.5


 


CK-MB (CK-2)    8.9 H


 


Troponin I    < 0.02


 


Total Protein    7.5


 


Albumin    3.9














  12/08/19 12/08/19





  20:30 21:35


 


WBC  


 


RBC  


 


Hgb  


 


Hct  


 


MCV  


 


MCH  


 


MCHC  


 


RDW  


 


Plt Count  


 


MPV  


 


Absolute Neuts (auto)  


 


Total Counted  


 


Neutrophils %  


 


Neutrophils % (Manual)  


 


Band Neutrophils %  


 


Lymphocytes %  


 


Lymphocytes % (Manual)  


 


Monocytes %  


 


Monocytes % (Manual)  


 


Eosinophils %  


 


Basophils %  


 


Nucleated RBC %  


 


Differential Comment  


 


Platelet Estimate  


 


Platelet Comment  


 


Poikilocytosis  


 


Anisocytosis  


 


Macrocytosis  


 


Ovalocytes  


 


Anticoagulation Therapy  Cancelled  No Result Required.


 


Puncture Site  Cancelled  Right radial


 


Patient Temperature  Cancelled 


 


ABG pH  Cancelled  7.09 L*


 


ABG pCO2 at Pt Temp  Cancelled  > 100 H*


 


ABG pO2 at Pt Temp  Cancelled  323 H


 


ABG HCO3  Cancelled  No Result Required.


 


ABG O2 Sat (Measured)  Cancelled  99.3 H


 


ABG O2 Content  Cancelled  17.8


 


ABG Base Excess  Cancelled  No Result Required.


 


Mick Test  Cancelled  Positive


 


Carboxyhemoglobin  Cancelled  0.7


 


Methemoglobin  Cancelled  < 1.0


 


O2 Delivery Device  Cancelled  No Result Required.


 


Oxygen Flow Rate  Cancelled  80%


 


Vent Mode  Cancelled  A/c


 


Vent Rate  Cancelled  18


 


Mechanical Rate  Cancelled  No Result Required.


 


PEEP  Cancelled  5.0


 


Pressure Support Vent  Cancelled  400


 


Sodium  


 


Potassium  


 


Chloride  


 


Carbon Dioxide  


 


Anion Gap  


 


BUN  


 


Creatinine  


 


Est GFR (CKD-EPI)AfAm  


 


Est GFR (CKD-EPI)NonAf  


 


Random Glucose  


 


Lactic Acid  


 


Calcium  


 


Magnesium  


 


Total Bilirubin  


 


AST  


 


ALT  


 


Alkaline Phosphatase  


 


Creatine Kinase  


 


Creatine Kinase Index  


 


CK-MB (CK-2)  


 


Troponin I  


 


Total Protein  


 


Albumin  








ABG consistent with hypercapnea hypoxic respiratory failure likely secondary to 

severe COPD exacerbation.


Leukocytosis present, elevated lactic acid, and negative troponin. 


Aztreonam and vancomycin given in the emergency department.


Patient was intubated (refer to procedure note)


Patient was admitted to the ICU for COPD exacerbation requiring intubation and 

now in respiratory failure. 


EKG: 


NSR without ST elevations or depressions. bifasicular block noted. 





Dispo:


Admit











Discharge





- Discharge Information


Problems reviewed: Yes


Clinical Impression/Diagnosis: 


 Acute on chronic respiratory failure with hypoxia and hypercapnia





Condition: Critical





- Follow up/Referral





- Patient Discharge Instructions





- Post Discharge Activity

## 2019-12-08 NOTE — PDOC
Documentation entered by Ruth Baugh SCRIBE, acting as scribe for Ferdinand Vazquez MD.








Ferdinand Vazquez MD:  This documentation has been prepared by the Amauri rouse Nirvannie, SCRIBE, under my direction and personally reviewed by me in its 

entirety.  I confirm that the documentation accurately reflects all work, 

treatment, procedures, and medical decision making performed by me.  





Attending Attestation





- Resident


Resident Name: London Graham





- ED Attending Attestation


I have performed the following: I have examined & evaluated the patient, The 

case was reviewed & discussed with the resident, I agree w/resident's findings 

& plan





- HPI


HPI: 





12/08/19 22:13


CC: Shortness of breath and chest pain.


 


HPI:





63 years old past medical history significant for CAD status post CABG COPD on 

home O2 awaiting lung transplant HIV, hypertension, hyperlipidemia, CHF, 

rheumatoid arthritis on methotrexate presents to the emergency department with 

weakness lethargy difficulty breathing





History limited progressively worsening over the last few days.





- Physicial Exam


PE: 





12/08/19 23:32





Vitals: Triage Vital signs reviewed


General Appearance: Moderate respiratory distress


Head: Atraumatic, 


Eyes: Pupils equal reactive round, extraocular movement intact


Neck: Supple; no Nucal rigidity


Chest Wall: Nontender


Cardiac: Tachycardic


Lungs: Coarse breath sounds at bases


Abdomen: Soft mild diffuse tenderness no rebound no guarding


Extremities: Full range of motion to all extremities, no cyanosis, clubbing, or 

edema


Skin: Warm and dry, no rashes or lesions, no rash, no petechiae





- Critical Care Time


Total Critical Care Time: 45


Critical Care Statement: The care of this patient involved high complexity 

decision making to prevent further life threatening deterioration of the patient

's condition and/or to evaluate & treat vital organ system(s) failure or risk 

of failure.





- Medical Decision Making





12/08/19 23:33


64 years old with multiple comorbidities end-stage CO2 awaiting lung transplant 

presents to the ED in respiratory distress afebrile





Given lethargy and altered mental status sepsis work-up initiated





Patient immediately placed on BiPAP however patient's mental status continued 

to deteriorate concern for airway compromise and protection of airway decision 

made to intubate patient





See intubation note





Patient successfully intubated with no complications stat ABG ordered





ABG demonstrates hypercapnic respiratory failure





Patient covered with broad-spectrum antibiotics aztreonam given given 

penicillin allergy.  Given altered mental status and mild diffuse abdominal 

discomfort when patient was more alert will obtain CTs of head chest abdomen 

pelvis to rule out other underlying pathology





ICU has been consulted





Patient will be admitted to the ICU for further management disease

## 2019-12-08 NOTE — PDOC
History of Present Illness





- General


Chief Complaint: Shortness of Breath


Stated Complaint: DIFFICULTY BREATHING


Time Seen by Provider: 12/08/19 20:00





- History of Present Illness


Initial Comments: 





12/08/19 20:01


HPI: 








Patient denies fever, chills, night sweats, HA, vision change, chest pain, 

palpitations, SOB, cough, leg swelling, abdominal pain, nausea, vomiting, 

diarrhea, constipation, dysuria, hematuria, BPR, lightheadedness, weakness, 

sensory changes.





PMHx: as noted above


ROS: as noted


SHx: Denies tobacco use; no alcohol use; no rec drugs


Allergies: NKDA








ROS: 


GENERAL/CONSTITUTIONAL: No fever or chills. No weakness.


HEAD, EYES, EARS, NOSE AND THROAT: No change in vision. No ear pain or 

discharge. No sore throat.


CARDIOVASCULAR: No chest pain or shortness of breath


RESPIRATORY: No cough, wheezing, or hemoptysis.


GASTROINTESTINAL: No nausea, vomiting, diarrhea or constipation.


GENITOURINARY: No dysuria, frequency, or change in urination.


MUSCULOSKELETAL: No joint or muscle swelling or pain. No neck or back pain.


SKIN: No rash


NEUROLOGIC: No headache, vertigo, loss of consciousness, or change in strength/

sensation.


ENDOCRINE: No increased thirst. No abnormal weight change


HEMATOLOGIC/LYMPHATIC: No anemia, easy bleeding, or history of blood clots.


ALLERGIC/IMMUNOLOGIC: No hives or skin allergy.








PE: 


GENERAL: Awake, alert, and fully oriented, no acute distress


HEAD: No signs of trauma, normocephalic, atraumatic 


EYES: EOMI, sclera anicteric, conjunctiva clear


ENT: Auricles normal inspection, hearing grossly normal, nares patent, 

oropharynx clear without exudates. Moist mucosa


NECK: Normal ROM, no lymphadenopathy


LUNGS: No increased work of breathing, symmetrical chest rise, clear to 

auscultation bilaterally, no wheezes, crackles or rhonchi 


HEART: Regular rate and rhythm, normal S1 and S2, no murmurs, peripheral pulses 

2+ and equal bilaterally. 


ABDOMEN: Soft, nondistended, nontender, normoactive bowel sounds. No guarding, 

no rebound. No masses. No CVAT


EXTREMITIES: Normal inspection, Normal range of motion, no edema. No clubbing 

or cyanosis. 


NEUROLOGICAL: Cranial nerves II through XII grossly intact. Normal speech, 

normal gait, no focal sensorimotor deficits 


SKIN: Warm, Dry, normal turgor, no rashes or lesions noted








Past History





- Past Medical History


Allergies/Adverse Reactions: 


 Allergies











Allergy/AdvReac Type Severity Reaction Status Date / Time


 


Penicillins Allergy Severe Rash Verified 11/28/18 13:31


 


seafood Allergy Severe Rash Uncoded 11/28/18 13:31











Home Medications: 


Ambulatory Orders





Albuterol Sulfate Inhaler - [Ventolin Hfa Inhaler -] 1 - 2 inh PO Q4H PRN 11/28/ 18 


Budesonide/Formeterol Fumarate [SYMBICORT 160/4.5mcg -] 1 inh PO BID 11/28/18 


Clopidogrel Bisulfate [Plavix] 75 mg PO DAILY 11/28/18 


Folic Acid 1 mg PO DAILY 11/28/18 


Furosemide [Lasix] 40 mg PO DAILY 11/28/18 


Hydroxychloroquine Sulfate [Plaquenil] 200 mg PO BID 11/28/18 


Methotrexate [Mexate -] 15 mg PO Q7D 11/28/18 


Ramipril [Altace] 5 mg PO DAILY 11/28/18 


Simvastatin [Zocor -] 40 mg PO HS 11/28/18 








Anemia: No


Asthma: No


Cancer: No


Cardiac Disorders: Yes (MI, stent, BYPASS)


CVA: No


COPD: Yes (2L)


CHF: Yes


Dementia: No


Diabetes: No


GI Disorders: No


 Disorders: No


HTN: Yes


Hypercholesterolemia: Yes


Liver Disease: No


Psychiatric Problems: Yes (anxiety)


Seizures: No


Thyroid Disease: No





- Surgical History


Abdominal Surgery: No


Appendectomy: No


Cardiac Surgery: Yes (Stent, cardiaccath)


Cholecystectomy: No


Lung Surgery: No


Neurologic Surgery: No


Orthopedic Surgery: Yes (KNEE)





- Immunization History


Immunization Up to Date: Yes





- Psycho Social/Smoking Cessation Hx


Smoking Status: No


Smoking History: Unknown if ever smoked


Have you smoked in the past 12 months: No


Number of Cigarettes Smoked Daily: 0


If you are a former smoker, when did you quit?: 3YRS


Hx Alcohol Use: No


Drug/Substance Use Hx: No


Substance Use Type: None


Hx Substance Use Treatment: No

## 2019-12-09 LAB
ALBUMIN SERPL-MCNC: 2.8 G/DL (ref 3.4–5)
ALP SERPL-CCNC: 76 U/L (ref 45–117)
ALT SERPL-CCNC: 25 U/L (ref 13–61)
ANION GAP SERPL CALC-SCNC: 6 MMOL/L (ref 8–16)
ANISOCYTOSIS BLD QL: (no result)
APPEARANCE UR: (no result)
ARTERIAL BLOOD GAS PCO2: 62.3 MMHG (ref 35–45)
ARTERIAL BLOOD GAS PCO2: 62.6 MMHG (ref 35–45)
ARTERIAL BLOOD GAS PCO2: 80.2 MMHG (ref 35–45)
ARTERIAL PATENCY WRIST A: POSITIVE
AST SERPL-CCNC: 25 U/L (ref 15–37)
BACTERIA # UR AUTO: 1.2 /HPF
BASE EXCESS BLDA CALC-SCNC: 0.2 MEQ/L (ref -2–2)
BASE EXCESS BLDA CALC-SCNC: 4.7 MEQ/L (ref -2–2)
BASE EXCESS BLDA CALC-SCNC: 5.1 MEQ/L (ref -2–2)
BASOPHILS # BLD: 0 % (ref 0–2)
BILIRUB SERPL-MCNC: 0.8 MG/DL (ref 0.2–1)
BILIRUB UR STRIP.AUTO-MCNC: NEGATIVE MG/DL
BUN SERPL-MCNC: 16.7 MG/DL (ref 7–18)
CALCIUM SERPL-MCNC: 8.3 MG/DL (ref 8.5–10.1)
CASTS URNS QL MICRO: 11 /LPF (ref 0–8)
CHLORIDE SERPL-SCNC: 101 MMOL/L (ref 98–107)
CO2 SERPL-SCNC: 32 MMOL/L (ref 21–32)
COLOR UR: YELLOW
CREAT SERPL-MCNC: 0.7 MG/DL (ref 0.55–1.3)
DEPRECATED RDW RBC AUTO: 15.9 % (ref 11.9–15.9)
EOSINOPHIL # BLD: 0 % (ref 0–4.5)
EPITH CASTS URNS QL MICRO: 2.7 /HPF
GLUCOSE SERPL-MCNC: 114 MG/DL (ref 74–106)
HCT VFR BLD CALC: 34.9 % (ref 35.4–49)
HGB BLD-MCNC: 11.3 GM/DL (ref 11.7–16.9)
KETONES UR QL STRIP: (no result)
LEUKOCYTE ESTERASE UR QL STRIP.AUTO: (no result)
LYMPHOCYTES # BLD: 1.5 % (ref 8–40)
MACROCYTES BLD QL: 0
MAGNESIUM SERPL-MCNC: 2.6 MG/DL (ref 1.8–2.4)
MAGNESIUM SERPL-MCNC: 2.9 MG/DL (ref 1.8–2.4)
MCH RBC QN AUTO: 29.8 PG (ref 25.7–33.7)
MCHC RBC AUTO-ENTMCNC: 32.5 G/DL (ref 32–35.9)
MCV RBC: 91.8 FL (ref 80–96)
MONOCYTES # BLD AUTO: 2.6 % (ref 3.8–10.2)
NEUTROPHILS # BLD: 95.9 % (ref 42.8–82.8)
NITRITE UR QL STRIP: NEGATIVE
PH UR: 5.5 [PH] (ref 5–8)
PHOSPHATE SERPL-MCNC: 2.1 MG/DL (ref 2.5–4.9)
PLATELET # BLD AUTO: 282 K/MM3 (ref 134–434)
PLATELET BLD QL SMEAR: NORMAL
PMV BLD: 7.5 FL (ref 7.5–11.1)
PO2 BLDA: 180 MMHG (ref 80–100)
PO2 BLDA: 197 MMHG (ref 80–100)
PO2 BLDA: 66.6 MMHG (ref 80–100)
POTASSIUM SERPLBLD-SCNC: 5 MMOL/L (ref 3.5–5.1)
PROT SERPL-MCNC: 5.9 G/DL (ref 6.4–8.2)
PROT UR QL STRIP: (no result)
PROT UR QL STRIP: NEGATIVE
RBC # BLD AUTO: 1107 /HPF (ref 0–4)
RBC # BLD AUTO: 3.8 M/MM3 (ref 4–5.6)
SAO2 % BLDA: 93.3 % (ref 95–98)
SAO2 % BLDA: 99.5 % (ref 95–98)
SAO2 % BLDA: 99.5 % (ref 95–98)
SODIUM SERPL-SCNC: 139 MMOL/L (ref 136–145)
SP GR UR: 1.02 (ref 1.01–1.03)
UROBILINOGEN UR STRIP-MCNC: 1 MG/DL (ref 0.2–1)
WBC # BLD AUTO: 18.8 K/MM3 (ref 4–10)
WBC # UR AUTO: 32 /HPF (ref 0–5)

## 2019-12-09 RX ADMIN — ALBUTEROL SULFATE SCH: 2.5 SOLUTION RESPIRATORY (INHALATION) at 12:00

## 2019-12-09 RX ADMIN — ALBUTEROL SULFATE SCH: 2.5 SOLUTION RESPIRATORY (INHALATION) at 08:30

## 2019-12-09 RX ADMIN — IPRATROPIUM BROMIDE AND ALBUTEROL SULFATE SCH AMP: .5; 3 SOLUTION RESPIRATORY (INHALATION) at 11:07

## 2019-12-09 RX ADMIN — IPRATROPIUM BROMIDE AND ALBUTEROL SULFATE SCH AMP: .5; 3 SOLUTION RESPIRATORY (INHALATION) at 08:30

## 2019-12-09 RX ADMIN — METHYLPREDNISOLONE SODIUM SUCCINATE SCH MG: 40 INJECTION, POWDER, FOR SOLUTION INTRAMUSCULAR; INTRAVENOUS at 17:10

## 2019-12-09 RX ADMIN — MUPIROCIN SCH: 20 OINTMENT TOPICAL at 21:20

## 2019-12-09 RX ADMIN — CLOPIDOGREL BISULFATE SCH MG: 75 TABLET, FILM COATED ORAL at 11:59

## 2019-12-09 RX ADMIN — IPRATROPIUM BROMIDE AND ALBUTEROL SULFATE SCH AMP: .5; 3 SOLUTION RESPIRATORY (INHALATION) at 15:39

## 2019-12-09 RX ADMIN — AZITHROMYCIN DIHYDRATE SCH MLS/HR: 500 INJECTION, POWDER, LYOPHILIZED, FOR SOLUTION INTRAVENOUS at 11:59

## 2019-12-09 RX ADMIN — CHLORHEXIDINE GLUCONATE SCH APPLIC: 213 SOLUTION TOPICAL at 21:18

## 2019-12-09 RX ADMIN — IPRATROPIUM BROMIDE AND ALBUTEROL SULFATE SCH AMP: .5; 3 SOLUTION RESPIRATORY (INHALATION) at 20:01

## 2019-12-09 RX ADMIN — SODIUM CHLORIDE SCH MLS/HR: 9 INJECTION, SOLUTION INTRAVENOUS at 14:10

## 2019-12-09 NOTE — PN
Physical Exam: 


SUBJECTIVE: Patient seen and examined. Taken off sedation around 0830 this 

morning after which patient slowly became arousable and started following 

commands. Extubated ~1030 without complications. Place on BiPAP with O2 

saturation in the mid 90s. 








OBJECTIVE:





 Vital Signs











 Period  Temp  Pulse  Resp  BP Sys/Baer  Pulse Ox


 


 Last 24 Hr  97.8 F-98.7 F    15-27  /56-86  











GENERAL: The patient is awake, alert, and fully oriented


HEAD: Normal with no signs of trauma.


EYES: EOMI, no scleral icterus 


ENT: dry mucous membranes 


NECK: Trachea midline, supple


LUNGS: Crackles bilaterally, worse at bases. no accessory muscle use 


HEART: RRR, no murmur 


ABDOMEN: Soft, nontender, nondistended, normoactive bowel sounds, no guarding


EXTREMITIES: 2+ pulses, warm, well-perfused, no edema. 


NEUROLOGICAL: Normal speech, gait not observed. Normal gag reflex 


SKIN: Warm, dry








 Laboratory Results - last 24 hr











  12/08/19 12/08/19 12/08/19





  20:20 20:30 20:30


 


WBC   22.7 H 


 


RBC   4.74 


 


Hgb   14.0 


 


Hct   44.2  D 


 


MCV   93.3 


 


MCH   29.6 


 


MCHC   31.7 L 


 


RDW   15.8 


 


Plt Count   367  D 


 


MPV   7.5 


 


Absolute Neuts (auto)   20.0 H 


 


Total Counted   100 


 


Neutrophils %   87.9 H 


 


Neutrophils % (Manual)   87.0 H 


 


Band Neutrophils %   3.0 


 


Lymphocytes %   2.4 L D 


 


Lymphocytes % (Manual)   2.0 L 


 


Monocytes %   9.5 


 


Monocytes % (Manual)   8 


 


Eosinophils %   0.0  D 


 


Eosinophils % (Manual)   


 


Basophils %   0.2 


 


Basophils % (Manual)   


 


Myelocytes % (Man)   


 


Promyelocytes % (Man)   


 


Blast Cells % (Manual)   


 


Nucleated RBC %   0 


 


Metamyelocytes   


 


Differential Comment   Man diff performed 


 


Hypochromia   


 


Platelet Estimate   Adequate 


 


Platelet Comment    


 


Polychromasia   


 


Poikilocytosis   1+ 


 


Anisocytosis   1+ 


 


Microcytosis   


 


Macrocytosis   1+ 


 


Ovalocytes   1+ 


 


Anticoagulation Therapy   


 


Puncture Site   


 


Patient Temperature   


 


ABG pH   


 


ABG pCO2 at Pt Temp   


 


ABG pO2 at Pt Temp   


 


ABG HCO3   


 


ABG O2 Sat (Measured)   


 


ABG O2 Content   


 


ABG Base Excess   


 


Mick Test   


 


Carboxyhemoglobin   


 


Methemoglobin   


 


O2 Delivery Device   


 


Oxygen Flow Rate   


 


Vent Mode   


 


Vent Rate   


 


Mechanical Rate   


 


PEEP   


 


Pressure Support Vent   


 


Sodium    137


 


Potassium    5.2 H


 


Chloride    96 L


 


Carbon Dioxide    35 H


 


Anion Gap    6 L


 


BUN    23.9 H


 


Creatinine    1.0


 


Est GFR (CKD-EPI)AfAm    93.08


 


Est GFR (CKD-EPI)NonAf    80.31


 


Random Glucose    123 H


 


Lactic Acid  4.0 H*  


 


Calcium    8.8


 


Phosphorus   


 


Magnesium    2.6 H


 


Total Bilirubin    1.2 H


 


AST    29


 


ALT    34


 


Alkaline Phosphatase    102


 


Creatine Kinase    196


 


Creatine Kinase Index    4.5


 


CK-MB (CK-2)    8.9 H


 


Troponin I    < 0.02


 


Total Protein    7.5


 


Albumin    3.9


 


Influenza A (Rapid)   


 


Influenza B (Rapid)   














  12/08/19 12/08/19 12/08/19





  20:30 21:35 23:50


 


WBC   


 


RBC   


 


Hgb   


 


Hct   


 


MCV   


 


MCH   


 


MCHC   


 


RDW   


 


Plt Count   


 


MPV   


 


Absolute Neuts (auto)   


 


Total Counted   


 


Neutrophils %   


 


Neutrophils % (Manual)   


 


Band Neutrophils %   


 


Lymphocytes %   


 


Lymphocytes % (Manual)   


 


Monocytes %   


 


Monocytes % (Manual)   


 


Eosinophils %   


 


Eosinophils % (Manual)   


 


Basophils %   


 


Basophils % (Manual)   


 


Myelocytes % (Man)   


 


Promyelocytes % (Man)   


 


Blast Cells % (Manual)   


 


Nucleated RBC %   


 


Metamyelocytes   


 


Differential Comment   


 


Hypochromia   


 


Platelet Estimate   


 


Platelet Comment   


 


Polychromasia   


 


Poikilocytosis   


 


Anisocytosis   


 


Microcytosis   


 


Macrocytosis   


 


Ovalocytes   


 


Anticoagulation Therapy  Cancelled  No Result Required.  No Result Required.


 


Puncture Site  Cancelled  Right radial  Right radial


 


Patient Temperature  Cancelled  


 


ABG pH  Cancelled  7.09 L*  7.20 L


 


ABG pCO2 at Pt Temp  Cancelled  > 100 H*  80.2 H*


 


ABG pO2 at Pt Temp  Cancelled  323 H  197 H


 


ABG HCO3  Cancelled  No Result Required.  29.9 H


 


ABG O2 Sat (Measured)  Cancelled  99.3 H  99.5 H


 


ABG O2 Content  Cancelled  17.8  16.6


 


ABG Base Excess  Cancelled  No Result Required.  0.2


 


Mick Test  Cancelled  Positive  Positive


 


Carboxyhemoglobin  Cancelled  0.7 


 


Methemoglobin  Cancelled  < 1.0 


 


O2 Delivery Device  Cancelled  No Result Required.  Vent


 


Oxygen Flow Rate  Cancelled  80%  60%


 


Vent Mode  Cancelled  A/c  A/c


 


Vent Rate  Cancelled  18  18


 


Mechanical Rate  Cancelled  No Result Required.  Yes


 


PEEP  Cancelled  5.0  5.0


 


Pressure Support Vent  Cancelled  400  400


 


Sodium   


 


Potassium   


 


Chloride   


 


Carbon Dioxide   


 


Anion Gap   


 


BUN   


 


Creatinine   


 


Est GFR (CKD-EPI)AfAm   


 


Est GFR (CKD-EPI)NonAf   


 


Random Glucose   


 


Lactic Acid   


 


Calcium   


 


Phosphorus   


 


Magnesium   


 


Total Bilirubin   


 


AST   


 


ALT   


 


Alkaline Phosphatase   


 


Creatine Kinase   


 


Creatine Kinase Index   


 


CK-MB (CK-2)   


 


Troponin I   


 


Total Protein   


 


Albumin   


 


Influenza A (Rapid)   


 


Influenza B (Rapid)   














  12/09/19 12/09/19 12/09/19





  00:10 00:50 05:50


 


WBC   


 


RBC   


 


Hgb   


 


Hct   


 


MCV   


 


MCH   


 


MCHC   


 


RDW   


 


Plt Count   


 


MPV   


 


Absolute Neuts (auto)   


 


Total Counted   


 


Neutrophils %   


 


Neutrophils % (Manual)   


 


Band Neutrophils %   


 


Lymphocytes %   


 


Lymphocytes % (Manual)   


 


Monocytes %   


 


Monocytes % (Manual)   


 


Eosinophils %   


 


Eosinophils % (Manual)   


 


Basophils %   


 


Basophils % (Manual)   


 


Myelocytes % (Man)   


 


Promyelocytes % (Man)   


 


Blast Cells % (Manual)   


 


Nucleated RBC %   


 


Metamyelocytes   


 


Differential Comment   


 


Hypochromia   


 


Platelet Estimate   


 


Platelet Comment   


 


Polychromasia   


 


Poikilocytosis   


 


Anisocytosis   


 


Microcytosis   


 


Macrocytosis   


 


Ovalocytes   


 


Anticoagulation Therapy    No Result Required.


 


Puncture Site    Right radial


 


Patient Temperature   


 


ABG pH    7.33 L


 


ABG pCO2 at Pt Temp    62.6 H


 


ABG pO2 at Pt Temp    180 H


 


ABG HCO3    32.1 H


 


ABG O2 Sat (Measured)    99.5 H


 


ABG O2 Content    16.3


 


ABG Base Excess    5.1 H


 


Mick Test    Positive


 


Carboxyhemoglobin   


 


Methemoglobin   


 


O2 Delivery Device    Vent


 


Oxygen Flow Rate    60%


 


Vent Mode    A/c


 


Vent Rate    18


 


Mechanical Rate    Yes


 


PEEP    5.0


 


Pressure Support Vent    400


 


Sodium   


 


Potassium   


 


Chloride   


 


Carbon Dioxide   


 


Anion Gap   


 


BUN   


 


Creatinine   


 


Est GFR (CKD-EPI)AfAm   


 


Est GFR (CKD-EPI)NonAf   


 


Random Glucose   


 


Lactic Acid   1.7 


 


Calcium   


 


Phosphorus   


 


Magnesium   


 


Total Bilirubin   


 


AST   


 


ALT   


 


Alkaline Phosphatase   


 


Creatine Kinase   


 


Creatine Kinase Index   


 


CK-MB (CK-2)   


 


Troponin I   


 


Total Protein   


 


Albumin   


 


Influenza A (Rapid)  Negative  


 


Influenza B (Rapid)  Negative  














  12/09/19 12/09/19 12/09/19





  06:00 06:20 06:20


 


WBC   18.8 H 


 


RBC   3.80 L 


 


Hgb   11.3 L 


 


Hct   34.9 L D 


 


MCV   91.8 


 


MCH   29.8 


 


MCHC   32.5 


 


RDW   15.9 


 


Plt Count   282  D 


 


MPV   7.5 


 


Absolute Neuts (auto)   18.0 H 


 


Total Counted   


 


Neutrophils %   95.9 H 


 


Neutrophils % (Manual)   95.0 H 


 


Band Neutrophils %   0.0 


 


Lymphocytes %   1.5 L D 


 


Lymphocytes % (Manual)   2.0 L 


 


Monocytes %   2.6 L 


 


Monocytes % (Manual)   2 L 


 


Eosinophils %   0.0 


 


Eosinophils % (Manual)   0.0 


 


Basophils %   0.0 


 


Basophils % (Manual)   0.0 


 


Myelocytes % (Man)   0 


 


Promyelocytes % (Man)   0 


 


Blast Cells % (Manual)   0 


 


Nucleated RBC %   0 


 


Metamyelocytes   1 


 


Differential Comment   


 


Hypochromia   0 


 


Platelet Estimate   Normal 


 


Platelet Comment   


 


Polychromasia   0 


 


Poikilocytosis   0 


 


Anisocytosis   1+ 


 


Microcytosis   0 


 


Macrocytosis   0 


 


Ovalocytes   


 


Anticoagulation Therapy   


 


Puncture Site   


 


Patient Temperature   


 


ABG pH   


 


ABG pCO2 at Pt Temp   


 


ABG pO2 at Pt Temp   


 


ABG HCO3   


 


ABG O2 Sat (Measured)   


 


ABG O2 Content   


 


ABG Base Excess   


 


Mick Test   


 


Carboxyhemoglobin   


 


Methemoglobin   


 


O2 Delivery Device   


 


Oxygen Flow Rate   


 


Vent Mode   


 


Vent Rate   


 


Mechanical Rate   


 


PEEP   


 


Pressure Support Vent   


 


Sodium    139


 


Potassium    5.0


 


Chloride    101


 


Carbon Dioxide    32


 


Anion Gap    6 L


 


BUN    16.7


 


Creatinine    0.7


 


Est GFR (CKD-EPI)AfAm    117.22


 


Est GFR (CKD-EPI)NonAf    101.14


 


Random Glucose    114 H


 


Lactic Acid  1.1  


 


Calcium    8.3 L


 


Phosphorus    2.1 L


 


Magnesium    2.9 H


 


Total Bilirubin    0.8


 


AST    25


 


ALT    25


 


Alkaline Phosphatase    76


 


Creatine Kinase   


 


Creatine Kinase Index   


 


CK-MB (CK-2)   


 


Troponin I   


 


Total Protein    5.9 L


 


Albumin    2.8 L


 


Influenza A (Rapid)   


 


Influenza B (Rapid)   








Active Medications











Generic Name Dose Route Start Last Admin





  Trade Name Freq  PRN Reason Stop Dose Admin


 


Albuterol Sulfate  1 amp  12/09/19 08:00  12/09/19 08:30





  Ventolin 0.083% Nebulizer Soln -  NEB   Not Given





  RQ4H ISAIAH   





     





     





     





     


 


Albuterol/Ipratropium  1 amp  12/09/19 08:00  12/09/19 11:07





  Duoneb -  NEB   1 amp





  RQID ISAIAH   Administration





     





     





     





     


 


Chlorhexidine Gluconate  1 applic  12/09/19 22:00  





  Hibiclens For Decolonization -  TP   





  HS ISAIAH   





     





     





     





     


 


Clopidogrel Bisulfate  75 mg  12/09/19 11:45  





  Plavix -  PO   





  DAILY ISAIAH   





     





     





     





     


 


Heparin Sodium (Porcine)  5,000 unit  12/09/19 14:00  





  Heparin -  SQ   





  TID ISAIAH   





     





     





     





     


 


Propofol  1,000,000 mcg in 100 mls @ 4.218 mls/hr  12/08/19 23:15  12/08/19 23:

11





  Diprivan -  IVPB   6 mcg/kg/min





  TITR ISAIAH   2.531 mls/hr





     Titration





     





  Protocol   





  10 MCG/KG/MIN   


 


Fentanyl 500 mcg/ Dextrose  100 mls @ 5 mls/hr  12/08/19 23:30  12/09/19 01:00





  IVPB   50 mcg/hr





  TITR ISAIAH   10 mls/hr





     Titration





     





     





  25 MCG/HR   


 


Sodium Chloride  1,000 mls @ 75 mls/hr  12/09/19 09:00  12/09/19 09:16





  Normal Saline -  IV   75 mls/hr





  ASDIR ISAIAH   Administration





     





     





     





     


 


Azithromycin  500 mg in 250 mls @ 250 mls/hr  12/09/19 11:45  





  Zithromax 500mg Ivpb (Pre-Docked)  IVPB   





  DAILY ISAIAH   





     





     





     





     


 


Methylprednisolone Sodium Succinate  40 mg  12/09/19 18:00  





  Solu-Medrol -  IVPUSH   





  Q8H-IV ISAIAH   





     





     





     





     


 


Mupirocin  1 applic  12/09/19 10:00  





  Bactroban Ointment (For Decolonization) -  NS  12/14/19 09:59  





  BID ISAIAH   





     





     





     





     











ASSESSMENT/PLAN:


65 y/o/m with PMH of [place], HLD, DM, COPD, CAD, CHF, RA, CVA and prostate CA. 

Presented to the ER from home on 12/8 with complaints of gradually worsening 

SOB and chest pain over the past 24 hours. He was lethargic on presentation and 

subsequently intubated.





#CNS


- Extubated on 12/9


- Head CT - no acute pathology 





#Respiratory


- Acute on chronic respiratory failure with hypoxia and hypercapnia requiring 

intubation on 12/8, extubated on 12/9


- Serial ABGs showing improvement in pH, pCO2, and O2. Adjust BiPAP settings as 

necessary 


- CT Chest pending


- CXR without significant change from prior, no obvious acute process seen, 

waiting official read 


- Emphysematous changes seen on chest CT 


- Duonebs


- Solu-medrol 40mg IV Q8hr





#CVS


- Hx of HTN: Will confirm and resume home meds when appropriate


- POC US in ER: decreased EF with LV lateral wall motion abnormality, no b lines

, % collapsible, no pericardial effusion, no pleural effusion 





#ID


- WBC 18.8, afebrile, monitor 


- Lactic acid normalized 


- Blood, urine cx without growth to date 


- Aztreonam, Imipenem, Vanco given in ED 


- Started on Azithromycin 500mg daily 





#GI


- CTAP - Mild hepatic steatosis with subtle hepatic surface nodularity is 

concerning for early cirrhosis.





#Renal


- BUN/Cr - 16.7/0.7





#Prophylaxis


- Plavix 





#FEN


- N/S @ 42mls/hr 


- monitor and replete lytes as needed  





#Dispo


- Continue ICU monitoring 





Visit type





- Emergency Visit


Emergency Visit: Yes


ED Registration Date: 12/08/19


Care time: The patient presented to the Emergency Department on the above date 

and was hospitalized for further evaluation of their emergent condition.





- New Patient


This patient is new to me today: Yes


Date on this admission: 12/09/19





- Critical Care


Critical Care patient: Yes


Total Critical Care Time (in minutes): 36


Critical Care Statement: The care of this patient involved high complexity 

decision making to prevent further life threatening deterioration of the patient

's condition and/or to evaluate & treat vital organ system(s) failure or risk 

of failure.





ATTENDING PHYSICIAN STATEMENT





I saw and evaluated the patient.


I reviewed the resident's note and discussed the case with the resident.


I agree with the resident's findings and plan as documented.








SUBJECTIVE:








OBJECTIVE:








ASSESSMENT AND PLAN:

## 2019-12-09 NOTE — EKG
Test Reason : 

Blood Pressure : ***/*** mmHG

Vent. Rate : 106 BPM     Atrial Rate : 106 BPM

   P-R Int : 116 ms          QRS Dur : 130 ms

    QT Int : 366 ms       P-R-T Axes : [phone] degrees

   QTc Int : 486 ms

 

*** POOR DATA QUALITY, INTERPRETATION MAY BE ADVERSELY AFFECTED

BASELINE ARTIFACT

SINUS TACHYCARDIA WITH OCCASIONAL PREMATURE VENTRICULAR COMPLEXES

RIGHT BUNDLE BRANCH BLOCK

LEFT POSTERIOR FASCICULAR BLOCK

*** BIFASCICULAR BLOCK ***

ABNORMAL ECG

WHEN COMPARED WITH ECG OF 28-NOV-2018 13:34,

PREMATURE VENTRICULAR COMPLEXES ARE NOW PRESENT

Confirmed by NICHOLE RIVERA MD ([id number]) on 12/9/2019 1:35:53 PM

 

Referred By:             Confirmed By:NICHOLE RIVERA MD

## 2019-12-09 NOTE — PN
Teaching Attending Note


Name of Resident: Manohar Barnes





ATTENDING PHYSICIAN STATEMENT





I saw and evaluated the patient.


I reviewed the resident's note and discussed the case with the resident.


I agree with the resident's findings and plan as documented.








SUBJECTIVE:





Pt seen and examined in the ICU. Intubated, awake off sedation. Tolerated CPAP/

PS and subsequently extubated to BiPAP during rounds.





OBJECTIVE:





 Vital Signs











 Period  Temp  Pulse  Resp  BP Sys/Baer  Pulse Ox


 


 Last 24 Hr  97.8 F-98.7 F    15-27  /56-86  








 Intake & Output











 12/06/19 12/07/19 12/08/19 12/09/19





 23:59 23:59 23:59 23:59


 


Intake Total    1151


 


Output Total    350


 


Balance    801


 


Weight   58.559 kg 58.542 kg








Gen:  extubated


Heart: RRR


Lung: decreased breath sounds at the bases


Abd: soft, nontender


Ext: no edema





 CBC, BMP





 12/09/19 06:20 





 12/09/19 06:20 





Active Medications





Albuterol Sulfate (Ventolin 0.083% Nebulizer Soln -)  1 amp NEB RQ4H ISAIAH


   Last Admin: 12/09/19 12:00 Dose:  Not Given


Albuterol/Ipratropium (Duoneb -)  1 amp NEB RQID ISAIAH


   Last Admin: 12/09/19 11:07 Dose:  1 amp


Chlorhexidine Gluconate (Hibiclens For Decolonization -)  1 applic TP HS ISAIAH


Clopidogrel Bisulfate (Plavix -)  75 mg PO DAILY ISAIAH


   Last Admin: 12/09/19 11:59 Dose:  75 mg


Heparin Sodium (Porcine) (Heparin -)  5,000 unit SQ TID ISAIAH


Propofol (Diprivan -)  1,000,000 mcg in 100 mls @ 4.218 mls/hr IVPB TITR ISAIAH; 

Protocol


   Last Titration: 12/08/19 23:11 Dose:  6 mcg/kg/min, 2.531 mls/hr


Fentanyl 500 mcg/ Dextrose  100 mls @ 5 mls/hr IVPB TITR ISAIAH


   Last Titration: 12/09/19 01:00 Dose:  50 mcg/hr, 10 mls/hr


Sodium Chloride (Normal Saline -)  1,000 mls @ 75 mls/hr IV ASDIR ISAIAH


   Last Admin: 12/09/19 09:16 Dose:  75 mls/hr


Azithromycin (Zithromax 500mg Ivpb (Pre-Docked))  500 mg in 250 mls @ 250 mls/

hr IVPB DAILY [facility]


   Last Admin: 12/09/19 11:59 Dose:  250 mls/hr


Methylprednisolone Sodium Succinate (Solu-Medrol -)  40 mg IVPUSH Q8H-IV ISAIAH


Mupirocin (Bactroban Ointment (For Decolonization) -)  1 applic NS BID ISAIAH


   Stop: 12/14/19 09:59








ASSESSMENT AND PLAN:


Acute on Chronic Hypoxic and Hypercapneic Respiratory Failure


Acute COPD Exacerbation


Emphysema


CAD


HTN


DM


Hyperlipidemia


Rheumatoid Arthritis


h/o CVA


Prostate Ca


Anemia





-  pt extubated


-  continue BiPAP


-  continue medrol


-  inhaled bronchodilators standing and PRN


-  O2 to keep Spo2 88-92%


-  azithromycin


-  IVF


-  continue ICU monitoring








critical care time spent in reviewing chart, evaluating patient and formulating 

plan 35 min

## 2019-12-09 NOTE — HP
DATE OF ADMISSION:  12/08/2019

 

HISTORY:  This is a 64-year-old male known to me for many years diagnosed to have

COPD, coronary artery disease, status post bypass surgery.  Came to the emergency

room yesterday with short of breath for the last 2-3 days.  He was not getting

better.  He was feeling better.  In the emergency room, he was hypoxic and was

retaining CO2.  Had to be intubated and placed on the respirator.  Now he is in the

ICU.  He is awake.  He wants tube to be taken out. 

 

PRESENT MEDICATIONS:  Xanax 0.5 b.i.d., Viagra for the COPD, Symbicort inhalation,

Plavix 75 mg once a day, simvastatin 40 mg once a day, and ramipril 5 mg once a day.

 

PHYSICAL EXAMINATION: 

Vital Signs:  His BP is 100/60, respirations 18, temperature 98, pulse 96.

HEENT:  Unremarkable.  Has ET tube in place.  Sedated.

Lungs:  Breath sounds reduced both sides. 

Heart:  S1, S2 normal.  No S3, S4.

Abdomen:  Soft.

Extremities:  Edema present.

Neurologic:  Sedated now. 

 

LABORATORY REPORTS:  WBC at the time of admission was 22.7, this morning 18.8,

hemoglobin 13.3, platelets 282.  Chemistry; sodium 139, potassium 5, chloride 101,

CO2 is 32, BUN 16, creatinine 0.6.  Blood sugar 114, lactic acid came down to 1.1. 

Chest x-ray:  No infiltrate.  Expanded lung.  Minimal congestion present.

 

PLAN:  Continue respirator.  Continue his present medications.  Will discuss with ICU

attending.   _____ knows the patient well.  We will follow.

 

FINAL DIAGNOSIS:  

1.  Respiratory failure on respirator.

2.  Chronic obstructive pulmonary disease.

3.  Arteriosclerotic heart disease.

 

 

FRIDA [id number]

DD: 12/09/2019 09:39

DT: 12/09/2019 11:12

Job #:  [id number]

## 2019-12-10 LAB
ALBUMIN SERPL-MCNC: 3.1 G/DL (ref 3.4–5)
ALP SERPL-CCNC: 80 U/L (ref 45–117)
ALT SERPL-CCNC: 28 U/L (ref 13–61)
ANION GAP SERPL CALC-SCNC: 5 MMOL/L (ref 8–16)
ARTERIAL BLOOD GAS PCO2: 60.1 MMHG (ref 35–45)
ARTERIAL PATENCY WRIST A: POSITIVE
AST SERPL-CCNC: 25 U/L (ref 15–37)
BASE EXCESS BLDA CALC-SCNC: 7.8 MEQ/L (ref -2–2)
BILIRUB SERPL-MCNC: 0.6 MG/DL (ref 0.2–1)
BUN SERPL-MCNC: 15 MG/DL (ref 7–18)
CALCIUM SERPL-MCNC: 9 MG/DL (ref 8.5–10.1)
CHLORIDE SERPL-SCNC: 98 MMOL/L (ref 98–107)
CO2 SERPL-SCNC: 36 MMOL/L (ref 21–32)
CREAT SERPL-MCNC: 0.8 MG/DL (ref 0.55–1.3)
DEPRECATED RDW RBC AUTO: 16.2 % (ref 11.9–15.9)
GLUCOSE SERPL-MCNC: 130 MG/DL (ref 74–106)
HCT VFR BLD CALC: 36.2 % (ref 35.4–49)
HGB BLD-MCNC: 11.9 GM/DL (ref 11.7–16.9)
MAGNESIUM SERPL-MCNC: 2.6 MG/DL (ref 1.8–2.4)
MCH RBC QN AUTO: 30 PG (ref 25.7–33.7)
MCHC RBC AUTO-ENTMCNC: 32.9 G/DL (ref 32–35.9)
MCV RBC: 91.1 FL (ref 80–96)
PHOSPHATE SERPL-MCNC: 3.2 MG/DL (ref 2.5–4.9)
PLATELET # BLD AUTO: 292 K/MM3 (ref 134–434)
PMV BLD: 7.5 FL (ref 7.5–11.1)
PO2 BLDA: 68.9 MMHG (ref 80–100)
POTASSIUM SERPLBLD-SCNC: 5 MMOL/L (ref 3.5–5.1)
PROT SERPL-MCNC: 6.5 G/DL (ref 6.4–8.2)
RBC # BLD AUTO: 3.97 M/MM3 (ref 4–5.6)
SAO2 % BLDA: 93.8 % (ref 95–98)
SODIUM SERPL-SCNC: 139 MMOL/L (ref 136–145)
WBC # BLD AUTO: 18.2 K/MM3 (ref 4–10)

## 2019-12-10 RX ADMIN — SODIUM CHLORIDE SCH MLS/HR: 9 INJECTION, SOLUTION INTRAVENOUS at 17:24

## 2019-12-10 RX ADMIN — AZITHROMYCIN DIHYDRATE SCH MLS/HR: 500 INJECTION, POWDER, LYOPHILIZED, FOR SOLUTION INTRAVENOUS at 10:53

## 2019-12-10 RX ADMIN — CHLORHEXIDINE GLUCONATE SCH APPLIC: 213 SOLUTION TOPICAL at 21:48

## 2019-12-10 RX ADMIN — IPRATROPIUM BROMIDE AND ALBUTEROL SULFATE SCH AMP: .5; 3 SOLUTION RESPIRATORY (INHALATION) at 11:20

## 2019-12-10 RX ADMIN — METHYLPREDNISOLONE SODIUM SUCCINATE SCH MG: 40 INJECTION, POWDER, FOR SOLUTION INTRAMUSCULAR; INTRAVENOUS at 01:22

## 2019-12-10 RX ADMIN — MUPIROCIN SCH APPLIC: 20 OINTMENT TOPICAL at 10:53

## 2019-12-10 RX ADMIN — MUPIROCIN SCH APPLIC: 20 OINTMENT TOPICAL at 21:48

## 2019-12-10 RX ADMIN — METHYLPREDNISOLONE SODIUM SUCCINATE SCH MG: 40 INJECTION, POWDER, FOR SOLUTION INTRAMUSCULAR; INTRAVENOUS at 10:53

## 2019-12-10 RX ADMIN — METHYLPREDNISOLONE SODIUM SUCCINATE SCH MG: 40 INJECTION, POWDER, FOR SOLUTION INTRAMUSCULAR; INTRAVENOUS at 17:24

## 2019-12-10 RX ADMIN — IPRATROPIUM BROMIDE AND ALBUTEROL SULFATE SCH AMP: .5; 3 SOLUTION RESPIRATORY (INHALATION) at 07:50

## 2019-12-10 RX ADMIN — IPRATROPIUM BROMIDE AND ALBUTEROL SULFATE SCH AMP: .5; 3 SOLUTION RESPIRATORY (INHALATION) at 16:04

## 2019-12-10 RX ADMIN — IPRATROPIUM BROMIDE AND ALBUTEROL SULFATE SCH AMP: .5; 3 SOLUTION RESPIRATORY (INHALATION) at 20:22

## 2019-12-10 RX ADMIN — PROPOFOL SCH: 10 INJECTION, EMULSION INTRAVENOUS at 01:21

## 2019-12-10 RX ADMIN — CLOPIDOGREL BISULFATE SCH MG: 75 TABLET, FILM COATED ORAL at 10:10

## 2019-12-10 NOTE — PN
Progress Note, Physician


Chief Complaint: 





has problems in breathing


History of Present Illness: 





S/P extubation on bipap





- Current Medication List


Current Medications: 


Active Medications





Albuterol Sulfate (Ventolin 0.083% Nebulizer Soln -)  1 amp NEB RQ4H PRN


   PRN Reason: Dyspnea


   Last Admin: 12/10/19 04:20 Dose:  1 amp


Albuterol/Ipratropium (Duoneb -)  1 amp NEB RQID [facility]


   Last Admin: 12/10/19 07:50 Dose:  1 amp


Chlorhexidine Gluconate (Hibiclens For Decolonization -)  1 applic TP HS [facility]


   Last Admin: 12/09/19 21:18 Dose:  1 applic


Clopidogrel Bisulfate (Plavix -)  75 mg PO DAILY [facility]


   Last Admin: 12/09/19 11:59 Dose:  75 mg


Propofol (Diprivan -)  1,000,000 mcg in 100 mls @ 4.218 mls/hr IVPB TITR ISAIAH; 

Protocol


   Last Admin: 12/10/19 01:21 Dose:  Not Given


Azithromycin (Zithromax 500mg Ivpb (Pre-Docked))  500 mg in 250 mls @ 250 mls/

hr IVPB DAILY [facility]


   Last Admin: 12/09/19 11:59 Dose:  250 mls/hr


Sodium Chloride (Normal Saline -)  1,000 mls @ 42 mls/hr IV ASDIR [facility]


   Last Admin: 12/09/19 14:10 Dose:  42 mls/hr


Methylprednisolone Sodium Succinate (Solu-Medrol -)  40 mg IVPUSH Q8H-IV ISAIAH


   Last Admin: 12/10/19 01:22 Dose:  40 mg


Mupirocin (Bactroban Ointment (For Decolonization) -)  1 applic NS BID [facility]


   Stop: 12/14/19 09:59


   Last Admin: 12/09/19 21:20 Dose:  Not Given











- Objective


Vital Signs: 


 Vital Signs











Temperature  97.5 F L  12/10/19 06:00


 


Pulse Rate  107 H  12/10/19 08:00


 


Respiratory Rate  21 H  12/10/19 08:00


 


Blood Pressure  170/91   12/10/19 08:00


 


O2 Sat by Pulse Oximetry (%)  96   12/10/19 08:34











Constitutional: Yes: Moderate Distress


Eyes: Yes: WNL


HENT: Yes: WNL


Neck: Yes: WNL


Cardiovascular: Yes: Regular Rate and Rhythm, Tachycardia


Respiratory: Yes: On BiPap


Gastrointestinal: Yes: Normal Bowel Sounds


...Rectal Exam: Yes: Deferred


Genitourinary: Yes: WNL


Edema: LUE: 1+, RUE: 1+, LLE: 1+, RLE: 1+


Neurological: Yes: Alert


Labs: 


 CBC, BMP





 12/10/19 05:33 





 12/10/19 05:33 











Assessment/Plan





Continue same trt

## 2019-12-10 NOTE — PN
Physical Exam: 


SUBJECTIVE: Patient seen and examined. Complaints that he feels anxious, still 

does not feel like he is breathing comfortably. No acute events overnight. 








OBJECTIVE:





 Vital Signs











 Period  Temp  Pulse  Resp  BP Sys/Baer  Pulse Ox


 


 Last 24 Hr  97.5 F-98.5 F    16-22  123-153/63-85  











GENERAL: The patient is awake, alert, and fully oriented, in mild respiratory 

distress. 


HEAD: Normal with no signs of trauma.


EYES: EOMI


ENT: on BiPAP, dry mucous membranes 


NECK: trachea midline, supple


LUNGS: crackles bilaterally, no accessory muscle use, no wheezing


HEART: RRR, no murmur


ABDOMEN: Soft, nontender, nondistended, normoactive bowel sounds, no guarding, 

no rebound


EXTREMITIES: 2+ pulses, warm


NEUROLOGICAL: Normal speech, gait not observed.


PSYCH: anxious mood 


SKIN: Warm, dry














 Laboratory Results - last 24 hr











  12/09/19 12/09/19 12/09/19





  06:20 13:55 20:30


 


WBC   


 


RBC   


 


Hgb   


 


Hct   


 


MCV   


 


MCH   


 


MCHC   


 


RDW   


 


Plt Count   


 


MPV   


 


Neutrophils % (Manual)  95.0 H  


 


Band Neutrophils %  0.0  


 


Lymphocytes % (Manual)  2.0 L  


 


Monocytes % (Manual)  2 L  


 


Eosinophils % (Manual)  0.0  


 


Basophils % (Manual)  0.0  


 


Myelocytes % (Man)  0  


 


Promyelocytes % (Man)  0  


 


Blast Cells % (Manual)  0  


 


Metamyelocytes  1  


 


Hypochromia  0  


 


Platelet Estimate  Normal  


 


Polychromasia  0  


 


Poikilocytosis  0  


 


Anisocytosis  1+  


 


Microcytosis  0  


 


Macrocytosis  0  


 


Anticoagulation Therapy   No Result Required. 


 


Puncture Site   Right radial 


 


ABG pH   7.33 L 


 


ABG pCO2 at Pt Temp   62.3 H 


 


ABG pO2 at Pt Temp   66.6 L 


 


ABG HCO3   31.8 H 


 


ABG O2 Sat (Measured)   93.3 L 


 


ABG O2 Content   15.6 


 


ABG Base Excess   4.7 H 


 


Mick Test   Positive 


 


O2 Delivery Device   No Result Required. 


 


Oxygen Flow Rate   Yes 


 


Vent Mode   No Result Required. 


 


Vent Rate   16 


 


Mechanical Rate   No Result Required. 


 


Pressure Support Vent   No Result Required. 


 


Sodium   


 


Potassium   


 


Chloride   


 


Carbon Dioxide   


 


Anion Gap   


 


BUN   


 


Creatinine   


 


Est GFR (CKD-EPI)AfAm   


 


Est GFR (CKD-EPI)NonAf   


 


Random Glucose   


 


Calcium   


 


Phosphorus   


 


Magnesium   


 


Total Bilirubin   


 


AST   


 


ALT   


 


Alkaline Phosphatase   


 


Total Protein   


 


Albumin   


 


Urine Color    Yellow


 


Urine Appearance    Cloudy


 


Urine pH    5.5


 


Ur Specific Gravity    1.021


 


Urine Protein    1+ H


 


Urine Glucose (UA)    Negative


 


Urine Ketones    1+ H


 


Urine Blood    3+ H


 


Urine Nitrite    Negative


 


Urine Bilirubin    Negative


 


Urine Urobilinogen    1.0


 


Ur Leukocyte Esterase    1+ H


 


Urine WBC (Auto)    32


 


Urine RBC (Auto)    1107


 


Urine Casts (Auto)    11


 


U Epithel Cells (Auto)    2.7


 


Urine Bacteria (Auto)    1.2














  12/10/19 12/10/19 12/10/19





  05:33 05:33 06:00


 


WBC  18.2 H  


 


RBC  3.97 L  


 


Hgb  11.9  


 


Hct  36.2  


 


MCV  91.1  


 


MCH  30.0  


 


MCHC  32.9  


 


RDW  16.2 H  


 


Plt Count  292  


 


MPV  7.5  


 


Neutrophils % (Manual)   


 


Band Neutrophils %   


 


Lymphocytes % (Manual)   


 


Monocytes % (Manual)   


 


Eosinophils % (Manual)   


 


Basophils % (Manual)   


 


Myelocytes % (Man)   


 


Promyelocytes % (Man)   


 


Blast Cells % (Manual)   


 


Metamyelocytes   


 


Hypochromia   


 


Platelet Estimate   


 


Polychromasia   


 


Poikilocytosis   


 


Anisocytosis   


 


Microcytosis   


 


Macrocytosis   


 


Anticoagulation Therapy   


 


Puncture Site    Left radial


 


ABG pH    7.38


 


ABG pCO2 at Pt Temp    60.1 H


 


ABG pO2 at Pt Temp    68.9 L


 


ABG HCO3    34.4 H


 


ABG O2 Sat (Measured)    93.8 L


 


ABG O2 Content    15.5


 


ABG Base Excess    7.8 H


 


Mick Test    Positive


 


O2 Delivery Device    Bipap


 


Oxygen Flow Rate    Yes


 


Vent Mode   


 


Vent Rate    16


 


Mechanical Rate   


 


Pressure Support Vent   


 


Sodium   139 


 


Potassium   5.0 


 


Chloride   98 


 


Carbon Dioxide   36 H 


 


Anion Gap   5 L 


 


BUN   15.0 


 


Creatinine   0.8 


 


Est GFR (CKD-EPI)AfAm   110.96 


 


Est GFR (CKD-EPI)NonAf   95.74 


 


Random Glucose   130 H 


 


Calcium   9.0 


 


Phosphorus   3.2 


 


Magnesium   2.6 H 


 


Total Bilirubin   0.6 


 


AST   25 


 


ALT   28 


 


Alkaline Phosphatase   80 


 


Total Protein   6.5 


 


Albumin   3.1 L 


 


Urine Color   


 


Urine Appearance   


 


Urine pH   


 


Ur Specific Gravity   


 


Urine Protein   


 


Urine Glucose (UA)   


 


Urine Ketones   


 


Urine Blood   


 


Urine Nitrite   


 


Urine Bilirubin   


 


Urine Urobilinogen   


 


Ur Leukocyte Esterase   


 


Urine WBC (Auto)   


 


Urine RBC (Auto)   


 


Urine Casts (Auto)   


 


U Epithel Cells (Auto)   


 


Urine Bacteria (Auto)   








Active Medications











Generic Name Dose Route Start Last Admin





  Trade Name Freq  PRN Reason Stop Dose Admin


 


Albuterol Sulfate  1 amp  12/09/19 15:40  12/10/19 04:20





  Ventolin 0.083% Nebulizer Soln -  NEB   1 amp





  RQ4H PRN   Administration





  Dyspnea   





     





     





     


 


Albuterol/Ipratropium  1 amp  12/09/19 08:00  12/10/19 07:50





  Duoneb -  NEB   1 amp





  RQID ISAIAH   Administration





     





     





     





     


 


Chlorhexidine Gluconate  1 applic  12/09/19 22:00  12/09/19 21:18





  Hibiclens For Decolonization -  TP   1 applic





  HS ISAIAH   Administration





     





     





     





     


 


Clopidogrel Bisulfate  75 mg  12/09/19 11:45  12/09/19 11:59





  Plavix -  PO   75 mg





  DAILY ISAIAH   Administration





     





     





     





     


 


Propofol  1,000,000 mcg in 100 mls @ 4.218 mls/hr  12/08/19 23:15  12/10/19 01:

21





  Diprivan -  IVPB   Not Given





  TITR ISAIAH   





     





     





  Protocol   





  10 MCG/KG/MIN   


 


Azithromycin  500 mg in 250 mls @ 250 mls/hr  12/09/19 11:45  12/09/19 11:59





  Zithromax 500mg Ivpb (Pre-Docked)  IVPB   250 mls/hr





  DAILY ISAIAH   Administration





     





     





     





     


 


Sodium Chloride  1,000 mls @ 42 mls/hr  12/09/19 13:51  12/09/19 14:10





  Normal Saline -  IV   42 mls/hr





  ASDIR ISAIAH   Administration





     





     





     





     


 


Methylprednisolone Sodium Succinate  40 mg  12/09/19 18:00  12/10/19 01:22





  Solu-Medrol -  IVPUSH   40 mg





  Q8H-IV ISAIAH   Administration





     





     





     





     


 


Mupirocin  1 applic  12/09/19 10:00  12/09/19 21:20





  Bactroban Ointment (For Decolonization) -  NS  12/14/19 09:59  Not Given





  BID ISAIAH   





     





     





     





     











ASSESSMENT/PLAN:


63 y/o/m with PMH of [place], HLD, DM, COPD, CAD, CHF, RA, CVA and prostate CA. 

Presented to the ER from home on 12/8 with complaints of gradually worsening 

SOB and chest pain over the past 24 hours. He was lethargic on presentation and 

subsequently intubated. Extubated on 12/9





#CNS


- Extubated on 12/9


- Head CT - no acute pathology 





#Respiratory


- Acute on chronic respiratory failure with hypoxia and hypercapnia requiring 

intubation on 12/8, extubated on 12/9


- Serial ABGs showing improvement in pH, pCO2, and O2. Adjust BiPAP settings as 

necessary 


- Emphysematous changes seen on chest CT 


- Duonebs


- Solu-medrol 40mg IV Q8hr





#CVS


- Hx of HTN: resume medications as appropriate 


- POC US in ER: decreased EF with LV lateral wall motion abnormality, no b lines

, % collapsible, no pericardial effusion, no pleural effusion 


- Can use Hydralazine for BP control, avoid Beta Blockers due to COPD 

exacerbation





#ID


- WBC 18.2, afebrile, monitor 


- Lactic acid normalized 


- Blood, urine cx without growth to date 


- Aztreonam, Imipenem, Vanco given in ED 


- Started on Azithromycin 500mg daily 





#GI


- CTAP - Mild hepatic steatosis with subtle hepatic surface nodularity is 

concerning for early cirrhosis.





#Renal


- BUN/Cr - 15/0.8





#Prophylaxis


- Plavix 





#FEN


- N/S @ 42mls/hr 


- monitor and replete lytes as needed  





#Dispo


- Continue ICU monitoring 








Visit type





- Emergency Visit


Emergency Visit: Yes


ED Registration Date: 12/08/19


Care time: The patient presented to the Emergency Department on the above date 

and was hospitalized for further evaluation of their emergent condition.





- New Patient


This patient is new to me today: No





- Critical Care


Critical Care patient: Yes


Total Critical Care Time (in minutes): 36


Critical Care Statement: The care of this patient involved high complexity 

decision making to prevent further life threatening deterioration of the patient

's condition and/or to evaluate & treat vital organ system(s) failure or risk 

of failure.





ATTENDING PHYSICIAN STATEMENT





I saw and evaluated the patient.


I reviewed the resident's note and discussed the case with the resident.


I agree with the resident's findings and plan as documented.








SUBJECTIVE:








OBJECTIVE:








ASSESSMENT AND PLAN:

## 2019-12-10 NOTE — PN
Teaching Attending Note


Name of Resident: Manohar Barnes





ATTENDING PHYSICIAN STATEMENT





I saw and evaluated the patient.


I reviewed the resident's note and discussed the case with the resident.


I agree with the resident's findings and plan as documented.








SUBJECTIVE:





Pt seen and examined in the ICU. Remains extubated on BiPAP. No fevers 

recorded. +cough and wheezing.





OBJECTIVE:





 Vital Signs











 Period  Temp  Pulse  Resp  BP Sys/Baer  Pulse Ox


 


 Last 24 Hr  97.3 F-98.3 F    16-22  123-170/63-91  








 Intake & Output











 12/07/19 12/08/19 12/09/19 12/10/19





 23:59 23:59 23:59 23:59


 


Intake Total   2214 394


 


Output Total   2525 450


 


Balance   -311 -56


 


Weight  58.559 kg 58.542 kg 60.963 kg








Gen:  tachypneic on BiPAP


Heart: RRR


Lung: scattered rhonchi


Abd: soft, nontender


Ext: + edema





 CBC, BMP





 12/10/19 05:33 





 12/10/19 05:33 





Active Medications





Albuterol Sulfate (Ventolin 0.083% Nebulizer Soln -)  1 amp NEB RQ4H PRN


   PRN Reason: Dyspnea


   Last Admin: 12/10/19 04:20 Dose:  1 amp


Albuterol/Ipratropium (Duoneb -)  1 amp NEB RQID ISAIAH


   Last Admin: 12/10/19 07:50 Dose:  1 amp


Chlorhexidine Gluconate (Hibiclens For Decolonization -)  1 applic TP HS ISAIAH


   Last Admin: 12/09/19 21:18 Dose:  1 applic


Clopidogrel Bisulfate (Plavix -)  75 mg PO DAILY [facility]


   Last Admin: 12/10/19 10:10 Dose:  75 mg


Propofol (Diprivan -)  1,000,000 mcg in 100 mls @ 4.218 mls/hr IVPB TITR ISAIAH; 

Protocol


   Last Admin: 12/10/19 01:21 Dose:  Not Given


Azithromycin (Zithromax 500mg Ivpb (Pre-Docked))  500 mg in 250 mls @ 250 mls/

hr IVPB DAILY ISAIAH


   Last Admin: 12/10/19 10:53 Dose:  250 mls/hr


Sodium Chloride (Normal Saline -)  1,000 mls @ 42 mls/hr IV ASDIR ISAIAH


   Last Admin: 12/09/19 14:10 Dose:  42 mls/hr


Methylprednisolone Sodium Succinate (Solu-Medrol -)  40 mg IVPUSH Q8H-IV ISAIAH


   Last Admin: 12/10/19 10:53 Dose:  40 mg


Mupirocin (Bactroban Ointment (For Decolonization) -)  1 applic NS BID [facility]


   Stop: 12/14/19 09:59


   Last Admin: 12/10/19 10:53 Dose:  1 applic








ASSESSMENT AND PLAN:


Acute on Chronic Hypoxic and Hypercapneic Respiratory Failure


Acute COPD Exacerbation


Emphysema


CAD


HTN


DM


Hyperlipidemia


Rheumatoid Arthritis


h/o CVA


Prostate Ca


Anemia





-  continue BiPAP


-  can attempt HFOT


-  continue medrol


-  inhaled bronchodilators standing and PRN


-  O2 to keep Spo2 88-92%


-  azithromycin


-  IVF


-  continue ICU monitoring








critical care time spent in reviewing chart, evaluating patient and formulating 

plan 35 min

## 2019-12-11 LAB
ALBUMIN SERPL-MCNC: 3 G/DL (ref 3.4–5)
ALP SERPL-CCNC: 80 U/L (ref 45–117)
ALT SERPL-CCNC: 27 U/L (ref 13–61)
ANION GAP SERPL CALC-SCNC: 5 MMOL/L (ref 8–16)
AST SERPL-CCNC: 22 U/L (ref 15–37)
BILIRUB SERPL-MCNC: 0.6 MG/DL (ref 0.2–1)
BUN SERPL-MCNC: 23.5 MG/DL (ref 7–18)
CALCIUM SERPL-MCNC: 9.3 MG/DL (ref 8.5–10.1)
CHLORIDE SERPL-SCNC: 99 MMOL/L (ref 98–107)
CO2 SERPL-SCNC: 38 MMOL/L (ref 21–32)
CREAT SERPL-MCNC: 0.8 MG/DL (ref 0.55–1.3)
DEPRECATED RDW RBC AUTO: 15.1 % (ref 11.9–15.9)
GLUCOSE SERPL-MCNC: 108 MG/DL (ref 74–106)
HCT VFR BLD CALC: 35.9 % (ref 35.4–49)
HGB BLD-MCNC: 12.1 GM/DL (ref 11.7–16.9)
MAGNESIUM SERPL-MCNC: 2.8 MG/DL (ref 1.8–2.4)
MCH RBC QN AUTO: 30.3 PG (ref 25.7–33.7)
MCHC RBC AUTO-ENTMCNC: 33.5 G/DL (ref 32–35.9)
MCV RBC: 90.3 FL (ref 80–96)
PHOSPHATE SERPL-MCNC: 3.3 MG/DL (ref 2.5–4.9)
PLATELET # BLD AUTO: 317 K/MM3 (ref 134–434)
PMV BLD: 7.4 FL (ref 7.5–11.1)
POTASSIUM SERPLBLD-SCNC: 4.7 MMOL/L (ref 3.5–5.1)
PROT SERPL-MCNC: 6.5 G/DL (ref 6.4–8.2)
RBC # BLD AUTO: 3.98 M/MM3 (ref 4–5.6)
SODIUM SERPL-SCNC: 142 MMOL/L (ref 136–145)
WBC # BLD AUTO: 14.9 K/MM3 (ref 4–10)

## 2019-12-11 RX ADMIN — MUPIROCIN SCH APPLIC: 20 OINTMENT TOPICAL at 16:52

## 2019-12-11 RX ADMIN — IPRATROPIUM BROMIDE AND ALBUTEROL SULFATE SCH AMP: .5; 3 SOLUTION RESPIRATORY (INHALATION) at 20:06

## 2019-12-11 RX ADMIN — MUPIROCIN SCH APPLIC: 20 OINTMENT TOPICAL at 21:47

## 2019-12-11 RX ADMIN — HEPARIN SODIUM SCH UNIT: 5000 INJECTION, SOLUTION INTRAVENOUS; SUBCUTANEOUS at 21:47

## 2019-12-11 RX ADMIN — METHYLPREDNISOLONE SODIUM SUCCINATE SCH MG: 40 INJECTION, POWDER, FOR SOLUTION INTRAMUSCULAR; INTRAVENOUS at 01:50

## 2019-12-11 RX ADMIN — IPRATROPIUM BROMIDE AND ALBUTEROL SULFATE SCH: .5; 3 SOLUTION RESPIRATORY (INHALATION) at 15:52

## 2019-12-11 RX ADMIN — MORPHINE SULFATE PRN MG: 2 INJECTION, SOLUTION INTRAMUSCULAR; INTRAVENOUS at 12:19

## 2019-12-11 RX ADMIN — AZITHROMYCIN DIHYDRATE SCH MLS/HR: 500 INJECTION, POWDER, LYOPHILIZED, FOR SOLUTION INTRAVENOUS at 09:22

## 2019-12-11 RX ADMIN — IPRATROPIUM BROMIDE AND ALBUTEROL SULFATE SCH AMP: .5; 3 SOLUTION RESPIRATORY (INHALATION) at 11:51

## 2019-12-11 RX ADMIN — METHYLPREDNISOLONE SODIUM SUCCINATE SCH MG: 40 INJECTION, POWDER, FOR SOLUTION INTRAMUSCULAR; INTRAVENOUS at 19:03

## 2019-12-11 RX ADMIN — HEPARIN SODIUM SCH UNIT: 5000 INJECTION, SOLUTION INTRAVENOUS; SUBCUTANEOUS at 16:50

## 2019-12-11 RX ADMIN — LEUCINE, PHENYLALANINE, LYSINE, METHIONINE, ISOLEUCINE, VALINE, HISTIDINE, THREONINE, TRYPTOPHAN, ALANINE, GLYCINE, ARGININE, PROLINE, SERINE, TYROSINE, DEXTROSE SCH MLS/HR: 311; 238; 247; 170; 255; 247; 204; 179; 77; 880; 438; 489; 289; 213; 17; 5 INJECTION INTRAVENOUS at 11:27

## 2019-12-11 RX ADMIN — METHYLPREDNISOLONE SODIUM SUCCINATE SCH MG: 40 INJECTION, POWDER, FOR SOLUTION INTRAMUSCULAR; INTRAVENOUS at 09:23

## 2019-12-11 RX ADMIN — CHLORHEXIDINE GLUCONATE SCH APPLIC: 213 SOLUTION TOPICAL at 21:47

## 2019-12-11 RX ADMIN — IPRATROPIUM BROMIDE AND ALBUTEROL SULFATE SCH AMP: .5; 3 SOLUTION RESPIRATORY (INHALATION) at 07:08

## 2019-12-11 RX ADMIN — MUPIROCIN SCH APPLIC: 20 OINTMENT TOPICAL at 16:51

## 2019-12-11 RX ADMIN — CLOPIDOGREL BISULFATE SCH MG: 75 TABLET, FILM COATED ORAL at 09:22

## 2019-12-11 NOTE — PN
Teaching Attending Note


Name of Resident: Robbie Bateman





ATTENDING PHYSICIAN STATEMENT





I saw and evaluated the patient.


I reviewed the resident's note and discussed the case with the resident.


I agree with the resident's findings and plan as documented.








SUBJECTIVE:





Pt seen and examined in the ICU. Remains tachypneic on BiPAP. CXR showing 

bibasilar effusions.





OBJECTIVE:





 Vital Signs











 Period  Temp  Pulse  Resp  BP Sys/Baer  Pulse Ox


 


 Last 24 Hr  97.5 F-98.0 F    20-29  104-157/  








 Intake & Output











 12/08/19 12/09/19 12/10/19 12/11/19





 23:59 23:59 23:59 23:59


 


Intake Total  2214 1058 210


 


Output Total  2525 1025 275


 


Balance  -311 33 -65


 


Weight 58.559 kg 58.542 kg 60.781 kg 58.2 kg








Gen:  tachypneic on BiPAP


Heart: RRR


Lung: distant breath sounds


Abd: soft, nontender


Ext: + edema





 CBC, BMP





 12/11/19 06:30 





 12/11/19 06:30 





Active Medications





Albuterol Sulfate (Ventolin 0.083% Nebulizer Soln -)  1 amp NEB RQ4H PRN


   PRN Reason: Dyspnea


   Last Admin: 12/10/19 04:20 Dose:  1 amp


Albuterol/Ipratropium (Duoneb -)  1 amp NEB RQID Replaced by [facility]


   Last Admin: 12/11/19 11:51 Dose:  1 amp


Chlorhexidine Gluconate (Hibiclens For Decolonization -)  1 applic TP HS Replaced by [facility]


   Last Admin: 12/10/19 21:48 Dose:  1 applic


Clopidogrel Bisulfate (Plavix -)  75 mg PO DAILY Replaced by [facility]


   Last Admin: 12/11/19 09:22 Dose:  75 mg


Heparin Sodium (Porcine) (Heparin -)  5,000 unit SQ TID Replaced by [facility]


Azithromycin (Zithromax 500mg Ivpb (Pre-Docked))  500 mg in 250 mls @ 250 mls/

hr IVPB DAILY Replaced by [facility]


   Last Admin: 12/11/19 09:22 Dose:  250 mls/hr


Amino Acids (Clinimix -)  1,000 mls @ 84 mls/hr IV Q12H Replaced by [facility]


   Last Admin: 12/11/19 11:27 Dose:  84 mls/hr


Methylprednisolone Sodium Succinate (Solu-Medrol -)  40 mg IVPUSH Q8H-IV ISAIAH


   Last Admin: 12/11/19 09:23 Dose:  40 mg


Morphine Sulfate (Morphine Sulfate)  2 mg IVPUSH Q4H PRN


   PRN Reason: PAIN LEVEL 6-10


Mupirocin (Bactroban Ointment (For Decolonization) -)  1 applic NS BID ISAIAH


   Stop: 12/14/19 09:59


   Last Admin: 12/10/19 21:48 Dose:  1 applic








ASSESSMENT AND PLAN:


Acute on Chronic Hypoxic and Hypercapneic Respiratory Failure


Acute COPD Exacerbation


Emphysema


CAD


HTN


DM


Hyperlipidemia


Rheumatoid Arthritis


h/o CVA


Prostate Ca


Anemia





-  continue BiPAP


-  can attempt HFOT


-  continue medrol


-  inhaled bronchodilators standing and PRN


-  lasix today


-  monitor urine output, creatinine


-  O2 to keep Spo2 88-92%


-  azithromycin


-  continue ICU monitoring








critical care time spent in reviewing chart, evaluating patient and formulating 

plan 35 min

## 2019-12-11 NOTE — EKG
Test Reason : 

Blood Pressure : ***/*** mmHG

Vent. Rate : 089 BPM     Atrial Rate : 089 BPM

   P-R Int : 126 ms          QRS Dur : 134 ms

    QT Int : 386 ms       P-R-T Axes : 103 117 049 degrees

   QTc Int : 469 ms

 

 

NORMAL SINUS RHYTHM

RIGHT BUNDLE BRANCH BLOCK

LEFT POSTERIOR FASCICULAR BLOCK

*** BIFASCICULAR BLOCK ***

ABNORMAL ECG

WHEN COMPARED WITH ECG OF 08-DEC-2019 20:13,

PREMATURE VENTRICULAR COMPLEXES ARE NO LONGER PRESENT

Confirmed by GERSON TIERNEY, TATA ([id number]) on 12/11/2019 10:28:37 AM

 

Referred By:             Confirmed By:TATA NIETO MD

## 2019-12-11 NOTE — ECHO
______________________________________________________________________________



Name: JONN SEAY, JR                                Exam:Adult Echocardiogram

MRN: [id number]         Study Date: 2019 12:46 PM

Age: 64 yrs

______________________________________________________________________________



Height: 64 in        Weight: 128 lb        BSA: 1.6 m2



______________________________________________________________________________



MMode/2D Measurements & Calculations

IVSd: 1.1 cm                                       Ao root diam: 3.1 cm

LVIDd: 3.7 cm                                      LA dimension: 2.2 cm

LVIDs: 2.7 cm

LVPWd: 0.91 cm



____________________________________________________

LVPWs: 1.2 cm                                      EDV(Teich): 59.1 ml

                                                   ESV(Teich): 26.5 ml



____________________________________________________

LVOT diam: 2.4 cm                                  RV S Juan: 9.7 cm/sec



Doppler Measurements & Calculations

MV E max juan: 47.1 cm/sec                            Ao V2 max: 77.3 cm/sec

MV A max juan: 88.3 cm/sec                            Ao max P.4 mmHg

MV E/A: 0.53

MV dec time: 0.07 sec                                SYLVIA(V,D): 3.8 cm2



______________________________________________________

LV V1 max P.7 mmHg                               TR max juan: 275.5 cm/sec

LV V1 max: 65.0 cm/sec                               TR max P.6 mmHg



______________________________________________________

PA V2 max: 104.7 cm/sec                              Med Peak E' Juan: 4.0 cm/sec

PA max P.4 mmHg                                  Med E/e': 11.7

                                                     Lat Peak E' Juan: 7.1 cm/sec

                                                     Lat E/e': 6.7





______________________________________________________________________________

Procedure

A two-dimensional transthoracic echocardiogram with color flow and Doppler was performed.

Left Ventricle

The left ventricular size, thickness and function are normal. The left ventricular ejection fraction 
is

normal. Septal motion is consistent with post-operative state.

Right Ventricle

The right ventricle is not well visualized.

Atria

Normal left and right atrial size and function.

Mitral Valve

There is mild mitral valve thickening. There is no mitral valve stenosis. There is mild mitral regurg
itation.

Tricuspid Valve

There is mild tricuspid valve thickening. There is no tricuspid stenosis. There is mild tricuspid

regurgitation. Right ventricular systolic pressure is elevated at 40-50mmHg.

Aortic Valve

The aortic valve is not well visualized. No hemodynamically significant valvular aortic stenosis. No 
aortic

regurgitation is present.

Pulmonic Valve

The pulmonic valve is not well visualized.

Great Vessels

The aortic root is normal size.

Pericardium/Pleura

There is no pericardial effusion.

______________________________________________________________________________





Interpretation Summary

The left ventricular size, thickness and function are normal

The left ventricular ejection fraction is normal.

Septal motion is consistent with post-operative state.

There is mild mitral regurgitation.

There is mild tricuspid regurgitation.

Right ventricular systolic pressure is elevated at 40-50mmHg.





MD Mario Verde 2019 01:46 PM

## 2019-12-11 NOTE — PN
Physical Exam: 


SUBJECTIVE: Patient seen and examined by the bedside. Was anxious overnight and 

received 1mg Morphine, which alleviated his anxiety.








OBJECTIVE:





 Vital Signs











 Period  Temp  Pulse  Resp  BP Sys/Baer  Pulse Ox


 


 Last 24 Hr  97.3 F-98.0 F    20-29  104-170/  











GENERAL: AOx3


HEAD: Normal with no signs of trauma.


EYES: EOMI


ENT: on BiPAP, dry mucous membranes 


NECK: trachea midline, supple


LUNGS: crackles bilaterally, no accessory muscle use, no wheezing


HEART: RRR, no murmur


ABDOMEN: Soft, nontender, nondistended, normoactive bowel sounds, no guarding, 

no rebound


EXTREMITIES: 2+ pulses, warm, no peripheral edema


NEUROLOGICAL: Normal speech, gait not observed.


PSYCH: anxious mood 


SKIN: Warm, dry











 Laboratory Results - last 24 hr











  12/10/19





  06:00


 


Puncture Site  Left radial


 


ABG pH  7.38


 


ABG pCO2 at Pt Temp  60.1 H


 


ABG pO2 at Pt Temp  68.9 L


 


ABG HCO3  34.4 H


 


ABG O2 Sat (Measured)  93.8 L


 


ABG O2 Content  15.5


 


ABG Base Excess  7.8 H


 


Mick Test  Positive


 


O2 Delivery Device  Bipap


 


Oxygen Flow Rate  Yes


 


Vent Rate  16








Active Medications











Generic Name Dose Route Start Last Admin





  Trade Name Freq  PRN Reason Stop Dose Admin


 


Albuterol Sulfate  1 amp  12/09/19 15:40  12/10/19 04:20





  Ventolin 0.083% Nebulizer Soln -  NEB   1 amp





  RQ4H PRN   Administration





  Dyspnea   





     





     





     


 


Albuterol/Ipratropium  1 amp  12/09/19 08:00  12/11/19 07:08





  Duoneb -  NEB   1 amp





  RQID ISAIAH   Administration





     





     





     





     


 


Chlorhexidine Gluconate  1 applic  12/09/19 22:00  12/10/19 21:48





  Hibiclens For Decolonization -  TP   1 applic





  HS ISAIAH   Administration





     





     





     





     


 


Clopidogrel Bisulfate  75 mg  12/09/19 11:45  12/10/19 10:10





  Plavix -  PO   75 mg





  DAILY ISAIAH   Administration





     





     





     





     


 


Azithromycin  500 mg in 250 mls @ 250 mls/hr  12/09/19 11:45  12/10/19 10:53





  Zithromax 500mg Ivpb (Pre-Docked)  IVPB   250 mls/hr





  DAILY ISAIAH   Administration





     





     





     





     


 


Sodium Chloride  1,000 mls @ 42 mls/hr  12/09/19 13:51  12/10/19 17:24





  Normal Saline -  IV   42 mls/hr





  ASDIR ISAIAH   Administration





     





     





     





     


 


Methylprednisolone Sodium Succinate  40 mg  12/09/19 18:00  12/11/19 01:50





  Solu-Medrol -  IVPUSH   40 mg





  Q8H-IV ISAIAH   Administration





     





     





     





     


 


Mupirocin  1 applic  12/09/19 10:00  12/10/19 21:48





  Bactroban Ointment (For Decolonization) -  NS  12/14/19 09:59  1 applic





  BID ISAIAH   Administration





     





     





     





     











ASSESSMENT/PLAN:


63 y/o/m with PMH of [place], HLD, DM, COPD, CAD, CHF, RA, CVA and prostate CA. 

Presented to the ER from home on 12/8 with complaints of gradually worsening 

SOB and chest pain over the past 24 hours. He was lethargic on presentation and 

subsequently intubated. Extubated on 12/9





#CNS


- Extubated on 12/9


- Head CT - no acute pathology 





#Respiratory


- Acute on chronic respiratory failure with hypoxia and hypercapnia requiring 

intubation on 12/8, extubated on 12/9


- Currently on BiPAP, will attempt to wean off BiPAP and start Hi Flow


- CXR 12/11: Congestion at bases B/L, official read pending


- Given 40mg Lasix


- Emphysematous changes seen on chest CT 12/8


- Duonebs, Solu-medrol 40mg IV Q8hr





#CVS


- Hx of HTN: Can use Hydralazine for BP control, avoid Beta Blockers due to 

COPD exacerbation, resume home meds when appropriate


- Cardio recs: BNP, Echo ordered


- Clopidogrel 75mg (home emd)


- POC US in ER: decreased EF with LV lateral wall motion abnormality, no b lines

, % collapsible, no pericardial effusion, no pleural effusion 





#ID


- WBC 14.9, afebrile overnight


- Lactic acid normalized 


- Blood, urine cx without growth to date 


- Azithromycin 500mg started 12/9





#GI


- CTAP - Mild hepatic steatosis with subtle hepatic surface nodularity is 

concerning for early cirrhosis.





#Renal


- Cr 0.8





#Prophylaxis


- Started on Heparin 5000U





#FEN


- N/S @ 42mls/hr 


- Resume diet once off BiPAP





#Dispo


- Continue ICU monitoring 





ATTENDING PHYSICIAN STATEMENT





I saw and evaluated the patient.


I reviewed the resident's note and discussed the case with the resident.


I agree with the resident's findings and plan as documented.








SUBJECTIVE:








OBJECTIVE:








ASSESSMENT AND PLAN:

## 2019-12-11 NOTE — PN
Progress Note, Physician


Chief Complaint: 





On BiPap


History of Present Illness: 





Exacerbation of COPD, respiratory failure





- Current Medication List


Current Medications: 


Active Medications





Albuterol Sulfate (Ventolin 0.083% Nebulizer Soln -)  1 amp NEB RQ4H PRN


   PRN Reason: Dyspnea


   Last Admin: 12/10/19 04:20 Dose:  1 amp


Albuterol/Ipratropium (Duoneb -)  1 amp NEB RQID [facility]


   Last Admin: 12/11/19 07:08 Dose:  1 amp


Chlorhexidine Gluconate (Hibiclens For Decolonization -)  1 applic TP HS [facility]


   Last Admin: 12/10/19 21:48 Dose:  1 applic


Clopidogrel Bisulfate (Plavix -)  75 mg PO DAILY [facility]


   Last Admin: 12/10/19 10:10 Dose:  75 mg


Azithromycin (Zithromax 500mg Ivpb (Pre-Docked))  500 mg in 250 mls @ 250 mls/

hr IVPB DAILY [facility]


   Last Admin: 12/10/19 10:53 Dose:  250 mls/hr


Sodium Chloride (Normal Saline -)  1,000 mls @ 42 mls/hr IV ASDIR [facility]


   Last Admin: 12/10/19 17:24 Dose:  42 mls/hr


Methylprednisolone Sodium Succinate (Solu-Medrol -)  40 mg IVPUSH Q8H-IV [facility]


   Last Admin: 12/11/19 01:50 Dose:  40 mg


Mupirocin (Bactroban Ointment (For Decolonization) -)  1 applic NS BID [facility]


   Stop: 12/14/19 09:59


   Last Admin: 12/10/19 21:48 Dose:  1 applic











- Objective


Vital Signs: 


 Vital Signs











Temperature  98.0 F   12/11/19 06:00


 


Pulse Rate  111 H  12/11/19 06:00


 


Respiratory Rate  22 H  12/11/19 06:00


 


Blood Pressure  130/105 H  12/11/19 06:00


 


O2 Sat by Pulse Oximetry (%)  97   12/11/19 08:38











Constitutional: Yes: Mild Distress


Eyes: Yes: WNL


HENT: Yes: WNL


Neck: Yes: WNL


Cardiovascular: Yes: Tachycardia


Respiratory: Yes: On BiPap


Gastrointestinal: Yes: Normal Bowel Sounds


Edema: LLE: 1+, RLE: 1+


Neurological: Yes: Alert


Labs: 


 CBC, BMP





 12/11/19 06:30 





 12/11/19 06:30 











Assessment/Plan





Change IV fluid

## 2019-12-11 NOTE — CON.CARD
Consult


Consult Specialty:: Cardiology





- History of Present Illness


History of Present Illness: 





62yo man with a PMH of CAD s/p CABG, COPD on home O2 (on a list for lung 

transplant), HTN, HLD, HIV, CHF, RA on methotrexate, and anxiety presents to 

the emergency department with respiratory distress from home. Per the patient's 

family member, he was having SOB today over the past 24 hours and was 

increasingly short of breath. Per the daughter, she states he wanted to present 

earlier but did not. On history intake, the patient was speaking in 1 word 

answers and lethargic. He stated he felt short of breath and had chest 

tightness. Denies fevers, nausea, vomiting, dysuria, abdominal pain. 





PE: 


severe decreased breath sounds with audible rhonchi


cardiac: rrr without murmurs


abdomen: diffusely tender on palpation











PMH


Past medical history 


Major events


CABG SVG to RCA Dec. 2014 Dr. Duarte


Ongoing medical problems


Abnormal stress test showing inferior wall ischemia October 13, 2014 [facility]


CAD- Stent oRCA 2008 [facility]


COPD


 oRCA prior stent site, o/w nonobstructive ds Oct. 2014 Dr. France at 

[facility]


Rheumatoid Arthritis


unsucesful  attempt Dec. 3 2014 [facility]











- Past Medical History


Cardio/Vascular: Yes: CAD, HTN, Hyperlipdemia


Pulmonary: Yes: COPD


Musculoskeletal: Yes: Chronic low back pain


Rheumatology: Yes: Rheumatoid Arthritis





- Past Surgical History


Past Surgical History: Yes: CABG, Stent





- Alcohol/Substance Use


Hx Alcohol Use: No


History of Substance Use: reports: None





- Smoking History


Smoking history: Unknown if ever smoked


Have you smoked in the past 12 months: No


Aproximately how many cigarettes per day: 0


If you are a former smoker, when did you quit?: 3YRS





Home Medications





- Allergies


Allergies/Adverse Reactions: 


 Allergies











Allergy/AdvReac Type Severity Reaction Status Date / Time


 


Penicillins Allergy Severe Rash Verified 12/08/19 20:16


 


seafood Allergy Severe Rash Uncoded 12/08/19 20:16














- Home Medications


Home Medications: 


Ambulatory Orders





Albuterol Sulfate Inhaler - [Ventolin Hfa Inhaler -] 1 - 2 inh PO Q4H PRN 11/28/ 18 


Budesonide/Formeterol Fumarate [SYMBICORT 160/4.5mcg -] 1 inh PO BID 11/28/18 


Clopidogrel Bisulfate [Plavix] 75 mg PO DAILY 11/28/18 


Folic Acid 1 mg PO DAILY 11/28/18 


Furosemide [Lasix] 40 mg PO DAILY 11/28/18 


Hydroxychloroquine Sulfate [Plaquenil] 200 mg PO BID 11/28/18 


Methotrexate [Mexate -] 15 mg PO Q7D 11/28/18 


Ramipril [Altace] 5 mg PO DAILY 11/28/18 


Simvastatin [Zocor -] 40 mg PO HS 11/28/18 


Azithromycin 1 tab PO WEEKLY 12/08/19 


Clonazepam 0.5 mg PO BID 12/08/19 


Fluticasone/Umeclidin/Vilanter [Trelegy Ellipta 100-62.5-25] 1 puff 12/08/19 











Review of Systems





- Review of Systems


Constitutional: reports: No Symptoms


Eyes: reports: No Symptoms


HENT: reports: No Symptoms


Neck: reports: No Symptoms


Cardiovascular: reports: Shortness of Breath


Respiratory: reports: Cough, SOB, SOB on Exertion


Gastrointestinal: reports: No Symptoms


Genitourinary: reports: No Symptoms


Breasts: reports: No Symptoms Reported


Musculoskeletal: reports: No Symptoms


Integumentary: reports: No Symptoms


Neurological: reports: No Symptoms


Endocrine: reports: No Symptoms


Hematology/Lymphatic: reports: No Symptoms


Psychiatric: reports: No Symptoms


Vital Signs: 


 Vital Signs











Temperature  98.0 F   12/11/19 06:00


 


Pulse Rate  111 H  12/11/19 06:00


 


Respiratory Rate  22 H  12/11/19 06:00


 


Blood Pressure  130/105 H  12/11/19 06:00


 


O2 Sat by Pulse Oximetry (%)  97   12/11/19 08:38











Constitutional: Yes: Mild Distress


Eyes: Yes: WNL, Conjunctiva Clear, EOM Intact


HENT: Yes: WNL, Atraumatic, Normocephalic


Neck: Yes: WNL, Supple, Trachea Midline


Respiratory: Yes: Diminished


Gastrointestinal: Yes: WNL, Normal Bowel Sounds


Renal/: Yes: WNL


Cardiovascular: Yes: WNL, Regular Rate and Rhythm


Heart Sounds: Yes: S1, S2


Musculoskeletal: Yes: WNL


Extremities: Yes: WNL


Edema: Yes


Integumentary: Yes: WNL


Neurological: Yes: WNL, Alert, Oriented


...Motor Strength: WNL


Psychiatric: Yes: WNL, Alert, Oriented





- Other Data


Labs, Other Data: 


 CBC, BMP





 12/11/19 06:30 





 12/11/19 06:30 





 Laboratory Tests











  12/08/19 12/08/19 12/08/19





  20:20 20:30 20:30


 


WBC   22.7 H 


 


RBC   4.74 


 


Hgb   14.0 


 


Hct   44.2  D 


 


MCV   93.3 


 


MCH   29.6 


 


MCHC   31.7 L 


 


RDW   15.8 


 


Plt Count   367  D 


 


MPV   7.5 


 


Absolute Neuts (auto)   20.0 H 


 


Total Counted   100 


 


Neutrophils %   87.9 H 


 


Neutrophils % (Manual)   87.0 H 


 


Band Neutrophils %   3.0 


 


Lymphocytes %   2.4 L D 


 


Lymphocytes % (Manual)   2.0 L 


 


Monocytes %   9.5 


 


Monocytes % (Manual)   8 


 


Eosinophils %   0.0  D 


 


Eosinophils % (Manual)   


 


Basophils %   0.2 


 


Basophils % (Manual)   


 


Myelocytes % (Man)   


 


Promyelocytes % (Man)   


 


Blast Cells % (Manual)   


 


Nucleated RBC %   0 


 


Metamyelocytes   


 


Differential Comment   Man diff performed 


 


Hypochromia   


 


Platelet Estimate   Adequate 


 


Platelet Comment    


 


Polychromasia   


 


Poikilocytosis   1+ 


 


Anisocytosis   1+ 


 


Microcytosis   


 


Macrocytosis   1+ 


 


Ovalocytes   1+ 


 


Anticoagulation Therapy   


 


Puncture Site   


 


Patient Temperature   


 


ABG pH   


 


ABG pCO2 at Pt Temp   


 


ABG pO2 at Pt Temp   


 


ABG HCO3   


 


ABG O2 Sat (Measured)   


 


ABG O2 Content   


 


ABG Base Excess   


 


Mick Test   


 


Carboxyhemoglobin   


 


Methemoglobin   


 


O2 Delivery Device   


 


Oxygen Flow Rate   


 


Vent Mode   


 


Vent Rate   


 


Mechanical Rate   


 


PEEP   


 


Pressure Support Vent   


 


Sodium    137


 


Potassium    5.2 H


 


Chloride    96 L


 


Carbon Dioxide    35 H


 


Anion Gap    6 L


 


BUN    23.9 H


 


Creatinine    1.0


 


Est GFR (CKD-EPI)AfAm    93.08


 


Est GFR (CKD-EPI)NonAf    80.31


 


Random Glucose    123 H


 


Lactic Acid  4.0 H*  


 


Calcium    8.8


 


Phosphorus   


 


Magnesium    2.6 H


 


Total Bilirubin    1.2 H


 


AST    29


 


ALT    34


 


Alkaline Phosphatase    102


 


Creatine Kinase    196


 


Creatine Kinase Index    4.5


 


CK-MB (CK-2)    8.9 H


 


Troponin I    < 0.02


 


Total Protein    7.5


 


Albumin    3.9


 


Urine Color   


 


Urine Appearance   


 


Urine pH   


 


Ur Specific Gravity   


 


Urine Protein   


 


Urine Glucose (UA)   


 


Urine Ketones   


 


Urine Blood   


 


Urine Nitrite   


 


Urine Bilirubin   


 


Urine Urobilinogen   


 


Ur Leukocyte Esterase   


 


Urine WBC (Auto)   


 


Urine RBC (Auto)   


 


Urine Casts (Auto)   


 


U Epithel Cells (Auto)   


 


Urine Bacteria (Auto)   


 


Influenza A (Rapid)   


 


Influenza B (Rapid)   














  12/08/19 12/08/19 12/08/19





  20:30 21:35 23:50


 


WBC   


 


RBC   


 


Hgb   


 


Hct   


 


MCV   


 


MCH   


 


MCHC   


 


RDW   


 


Plt Count   


 


MPV   


 


Absolute Neuts (auto)   


 


Total Counted   


 


Neutrophils %   


 


Neutrophils % (Manual)   


 


Band Neutrophils %   


 


Lymphocytes %   


 


Lymphocytes % (Manual)   


 


Monocytes %   


 


Monocytes % (Manual)   


 


Eosinophils %   


 


Eosinophils % (Manual)   


 


Basophils %   


 


Basophils % (Manual)   


 


Myelocytes % (Man)   


 


Promyelocytes % (Man)   


 


Blast Cells % (Manual)   


 


Nucleated RBC %   


 


Metamyelocytes   


 


Differential Comment   


 


Hypochromia   


 


Platelet Estimate   


 


Platelet Comment   


 


Polychromasia   


 


Poikilocytosis   


 


Anisocytosis   


 


Microcytosis   


 


Macrocytosis   


 


Ovalocytes   


 


Anticoagulation Therapy  Cancelled  No Result Required.  No Result Required.


 


Puncture Site  Cancelled  Right radial  Right radial


 


Patient Temperature  Cancelled  


 


ABG pH  Cancelled  7.09 L*  7.20 L


 


ABG pCO2 at Pt Temp  Cancelled  > 100 H*  80.2 H*


 


ABG pO2 at Pt Temp  Cancelled  323 H  197 H


 


ABG HCO3  Cancelled  No Result Required.  29.9 H


 


ABG O2 Sat (Measured)  Cancelled  99.3 H  99.5 H


 


ABG O2 Content  Cancelled  17.8  16.6


 


ABG Base Excess  Cancelled  No Result Required.  0.2


 


Mick Test  Cancelled  Positive  Positive


 


Carboxyhemoglobin  Cancelled  0.7 


 


Methemoglobin  Cancelled  < 1.0 


 


O2 Delivery Device  Cancelled  No Result Required.  Vent


 


Oxygen Flow Rate  Cancelled  80%  60%


 


Vent Mode  Cancelled  A/c  A/c


 


Vent Rate  Cancelled  18  18


 


Mechanical Rate  Cancelled  No Result Required.  Yes


 


PEEP  Cancelled  5.0  5.0


 


Pressure Support Vent  Cancelled  400  400


 


Sodium   


 


Potassium   


 


Chloride   


 


Carbon Dioxide   


 


Anion Gap   


 


BUN   


 


Creatinine   


 


Est GFR (CKD-EPI)AfAm   


 


Est GFR (CKD-EPI)NonAf   


 


Random Glucose   


 


Lactic Acid   


 


Calcium   


 


Phosphorus   


 


Magnesium   


 


Total Bilirubin   


 


AST   


 


ALT   


 


Alkaline Phosphatase   


 


Creatine Kinase   


 


Creatine Kinase Index   


 


CK-MB (CK-2)   


 


Troponin I   


 


Total Protein   


 


Albumin   


 


Urine Color   


 


Urine Appearance   


 


Urine pH   


 


Ur Specific Gravity   


 


Urine Protein   


 


Urine Glucose (UA)   


 


Urine Ketones   


 


Urine Blood   


 


Urine Nitrite   


 


Urine Bilirubin   


 


Urine Urobilinogen   


 


Ur Leukocyte Esterase   


 


Urine WBC (Auto)   


 


Urine RBC (Auto)   


 


Urine Casts (Auto)   


 


U Epithel Cells (Auto)   


 


Urine Bacteria (Auto)   


 


Influenza A (Rapid)   


 


Influenza B (Rapid)   














  12/09/19 12/09/19 12/09/19





  00:10 00:50 05:50


 


WBC   


 


RBC   


 


Hgb   


 


Hct   


 


MCV   


 


MCH   


 


MCHC   


 


RDW   


 


Plt Count   


 


MPV   


 


Absolute Neuts (auto)   


 


Total Counted   


 


Neutrophils %   


 


Neutrophils % (Manual)   


 


Band Neutrophils %   


 


Lymphocytes %   


 


Lymphocytes % (Manual)   


 


Monocytes %   


 


Monocytes % (Manual)   


 


Eosinophils %   


 


Eosinophils % (Manual)   


 


Basophils %   


 


Basophils % (Manual)   


 


Myelocytes % (Man)   


 


Promyelocytes % (Man)   


 


Blast Cells % (Manual)   


 


Nucleated RBC %   


 


Metamyelocytes   


 


Differential Comment   


 


Hypochromia   


 


Platelet Estimate   


 


Platelet Comment   


 


Polychromasia   


 


Poikilocytosis   


 


Anisocytosis   


 


Microcytosis   


 


Macrocytosis   


 


Ovalocytes   


 


Anticoagulation Therapy    No Result Required.


 


Puncture Site    Right radial


 


Patient Temperature   


 


ABG pH    7.33 L


 


ABG pCO2 at Pt Temp    62.6 H


 


ABG pO2 at Pt Temp    180 H


 


ABG HCO3    32.1 H


 


ABG O2 Sat (Measured)    99.5 H


 


ABG O2 Content    16.3


 


ABG Base Excess    5.1 H


 


Mick Test    Positive


 


Carboxyhemoglobin   


 


Methemoglobin   


 


O2 Delivery Device    Vent


 


Oxygen Flow Rate    60%


 


Vent Mode    A/c


 


Vent Rate    18


 


Mechanical Rate    Yes


 


PEEP    5.0


 


Pressure Support Vent    400


 


Sodium   


 


Potassium   


 


Chloride   


 


Carbon Dioxide   


 


Anion Gap   


 


BUN   


 


Creatinine   


 


Est GFR (CKD-EPI)AfAm   


 


Est GFR (CKD-EPI)NonAf   


 


Random Glucose   


 


Lactic Acid   1.7 


 


Calcium   


 


Phosphorus   


 


Magnesium   


 


Total Bilirubin   


 


AST   


 


ALT   


 


Alkaline Phosphatase   


 


Creatine Kinase   


 


Creatine Kinase Index   


 


CK-MB (CK-2)   


 


Troponin I   


 


Total Protein   


 


Albumin   


 


Urine Color   


 


Urine Appearance   


 


Urine pH   


 


Ur Specific Gravity   


 


Urine Protein   


 


Urine Glucose (UA)   


 


Urine Ketones   


 


Urine Blood   


 


Urine Nitrite   


 


Urine Bilirubin   


 


Urine Urobilinogen   


 


Ur Leukocyte Esterase   


 


Urine WBC (Auto)   


 


Urine RBC (Auto)   


 


Urine Casts (Auto)   


 


U Epithel Cells (Auto)   


 


Urine Bacteria (Auto)   


 


Influenza A (Rapid)  Negative  


 


Influenza B (Rapid)  Negative  














  12/09/19 12/09/19 12/09/19





  06:00 06:20 06:20


 


WBC   18.8 H 


 


RBC   3.80 L 


 


Hgb   11.3 L 


 


Hct   34.9 L D 


 


MCV   91.8 


 


MCH   29.8 


 


MCHC   32.5 


 


RDW   15.9 


 


Plt Count   282  D 


 


MPV   7.5 


 


Absolute Neuts (auto)   18.0 H 


 


Total Counted   


 


Neutrophils %   95.9 H 


 


Neutrophils % (Manual)   95.0 H 


 


Band Neutrophils %   0.0 


 


Lymphocytes %   1.5 L D 


 


Lymphocytes % (Manual)   2.0 L 


 


Monocytes %   2.6 L 


 


Monocytes % (Manual)   2 L 


 


Eosinophils %   0.0 


 


Eosinophils % (Manual)   0.0 


 


Basophils %   0.0 


 


Basophils % (Manual)   0.0 


 


Myelocytes % (Man)   0 


 


Promyelocytes % (Man)   0 


 


Blast Cells % (Manual)   0 


 


Nucleated RBC %   0 


 


Metamyelocytes   1 


 


Differential Comment   


 


Hypochromia   0 


 


Platelet Estimate   Normal 


 


Platelet Comment   


 


Polychromasia   0 


 


Poikilocytosis   0 


 


Anisocytosis   1+ 


 


Microcytosis   0 


 


Macrocytosis   0 


 


Ovalocytes   


 


Anticoagulation Therapy   


 


Puncture Site   


 


Patient Temperature   


 


ABG pH   


 


ABG pCO2 at Pt Temp   


 


ABG pO2 at Pt Temp   


 


ABG HCO3   


 


ABG O2 Sat (Measured)   


 


ABG O2 Content   


 


ABG Base Excess   


 


Mick Test   


 


Carboxyhemoglobin   


 


Methemoglobin   


 


O2 Delivery Device   


 


Oxygen Flow Rate   


 


Vent Mode   


 


Vent Rate   


 


Mechanical Rate   


 


PEEP   


 


Pressure Support Vent   


 


Sodium    139


 


Potassium    5.0


 


Chloride    101


 


Carbon Dioxide    32


 


Anion Gap    6 L


 


BUN    16.7


 


Creatinine    0.7


 


Est GFR (CKD-EPI)AfAm    117.22


 


Est GFR (CKD-EPI)NonAf    101.14


 


Random Glucose    114 H


 


Lactic Acid  1.1  


 


Calcium    8.3 L


 


Phosphorus    2.1 L


 


Magnesium    2.9 H


 


Total Bilirubin    0.8


 


AST    25


 


ALT    25


 


Alkaline Phosphatase    76


 


Creatine Kinase   


 


Creatine Kinase Index   


 


CK-MB (CK-2)   


 


Troponin I   


 


Total Protein    5.9 L


 


Albumin    2.8 L


 


Urine Color   


 


Urine Appearance   


 


Urine pH   


 


Ur Specific Gravity   


 


Urine Protein   


 


Urine Glucose (UA)   


 


Urine Ketones   


 


Urine Blood   


 


Urine Nitrite   


 


Urine Bilirubin   


 


Urine Urobilinogen   


 


Ur Leukocyte Esterase   


 


Urine WBC (Auto)   


 


Urine RBC (Auto)   


 


Urine Casts (Auto)   


 


U Epithel Cells (Auto)   


 


Urine Bacteria (Auto)   


 


Influenza A (Rapid)   


 


Influenza B (Rapid)   














  12/09/19 12/09/19 12/10/19





  13:55 20:30 05:33


 


WBC    18.2 H


 


RBC    3.97 L


 


Hgb    11.9


 


Hct    36.2


 


MCV    91.1


 


MCH    30.0


 


MCHC    32.9


 


RDW    16.2 H


 


Plt Count    292


 


MPV    7.5


 


Absolute Neuts (auto)   


 


Total Counted   


 


Neutrophils %   


 


Neutrophils % (Manual)   


 


Band Neutrophils %   


 


Lymphocytes %   


 


Lymphocytes % (Manual)   


 


Monocytes %   


 


Monocytes % (Manual)   


 


Eosinophils %   


 


Eosinophils % (Manual)   


 


Basophils %   


 


Basophils % (Manual)   


 


Myelocytes % (Man)   


 


Promyelocytes % (Man)   


 


Blast Cells % (Manual)   


 


Nucleated RBC %   


 


Metamyelocytes   


 


Differential Comment   


 


Hypochromia   


 


Platelet Estimate   


 


Platelet Comment   


 


Polychromasia   


 


Poikilocytosis   


 


Anisocytosis   


 


Microcytosis   


 


Macrocytosis   


 


Ovalocytes   


 


Anticoagulation Therapy  No Result Required.  


 


Puncture Site  Right radial  


 


Patient Temperature   


 


ABG pH  7.33 L  


 


ABG pCO2 at Pt Temp  62.3 H  


 


ABG pO2 at Pt Temp  66.6 L  


 


ABG HCO3  31.8 H  


 


ABG O2 Sat (Measured)  93.3 L  


 


ABG O2 Content  15.6  


 


ABG Base Excess  4.7 H  


 


Mick Test  Positive  


 


Carboxyhemoglobin   


 


Methemoglobin   


 


O2 Delivery Device  No Result Required.  


 


Oxygen Flow Rate  Yes  


 


Vent Mode  No Result Required.  


 


Vent Rate  16  


 


Mechanical Rate  No Result Required.  


 


PEEP   


 


Pressure Support Vent  No Result Required.  


 


Sodium   


 


Potassium   


 


Chloride   


 


Carbon Dioxide   


 


Anion Gap   


 


BUN   


 


Creatinine   


 


Est GFR (CKD-EPI)AfAm   


 


Est GFR (CKD-EPI)NonAf   


 


Random Glucose   


 


Lactic Acid   


 


Calcium   


 


Phosphorus   


 


Magnesium   


 


Total Bilirubin   


 


AST   


 


ALT   


 


Alkaline Phosphatase   


 


Creatine Kinase   


 


Creatine Kinase Index   


 


CK-MB (CK-2)   


 


Troponin I   


 


Total Protein   


 


Albumin   


 


Urine Color   Yellow 


 


Urine Appearance   Cloudy 


 


Urine pH   5.5 


 


Ur Specific Gravity   1.021 


 


Urine Protein   1+ H 


 


Urine Glucose (UA)   Negative 


 


Urine Ketones   1+ H 


 


Urine Blood   3+ H 


 


Urine Nitrite   Negative 


 


Urine Bilirubin   Negative 


 


Urine Urobilinogen   1.0 


 


Ur Leukocyte Esterase   1+ H 


 


Urine WBC (Auto)   32 


 


Urine RBC (Auto)   1107 


 


Urine Casts (Auto)   11 


 


U Epithel Cells (Auto)   2.7 


 


Urine Bacteria (Auto)   1.2 


 


Influenza A (Rapid)   


 


Influenza B (Rapid)   














  12/10/19 12/10/19 12/11/19





  05:33 06:00 06:30


 


WBC    14.9 H


 


RBC    3.98 L


 


Hgb    12.1


 


Hct    35.9


 


MCV    90.3


 


MCH    30.3


 


MCHC    33.5


 


RDW    15.1


 


Plt Count    317


 


MPV    7.4 L


 


Absolute Neuts (auto)   


 


Total Counted   


 


Neutrophils %   


 


Neutrophils % (Manual)   


 


Band Neutrophils %   


 


Lymphocytes %   


 


Lymphocytes % (Manual)   


 


Monocytes %   


 


Monocytes % (Manual)   


 


Eosinophils %   


 


Eosinophils % (Manual)   


 


Basophils %   


 


Basophils % (Manual)   


 


Myelocytes % (Man)   


 


Promyelocytes % (Man)   


 


Blast Cells % (Manual)   


 


Nucleated RBC %   


 


Metamyelocytes   


 


Differential Comment   


 


Hypochromia   


 


Platelet Estimate   


 


Platelet Comment   


 


Polychromasia   


 


Poikilocytosis   


 


Anisocytosis   


 


Microcytosis   


 


Macrocytosis   


 


Ovalocytes   


 


Anticoagulation Therapy   


 


Puncture Site   Left radial 


 


Patient Temperature   


 


ABG pH   7.38 


 


ABG pCO2 at Pt Temp   60.1 H 


 


ABG pO2 at Pt Temp   68.9 L 


 


ABG HCO3   34.4 H 


 


ABG O2 Sat (Measured)   93.8 L 


 


ABG O2 Content   15.5 


 


ABG Base Excess   7.8 H 


 


Mick Test   Positive 


 


Carboxyhemoglobin   


 


Methemoglobin   


 


O2 Delivery Device   Bipap 


 


Oxygen Flow Rate   Yes 


 


Vent Mode   


 


Vent Rate   16 


 


Mechanical Rate   


 


PEEP   


 


Pressure Support Vent   


 


Sodium  139  


 


Potassium  5.0  


 


Chloride  98  


 


Carbon Dioxide  36 H  


 


Anion Gap  5 L  


 


BUN  15.0  


 


Creatinine  0.8  


 


Est GFR (CKD-EPI)AfAm  110.96  


 


Est GFR (CKD-EPI)NonAf  95.74  


 


Random Glucose  130 H  


 


Lactic Acid   


 


Calcium  9.0  


 


Phosphorus  3.2  


 


Magnesium  2.6 H  


 


Total Bilirubin  0.6  


 


AST  25  


 


ALT  28  


 


Alkaline Phosphatase  80  


 


Creatine Kinase   


 


Creatine Kinase Index   


 


CK-MB (CK-2)   


 


Troponin I   


 


Total Protein  6.5  


 


Albumin  3.1 L  


 


Urine Color   


 


Urine Appearance   


 


Urine pH   


 


Ur Specific Gravity   


 


Urine Protein   


 


Urine Glucose (UA)   


 


Urine Ketones   


 


Urine Blood   


 


Urine Nitrite   


 


Urine Bilirubin   


 


Urine Urobilinogen   


 


Ur Leukocyte Esterase   


 


Urine WBC (Auto)   


 


Urine RBC (Auto)   


 


Urine Casts (Auto)   


 


U Epithel Cells (Auto)   


 


Urine Bacteria (Auto)   


 


Influenza A (Rapid)   


 


Influenza B (Rapid)   














  12/11/19





  06:30


 


WBC 


 


RBC 


 


Hgb 


 


Hct 


 


MCV 


 


MCH 


 


MCHC 


 


RDW 


 


Plt Count 


 


MPV 


 


Absolute Neuts (auto) 


 


Total Counted 


 


Neutrophils % 


 


Neutrophils % (Manual) 


 


Band Neutrophils % 


 


Lymphocytes % 


 


Lymphocytes % (Manual) 


 


Monocytes % 


 


Monocytes % (Manual) 


 


Eosinophils % 


 


Eosinophils % (Manual) 


 


Basophils % 


 


Basophils % (Manual) 


 


Myelocytes % (Man) 


 


Promyelocytes % (Man) 


 


Blast Cells % (Manual) 


 


Nucleated RBC % 


 


Metamyelocytes 


 


Differential Comment 


 


Hypochromia 


 


Platelet Estimate 


 


Platelet Comment 


 


Polychromasia 


 


Poikilocytosis 


 


Anisocytosis 


 


Microcytosis 


 


Macrocytosis 


 


Ovalocytes 


 


Anticoagulation Therapy 


 


Puncture Site 


 


Patient Temperature 


 


ABG pH 


 


ABG pCO2 at Pt Temp 


 


ABG pO2 at Pt Temp 


 


ABG HCO3 


 


ABG O2 Sat (Measured) 


 


ABG O2 Content 


 


ABG Base Excess 


 


Mick Test 


 


Carboxyhemoglobin 


 


Methemoglobin 


 


O2 Delivery Device 


 


Oxygen Flow Rate 


 


Vent Mode 


 


Vent Rate 


 


Mechanical Rate 


 


PEEP 


 


Pressure Support Vent 


 


Sodium  142


 


Potassium  4.7


 


Chloride  99


 


Carbon Dioxide  38 H


 


Anion Gap  5 L


 


BUN  23.5 H


 


Creatinine  0.8


 


Est GFR (CKD-EPI)AfAm  110.96


 


Est GFR (CKD-EPI)NonAf  95.74


 


Random Glucose  108 H


 


Lactic Acid 


 


Calcium  9.3


 


Phosphorus  3.3


 


Magnesium  2.8 H


 


Total Bilirubin  0.6


 


AST  22


 


ALT  27


 


Alkaline Phosphatase  80


 


Creatine Kinase 


 


Creatine Kinase Index 


 


CK-MB (CK-2) 


 


Troponin I 


 


Total Protein  6.5


 


Albumin  3.0 L


 


Urine Color 


 


Urine Appearance 


 


Urine pH 


 


Ur Specific Gravity 


 


Urine Protein 


 


Urine Glucose (UA) 


 


Urine Ketones 


 


Urine Blood 


 


Urine Nitrite 


 


Urine Bilirubin 


 


Urine Urobilinogen 


 


Ur Leukocyte Esterase 


 


Urine WBC (Auto) 


 


Urine RBC (Auto) 


 


Urine Casts (Auto) 


 


U Epithel Cells (Auto) 


 


Urine Bacteria (Auto) 


 


Influenza A (Rapid) 


 


Influenza B (Rapid) 














Imaging





- Results


Chest X-ray: Image Reviewed (s/p [facility] no i/e)


EKG: Image Reviewed (s tachy rep abn)





Problem List





- Problems


(1) Acute on chronic respiratory failure with hypoxia and hypercapnia


Code(s): J96.21 - ACUTE AND CHRONIC RESPIRATORY FAILURE WITH HYPOXIA; J96.22 - 

ACUTE AND CHRONIC RESPIRATORY FAILURE WITH HYPERCAPNIA   





(2) Abnormal LFTs


Code(s): R79.89 - OTHER SPECIFIED ABNORMAL FINDINGS OF BLOOD CHEMISTRY   





(3) Abscess of calf


Code(s): L02.419 - CUTANEOUS ABSCESS OF LIMB, UNSPECIFIED   





(4) Acute on chronic respiratory failure with hypoxemia


Code(s): J96.21 - ACUTE AND CHRONIC RESPIRATORY FAILURE WITH HYPOXIA   





(5) Anxiety


Code(s): F41.9 - ANXIETY DISORDER, UNSPECIFIED   





(6) CAD (coronary artery disease)


Code(s): I25.10 - ATHSCL HEART DISEASE OF NATIVE CORONARY ARTERY W/O ANG PCTRS 

  


Qualifiers: 


   Coronary Disease-Associated Artery/Lesion type: native artery   Native vs. 

transplanted heart: native heart   Associated angina: without angina   

Qualified Code(s): I25.10 - Atherosclerotic heart disease of native coronary 

artery without angina pectoris   





(7) COPD (chronic obstructive pulmonary disease)


Code(s): J44.9 - CHRONIC OBSTRUCTIVE PULMONARY DISEASE, UNSPECIFIED   


Qualifiers: 


   COPD type: emphysema   Emphysema type: unspecified   Qualified Code(s): 

J43.9 - Emphysema, unspecified   





(8) COPD exacerbation


Code(s): J44.1 - CHRONIC OBSTRUCTIVE PULMONARY DISEASE W (ACUTE) EXACERBATION   





(9) Chronic pain


Code(s): G89.29 - OTHER CHRONIC PAIN   





(10) Compression fracture of L1 lumbar vertebra


Code(s): S32.010A - WEDGE COMPRESSION FRACTURE OF FIRST LUMBAR VERTEBRA, INIT   


Qualifiers: 


   Encounter type: initial encounter 





(11) Congestive cardiac failure


Code(s): I50.9 - HEART FAILURE, UNSPECIFIED   





(12) Cough


Code(s): R05 - COUGH   





(13) Diastolic CHF


Code(s): I50.30 - UNSPECIFIED DIASTOLIC (CONGESTIVE) HEART FAILURE   


Qualifiers: 


   Heart failure chronicity: unspecified   Qualified Code(s): I50.30 - 

Unspecified diastolic (congestive) heart failure   





(14) Edema


Code(s): R60.9 - EDEMA, UNSPECIFIED   


Qualifiers: 


   Edema type: unspecified   Qualified Code(s): R60.9 - Edema, unspecified   





(15) HTN (hypertension)


Code(s): I10 - ESSENTIAL (PRIMARY) HYPERTENSION   


Qualifiers: 


   Hypertension type: essential hypertension   Qualified Code(s): I10 - 

Essential (primary) hypertension   





(16) History of coronary artery bypass graft


Code(s): Z95.1 - PRESENCE OF AORTOCORONARY BYPASS GRAFT   





(17) History of percutaneous coronary intervention


Code(s): Z98.89 - OTHER SPECIFIED POSTPROCEDURAL STATES * DO NOT USE *   





(18) Hypercholesterolemia


Code(s): E78.0 - PURE HYPERCHOLESTEROLEMIA * DO NOT USE *   





(19) Hyperkalemia


Code(s): E87.5 - HYPERKALEMIA   





(20) Inguinal hernia


Code(s): K40.90 - UNIL INGUINAL HERNIA, W/O OBST OR GANGR, NOT SPCF AS RECUR   





(21) Intractable pain


Code(s): R52 - PAIN, UNSPECIFIED   





(22) LLQ pain


Code(s): R10.32 - LEFT LOWER QUADRANT PAIN   





(23) Left groin pain


Code(s): R10.32 - LEFT LOWER QUADRANT PAIN   





(24) Leg pain


Code(s): M79.606 - PAIN IN LEG, UNSPECIFIED   





(25) Lower extremity edema


Code(s): R60.0 - LOCALIZED EDEMA   





(26) Lumbar disc herniation


Code(s): M51.26 - OTHER INTERVERTEBRAL DISC DISPLACEMENT, LUMBAR REGION   





(27) Nosebleed


Code(s): R04.0 - EPISTAXIS   





(28) PSVT (paroxysmal supraventricular tachycardia)


Code(s): I47.1 - SUPRAVENTRICULAR TACHYCARDIA   





(29) Pain and swelling of right lower leg


Code(s): M79.661 - PAIN IN RIGHT LOWER LEG; M79.89 - OTHER SPECIFIED SOFT 

TISSUE DISORDERS   





(30) Pneumonia


Code(s): J18.9 - PNEUMONIA, UNSPECIFIED ORGANISM   





(31) Radiculopathy


Code(s): M54.10 - RADICULOPATHY, SITE UNSPECIFIED   


Qualifiers: 


   Spinal region: lumbar   Qualified Code(s): M54.16 - Radiculopathy, lumbar 

region   





(32) Rheumatoid arthritis


Code(s): M06.9 - RHEUMATOID ARTHRITIS, UNSPECIFIED   





(33) Rheumatoid arthritis involving ankle with positive rheumatoid factor


Code(s): M05.779 - RHEU ARTHRIT W RHEU FACTOR OF UNSP ANK/FT W/O ORG/SYS INVOLV

   


Qualifiers: 


   Laterality: bilateral   Qualified Code(s): M05.771 - Rheumatoid arthritis 

with rheumatoid factor of right ankle and foot without organ or systems 

involvement; M05.772 - Rheumatoid arthritis with rheumatoid factor of left 

ankle and foot without organ or systems involvement   





(34) Rheumatoid arthritis of multiple sites without organ or system involvement 

with positive rheumatoid factor


Code(s): M05.79 - RHEU ARTHRITIS W RHEU FACTOR MULT SITE W/O ORG/SYS INVOLV   





(35) Sinus tachycardia


Code(s): R00.0 - TACHYCARDIA, UNSPECIFIED   





Assessment/Plan





Acute on Chronic Hypoxic and Hypercapneic Respiratory Failure


Acute COPD Exacerbation


Emphysema


CAD


s/p CABG


HTN


DM


Hyperlipidemia


Rheumatoid Arthritis


h/o CVA


Prostate Ca


Anemia


Lactic acidosis


CABG SVG to RCA Dec. 2014 Dr. Duarte


Abnormal stress test showing inferior wall ischemia October 13, 2014 [facility]


CAD- Stent oRCA 2008 [facility]


COPD


 oRCA prior stent site, o/w nonobstructive ds Oct. 2014 Dr. France at 

[facility]


unsucesful  attempt Dec. 3 2014 [facility]





Plan


echo


BNP


comt present rx as per pulmonary and ICU team





cc time spent 70 min

## 2019-12-12 LAB
ALBUMIN SERPL-MCNC: 2.8 G/DL (ref 3.4–5)
ALP SERPL-CCNC: 72 U/L (ref 45–117)
ALT SERPL-CCNC: 24 U/L (ref 13–61)
ANION GAP SERPL CALC-SCNC: 2 MMOL/L (ref 8–16)
AST SERPL-CCNC: 15 U/L (ref 15–37)
BASOPHILS # BLD: 0.2 % (ref 0–2)
BILIRUB SERPL-MCNC: 0.7 MG/DL (ref 0.2–1)
BNP SERPL-MCNC: 2675.5 PG/ML (ref 5–125)
BUN SERPL-MCNC: 30.5 MG/DL (ref 7–18)
CALCIUM SERPL-MCNC: 9.2 MG/DL (ref 8.5–10.1)
CHLORIDE SERPL-SCNC: 96 MMOL/L (ref 98–107)
CO2 SERPL-SCNC: 40 MMOL/L (ref 21–32)
CREAT SERPL-MCNC: 0.7 MG/DL (ref 0.55–1.3)
DEPRECATED RDW RBC AUTO: 15.4 % (ref 11.9–15.9)
EOSINOPHIL # BLD: 0 % (ref 0–4.5)
GLUCOSE SERPL-MCNC: 120 MG/DL (ref 74–106)
HCT VFR BLD CALC: 37.3 % (ref 35.4–49)
HGB BLD-MCNC: 12.5 GM/DL (ref 11.7–16.9)
LYMPHOCYTES # BLD: 2 % (ref 8–40)
MCH RBC QN AUTO: 30.4 PG (ref 25.7–33.7)
MCHC RBC AUTO-ENTMCNC: 33.6 G/DL (ref 32–35.9)
MCV RBC: 90.4 FL (ref 80–96)
MONOCYTES # BLD AUTO: 5 % (ref 3.8–10.2)
NEUTROPHILS # BLD: 92.8 % (ref 42.8–82.8)
PLATELET # BLD AUTO: 319 K/MM3 (ref 134–434)
PLATELET BLD QL SMEAR: NORMAL
PMV BLD: 7.4 FL (ref 7.5–11.1)
POTASSIUM SERPLBLD-SCNC: 4.6 MMOL/L (ref 3.5–5.1)
PROT SERPL-MCNC: 6.2 G/DL (ref 6.4–8.2)
RBC # BLD AUTO: 4.13 M/MM3 (ref 4–5.6)
SODIUM SERPL-SCNC: 138 MMOL/L (ref 136–145)
WBC # BLD AUTO: 11.7 K/MM3 (ref 4–10)

## 2019-12-12 RX ADMIN — LEUCINE, PHENYLALANINE, LYSINE, METHIONINE, ISOLEUCINE, VALINE, HISTIDINE, THREONINE, TRYPTOPHAN, ALANINE, GLYCINE, ARGININE, PROLINE, SERINE, TYROSINE, DEXTROSE SCH MLS/HR: 311; 238; 247; 170; 255; 247; 204; 179; 77; 880; 438; 489; 289; 213; 17; 5 INJECTION INTRAVENOUS at 00:53

## 2019-12-12 RX ADMIN — IPRATROPIUM BROMIDE AND ALBUTEROL SULFATE SCH AMP: .5; 3 SOLUTION RESPIRATORY (INHALATION) at 08:30

## 2019-12-12 RX ADMIN — IPRATROPIUM BROMIDE AND ALBUTEROL SULFATE SCH AMP: .5; 3 SOLUTION RESPIRATORY (INHALATION) at 16:32

## 2019-12-12 RX ADMIN — MORPHINE SULFATE PRN MG: 2 INJECTION, SOLUTION INTRAMUSCULAR; INTRAVENOUS at 16:33

## 2019-12-12 RX ADMIN — MUPIROCIN SCH APPLIC: 20 OINTMENT TOPICAL at 22:06

## 2019-12-12 RX ADMIN — HEPARIN SODIUM SCH UNIT: 5000 INJECTION, SOLUTION INTRAVENOUS; SUBCUTANEOUS at 06:13

## 2019-12-12 RX ADMIN — IPRATROPIUM BROMIDE AND ALBUTEROL SULFATE SCH AMP: .5; 3 SOLUTION RESPIRATORY (INHALATION) at 12:33

## 2019-12-12 RX ADMIN — METHYLPREDNISOLONE SODIUM SUCCINATE SCH MG: 40 INJECTION, POWDER, FOR SOLUTION INTRAMUSCULAR; INTRAVENOUS at 15:19

## 2019-12-12 RX ADMIN — METHYLPREDNISOLONE SODIUM SUCCINATE SCH MG: 40 INJECTION, POWDER, FOR SOLUTION INTRAMUSCULAR; INTRAVENOUS at 10:21

## 2019-12-12 RX ADMIN — CLOPIDOGREL BISULFATE SCH MG: 75 TABLET, FILM COATED ORAL at 10:22

## 2019-12-12 RX ADMIN — HEPARIN SODIUM SCH UNIT: 5000 INJECTION, SOLUTION INTRAVENOUS; SUBCUTANEOUS at 15:16

## 2019-12-12 RX ADMIN — HEPARIN SODIUM SCH UNIT: 5000 INJECTION, SOLUTION INTRAVENOUS; SUBCUTANEOUS at 22:06

## 2019-12-12 RX ADMIN — ASCORBIC ACID, VITAMIN A PALMITATE, CHOLECALCIFEROL, THIAMINE HYDROCHLORIDE, RIBOFLAVIN-5 PHOSPHATE SODIUM, PYRIDOXINE HYDROCHLORIDE, NIACINAMIDE, DEXPANTHENOL, ALPHA-TOCOPHEROL ACETATE, VITAMIN K1, FOLIC ACID, BIOTIN, CYANOCOBALAMIN SCH ML: 200; 3300; 200; 6; 3.6; 6; 40; 15; 10; 150; 600; 60; 5 INJECTION, SOLUTION INTRAVENOUS at 15:07

## 2019-12-12 RX ADMIN — METHYLPREDNISOLONE SODIUM SUCCINATE SCH MG: 40 INJECTION, POWDER, FOR SOLUTION INTRAMUSCULAR; INTRAVENOUS at 01:44

## 2019-12-12 RX ADMIN — MORPHINE SULFATE PRN MG: 2 INJECTION, SOLUTION INTRAMUSCULAR; INTRAVENOUS at 09:55

## 2019-12-12 RX ADMIN — CHLORHEXIDINE GLUCONATE SCH APPLIC: 213 SOLUTION TOPICAL at 22:06

## 2019-12-12 RX ADMIN — IPRATROPIUM BROMIDE AND ALBUTEROL SULFATE SCH AMP: .5; 3 SOLUTION RESPIRATORY (INHALATION) at 20:30

## 2019-12-12 RX ADMIN — METHYLPREDNISOLONE SODIUM SUCCINATE SCH MG: 40 INJECTION, POWDER, FOR SOLUTION INTRAMUSCULAR; INTRAVENOUS at 22:06

## 2019-12-12 RX ADMIN — LEUCINE, PHENYLALANINE, LYSINE, METHIONINE, ISOLEUCINE, VALINE, HISTIDINE, THREONINE, TRYPTOPHAN, ALANINE, GLYCINE, ARGININE, PROLINE, SERINE, TYROSINE, DEXTROSE SCH MLS/HR: 311; 238; 247; 170; 255; 247; 204; 179; 77; 880; 438; 489; 289; 213; 17; 5 INJECTION INTRAVENOUS at 15:06

## 2019-12-12 RX ADMIN — AZITHROMYCIN DIHYDRATE SCH MLS/HR: 500 INJECTION, POWDER, LYOPHILIZED, FOR SOLUTION INTRAVENOUS at 10:25

## 2019-12-12 RX ADMIN — MUPIROCIN SCH APPLIC: 20 OINTMENT TOPICAL at 10:25

## 2019-12-12 NOTE — PN
Physical Exam: 


SUBJECTIVE: Patient seen and examined. Patient able to speak in full sentences 

today with less SOB. Still feels uncomfortable. Complains of hunger, will trial 

off BiPAP so patient has an opportunity to eat. 








OBJECTIVE:





 Vital Signs











 Period  Temp  Pulse  Resp  BP Sys/Baer  Pulse Ox


 


 Last 24 Hr  97.8 F-98.3 F    14-22  110-143/68-88  











GENERAL: The patient is awake, alert, and fully oriented, in mild respiratory 

distress. 


HEAD: Normal with no signs of trauma.


EYES: EOMI, no scleral icterus 


ENT: on BiPAP, dry mucous membranes 


NECK: trachea midline, supple


LUNGS: crackles bilaterally, no accessory muscle use, no wheezing


HEART: RRR, no murmur


ABDOMEN: Soft, nontender, nondistended, normoactive bowel sounds, no guarding, 

no rebound


EXTREMITIES: 2+ pulses, warm


NEUROLOGICAL: Normal speech, gait not observed.


PSYCH: anxious mood 


SKIN: Warm, dry














 Laboratory Results - last 24 hr











  12/11/19 12/11/19





  06:30 06:30


 


WBC  14.9 H 


 


RBC  3.98 L 


 


Hgb  12.1 


 


Hct  35.9 


 


MCV  90.3 


 


MCH  30.3 


 


MCHC  33.5 


 


RDW  15.1 


 


Plt Count  317 


 


MPV  7.4 L 


 


Sodium   142


 


Potassium   4.7


 


Chloride   99


 


Carbon Dioxide   38 H


 


Anion Gap   5 L


 


BUN   23.5 H


 


Creatinine   0.8


 


Est GFR (CKD-EPI)AfAm   110.96


 


Est GFR (CKD-EPI)NonAf   95.74


 


Random Glucose   108 H


 


Calcium   9.3


 


Phosphorus   3.3


 


Magnesium   2.8 H


 


Total Bilirubin   0.6


 


AST   22


 


ALT   27


 


Alkaline Phosphatase   80


 


Total Protein   6.5


 


Albumin   3.0 L








Active Medications











Generic Name Dose Route Start Last Admin





  Trade Name Freq  PRN Reason Stop Dose Admin


 


Albuterol Sulfate  1 amp  12/09/19 15:40  12/10/19 04:20





  Ventolin 0.083% Nebulizer Soln -  NEB   1 amp





  RQ4H PRN   Administration





  Dyspnea   





     





     





     


 


Albuterol/Ipratropium  1 amp  12/09/19 08:00  12/11/19 20:06





  Duoneb -  NEB   1 amp





  RQID ISAIAH   Administration





     





     





     





     


 


Chlorhexidine Gluconate  1 applic  12/09/19 22:00  12/11/19 21:47





  Hibiclens For Decolonization -  TP   1 applic





  HS ISAIAH   Administration





     





     





     





     


 


Clopidogrel Bisulfate  75 mg  12/09/19 11:45  12/11/19 09:22





  Plavix -  PO   75 mg





  DAILY ISAIAH   Administration





     





     





     





     


 


Heparin Sodium (Porcine)  5,000 unit  12/11/19 14:00  12/12/19 06:13





  Heparin -  SQ   5,000 unit





  TID ISAIAH   Administration





     





     





     





     


 


Azithromycin  500 mg in 250 mls @ 250 mls/hr  12/09/19 11:45  12/11/19 09:22





  Zithromax 500mg Ivpb (Pre-Docked)  IVPB   250 mls/hr





  DAILY ISAIAH   Administration





     





     





     





     


 


Amino Acids  1,000 mls @ 84 mls/hr  12/11/19 09:45  12/12/19 00:53





  Clinimix -  IV   84 mls/hr





  Q12H ISAIAH   Administration





     





     





     





     


 


Methylprednisolone Sodium Succinate  40 mg  12/09/19 18:00  12/12/19 01:44





  Solu-Medrol -  IVPUSH   40 mg





  Q8H-IV ISAIAH   Administration





     





     





     





     


 


Morphine Sulfate  2 mg  12/11/19 11:05  12/11/19 12:19





  Morphine Sulfate  IVPUSH   2 mg





  Q4H PRN   Administration





  PAIN LEVEL 6-10   





     





     





     


 


Mupirocin  1 applic  12/09/19 10:00  12/11/19 21:47





  Bactroban Ointment (For Decolonization) -  NS  12/14/19 09:59  1 applic





  BID ISAIAH   Administration





     





     





     





     











ASSESSMENT/PLAN:


65 y/o/m with PMH of [place], HLD, DM, COPD, CAD, CHF, RA, CVA and prostate CA. 

Presented to the ER from home on 12/8 with complaints of gradually worsening 

SOB and chest pain over the past 24 hours. He was lethargic on presentation and 

subsequently intubated. Extubated on 12/9





#CNS


- Extubated on 12/9


- Head CT - no acute pathology 





#Respiratory


- Acute on chronic respiratory failure with hypoxia and hypercapnia requiring 

intubation on 12/8, extubated on 12/9


- Currently on BiPAP, trial on Venti Mask and continue to wean as tolerated


- CXR shows improvement at bases. 


- Given 40mg Lasix yesterday with good response 


- Emphysematous changes seen on chest CT 12/8


- Duonebs, Solu-medrol 60mg IV Q6hr





#CVS


- Hx of HTN: Can use Hydralazine for BP control, avoid Beta Blockers due to 

COPD exacerbation, resume home meds when appropriate


- ECHO showing normal EF, elevated RV systolic pressure 


- BNP elevated to 2675, higher than prior admissions 


- Clopidogrel 75mg (home med)


- POC US in ER: decreased EF with LV lateral wall motion abnormality, no b lines

, % collapsible, no pericardial effusion, no pleural effusion 





#ID


- WBC 11.7, afebrile 


- Lactic acid normalized 


- Blood, urine cx without growth to date 


- Azithromycin 500mg started 12/9





#GI


- CTAP - Mild hepatic steatosis with subtle hepatic surface nodularity is 

concerning for early cirrhosis.





#Renal


- Cr 0.8





#Prophylaxis


- Heparin





#FEN


- Clinimix with multivitamin additive @84mls/hr


- dysphagia puree diet started





#Dispo


- Continue ICU monitoring 





Visit type





- Emergency Visit


Emergency Visit: Yes


ED Registration Date: 12/08/19


Care time: The patient presented to the Emergency Department on the above date 

and was hospitalized for further evaluation of their emergent condition.





- New Patient


This patient is new to me today: No





- Critical Care


Critical Care patient: Yes


Total Critical Care Time (in minutes): 36


Critical Care Statement: The care of this patient involved high complexity 

decision making to prevent further life threatening deterioration of the patient

's condition and/or to evaluate & treat vital organ system(s) failure or risk 

of failure.





ATTENDING PHYSICIAN STATEMENT





I saw and evaluated the patient.


I reviewed the resident's note and discussed the case with the resident.


I agree with the resident's findings and plan as documented.








SUBJECTIVE:








OBJECTIVE:








ASSESSMENT AND PLAN:

## 2019-12-12 NOTE — PN
Teaching Attending Note


Name of Resident: Manohar Barnes





ATTENDING PHYSICIAN STATEMENT





I saw and evaluated the patient.


I reviewed the resident's note and discussed the case with the resident.


I agree with the resident's findings and plan as documented.








SUBJECTIVE:





Pt seen and examined in the ICU. Has been dependent on BiPAP but breathing 

slightly improved today. Able to speak longer sentences.


 


OBJECTIVE:





 Vital Signs











 Period  Temp  Pulse  Resp  BP Sys/Baer  Pulse Ox


 


 Last 24 Hr  97.8 F-98.3 F    14-22  110-150/  








 Intake & Output











 12/09/19 12/10/19 12/11/19 12/12/19





 23:59 23:59 23:59 23:59


 


Intake Total 2214 1058 1426 581


 


Output Total 2525 1025 2036 200


 


Balance -311 33 -610 381


 


Weight 58.542 kg 60.781 kg 58.2 kg 59.562 kg








Gen:  less tachypneic but still with accessory muscle use


Heart: RRR


Lung: distant breath sounds


Abd: soft, nontender


Ext: no edema





 CBC, BMP





 12/12/19 06:58 





 12/12/19 06:58 





Active Medications





Albuterol Sulfate (Ventolin 0.083% Nebulizer Soln -)  1 amp NEB RQ4H PRN


   PRN Reason: Dyspnea


   Last Admin: 12/10/19 04:20 Dose:  1 amp


Albuterol/Ipratropium (Duoneb -)  1 amp NEB RQID ISAIAH


   Last Admin: 12/12/19 08:30 Dose:  1 amp


Chlorhexidine Gluconate (Hibiclens For Decolonization -)  1 applic TP HS ISAIAH


   Last Admin: 12/11/19 21:47 Dose:  1 applic


Clopidogrel Bisulfate (Plavix -)  75 mg PO DAILY ISAIAH


   Last Admin: 12/12/19 10:22 Dose:  75 mg


Heparin Sodium (Porcine) (Heparin -)  5,000 unit SQ TID ISAIAH


   Last Admin: 12/12/19 06:13 Dose:  5,000 unit


Amino Acids (Clinimix -)  1,000 mls @ 84 mls/hr IV Q12H ISAIAH


   Last Admin: 12/12/19 00:53 Dose:  84 mls/hr


Methylprednisolone Sodium Succinate (Solu-Medrol -)  60 mg IVPUSH Q6H-IV ISAIAH


   Last Admin: 12/12/19 10:21 Dose:  60 mg


Morphine Sulfate (Morphine Sulfate)  2 mg IVPUSH Q4H PRN


   PRN Reason: PAIN LEVEL 6-10


   Last Admin: 12/12/19 09:55 Dose:  2 mg


Mupirocin (Bactroban Ointment (For Decolonization) -)  1 applic NS BID ISAIAH


   Stop: 12/14/19 09:59


   Last Admin: 12/12/19 10:25 Dose:  1 applic








ASSESSMENT AND PLAN:


Acute on Chronic Hypoxic and Hypercapneic Respiratory Failure


Acute COPD Exacerbation


Emphysema


CAD


HTN


DM


Hyperlipidemia


Rheumatoid Arthritis


h/o CVA


Prostate Ca


Anemia





-  continue BiPAP


-  can attempt ventimask


-  increase medrol


-  inhaled bronchodilators standing and PRN


-  lasix as needed


-  monitor urine output, creatinine


-  O2 to keep Spo2 88-92%


-  azithromycin


-  continue ICU monitoring for tenuous respiratory status








critical care time spent in reviewing chart, evaluating patient and formulating 

plan 35 min

## 2019-12-12 NOTE — PN
Progress Note, Physician


Chief Complaint: 





Feels little better


History of Present Illness: 





S/P extubation





- Current Medication List


Current Medications: 


Active Medications





Albuterol Sulfate (Ventolin 0.083% Nebulizer Soln -)  1 amp NEB RQ4H PRN


   PRN Reason: Dyspnea


   Last Admin: 12/10/19 04:20 Dose:  1 amp


Albuterol/Ipratropium (Duoneb -)  1 amp NEB RQID [facility]


   Last Admin: 12/12/19 08:30 Dose:  1 amp


Chlorhexidine Gluconate (Hibiclens For Decolonization -)  1 applic TP HS [facility]


   Last Admin: 12/11/19 21:47 Dose:  1 applic


Clopidogrel Bisulfate (Plavix -)  75 mg PO DAILY [facility]


   Last Admin: 12/11/19 09:22 Dose:  75 mg


Heparin Sodium (Porcine) (Heparin -)  5,000 unit SQ TID [facility]


   Last Admin: 12/12/19 06:13 Dose:  5,000 unit


Azithromycin (Zithromax 500mg Ivpb (Pre-Docked))  500 mg in 250 mls @ 250 mls/

hr IVPB DAILY [facility]


   Last Admin: 12/11/19 09:22 Dose:  250 mls/hr


Amino Acids (Clinimix -)  1,000 mls @ 84 mls/hr IV Q12H [facility]


   Last Admin: 12/12/19 00:53 Dose:  84 mls/hr


Methylprednisolone Sodium Succinate (Solu-Medrol -)  60 mg IVPUSH Q6H-IV ISAIAH


Morphine Sulfate (Morphine Sulfate)  2 mg IVPUSH Q4H PRN


   PRN Reason: PAIN LEVEL 6-10


   Last Admin: 12/12/19 09:55 Dose:  2 mg


Mupirocin (Bactroban Ointment (For Decolonization) -)  1 applic NS BID [facility]


   Stop: 12/14/19 09:59


   Last Admin: 12/11/19 21:47 Dose:  1 applic











- Objective


Vital Signs: 


 Vital Signs











Temperature  98.3 F   12/12/19 02:00


 


Pulse Rate  90   12/12/19 08:00


 


Respiratory Rate  18   12/12/19 08:00


 


Blood Pressure  150/102 H  12/12/19 08:00


 


O2 Sat by Pulse Oximetry (%)  100   12/12/19 09:00











Constitutional: Yes: Anxious


Eyes: Yes: WNL


HENT: Yes: WNL


Neck: Yes: WNL


Cardiovascular: Yes: Tachycardia


Respiratory: Yes: On Venti-Mask


Gastrointestinal: Yes: Normal Bowel Sounds


...Rectal Exam: Yes: WNL


Genitourinary: Yes: WNL


Musculoskeletal: Yes: Muscle Weakness


Edema: LLE: Trace, RLE: Trace


Neurological: Yes: Alert


Labs: 


 CBC, BMP





 12/12/19 06:58 





 12/12/19 06:58 











Assessment/Plan





Continue same trt

## 2019-12-13 LAB
ALBUMIN SERPL-MCNC: 3 G/DL (ref 3.4–5)
ALP SERPL-CCNC: 73 U/L (ref 45–117)
ALT SERPL-CCNC: 25 U/L (ref 13–61)
ANION GAP SERPL CALC-SCNC: 4 MMOL/L (ref 8–16)
AST SERPL-CCNC: 13 U/L (ref 15–37)
BILIRUB SERPL-MCNC: 0.9 MG/DL (ref 0.2–1)
BUN SERPL-MCNC: 27.1 MG/DL (ref 7–18)
CALCIUM SERPL-MCNC: 9.5 MG/DL (ref 8.5–10.1)
CHLORIDE SERPL-SCNC: 95 MMOL/L (ref 98–107)
CO2 SERPL-SCNC: 39 MMOL/L (ref 21–32)
CREAT SERPL-MCNC: 0.8 MG/DL (ref 0.55–1.3)
DEPRECATED RDW RBC AUTO: 15.3 % (ref 11.9–15.9)
GLUCOSE SERPL-MCNC: 97 MG/DL (ref 74–106)
HCT VFR BLD CALC: 39.9 % (ref 35.4–49)
HGB BLD-MCNC: 13.5 GM/DL (ref 11.7–16.9)
MAGNESIUM SERPL-MCNC: 2.7 MG/DL (ref 1.8–2.4)
MCH RBC QN AUTO: 30.6 PG (ref 25.7–33.7)
MCHC RBC AUTO-ENTMCNC: 33.8 G/DL (ref 32–35.9)
MCV RBC: 90.4 FL (ref 80–96)
PHOSPHATE SERPL-MCNC: 3.5 MG/DL (ref 2.5–4.9)
PLATELET # BLD AUTO: 326 K/MM3 (ref 134–434)
PMV BLD: 7.7 FL (ref 7.5–11.1)
POTASSIUM SERPLBLD-SCNC: 4.6 MMOL/L (ref 3.5–5.1)
PROT SERPL-MCNC: 6.5 G/DL (ref 6.4–8.2)
RBC # BLD AUTO: 4.41 M/MM3 (ref 4–5.6)
SODIUM SERPL-SCNC: 138 MMOL/L (ref 136–145)
WBC # BLD AUTO: 9.1 K/MM3 (ref 4–10)

## 2019-12-13 RX ADMIN — MUPIROCIN SCH APPLIC: 20 OINTMENT TOPICAL at 10:18

## 2019-12-13 RX ADMIN — HEPARIN SODIUM SCH UNIT: 5000 INJECTION, SOLUTION INTRAVENOUS; SUBCUTANEOUS at 15:56

## 2019-12-13 RX ADMIN — LEUCINE, PHENYLALANINE, LYSINE, METHIONINE, ISOLEUCINE, VALINE, HISTIDINE, THREONINE, TRYPTOPHAN, ALANINE, GLYCINE, ARGININE, PROLINE, SERINE, TYROSINE, DEXTROSE SCH MLS/HR: 311; 238; 247; 170; 255; 247; 204; 179; 77; 880; 438; 489; 289; 213; 17; 5 INJECTION INTRAVENOUS at 08:00

## 2019-12-13 RX ADMIN — METHYLPREDNISOLONE SODIUM SUCCINATE SCH: 40 INJECTION, POWDER, FOR SOLUTION INTRAMUSCULAR; INTRAVENOUS at 05:03

## 2019-12-13 RX ADMIN — IPRATROPIUM BROMIDE AND ALBUTEROL SULFATE SCH AMP: .5; 3 SOLUTION RESPIRATORY (INHALATION) at 20:48

## 2019-12-13 RX ADMIN — ASCORBIC ACID, VITAMIN A PALMITATE, CHOLECALCIFEROL, THIAMINE HYDROCHLORIDE, RIBOFLAVIN-5 PHOSPHATE SODIUM, PYRIDOXINE HYDROCHLORIDE, NIACINAMIDE, DEXPANTHENOL, ALPHA-TOCOPHEROL ACETATE, VITAMIN K1, FOLIC ACID, BIOTIN, CYANOCOBALAMIN SCH ML: 200; 3300; 200; 6; 3.6; 6; 40; 15; 10; 150; 600; 60; 5 INJECTION, SOLUTION INTRAVENOUS at 10:21

## 2019-12-13 RX ADMIN — IPRATROPIUM BROMIDE AND ALBUTEROL SULFATE SCH AMP: .5; 3 SOLUTION RESPIRATORY (INHALATION) at 11:46

## 2019-12-13 RX ADMIN — METHYLPREDNISOLONE SODIUM SUCCINATE SCH MG: 40 INJECTION, POWDER, FOR SOLUTION INTRAMUSCULAR; INTRAVENOUS at 15:57

## 2019-12-13 RX ADMIN — HEPARIN SODIUM SCH UNIT: 5000 INJECTION, SOLUTION INTRAVENOUS; SUBCUTANEOUS at 23:24

## 2019-12-13 RX ADMIN — CLOPIDOGREL BISULFATE SCH MG: 75 TABLET, FILM COATED ORAL at 10:19

## 2019-12-13 RX ADMIN — METHYLPREDNISOLONE SODIUM SUCCINATE SCH MG: 40 INJECTION, POWDER, FOR SOLUTION INTRAMUSCULAR; INTRAVENOUS at 23:24

## 2019-12-13 RX ADMIN — LEUCINE, PHENYLALANINE, LYSINE, METHIONINE, ISOLEUCINE, VALINE, HISTIDINE, THREONINE, TRYPTOPHAN, ALANINE, GLYCINE, ARGININE, PROLINE, SERINE, TYROSINE, DEXTROSE SCH: 311; 238; 247; 170; 255; 247; 204; 179; 77; 880; 438; 489; 289; 213; 17; 5 INJECTION INTRAVENOUS at 01:15

## 2019-12-13 RX ADMIN — IPRATROPIUM BROMIDE AND ALBUTEROL SULFATE SCH AMP: .5; 3 SOLUTION RESPIRATORY (INHALATION) at 08:15

## 2019-12-13 RX ADMIN — CHLORHEXIDINE GLUCONATE SCH APPLIC: 213 SOLUTION TOPICAL at 23:24

## 2019-12-13 RX ADMIN — METHYLPREDNISOLONE SODIUM SUCCINATE SCH MG: 40 INJECTION, POWDER, FOR SOLUTION INTRAMUSCULAR; INTRAVENOUS at 08:55

## 2019-12-13 RX ADMIN — IPRATROPIUM BROMIDE AND ALBUTEROL SULFATE SCH AMP: .5; 3 SOLUTION RESPIRATORY (INHALATION) at 16:00

## 2019-12-13 RX ADMIN — ASCORBIC ACID, VITAMIN A PALMITATE, CHOLECALCIFEROL, THIAMINE HYDROCHLORIDE, RIBOFLAVIN-5 PHOSPHATE SODIUM, PYRIDOXINE HYDROCHLORIDE, NIACINAMIDE, DEXPANTHENOL, ALPHA-TOCOPHEROL ACETATE, VITAMIN K1, FOLIC ACID, BIOTIN, CYANOCOBALAMIN SCH: 200; 3300; 200; 6; 3.6; 6; 40; 15; 10; 150; 600; 60; 5 INJECTION, SOLUTION INTRAVENOUS at 13:00

## 2019-12-13 RX ADMIN — HEPARIN SODIUM SCH UNIT: 5000 INJECTION, SOLUTION INTRAVENOUS; SUBCUTANEOUS at 06:19

## 2019-12-13 RX ADMIN — MUPIROCIN SCH APPLIC: 20 OINTMENT TOPICAL at 23:24

## 2019-12-13 NOTE — PN
Teaching Attending Note


Name of Resident: Manohar Barnes





ATTENDING PHYSICIAN STATEMENT





I saw and evaluated the patient.


I reviewed the resident's note and discussed the case with the resident.


I agree with the resident's findings and plan as documented.








SUBJECTIVE:


Patient seen and examined in the ICU.  Awake and alert on NIPPV support. 


Reports feeling a little better today. 


Able to speak longer sentences.  Still tachypneic at rest 


 


OBJECTIVE:





 Intake & Output











 12/10/19 12/11/19 12/12/19 12/13/19





 23:59 23:59 23:59 23:59


 


Intake Total 1058 1426 2171 336


 


Output Total 1025 2036 1270 800


 


Balance 33 610 901 -464


 


Weight 134 lb 128 lb 4.944 oz 131 lb 5 oz 130 lb








 Last Vital Signs











Temp Pulse Resp BP Pulse Ox


 


 98 F   89   18   140/78   99 


 


 12/13/19 06:00  12/13/19 06:00  12/13/19 06:00  12/13/19 06:00  12/13/19 08:15








Active Medications





Albuterol Sulfate (Ventolin 0.083% Nebulizer Soln -)  1 amp NEB RQ4H PRN


   PRN Reason: Dyspnea


   Last Admin: 12/10/19 04:20 Dose:  1 amp


Albuterol/Ipratropium (Duoneb -)  1 amp NEB RQID [facility]


   Last Admin: 12/13/19 08:15 Dose:  1 amp


Chlorhexidine Gluconate (Hibiclens For Decolonization -)  1 applic TP HS [facility]


   Last Admin: 12/12/19 22:06 Dose:  1 applic


Clonazepam (Klonopin -)  0.5 mg PO BID PRN


   PRN Reason: ANXIETY


Clopidogrel Bisulfate (Plavix -)  75 mg PO DAILY [facility]


   Last Admin: 12/12/19 10:22 Dose:  75 mg


Heparin Sodium (Porcine) (Heparin -)  5,000 unit SQ TID [facility]


   Last Admin: 12/13/19 06:19 Dose:  5,000 unit


Amino Acids (Clinimix -)  1,000 mls @ 84 mls/hr IV Q12H [facility]


   Last Admin: 12/13/19 01:15 Dose:  Not Given


Methylprednisolone Sodium Succinate (Solu-Medrol -)  60 mg IVPUSH Q6H-IV ISAIAH


   Last Admin: 12/13/19 05:03 Dose:  Not Given


Morphine Sulfate (Morphine Sulfate)  2 mg IVPUSH Q4H PRN


   PRN Reason: PAIN LEVEL 6-10


   Last Admin: 12/12/19 16:33 Dose:  2 mg


Multivitamins/Minerals (Infuvite Adult -)  10 ml IV DAILY ISAIAH


   Last Admin: 12/12/19 15:07 Dose:  10 ml


Mupirocin (Bactroban Ointment (For Decolonization) -)  1 applic NS BID ISAIAH


   Stop: 12/14/19 09:59


   Last Admin: 12/12/19 22:06 Dose:  1 applic








Gen: Awake and responsive on NIPPV support, less tachypneic but still with 

accessory muscle use


Heart: RRR


Lung: distant breath sounds


Abd: soft, nontender


Ext: no edema


 Laboratory Results - last 24 hr











  12/12/19 12/13/19 12/13/19





  11:18 06:20 06:20


 


WBC   9.1 


 


RBC   4.41 


 


Hgb   13.5 


 


Hct   39.9 


 


MCV   90.4 


 


MCH   30.6 


 


MCHC   33.8 


 


RDW   15.3 


 


Plt Count   326 


 


MPV   7.7 


 


Sodium    138


 


Potassium    4.6


 


Chloride    95 L


 


Carbon Dioxide    39 H


 


Anion Gap    4 L


 


BUN    27.1 H


 


Creatinine    0.8


 


Est GFR (CKD-EPI)AfAm    110.96


 


Est GFR (CKD-EPI)NonAf    95.74


 


Random Glucose    97


 


Calcium    9.5


 


Phosphorus    3.5


 


Magnesium    2.7 H


 


Total Bilirubin    0.9


 


AST    13 L


 


ALT    25


 


Alkaline Phosphatase    73


 


Total Protein    6.5


 


Albumin    3.0 L


 


HIV 1&2 Ag/Ab, 4th Gen  Non reactive  











ASSESSMENT AND PLAN:


Acute on Chronic Hypoxic and Hypercapneic Respiratory Failure


Acute COPD Exacerbation


Emphysema


CAD


HTN


DM


Hyperlipidemia


Rheumatoid Arthritis


h/o CVA


Prostate Ca


Anemia








-  continue NIPPV support 


-  can attempt ventimask as tolerated 


-  Medrol at current dose 


-  inhaled bronchodilators standing and PRN


-  lasix as needed


-  monitor urine output, creatinine


-  O2 to keep Spo2 88-92%


-  azithromycin


-  continue ICU monitoring for tenuous respiratory status








Dr Ramesh

## 2019-12-13 NOTE — PN
Physical Exam: 


SUBJECTIVE: Patient seen and examined. Appears to be breathing more comfortably 

today. Complains of anxiety, is on Clonazepam at home. Patient was able to 

tolerate venti mask yesterday for a short period of time. Will continue to 

trial venti mask today. 








OBJECTIVE:





 Vital Signs











 Period  Temp  Pulse  Resp  BP Sys/Baer  Pulse Ox


 


 Last 24 Hr  97.6 F-98.3 F    13-34  119-177/  











GENERAL: The patient is awake, alert, and fully oriented, in no acute distress 


HEAD: Normal with no signs of trauma.


EYES: EOMI, no scleral icterus 


ENT: on BiPAP, dry mucous membranes 


NECK: trachea midline, supple


LUNGS: coarse breath sounds bilaterally, no accessory muscle use, no wheezing


HEART: RRR, no murmur


ABDOMEN: Soft, nontender, nondistended, normoactive bowel sounds, no guarding, 

no rebound


EXTREMITIES: 2+ pulses, warm, no edema 


NEUROLOGICAL: Normal speech, gait not observed.


PSYCH: anxious mood 


SKIN: Warm, dry

















 Laboratory Results - last 24 hr











  12/12/19 12/13/19 12/13/19





  11:18 06:20 06:20


 


WBC   9.1 


 


RBC   4.41 


 


Hgb   13.5 


 


Hct   39.9 


 


MCV   90.4 


 


MCH   30.6 


 


MCHC   33.8 


 


RDW   15.3 


 


Plt Count   326 


 


MPV   7.7 


 


Sodium    138


 


Potassium    4.6


 


Chloride    95 L


 


Carbon Dioxide    39 H


 


Anion Gap    4 L


 


BUN    27.1 H


 


Creatinine    0.8


 


Est GFR (CKD-EPI)AfAm    110.96


 


Est GFR (CKD-EPI)NonAf    95.74


 


Random Glucose    97


 


Calcium    9.5


 


Phosphorus    3.5


 


Magnesium    2.7 H


 


Total Bilirubin    0.9


 


AST    13 L


 


ALT    25


 


Alkaline Phosphatase    73


 


Total Protein    6.5


 


Albumin    3.0 L


 


HIV 1&2 Ag/Ab, 4th Gen  Non reactive  








Active Medications











Generic Name Dose Route Start Last Admin





  Trade Name Freq  PRN Reason Stop Dose Admin


 


Albuterol Sulfate  1 amp  12/09/19 15:40  12/10/19 04:20





  Ventolin 0.083% Nebulizer Soln -  NEB   1 amp





  RQ4H PRN   Administration





  Dyspnea   





     





     





     


 


Albuterol/Ipratropium  1 amp  12/09/19 08:00  12/13/19 08:15





  Duoneb -  NEB   1 amp





  RQID ISAIAH   Administration





     





     





     





     


 


Chlorhexidine Gluconate  1 applic  12/09/19 22:00  12/12/19 22:06





  Hibiclens For Decolonization -  TP   1 applic





  HS ISAIAH   Administration





     





     





     





     


 


Clonazepam  0.5 mg  12/13/19 08:28  12/13/19 10:19





  Klonopin -  PO   0.5 mg





  BID PRN   Administration





  ANXIETY   





     





     





     


 


Clopidogrel Bisulfate  75 mg  12/09/19 11:45  12/13/19 10:19





  Plavix -  PO   75 mg





  DAILY ISAIAH   Administration





     





     





     





     


 


Heparin Sodium (Porcine)  5,000 unit  12/11/19 14:00  12/13/19 06:19





  Heparin -  SQ   5,000 unit





  TID ISAIAH   Administration





     





     





     





     


 


Amino Acids  1,000 mls @ 84 mls/hr  12/12/19 13:15  12/13/19 01:15





  Clinimix -  IV   Not Given





  [facility]12H [facility]   





     





     





     





     


 


Methylprednisolone Sodium Succinate  60 mg  12/12/19 10:00  12/13/19 08:55





  Solu-Medrol -  IVPUSH   60 mg





  Q6H-IV ISAIAH   Administration





     





     





     





     


 


Morphine Sulfate  2 mg  12/11/19 11:05  12/12/19 16:33





  Morphine Sulfate  IVPUSH   2 mg





  Q4H PRN   Administration





  PAIN LEVEL 6-10   





     





     





     


 


Multivitamins/Minerals  10 ml  12/12/19 13:15  12/12/19 15:07





  Infuvite Adult -  IV   10 ml





  DAILY ISAIAH   Administration





     





     





     





     


 


Mupirocin  1 applic  12/09/19 10:00  12/13/19 10:18





  Bactroban Ointment (For Decolonization) -  NS  12/14/19 09:59  1 applic





  BID ISAIAH   Administration





     





     





     





     











ASSESSMENT/PLAN:


65 y/o/m with PMH of [place], HLD, DM, COPD, CAD, CHF, RA, CVA and prostate CA. 

Presented to the ER from home on 12/8 with complaints of gradually worsening 

SOB and chest pain over the past 24 hours. He was lethargic on presentation and 

subsequently intubated. Extubated on 12/9





#CNS


- Extubated on 12/9


- Head CT - no acute pathology


- On Clonazepam at home, started on home dose for anxiety 





#Respiratory


- Acute on chronic respiratory failure with hypoxia and hypercapnia requiring 

intubation on 12/8, extubated on 12/9


- Currently on BiPAP, trial on Venti Mask and continue to wean as tolerated


- CXR shows improvement at bases. 


- Given 40mg Lasix x1 with good response 


- Emphysematous changes seen on chest CT 12/8


- Duonebs, Solu-medrol 60mg IV Q6hr





#CVS


- Hx of HTN: Can use Hydralazine for BP control, avoid Beta Blockers due to 

COPD exacerbation, resume home meds when appropriate


- ECHO showing normal EF, elevated RV systolic pressure 


- BNP elevated to 2675, higher than prior admissions 


- Clopidogrel 75mg (home med)


- POC US in ER: decreased EF with LV lateral wall motion abnormality, no b lines

, % collapsible, no pericardial effusion, no pleural effusion 





#ID


- WBC 9.1, afebrile 


- Lactic acid normalized 


- Blood, urine cx without growth to date 


- Azithromycin 500mg started 12/9





#GI


- CTAP - Mild hepatic steatosis with subtle hepatic surface nodularity is 

concerning for early cirrhosis.





#Renal


- Cr 0.8





#Prophylaxis


- Heparin





#FEN


- Clinimix with multivitamin additive @84mls/hr


- dysphagia puree diet started





#Dispo


- Continue ICU monitoring 








Visit type





- Emergency Visit


Emergency Visit: Yes


ED Registration Date: 12/08/19


Care time: The patient presented to the Emergency Department on the above date 

and was hospitalized for further evaluation of their emergent condition.





- New Patient


This patient is new to me today: No





- Critical Care


Critical Care patient: Yes


Total Critical Care Time (in minutes): 36


Critical Care Statement: The care of this patient involved high complexity 

decision making to prevent further life threatening deterioration of the patient

's condition and/or to evaluate & treat vital organ system(s) failure or risk 

of failure.





ATTENDING PHYSICIAN STATEMENT





I saw and evaluated the patient.


I reviewed the resident's note and discussed the case with the resident.


I agree with the resident's findings and plan as documented.








SUBJECTIVE:








OBJECTIVE:








ASSESSMENT AND PLAN:

## 2019-12-13 NOTE — PN
Progress Note, Physician


History of Present Illness: 





CoPD exaceebation,respiratory failure





- Current Medication List


Current Medications: 


Active Medications





Albuterol Sulfate (Ventolin 0.083% Nebulizer Soln -)  1 amp NEB RQ4H PRN


   PRN Reason: Dyspnea


   Last Admin: 12/10/19 04:20 Dose:  1 amp


Albuterol/Ipratropium (Duoneb -)  1 amp NEB RQID [facility]


   Last Admin: 12/13/19 08:15 Dose:  1 amp


Chlorhexidine Gluconate (Hibiclens For Decolonization -)  1 applic TP HS [facility]


   Last Admin: 12/12/19 22:06 Dose:  1 applic


Clonazepam (Klonopin -)  0.5 mg PO BID PRN


   PRN Reason: ANXIETY


Clopidogrel Bisulfate (Plavix -)  75 mg PO DAILY [facility]


   Last Admin: 12/12/19 10:22 Dose:  75 mg


Heparin Sodium (Porcine) (Heparin -)  5,000 unit SQ TID [facility]


   Last Admin: 12/13/19 06:19 Dose:  5,000 unit


Amino Acids (Clinimix -)  1,000 mls @ 84 mls/hr IV Q12H [facility]


   Last Admin: 12/13/19 01:15 Dose:  Not Given


Methylprednisolone Sodium Succinate (Solu-Medrol -)  60 mg IVPUSH Q6H-IV [facility]


   Last Admin: 12/13/19 05:03 Dose:  Not Given


Morphine Sulfate (Morphine Sulfate)  2 mg IVPUSH Q4H PRN


   PRN Reason: PAIN LEVEL 6-10


   Last Admin: 12/12/19 16:33 Dose:  2 mg


Multivitamins/Minerals (Infuvite Adult -)  10 ml IV DAILY [facility]


   Last Admin: 12/12/19 15:07 Dose:  10 ml


Mupirocin (Bactroban Ointment (For Decolonization) -)  1 applic NS BID [facility]


   Stop: 12/14/19 09:59


   Last Admin: 12/12/19 22:06 Dose:  1 applic











- Objective


Vital Signs: 


 Vital Signs











Temperature  98 F   12/13/19 06:00


 


Pulse Rate  89   12/13/19 06:00


 


Respiratory Rate  18   12/13/19 06:00


 


Blood Pressure  140/78   12/13/19 06:00


 


O2 Sat by Pulse Oximetry (%)  99   12/13/19 08:15











Constitutional: Yes: Anxious


Eyes: Yes: WNL


HENT: Yes: WNL


Neck: Yes: WNL


Cardiovascular: Yes: Tachycardia


Respiratory: Yes: On BiPap, SOB


Gastrointestinal: Yes: WNL


...Rectal Exam: Yes: WNL


Genitourinary: Yes: WNL


Musculoskeletal: Yes: Muscle Weakness


Edema: LLE: Trace, RLE: Trace


Neurological: Yes: Alert


Labs: 


 CBC, BMP





 12/13/19 06:20 





 12/13/19 06:20 











Assessment/Plan





Continue ICU care

## 2019-12-13 NOTE — PN
Progress Note, Physician


Chief Complaint: 





Pt A&Ox3; markedly dyspneic.


History of Present Illness: 





63 year old man with past medical history significant for CAD s/p CABG, end-

stage COPD-- on home O2, awaiting lung transplant; HIV, hypertension, 

hyperlipidemia, diastolic CHF, rheumatoid arthritis on methotrexate, who 

presents to the emergency department with weakness, lethargy, difficulty 

breathing.





History limited; progressively worsening ability to breathe over the last few 

days.








- Current Medication List


Current Medications: 


Active Medications





Albuterol Sulfate (Ventolin 0.083% Nebulizer Soln -)  1 amp NEB RQ4H PRN


   PRN Reason: Dyspnea


   Last Admin: 12/10/19 04:20 Dose:  1 amp


Albuterol/Ipratropium (Duoneb -)  1 amp NEB RQID [facility]


   Last Admin: 12/13/19 20:48 Dose:  1 amp


Chlorhexidine Gluconate (Hibiclens For Decolonization -)  1 applic TP HS [facility]


   Last Admin: 12/12/19 22:06 Dose:  1 applic


Clonazepam (Klonopin -)  0.5 mg PO BID PRN


   PRN Reason: ANXIETY


   Last Admin: 12/13/19 10:19 Dose:  0.5 mg


Clopidogrel Bisulfate (Plavix -)  75 mg PO DAILY [facility]


   Last Admin: 12/13/19 10:19 Dose:  75 mg


Heparin Sodium (Porcine) (Heparin -)  5,000 unit SQ TID [facility]


   Last Admin: 12/13/19 15:56 Dose:  5,000 unit


Amino Acids (Clinimix -)  1,000 mls @ 84 mls/hr IV Q12H [facility]


   Last Admin: 12/13/19 08:00 Dose:  84 mls/hr


Methylprednisolone Sodium Succinate (Solu-Medrol -)  60 mg IVPUSH Q6H-IV [facility]


   Last Admin: 12/13/19 15:57 Dose:  60 mg


Morphine Sulfate (Morphine Sulfate)  2 mg IVPUSH Q4H PRN


   PRN Reason: PAIN LEVEL 6-10


   Last Admin: 12/12/19 16:33 Dose:  2 mg


Multivitamins/Minerals (Infuvite Adult -)  10 ml IV DAILY [facility]


   Last Admin: 12/13/19 13:00 Dose:  Not Given


Mupirocin (Bactroban Ointment (For Decolonization) -)  1 applic NS BID [facility]


   Stop: 12/14/19 09:59


   Last Admin: 12/13/19 10:18 Dose:  1 applic











- Objective


Vital Signs: 


 Vital Signs











Temperature  97.2 F L  12/13/19 17:40


 


Pulse Rate  117 H  12/13/19 18:38


 


Respiratory Rate  24 H  12/13/19 18:38


 


Blood Pressure  137/83   12/13/19 18:38


 


O2 Sat by Pulse Oximetry (%)  95   12/13/19 20:47











Constitutional: Yes: Anxious


Eyes: Yes: WNL


HENT: Yes: WNL


Neck: Yes: WNL


Cardiovascular: Yes: Tachycardia, S1, S2, S4


Respiratory: Yes: Diminished, On BiPap


Gastrointestinal: Yes: Soft


...Rectal Exam: Yes: Deferred


Genitourinary: No: Anuria


Breast(s): Yes: WNL


Musculoskeletal: Yes: Muscle Weakness


Extremities: Yes: Cool


Edema: No


Peripheral Pulses WNL: Yes


Integumentary: Yes: WNL


Neurological: Yes: Alert, Oriented, Weakness


Psychiatric: Yes: Alert, Oriented, Other (anxiety/depression)


Labs: 


 CBC, BMP





 12/13/19 06:20 





 12/13/19 06:20 





 Abnormal Lab Results











  12/13/19





  06:20


 


Chloride  95 L


 


Carbon Dioxide  39 H


 


Anion Gap  4 L


 


BUN  27.1 H


 


Magnesium  2.7 H


 


AST  13 L


 


Albumin  3.0 L














- ....Imaging


Chest X-ray: Image Reviewed


EKG: Image Reviewed


Other: Image Reviewed (telemetry: sinus tachycardia)





Problem List





- Problems


(1) Acute on chronic respiratory failure with hypoxia and hypercapnia


Assessment/Plan: 


On O2, steroids, and bronchodilators per pulmonologist.


Code(s): J96.21 - ACUTE AND CHRONIC RESPIRATORY FAILURE WITH HYPOXIA; J96.22 - 

ACUTE AND CHRONIC RESPIRATORY FAILURE WITH HYPERCAPNIA   





(2) CAD (coronary artery disease)


Assessment/Plan: 


On clopidogrel.


F/u lipid profile, and start statin as needed.


Code(s): I25.10 - ATHSCL HEART DISEASE OF NATIVE CORONARY ARTERY W/O ANG PCTRS 

  


Qualifiers: 


   Coronary Disease-Associated Artery/Lesion type: native artery   Native vs. 

transplanted heart: native heart   Associated angina: without angina   

Qualified Code(s): I25.10 - Atherosclerotic heart disease of native coronary 

artery without angina pectoris   





(3) COPD exacerbation


Assessment/Plan: 


see "...Respiratory Failure").


Code(s): J44.1 - CHRONIC OBSTRUCTIVE PULMONARY DISEASE W (ACUTE) EXACERBATION   





(4) Cough


Code(s): R05 - COUGH   





(5) Diastolic CHF


Code(s): I50.30 - UNSPECIFIED DIASTOLIC (CONGESTIVE) HEART FAILURE   


Qualifiers: 


   Heart failure chronicity: unspecified   Qualified Code(s): I50.30 - 

Unspecified diastolic (congestive) heart failure   





(6) HTN (hypertension)


Code(s): I10 - ESSENTIAL (PRIMARY) HYPERTENSION   


Qualifiers: 


   Hypertension type: essential hypertension   Qualified Code(s): I10 - 

Essential (primary) hypertension   





(7) History of coronary artery bypass graft


Code(s): Z95.1 - PRESENCE OF AORTOCORONARY BYPASS GRAFT   





(8) Hypercholesterolemia


Assessment/Plan: 


f/u lipid profile; start statin if needed.


Code(s): E78.0 - PURE HYPERCHOLESTEROLEMIA * DO NOT USE *   





(9) PSVT (paroxysmal supraventricular tachycardia)


Assessment/Plan: 


by history.


Code(s): I47.1 - SUPRAVENTRICULAR TACHYCARDIA   





(10) Sinus tachycardia


Code(s): R00.0 - TACHYCARDIA, UNSPECIFIED   





(11) Acute on chronic diastolic (congestive) heart failure


Code(s): I50.33 - ACUTE ON CHRONIC DIASTOLIC (CONGESTIVE) HEART FAILURE   





(12) Elevated brain natriuretic peptide (BNP) level


Assessment/Plan: 


Major contributor likely from severe pulmonary HTN with COPD exacerbation; pt 

also with CAD and diastolic CHF.


Code(s): R79.89 - OTHER SPECIFIED ABNORMAL FINDINGS OF BLOOD CHEMISTRY   





(13) Anxiety and depression


Assessment/Plan: 


Pt is frustrated that he can no longer do even the most simple activities 

without becoming markedly short of breath; he says he sometimes feels he can no 

longer "go on".


On Klonopin.


Code(s): F41.9 - ANXIETY DISORDER, UNSPECIFIED; F32.9 - MAJOR DEPRESSIVE 

DISORDER, SINGLE EPISODE, UNSPECIFIED   





Assessment/Plan





CCU time spent: 35 minutes.

## 2019-12-13 NOTE — PN
Progress Note, Physician


Chief Complaint: 





Pt A&Ox3; dyspnea at all times; on Bipap. No chest pain. Able to have 

converation, but has to stop in mid-sentence to catch his breath.


History of Present Illness: 





63 year old man with past medical history significant for CAD s/p CABG, end-

stage COPD-- on home O2, awaiting lung transplant; HIV, hypertension, 

hyperlipidemia, diastolic CHF, rheumatoid arthritis on methotrexate, who 

presents to the emergency department with weakness, lethargy, difficulty 

breathing.





History limited; progressively worsening ability to breathe over the last few 

days.








- Current Medication List


Current Medications: 


Active Medications





Albuterol Sulfate (Ventolin 0.083% Nebulizer Soln -)  1 amp NEB RQ4H PRN


   PRN Reason: Dyspnea


   Last Admin: 12/10/19 04:20 Dose:  1 amp


Albuterol/Ipratropium (Duoneb -)  1 amp NEB RQID [facility]


   Last Admin: 12/13/19 11:46 Dose:  1 amp


Chlorhexidine Gluconate (Hibiclens For Decolonization -)  1 applic TP HS [facility]


   Last Admin: 12/12/19 22:06 Dose:  1 applic


Clonazepam (Klonopin -)  0.5 mg PO BID PRN


   PRN Reason: ANXIETY


   Last Admin: 12/13/19 10:19 Dose:  0.5 mg


Clopidogrel Bisulfate (Plavix -)  75 mg PO DAILY [facility]


   Last Admin: 12/13/19 10:19 Dose:  75 mg


Heparin Sodium (Porcine) (Heparin -)  5,000 unit SQ TID [facility]


   Last Admin: 12/13/19 06:19 Dose:  5,000 unit


Amino Acids (Clinimix -)  1,000 mls @ 84 mls/hr IV Q12H [facility]


   Last Admin: 12/13/19 01:15 Dose:  Not Given


Methylprednisolone Sodium Succinate (Solu-Medrol -)  60 mg IVPUSH Q6H-IV [facility]


   Last Admin: 12/13/19 08:55 Dose:  60 mg


Morphine Sulfate (Morphine Sulfate)  2 mg IVPUSH Q4H PRN


   PRN Reason: PAIN LEVEL 6-10


   Last Admin: 12/12/19 16:33 Dose:  2 mg


Multivitamins/Minerals (Infuvite Adult -)  10 ml IV DAILY [facility]


   Last Admin: 12/12/19 15:07 Dose:  10 ml


Mupirocin (Bactroban Ointment (For Decolonization) -)  1 applic NS BID [facility]


   Stop: 12/14/19 09:59


   Last Admin: 12/13/19 10:18 Dose:  1 applic











- Objective


Vital Signs: 


 Vital Signs











Temperature  97.6 F   12/13/19 10:00


 


Pulse Rate  119 H  12/13/19 14:00


 


Respiratory Rate  22 H  12/13/19 14:00


 


Blood Pressure  137/90   12/13/19 14:00


 


O2 Sat by Pulse Oximetry (%)  98   12/13/19 10:00











Constitutional: Yes: Mild Distress, Thin


Eyes: Yes: WNL


HENT: Yes: WNL


Neck: Yes: WNL


Cardiovascular: Yes: Tachycardia, S1, S2, S4


Respiratory: Yes: Diminished, On BiPap, SOB, Tachypnea


Gastrointestinal: Yes: Soft


...Rectal Exam: Yes: Deferred


Genitourinary: No: Anuria


Breast(s): Yes: WNL


Musculoskeletal: Yes: Muscle Weakness


Extremities: Yes: Cool


Edema: No


Peripheral Pulses WNL: Yes


Integumentary: Yes: WNL


Neurological: Yes: Alert, Oriented, Weakness


Psychiatric: Yes: Alert, Oriented


Labs: 


 CBC, BMP





 12/13/19 06:20 





 12/13/19 06:20 











- ....Imaging


Chest X-ray: Image Reviewed


EKG: Image Reviewed


Other: Image Reviewed





Problem List





- Problems


(1) Acute on chronic respiratory failure with hypoxia and hypercapnia


Assessment/Plan: 


On O2, steroids, and bronchodilators per pulmonologist.


Code(s): J96.21 - ACUTE AND CHRONIC RESPIRATORY FAILURE WITH HYPOXIA; J96.22 - 

ACUTE AND CHRONIC RESPIRATORY FAILURE WITH HYPERCAPNIA   





(2) Anxiety


Code(s): F41.9 - ANXIETY DISORDER, UNSPECIFIED   





(3) CAD (coronary artery disease)


Assessment/Plan: 


On clopidogrel.


F/u lipid profile, and start statin as needed.


Code(s): I25.10 - ATHSCL HEART DISEASE OF NATIVE CORONARY ARTERY W/O ANG PCTRS 

  


Qualifiers: 


   Coronary Disease-Associated Artery/Lesion type: native artery   Native vs. 

transplanted heart: native heart   Associated angina: without angina   

Qualified Code(s): I25.10 - Atherosclerotic heart disease of native coronary 

artery without angina pectoris   





(4) COPD exacerbation


Code(s): J44.1 - CHRONIC OBSTRUCTIVE PULMONARY DISEASE W (ACUTE) EXACERBATION   





(5) Cough


Code(s): R05 - COUGH   





(6) Diastolic CHF


Code(s): I50.30 - UNSPECIFIED DIASTOLIC (CONGESTIVE) HEART FAILURE   


Qualifiers: 


   Heart failure chronicity: unspecified   Qualified Code(s): I50.30 - 

Unspecified diastolic (congestive) heart failure   





(7) HTN (hypertension)


Code(s): I10 - ESSENTIAL (PRIMARY) HYPERTENSION   


Qualifiers: 


   Hypertension type: essential hypertension   Qualified Code(s): I10 - 

Essential (primary) hypertension   





(8) History of coronary artery bypass graft


Code(s): Z95.1 - PRESENCE OF AORTOCORONARY BYPASS GRAFT   





(9) Hypercholesterolemia


Assessment/Plan: 


f/u lipid profile; start statin if needed.


Code(s): E78.0 - PURE HYPERCHOLESTEROLEMIA * DO NOT USE *   





(10) PSVT (paroxysmal supraventricular tachycardia)


Assessment/Plan: 


by history.


Code(s): I47.1 - SUPRAVENTRICULAR TACHYCARDIA   





(11) Sinus tachycardia


Code(s): R00.0 - TACHYCARDIA, UNSPECIFIED   





(12) Acute on chronic diastolic (congestive) heart failure


Code(s): I50.33 - ACUTE ON CHRONIC DIASTOLIC (CONGESTIVE) HEART FAILURE   





(13) Elevated brain natriuretic peptide (BNP) level


Assessment/Plan: 


Major contributer likely from pulmonary HTN with COPD exacerbation; pt also 

with CAD and diastolic CHF.


Code(s): R79.89 - OTHER SPECIFIED ABNORMAL FINDINGS OF BLOOD CHEMISTRY   





(14) Anxiety and depression


Code(s): F41.9 - ANXIETY DISORDER, UNSPECIFIED; F32.9 - MAJOR DEPRESSIVE 

DISORDER, SINGLE EPISODE, UNSPECIFIED   





Assessment/Plan





CCU time spent: 40 minutes.

## 2019-12-14 LAB
ALBUMIN SERPL-MCNC: 2.9 G/DL (ref 3.4–5)
ALP SERPL-CCNC: 71 U/L (ref 45–117)
ALT SERPL-CCNC: 25 U/L (ref 13–61)
ANION GAP SERPL CALC-SCNC: 6 MMOL/L (ref 8–16)
AST SERPL-CCNC: 20 U/L (ref 15–37)
BILIRUB SERPL-MCNC: 0.8 MG/DL (ref 0.2–1)
BUN SERPL-MCNC: 28.4 MG/DL (ref 7–18)
CALCIUM SERPL-MCNC: 9 MG/DL (ref 8.5–10.1)
CHLORIDE SERPL-SCNC: 97 MMOL/L (ref 98–107)
CHOLEST SERPL-MCNC: 220 MG/DL (ref 50–200)
CO2 SERPL-SCNC: 35 MMOL/L (ref 21–32)
CREAT SERPL-MCNC: 0.7 MG/DL (ref 0.55–1.3)
DEPRECATED RDW RBC AUTO: 15.2 % (ref 11.9–15.9)
GLUCOSE SERPL-MCNC: 120 MG/DL (ref 74–106)
HCT VFR BLD CALC: 40 % (ref 35.4–49)
HDLC SERPL-MCNC: 77 MG/DL (ref 40–60)
HGB BLD-MCNC: 13.2 GM/DL (ref 11.7–16.9)
LDLC SERPL CALC-MCNC: 122 MG/DL (ref 5–100)
MAGNESIUM SERPL-MCNC: 2.5 MG/DL (ref 1.8–2.4)
MCH RBC QN AUTO: 30.1 PG (ref 25.7–33.7)
MCHC RBC AUTO-ENTMCNC: 33 G/DL (ref 32–35.9)
MCV RBC: 91.3 FL (ref 80–96)
PHOSPHATE SERPL-MCNC: 3.3 MG/DL (ref 2.5–4.9)
PLATELET # BLD AUTO: 337 K/MM3 (ref 134–434)
PMV BLD: 7.8 FL (ref 7.5–11.1)
POTASSIUM SERPLBLD-SCNC: 5 MMOL/L (ref 3.5–5.1)
PROT SERPL-MCNC: 6.4 G/DL (ref 6.4–8.2)
RBC # BLD AUTO: 4.39 M/MM3 (ref 4–5.6)
SODIUM SERPL-SCNC: 138 MMOL/L (ref 136–145)
TRIGL SERPL-MCNC: 132 MG/DL (ref 0–150)
WBC # BLD AUTO: 10.4 K/MM3 (ref 4–10)

## 2019-12-14 RX ADMIN — METHYLPREDNISOLONE SODIUM SUCCINATE SCH MG: 40 INJECTION, POWDER, FOR SOLUTION INTRAMUSCULAR; INTRAVENOUS at 03:52

## 2019-12-14 RX ADMIN — HEPARIN SODIUM SCH UNIT: 5000 INJECTION, SOLUTION INTRAVENOUS; SUBCUTANEOUS at 05:54

## 2019-12-14 RX ADMIN — METHYLPREDNISOLONE SODIUM SUCCINATE SCH MG: 40 INJECTION, POWDER, FOR SOLUTION INTRAMUSCULAR; INTRAVENOUS at 09:42

## 2019-12-14 RX ADMIN — IPRATROPIUM BROMIDE AND ALBUTEROL SULFATE SCH AMP: .5; 3 SOLUTION RESPIRATORY (INHALATION) at 20:57

## 2019-12-14 RX ADMIN — ASCORBIC ACID, VITAMIN A PALMITATE, CHOLECALCIFEROL, THIAMINE HYDROCHLORIDE, RIBOFLAVIN-5 PHOSPHATE SODIUM, PYRIDOXINE HYDROCHLORIDE, NIACINAMIDE, DEXPANTHENOL, ALPHA-TOCOPHEROL ACETATE, VITAMIN K1, FOLIC ACID, BIOTIN, CYANOCOBALAMIN SCH ML: 200; 3300; 200; 6; 3.6; 6; 40; 15; 10; 150; 600; 60; 5 INJECTION, SOLUTION INTRAVENOUS at 10:23

## 2019-12-14 RX ADMIN — IPRATROPIUM BROMIDE AND ALBUTEROL SULFATE SCH AMP: .5; 3 SOLUTION RESPIRATORY (INHALATION) at 16:32

## 2019-12-14 RX ADMIN — HEPARIN SODIUM SCH UNIT: 5000 INJECTION, SOLUTION INTRAVENOUS; SUBCUTANEOUS at 14:11

## 2019-12-14 RX ADMIN — IPRATROPIUM BROMIDE AND ALBUTEROL SULFATE SCH AMP: .5; 3 SOLUTION RESPIRATORY (INHALATION) at 08:07

## 2019-12-14 RX ADMIN — METHYLPREDNISOLONE SODIUM SUCCINATE SCH MG: 40 INJECTION, POWDER, FOR SOLUTION INTRAMUSCULAR; INTRAVENOUS at 21:09

## 2019-12-14 RX ADMIN — IPRATROPIUM BROMIDE AND ALBUTEROL SULFATE SCH AMP: .5; 3 SOLUTION RESPIRATORY (INHALATION) at 11:44

## 2019-12-14 RX ADMIN — LEUCINE, PHENYLALANINE, LYSINE, METHIONINE, ISOLEUCINE, VALINE, HISTIDINE, THREONINE, TRYPTOPHAN, ALANINE, GLYCINE, ARGININE, PROLINE, SERINE, TYROSINE, DEXTROSE SCH MLS/HR: 311; 238; 247; 170; 255; 247; 204; 179; 77; 880; 438; 489; 289; 213; 17; 5 INJECTION INTRAVENOUS at 14:12

## 2019-12-14 RX ADMIN — CLOPIDOGREL BISULFATE SCH MG: 75 TABLET, FILM COATED ORAL at 09:43

## 2019-12-14 RX ADMIN — HEPARIN SODIUM SCH UNIT: 5000 INJECTION, SOLUTION INTRAVENOUS; SUBCUTANEOUS at 21:10

## 2019-12-14 RX ADMIN — LEUCINE, PHENYLALANINE, LYSINE, METHIONINE, ISOLEUCINE, VALINE, HISTIDINE, THREONINE, TRYPTOPHAN, ALANINE, GLYCINE, ARGININE, PROLINE, SERINE, TYROSINE, DEXTROSE SCH MLS/HR: 311; 238; 247; 170; 255; 247; 204; 179; 77; 880; 438; 489; 289; 213; 17; 5 INJECTION INTRAVENOUS at 02:41

## 2019-12-14 RX ADMIN — METHYLPREDNISOLONE SODIUM SUCCINATE SCH MG: 40 INJECTION, POWDER, FOR SOLUTION INTRAMUSCULAR; INTRAVENOUS at 14:20

## 2019-12-14 NOTE — PN
Progress Note, Physician


Chief Complaint: 





Patient is still complaint of shortness of breath on facemak saturating well


History of Present Illness: 


63 year old man with past medical history significant for CAD s/p CABG, end-

stage COPD-- on home O2, awaiting lung transplant; HIV, hypertension, 

hyperlipidemia, diastolic CHF, rheumatoid arthritis on methotrexate, who 

presents to the emergency department with weakness, lethargy, difficulty 

breathing.











- Current Medication List


Current Medications: 


Active Medications





Albuterol Sulfate (Ventolin 0.083% Nebulizer Soln -)  1 amp NEB RQ4H PRN


   PRN Reason: Dyspnea


   Last Admin: 12/10/19 04:20 Dose:  1 amp


Albuterol/Ipratropium (Duoneb -)  1 amp NEB RQID [facility]


   Last Admin: 12/14/19 08:07 Dose:  1 amp


Chlorhexidine Gluconate (Hibiclens For Decolonization -)  1 applic TP HS [facility]


   Last Admin: 12/13/19 23:24 Dose:  1 applic


Clonazepam (Klonopin -)  0.5 mg PO BID PRN


   PRN Reason: ANXIETY


   Last Admin: 12/13/19 10:19 Dose:  0.5 mg


Clopidogrel Bisulfate (Plavix -)  75 mg PO DAILY [facility]


   Last Admin: 12/13/19 10:19 Dose:  75 mg


Heparin Sodium (Porcine) (Heparin -)  5,000 unit SQ TID [facility]


   Last Admin: 12/14/19 05:54 Dose:  5,000 unit


Amino Acids (Clinimix -)  1,000 mls @ 84 mls/hr IV Q12H [facility]


   Last Admin: 12/14/19 02:41 Dose:  84 mls/hr


Methylprednisolone Sodium Succinate (Solu-Medrol -)  60 mg IVPUSH Q6H-IV [facility]


   Last Admin: 12/14/19 03:52 Dose:  60 mg


Morphine Sulfate (Morphine Sulfate)  2 mg IVPUSH Q4H PRN


   PRN Reason: PAIN LEVEL 6-10


   Last Admin: 12/12/19 16:33 Dose:  2 mg


Multivitamins/Minerals (Infuvite Adult -)  10 ml IV DAILY [facility]


   Last Admin: 12/13/19 13:00 Dose:  Not Given


Mupirocin (Bactroban Ointment (For Decolonization) -)  1 applic NS BID [facility]


   Stop: 12/14/19 09:59


   Last Admin: 12/13/19 23:24 Dose:  1 applic











- Objective


Vital Signs: 


Vital Signs:

















Temperature  97.9 F   12/14/19 05:51


 


Pulse Rate  102 H  12/14/19 05:51


 


Respiratory Rate  20   12/14/19 05:51


 


Blood Pressure  131/87   12/14/19 05:51


 


O2 Sat by Pulse Oximetry (%)  98   12/14/19 08:06








 














General: Middle-aged man, sick looking in respiratory distress


HEENT; mucous membranes moist, no anemia, no jaundice, PERRLA, no nystagmus


Neck: No JVD, supple, no bruit, thyroid palpably normal, normal carotid 

pulsations.


Chest: Bilateral diffuse wheezes with tachypnea


CVS: S1-S2 regularno murmur/gallop/rub


Abdomen: Nondistended, soft, bowel sounds present.


Extremities: No edema.,  No cough tenderness, pulses present


CNS: AO X3 , no gross motor sensory deficit


Labs: 


 CBC, BMP





 12/14/19 05:41 





 12/14/19 05:41 











Problem List





- Problems


(1) Acute on chronic respiratory failure with hypoxia and hypercapnia


Assessment/Plan: 


Advanced COPD on lung transplant list, continue IV steroids, albuterol, 

respiratory support, follow-up critical care team recommendations.


Problems reviewed: Yes   


Code(s): J96.21 - ACUTE AND CHRONIC RESPIRATORY FAILURE WITH HYPOXIA; J96.22 - 

ACUTE AND CHRONIC RESPIRATORY FAILURE WITH HYPERCAPNIA   





(2) Anxiety and depression


Assessment/Plan: 


Continue home medication


Problems reviewed: Yes   


Code(s): F41.9 - ANXIETY DISORDER, UNSPECIFIED; F32.9 - MAJOR DEPRESSIVE 

DISORDER, SINGLE EPISODE, UNSPECIFIED   





(3) History of coronary artery bypass graft


Assessment/Plan: 


At present stable follow-up cardiology recommendation


Problems reviewed: Yes   


Code(s): Z95.1 - PRESENCE OF AORTOCORONARY BYPASS GRAFT   





(4) Diastolic CHF


Assessment/Plan: 


Follow cardiology recommendation


Problems reviewed: Yes   


Code(s): I50.30 - UNSPECIFIED DIASTOLIC (CONGESTIVE) HEART FAILURE   


Qualifiers: 


   Heart failure chronicity: unspecified   Qualified Code(s): I50.30 - 

Unspecified diastolic (congestive) heart failure

## 2019-12-14 NOTE — PN
Progress Note (short form)





- Note


Progress Note: 


PULM/CCM





Patient seen and examined in the ICU.  Awake and alert on SFM. Reports feeling 

a little better today. Remains tachypneic.





Active Medications





Albuterol Sulfate (Ventolin 0.083% Nebulizer Soln -)  1 amp NEB RQ4H PRN


   PRN Reason: Dyspnea


   Last Admin: 12/10/19 04:20 Dose:  1 amp


Albuterol/Ipratropium (Duoneb -)  1 amp NEB RQID [facility]


   Last Admin: 12/14/19 16:32 Dose:  1 amp


Chlorhexidine Gluconate (Hibiclens For Decolonization -)  1 applic TP HS [facility]


   Last Admin: 12/13/19 23:24 Dose:  1 applic


Clonazepam (Klonopin -)  0.5 mg PO BID PRN


   PRN Reason: ANXIETY


   Last Admin: 12/13/19 10:19 Dose:  0.5 mg


Clopidogrel Bisulfate (Plavix -)  75 mg PO DAILY [facility]


   Last Admin: 12/14/19 09:43 Dose:  75 mg


Heparin Sodium (Porcine) (Heparin -)  5,000 unit SQ TID [facility]


   Last Admin: 12/14/19 14:11 Dose:  5,000 unit


Amino Acids (Clinimix -)  1,000 mls @ 84 mls/hr IV Q12H [facility]


   Last Admin: 12/14/19 14:12 Dose:  84 mls/hr


Methylprednisolone Sodium Succinate (Solu-Medrol -)  60 mg IVPUSH Q6H-IV [facility]


   Last Admin: 12/14/19 14:20 Dose:  60 mg


Morphine Sulfate (Morphine Sulfate)  2 mg IVPUSH Q4H PRN


   PRN Reason: PAIN LEVEL 6-10


   Last Admin: 12/12/19 16:33 Dose:  2 mg


Multivitamins/Minerals (Infuvite Adult -)  10 ml IV DAILY [facility]


   Last Admin: 12/14/19 10:23 Dose:  10 ml





 Vital Signs











 Period  Temp  Pulse  Resp  BP Sys/Baer  Pulse Ox


 


 Last 24 Hr  97.2 F-97.9 F    20-26  119-142/80-90  








 Intake & Output











 12/11/19 12/12/19 12/13/19 12/14/19





 23:59 23:59 23:59 23:59


 


Intake Total 1426 2171 698 681


 


Output Total 2036 1270 1100 


 


Balance -[phone] -[phone]


 


Weight 58.2 kg 59.562 kg 58.967 kg 59.148 kg











Gen: CA + O X3, remains tachypneic & w/ some accessory muscle use


Heart: RRR


Lung: distant breath sounds


Abd: soft, nontender


Ext: no edema





 CBC, BMP





 12/14/19 05:41 





 12/14/19 05:41 





 Microbiology





12/08/19 20:30   Blood - Peripheral Venous   Blood Culture - Final


                            NO GROWTH AFTER 5 DAYS INCUBATION


12/08/19 20:30   Blood - Peripheral Venous   Blood Culture - Final


                            NO GROWTH AFTER 5 DAYS INCUBATION


12/09/19 00:00   Sputum - Endotrachea Suction/Ventilator   Gram Stain - Final


12/09/19 00:00   Sputum - Endotrachea Suction/Ventilator   Sputum Culture - 

Final


                            NORMAL RESPIRATORY EDU


12/08/19 00:01   Urine - Urine - Catheterized   Urine Culture - Final


                            NO GROWTH OBTAINED





CXR 12/13: Unchanged contour of the cardiomediastinal silhouette. Hyperaerated 

lungs. Increased vascular markings are noted in lower lung zones. No evidence 

of CHF. No pneumothorax, or large pleural effusion is seen. No evidence of a 

pulmonary infiltrates. No evidence of vascular congestive changes. 


 


ASSESSMENT AND PLAN:


Acute on Chronic Hypoxic and Hypercapneic Respiratory Failure


Acute COPD Exacerbation


Emphysema


CAD


HTN


DM


Hyperlipidemia


Rheumatoid Arthritis


h/o CVA


Prostate Ca


Anemia





-  O2 to keep Spo2 88-92%


-  Nebs


-  Cont Nocturnal NIPPV support 


-  Venti Mask by Day


-  Finish Abx


-  Cont Medrol


-  lasix as needed


-  monitor urine output, creatinine


-  Cont TPN


-  SQH


-  SCD


-  GI ppx


-  continue ICU monitoring for tenuous respiratory status








JOSE DOUGLAS-[facility] ICU


PULM/CCM


[id number]

## 2019-12-15 LAB
ANION GAP SERPL CALC-SCNC: 6 MMOL/L (ref 8–16)
BUN SERPL-MCNC: 30.5 MG/DL (ref 7–18)
CALCIUM SERPL-MCNC: 9 MG/DL (ref 8.5–10.1)
CHLORIDE SERPL-SCNC: 100 MMOL/L (ref 98–107)
CO2 SERPL-SCNC: 35 MMOL/L (ref 21–32)
CREAT SERPL-MCNC: 0.7 MG/DL (ref 0.55–1.3)
DEPRECATED RDW RBC AUTO: 15.2 % (ref 11.9–15.9)
GLUCOSE SERPL-MCNC: 110 MG/DL (ref 74–106)
HCT VFR BLD CALC: 40.4 % (ref 35.4–49)
HGB BLD-MCNC: 13.4 GM/DL (ref 11.7–16.9)
MAGNESIUM SERPL-MCNC: 2.7 MG/DL (ref 1.8–2.4)
MCH RBC QN AUTO: 30.2 PG (ref 25.7–33.7)
MCHC RBC AUTO-ENTMCNC: 33.2 G/DL (ref 32–35.9)
MCV RBC: 90.9 FL (ref 80–96)
PHOSPHATE SERPL-MCNC: 4.4 MG/DL (ref 2.5–4.9)
PLATELET # BLD AUTO: 305 K/MM3 (ref 134–434)
PMV BLD: 8.2 FL (ref 7.5–11.1)
POTASSIUM SERPLBLD-SCNC: 5 MMOL/L (ref 3.5–5.1)
RBC # BLD AUTO: 4.44 M/MM3 (ref 4–5.6)
SODIUM SERPL-SCNC: 140 MMOL/L (ref 136–145)
WBC # BLD AUTO: 11.3 K/MM3 (ref 4–10)

## 2019-12-15 PROCEDURE — B51MZZA FLUOROSCOPY OF RIGHT UPPER EXTREMITY VEINS, GUIDANCE: ICD-10-PCS

## 2019-12-15 PROCEDURE — 05HB33Z INSERTION OF INFUSION DEVICE INTO RIGHT BASILIC VEIN, PERCUTANEOUS APPROACH: ICD-10-PCS

## 2019-12-15 RX ADMIN — CHLORHEXIDINE GLUCONATE SCH APPLIC: 213 SOLUTION TOPICAL at 00:23

## 2019-12-15 RX ADMIN — CLOPIDOGREL BISULFATE SCH MG: 75 TABLET, FILM COATED ORAL at 09:46

## 2019-12-15 RX ADMIN — LEUCINE, PHENYLALANINE, LYSINE, METHIONINE, ISOLEUCINE, VALINE, HISTIDINE, THREONINE, TRYPTOPHAN, ALANINE, GLYCINE, ARGININE, PROLINE, SERINE, TYROSINE, DEXTROSE SCH MLS/HR: 311; 238; 247; 170; 255; 247; 204; 179; 77; 880; 438; 489; 289; 213; 17; 5 INJECTION INTRAVENOUS at 01:58

## 2019-12-15 RX ADMIN — CHLORHEXIDINE GLUCONATE SCH APPLIC: 213 SOLUTION TOPICAL at 21:14

## 2019-12-15 RX ADMIN — HEPARIN SODIUM SCH UNIT: 5000 INJECTION, SOLUTION INTRAVENOUS; SUBCUTANEOUS at 05:24

## 2019-12-15 RX ADMIN — IPRATROPIUM BROMIDE AND ALBUTEROL SULFATE SCH AMP: .5; 3 SOLUTION RESPIRATORY (INHALATION) at 08:30

## 2019-12-15 RX ADMIN — METHYLPREDNISOLONE SODIUM SUCCINATE SCH MG: 40 INJECTION, POWDER, FOR SOLUTION INTRAMUSCULAR; INTRAVENOUS at 09:44

## 2019-12-15 RX ADMIN — IPRATROPIUM BROMIDE AND ALBUTEROL SULFATE SCH AMP: .5; 3 SOLUTION RESPIRATORY (INHALATION) at 16:09

## 2019-12-15 RX ADMIN — PREDNISONE SCH MG: 20 TABLET ORAL at 10:59

## 2019-12-15 RX ADMIN — METHYLPREDNISOLONE SODIUM SUCCINATE SCH MG: 40 INJECTION, POWDER, FOR SOLUTION INTRAMUSCULAR; INTRAVENOUS at 02:01

## 2019-12-15 RX ADMIN — IPRATROPIUM BROMIDE AND ALBUTEROL SULFATE SCH AMP: .5; 3 SOLUTION RESPIRATORY (INHALATION) at 19:34

## 2019-12-15 RX ADMIN — HEPARIN SODIUM SCH UNIT: 5000 INJECTION, SOLUTION INTRAVENOUS; SUBCUTANEOUS at 21:14

## 2019-12-15 RX ADMIN — LEUCINE, PHENYLALANINE, LYSINE, METHIONINE, ISOLEUCINE, VALINE, HISTIDINE, THREONINE, TRYPTOPHAN, ALANINE, GLYCINE, ARGININE, PROLINE, SERINE, TYROSINE, DEXTROSE SCH MLS/HR: 311; 238; 247; 170; 255; 247; 204; 179; 77; 880; 438; 489; 289; 213; 17; 5 INJECTION INTRAVENOUS at 14:06

## 2019-12-15 RX ADMIN — HEPARIN SODIUM SCH UNIT: 5000 INJECTION, SOLUTION INTRAVENOUS; SUBCUTANEOUS at 15:03

## 2019-12-15 RX ADMIN — ASCORBIC ACID, VITAMIN A PALMITATE, CHOLECALCIFEROL, THIAMINE HYDROCHLORIDE, RIBOFLAVIN-5 PHOSPHATE SODIUM, PYRIDOXINE HYDROCHLORIDE, NIACINAMIDE, DEXPANTHENOL, ALPHA-TOCOPHEROL ACETATE, VITAMIN K1, FOLIC ACID, BIOTIN, CYANOCOBALAMIN SCH ML: 200; 3300; 200; 6; 3.6; 6; 40; 15; 10; 150; 600; 60; 5 INJECTION, SOLUTION INTRAVENOUS at 14:05

## 2019-12-15 RX ADMIN — IPRATROPIUM BROMIDE AND ALBUTEROL SULFATE SCH AMP: .5; 3 SOLUTION RESPIRATORY (INHALATION) at 11:51

## 2019-12-15 NOTE — PN
Progress Note, Physician


Chief Complaint: 





Patient is still complaint of shortness of breath on  high flow saturating well


History of Present Illness: 


63 year old man with past medical history significant for CAD s/p CABG, end-

stage COPD-- on home O2, awaiting lung transplant; HIV, hypertension, 

hyperlipidemia, diastolic CHF, rheumatoid arthritis on methotrexate, who 

presents to the emergency department with weakness, lethargy, difficulty 

breathing.











- Current Medication List


Current Medications: 


Active Medications





Albuterol Sulfate (Ventolin 0.083% Nebulizer Soln -)  1 amp NEB RQ4H PRN


   PRN Reason: Dyspnea


   Last Admin: 12/10/19 04:20 Dose:  1 amp


Albuterol/Ipratropium (Duoneb -)  1 amp NEB RQID [facility]


   Last Admin: 12/15/19 11:51 Dose:  1 amp


Chlorhexidine Gluconate (Hibiclens For Decolonization -)  1 applic TP HS [facility]


   Last Admin: 12/15/19 00:23 Dose:  1 applic


Clonazepam (Klonopin -)  0.5 mg PO BID PRN


   PRN Reason: ANXIETY


   Last Admin: 12/15/19 11:03 Dose:  0.5 mg


Clopidogrel Bisulfate (Plavix -)  75 mg PO DAILY [facility]


   Last Admin: 12/15/19 09:46 Dose:  75 mg


Heparin Sodium (Porcine) (Heparin -)  5,000 unit SQ TID [facility]


   Last Admin: 12/15/19 05:24 Dose:  5,000 unit


Amino Acids (Clinimix -)  1,000 mls @ 84 mls/hr IV Q12H [facility]


   Last Admin: 12/15/19 01:58 Dose:  84 mls/hr


Morphine Sulfate (Morphine Sulfate)  2 mg IVPUSH Q4H PRN


   PRN Reason: PAIN LEVEL 6-10


   Last Admin: 12/12/19 16:33 Dose:  2 mg


Multivitamins/Minerals (Infuvite Adult -)  10 ml IV DAILY [facility]


   Last Admin: 12/14/19 10:23 Dose:  10 ml


Prednisone (Deltasone -)  60 mg PO DAILY [facility]


   Last Admin: 12/15/19 10:59 Dose:  60 mg











- Objective


Vital Signs: 


 Vital Signs











Temperature  98.2 F   12/15/19 12:00


 


Pulse Rate  109 H  12/15/19 12:00


 


Respiratory Rate  22 H  12/15/19 12:00


 


Blood Pressure  126/74   12/15/19 12:00


 


O2 Sat by Pulse Oximetry (%)  100   12/15/19 09:00











General: Middle-aged man, sick looking in respiratory distress


HEENT; mucous membranes moist, no anemia, no jaundice, PERRLA, no nystagmus


Neck: No JVD, supple, no bruit, thyroid palpably normal, normal carotid 

pulsations.


Chest: Bilateral diffuse wheezes with tachypnea


CVS: S1-S2 regularno murmur/gallop/rub


Abdomen: Nondistended, soft, bowel sounds present.


Extremities: No edema.,  No cough tenderness, pulses present


CNS: AO X3 , no gross motor sensory deficit


Labs: 


 CBC, BMP





 12/15/19 05:55 





 12/15/19 05:55 











Problem List





- Problems


(1) Acute on chronic respiratory failure with hypoxia and hypercapnia


Assessment/Plan: 


Advanced COPD on lung transplant list, continue IV steroids, albuterol, 

respiratory support, follow-up critical care team recommendations.


Code(s): J96.21 - ACUTE AND CHRONIC RESPIRATORY FAILURE WITH HYPOXIA; J96.22 - 

ACUTE AND CHRONIC RESPIRATORY FAILURE WITH HYPERCAPNIA   





(2) Anxiety and depression


Assessment/Plan: 


Continue home medication


Code(s): F41.9 - ANXIETY DISORDER, UNSPECIFIED; F32.9 - MAJOR DEPRESSIVE 

DISORDER, SINGLE EPISODE, UNSPECIFIED   





(3) History of coronary artery bypass graft


Assessment/Plan: 


At present stable follow-up cardiology recommendation


Code(s): Z95.1 - PRESENCE OF AORTOCORONARY BYPASS GRAFT   





(4) Diastolic CHF


Assessment/Plan: 


Follow cardiology recommendation


Code(s): I50.30 - UNSPECIFIED DIASTOLIC (CONGESTIVE) HEART FAILURE   


Qualifiers: 


   Heart failure chronicity: unspecified   Qualified Code(s): I50.30 - 

Unspecified diastolic (congestive) heart failure

## 2019-12-15 NOTE — PROC
Procedure Note


Procedure: 





Midline Cath:


Indication: Poor IV access


5fr 20mc dualType:  thea





Pt prepped and draped in the usual sterile fashion. Lidocaine 1% w/o epi was 

use for local anesthesia. Under ultrasound guidance the right basilic vein was 

cannulated via modified seldinger tech. Good non pulsatile blood flow was noted 

from both pots. Sterial dressing was applied. Pt tolerated procedure well w/o 

complication. EBL 0. 








Boerem ACNP


Pulm/CCM





Miline: 35854


Ultrasound guidance: 36117

## 2019-12-15 NOTE — PN
Progress Note (short form)





- Note


Progress Note: 





Seen and examined in the ICU


Still w/ significant SOB but states he feels better than yesterday


BiPAP overnight, on Venti-mask this AM FiO2: 35%


afebrile. BP stable








 Current Medications





Albuterol Sulfate (Ventolin 0.083% Nebulizer Soln -)  1 amp NEB RQ4H PRN


   PRN Reason: Dyspnea


   Last Admin: 12/10/19 04:20 Dose:  1 amp


Albuterol/Ipratropium (Duoneb -)  1 amp NEB RQID [facility]


   Last Admin: 12/14/19 20:57 Dose:  1 amp


Chlorhexidine Gluconate (Hibiclens For Decolonization -)  1 applic TP HS [facility]


   Last Admin: 12/15/19 00:23 Dose:  1 applic


Clonazepam (Klonopin -)  0.5 mg PO BID PRN


   PRN Reason: ANXIETY


   Last Admin: 12/13/19 10:19 Dose:  0.5 mg


Clopidogrel Bisulfate (Plavix -)  75 mg PO DAILY [facility]


   Last Admin: 12/14/19 09:43 Dose:  75 mg


Heparin Sodium (Porcine) (Heparin -)  5,000 unit SQ TID [facility]


   Last Admin: 12/15/19 05:24 Dose:  5,000 unit


Amino Acids (Clinimix -)  1,000 mls @ 84 mls/hr IV Q12H [facility]


   Last Admin: 12/15/19 01:58 Dose:  84 mls/hr


Methylprednisolone Sodium Succinate (Solu-Medrol -)  60 mg IVPUSH Q6H-IV [facility]


   Last Admin: 12/15/19 02:01 Dose:  60 mg


Morphine Sulfate (Morphine Sulfate)  2 mg IVPUSH Q4H PRN


   PRN Reason: PAIN LEVEL 6-10


   Last Admin: 12/12/19 16:33 Dose:  2 mg


Multivitamins/Minerals (Infuvite Adult -)  10 ml IV DAILY [facility]


   Last Admin: 12/14/19 10:23 Dose:  10 ml





 Vital Signs











 Period  Temp  Pulse  Resp  BP Sys/Baer  Pulse Ox


 


 Last 24 Hr  97.9 F-97.9 F    16-24  /47-90  





 


 Intake & Output











 12/12/19 12/13/19 12/14/19 12/15/19





 23:59 23:59 23:59 23:59


 


Intake Total 2171 698 2009 681


 


Output Total 1270 1100  500


 


Balance 901 -402 2009 181


 


Weight 59.562 kg 58.967 kg 59.148 kg 56.608 kg











Exam: 


General: awake and alert, depressed, mod WOB


HEENT: PRRL, no JVD, anicteric sclera


CV: RRR


Pulm: very diminished w/ poor airmovement


Abd: SNTND, +BS 


Ext: WWP, trace LE edema


Neuro: AOx3, CN grossly intact








 CBC, BMP





 12/15/19 05:55 





 12/15/19 05:55 








 Microbiology





12/08/19 20:30   Blood - Peripheral Venous   Blood Culture - Final


                            NO GROWTH AFTER 5 DAYS INCUBATION


12/08/19 20:30   Blood - Peripheral Venous   Blood Culture - Final


                            NO GROWTH AFTER 5 DAYS INCUBATION


12/09/19 00:00   Sputum - Endotrachea Suction/Ventilator   Gram Stain - Final


12/09/19 00:00   Sputum - Endotrachea Suction/Ventilator   Sputum Culture - 

Final


                            NORMAL RESPIRATORY EDU


12/08/19 00:01   Urine - Urine - Catheterized   Urine Culture - Final


                            NO GROWTH OBTAINED





ASSESSMENT AND PLAN:


Acute on Chronic Hypoxic and Hypercapneic Respiratory Failure


Acute COPD Exacerbation


Emphysema


CAD


HTN


DM


Hyperlipidemia


Rheumatoid Arthritis


h/o CVA


Prostate Ca


Anemia





-  O2 to keep Spo2 88-92%


-  Nebs


-  Cont Nocturnal NIPPV support 


-  Venti Mask by Day, will attempt HFNC for comfort


-  s/p Abx


-  change Medrol to prednisone 60mg with slow taper


-  lasix as needed


-  monitor urine output, creatinine


-  Cont TPN


-  SQH


-  SCD


-  GI ppx


-  continue ICU monitoring for tenuous respiratory status





Boerem ACNP


Pulm/CCM





CCT: 35m

## 2019-12-16 LAB
ANION GAP SERPL CALC-SCNC: 3 MMOL/L (ref 8–16)
ARTERIAL BLOOD GAS PCO2: 53.3 MMHG (ref 35–45)
ARTERIAL PATENCY WRIST A: POSITIVE
BASE EXCESS BLDA CALC-SCNC: 9.3 MEQ/L (ref -2–2)
BUN SERPL-MCNC: 29.1 MG/DL (ref 7–18)
CALCIUM SERPL-MCNC: 8.9 MG/DL (ref 8.5–10.1)
CHLORIDE SERPL-SCNC: 98 MMOL/L (ref 98–107)
CO2 SERPL-SCNC: 38 MMOL/L (ref 21–32)
CREAT SERPL-MCNC: 0.7 MG/DL (ref 0.55–1.3)
DEPRECATED RDW RBC AUTO: 15.5 % (ref 11.9–15.9)
GLUCOSE SERPL-MCNC: 82 MG/DL (ref 74–106)
HCT VFR BLD CALC: 40.2 % (ref 35.4–49)
HGB BLD-MCNC: 13.4 GM/DL (ref 11.7–16.9)
MAGNESIUM SERPL-MCNC: 2.5 MG/DL (ref 1.8–2.4)
MCH RBC QN AUTO: 30.4 PG (ref 25.7–33.7)
MCHC RBC AUTO-ENTMCNC: 33.3 G/DL (ref 32–35.9)
MCV RBC: 91.3 FL (ref 80–96)
PHOSPHATE SERPL-MCNC: 2.3 MG/DL (ref 2.5–4.9)
PLATELET # BLD AUTO: 297 K/MM3 (ref 134–434)
PMV BLD: 8.1 FL (ref 7.5–11.1)
PO2 BLDA: 62 MMHG (ref 80–100)
POTASSIUM SERPLBLD-SCNC: 4.6 MMOL/L (ref 3.5–5.1)
RBC # BLD AUTO: 4.41 M/MM3 (ref 4–5.6)
SAO2 % BLDA: 90.8 % (ref 95–98)
SODIUM SERPL-SCNC: 139 MMOL/L (ref 136–145)
WBC # BLD AUTO: 11 K/MM3 (ref 4–10)

## 2019-12-16 RX ADMIN — HEPARIN SODIUM SCH UNIT: 5000 INJECTION, SOLUTION INTRAVENOUS; SUBCUTANEOUS at 14:35

## 2019-12-16 RX ADMIN — CLOPIDOGREL BISULFATE SCH MG: 75 TABLET, FILM COATED ORAL at 10:57

## 2019-12-16 RX ADMIN — ASCORBIC ACID, VITAMIN A PALMITATE, CHOLECALCIFEROL, THIAMINE HYDROCHLORIDE, RIBOFLAVIN-5 PHOSPHATE SODIUM, PYRIDOXINE HYDROCHLORIDE, NIACINAMIDE, DEXPANTHENOL, ALPHA-TOCOPHEROL ACETATE, VITAMIN K1, FOLIC ACID, BIOTIN, CYANOCOBALAMIN SCH ML: 200; 3300; 200; 6; 3.6; 6; 40; 15; 10; 150; 600; 60; 5 INJECTION, SOLUTION INTRAVENOUS at 20:03

## 2019-12-16 RX ADMIN — HEPARIN SODIUM SCH UNIT: 5000 INJECTION, SOLUTION INTRAVENOUS; SUBCUTANEOUS at 05:09

## 2019-12-16 RX ADMIN — IPRATROPIUM BROMIDE AND ALBUTEROL SULFATE SCH AMP: .5; 3 SOLUTION RESPIRATORY (INHALATION) at 07:40

## 2019-12-16 RX ADMIN — PREDNISONE SCH MG: 20 TABLET ORAL at 10:56

## 2019-12-16 RX ADMIN — IPRATROPIUM BROMIDE AND ALBUTEROL SULFATE SCH: .5; 3 SOLUTION RESPIRATORY (INHALATION) at 21:25

## 2019-12-16 RX ADMIN — CHLORHEXIDINE GLUCONATE SCH: 213 SOLUTION TOPICAL at 21:42

## 2019-12-16 RX ADMIN — LEUCINE, PHENYLALANINE, LYSINE, METHIONINE, ISOLEUCINE, VALINE, HISTIDINE, THREONINE, TRYPTOPHAN, ALANINE, GLYCINE, ARGININE, PROLINE, SERINE, TYROSINE, DEXTROSE SCH MLS/HR: 311; 238; 247; 170; 255; 247; 204; 179; 77; 880; 438; 489; 289; 213; 17; 5 INJECTION INTRAVENOUS at 17:27

## 2019-12-16 RX ADMIN — IPRATROPIUM BROMIDE AND ALBUTEROL SULFATE SCH AMP: .5; 3 SOLUTION RESPIRATORY (INHALATION) at 11:25

## 2019-12-16 RX ADMIN — LEUCINE, PHENYLALANINE, LYSINE, METHIONINE, ISOLEUCINE, VALINE, HISTIDINE, THREONINE, TRYPTOPHAN, ALANINE, GLYCINE, ARGININE, PROLINE, SERINE, TYROSINE, DEXTROSE SCH MLS/HR: 311; 238; 247; 170; 255; 247; 204; 179; 77; 880; 438; 489; 289; 213; 17; 5 INJECTION INTRAVENOUS at 02:23

## 2019-12-16 RX ADMIN — IPRATROPIUM BROMIDE AND ALBUTEROL SULFATE SCH AMP: .5; 3 SOLUTION RESPIRATORY (INHALATION) at 15:32

## 2019-12-16 NOTE — PN
Progress Note, Physician





- Current Medication List


Current Medications: 


Active Medications





Albuterol Sulfate (Ventolin 0.083% Nebulizer Soln -)  1 amp NEB RQ4H PRN


   PRN Reason: Dyspnea


   Last Admin: 12/10/19 04:20 Dose:  1 amp


Albuterol/Ipratropium (Duoneb -)  1 amp NEB RQID [facility]


   Last Admin: 12/15/19 19:34 Dose:  1 amp


Chlorhexidine Gluconate (Hibiclens For Decolonization -)  1 applic TP HS [facility]


   Last Admin: 12/15/19 21:14 Dose:  1 applic


Clonazepam (Klonopin -)  0.5 mg PO BID PRN


   PRN Reason: ANXIETY


   Last Admin: 12/15/19 11:03 Dose:  0.5 mg


Clopidogrel Bisulfate (Plavix -)  75 mg PO DAILY [facility]


   Last Admin: 12/15/19 09:46 Dose:  75 mg


Heparin Sodium (Porcine) (Heparin -)  5,000 unit SQ TID [facility]


   Last Admin: 12/15/19 21:14 Dose:  5,000 unit


Amino Acids (Clinimix -)  1,000 mls @ 84 mls/hr IV Q12H [facility]


   Last Admin: 12/16/19 02:23 Dose:  84 mls/hr


Morphine Sulfate (Morphine Sulfate)  2 mg IVPUSH Q4H PRN


   PRN Reason: PAIN LEVEL 6-10


   Last Admin: 12/12/19 16:33 Dose:  2 mg


Multivitamins/Minerals (Infuvite Adult -)  10 ml IV DAILY [facility]


   Last Admin: 12/15/19 14:05 Dose:  10 ml


Prednisone (Deltasone -)  60 mg PO DAILY [facility]


   Last Admin: 12/15/19 10:59 Dose:  60 mg











- Objective


Vital Signs: 


 Vital Signs











Temperature  98.3 F   12/15/19 18:00


 


Pulse Rate  100 H  12/16/19 02:00


 


Respiratory Rate  20   12/16/19 02:00


 


Blood Pressure  146/82   12/16/19 02:00


 


O2 Sat by Pulse Oximetry (%)  100   12/15/19 09:00











Labs: 


 CBC, BMP





 12/15/19 05:55 





 12/15/19 05:55 











Problem List





- Problems


(1) Acute on chronic respiratory failure with hypoxia and hypercapnia


Code(s): J96.21 - ACUTE AND CHRONIC RESPIRATORY FAILURE WITH HYPOXIA; J96.22 - 

ACUTE AND CHRONIC RESPIRATORY FAILURE WITH HYPERCAPNIA   





(2) CAD (coronary artery disease)


Code(s): I25.10 - ATHSCL HEART DISEASE OF NATIVE CORONARY ARTERY W/O ANG PCTRS 

  


Qualifiers: 


   Coronary Disease-Associated Artery/Lesion type: native artery   Native vs. 

transplanted heart: native heart   Associated angina: without angina   

Qualified Code(s): I25.10 - Atherosclerotic heart disease of native coronary 

artery without angina pectoris   





(3) COPD exacerbation


Code(s): J44.1 - CHRONIC OBSTRUCTIVE PULMONARY DISEASE W (ACUTE) EXACERBATION   





(4) Cough


Code(s): R05 - COUGH   





(5) Diastolic CHF


Code(s): I50.30 - UNSPECIFIED DIASTOLIC (CONGESTIVE) HEART FAILURE   


Qualifiers: 


   Heart failure chronicity: unspecified   Qualified Code(s): I50.30 - 

Unspecified diastolic (congestive) heart failure   





(6) HTN (hypertension)


Code(s): I10 - ESSENTIAL (PRIMARY) HYPERTENSION   


Qualifiers: 


   Hypertension type: essential hypertension   Qualified Code(s): I10 - 

Essential (primary) hypertension   





(7) History of coronary artery bypass graft


Code(s): Z95.1 - PRESENCE OF AORTOCORONARY BYPASS GRAFT   





(8) Hypercholesterolemia


Code(s): E78.0 - PURE HYPERCHOLESTEROLEMIA * DO NOT USE *   





(9) PSVT (paroxysmal supraventricular tachycardia)


Code(s): I47.1 - SUPRAVENTRICULAR TACHYCARDIA   





(10) Sinus tachycardia


Code(s): R00.0 - TACHYCARDIA, UNSPECIFIED   





(11) Acute on chronic diastolic (congestive) heart failure


Code(s): I50.33 - ACUTE ON CHRONIC DIASTOLIC (CONGESTIVE) HEART FAILURE   





(12) Elevated brain natriuretic peptide (BNP) level


Code(s): R79.89 - OTHER SPECIFIED ABNORMAL FINDINGS OF BLOOD CHEMISTRY   





(13) Anxiety and depression


Code(s): F41.9 - ANXIETY DISORDER, UNSPECIFIED; F32.9 - MAJOR DEPRESSIVE 

DISORDER, SINGLE EPISODE, UNSPECIFIED

## 2019-12-16 NOTE — PN
Progress Note, Physician


History of Present Illness: 





64yo man with a PMH of CAD s/p CABG, COPD on home O2 (on a list for lung 

transplant), HTN, HLD, HIV, CHF, RA on methotrexate, and anxiety presents to 

the emergency department with respiratory distress from home. Per the patient's 

family member, he was having SOB today over the past 24 hours and was 

increasingly short of breath. Per the daughter, she states he wanted to present 

earlier but did not. On history intake, the patient was speaking in 1 word 

answers and lethargic. He stated he felt short of breath and had chest 

tightness. Denies fevers, nausea, vomiting, dysuria, abdominal pain. 





PE: 


severe decreased breath sounds with audible rhonchi


cardiac: rrr without murmurs


abdomen: diffusely tender on palpation











PMH


Past medical history 


Major events


CABG SVG to RCA Dec. 2014 Dr. Duarte


Ongoing medical problems


Abnormal stress test showing inferior wall ischemia October 13, 2014 [facility]


CAD- Stent oRCA 2008 [facility]


COPD


 oRCA prior stent site, o/w nonobstructive ds Oct. 2014 Dr. France at 

[facility]


Rheumatoid Arthritis


unsucesful  attempt Dec. 3 2014 [facility]











- Current Medication List


Current Medications: 


Active Medications





Albuterol Sulfate (Ventolin 0.083% Nebulizer Soln -)  1 amp NEB RQ4H PRN


   PRN Reason: Dyspnea


   Last Admin: 12/10/19 04:20 Dose:  1 amp


Albuterol/Ipratropium (Duoneb -)  1 amp NEB RQID [facility]


   Last Admin: 12/16/19 07:40 Dose:  1 amp


Chlorhexidine Gluconate (Hibiclens For Decolonization -)  1 applic TP HS [facility]


   Last Admin: 12/15/19 21:14 Dose:  1 applic


Clonazepam (Klonopin -)  0.5 mg PO BID PRN


   PRN Reason: ANXIETY


   Last Admin: 12/15/19 11:03 Dose:  0.5 mg


Clopidogrel Bisulfate (Plavix -)  75 mg PO DAILY [facility]


   Last Admin: 12/15/19 09:46 Dose:  75 mg


Heparin Sodium (Porcine) (Heparin -)  5,000 unit SQ TID [facility]


   Last Admin: 12/16/19 05:09 Dose:  5,000 unit


Amino Acids (Clinimix -)  1,000 mls @ 84 mls/hr IV Q12H [facility]


   Last Admin: 12/16/19 02:23 Dose:  84 mls/hr


Morphine Sulfate (Morphine Sulfate)  2 mg IVPUSH Q4H PRN


   PRN Reason: PAIN LEVEL 6-10


   Last Admin: 12/12/19 16:33 Dose:  2 mg


Multivitamins/Minerals (Infuvite Adult -)  10 ml IV DAILY [facility]


   Last Admin: 12/15/19 14:05 Dose:  10 ml


Prednisone (Deltasone -)  60 mg PO DAILY [facility]


   Last Admin: 12/15/19 10:59 Dose:  60 mg











- Objective


Vital Signs: 


 Vital Signs











Temperature  98.1 F   12/16/19 06:00


 


Pulse Rate  101 H  12/16/19 08:00


 


Respiratory Rate  16   12/16/19 08:00


 


Blood Pressure  119/96   12/16/19 08:00


 


O2 Sat by Pulse Oximetry (%)  98   12/16/19 08:55











Eyes: Yes: WNL, Conjunctiva Clear, EOM Intact


HENT: Yes: WNL, Atraumatic, Normocephalic


Neck: Yes: WNL, Supple, Trachea Midline


Cardiovascular: Yes: WNL, Regular Rate and Rhythm


Respiratory: Yes: Diminished


Gastrointestinal: Yes: WNL, Normal Bowel Sounds


Genitourinary: Yes: WNL


Musculoskeletal: Yes: WNL


Extremities: Yes: WNL


Edema: No


Integumentary: Yes: WNL


Neurological: Yes: WNL, Alert, Oriented


...Motor Strength: WNL


Psychiatric: Yes: WNL


Labs: 


 CBC, BMP





 12/16/19 06:38 





 12/16/19 06:38 











Problem List





- Problems


(1) Acute on chronic respiratory failure with hypoxia and hypercapnia


Code(s): J96.21 - ACUTE AND CHRONIC RESPIRATORY FAILURE WITH HYPOXIA; J96.22 - 

ACUTE AND CHRONIC RESPIRATORY FAILURE WITH HYPERCAPNIA   





(2) Abnormal LFTs


Code(s): R79.89 - OTHER SPECIFIED ABNORMAL FINDINGS OF BLOOD CHEMISTRY   





(3) Abscess of calf


Code(s): L02.419 - CUTANEOUS ABSCESS OF LIMB, UNSPECIFIED   





(4) Acute on chronic respiratory failure with hypoxemia


Code(s): J96.21 - ACUTE AND CHRONIC RESPIRATORY FAILURE WITH HYPOXIA   





(5) Anxiety


Code(s): F41.9 - ANXIETY DISORDER, UNSPECIFIED   





(6) CAD (coronary artery disease)


Code(s): I25.10 - ATHSCL HEART DISEASE OF NATIVE CORONARY ARTERY W/O ANG PCTRS 

  


Qualifiers: 


   Coronary Disease-Associated Artery/Lesion type: native artery   Native vs. 

transplanted heart: native heart   Associated angina: without angina   

Qualified Code(s): I25.10 - Atherosclerotic heart disease of native coronary 

artery without angina pectoris   





(7) COPD (chronic obstructive pulmonary disease)


Code(s): J44.9 - CHRONIC OBSTRUCTIVE PULMONARY DISEASE, UNSPECIFIED   


Qualifiers: 


   COPD type: emphysema   Emphysema type: unspecified   Qualified Code(s): 

J43.9 - Emphysema, unspecified   





(8) COPD exacerbation


Code(s): J44.1 - CHRONIC OBSTRUCTIVE PULMONARY DISEASE W (ACUTE) EXACERBATION   





(9) Chronic pain


Code(s): G89.29 - OTHER CHRONIC PAIN   





(10) Compression fracture of L1 lumbar vertebra


Code(s): S32.010A - WEDGE COMPRESSION FRACTURE OF FIRST LUMBAR VERTEBRA, INIT   


Qualifiers: 


   Encounter type: initial encounter 





(11) Congestive cardiac failure


Code(s): I50.9 - HEART FAILURE, UNSPECIFIED   





(12) Cough


Code(s): R05 - COUGH   





(13) Diastolic CHF


Code(s): I50.30 - UNSPECIFIED DIASTOLIC (CONGESTIVE) HEART FAILURE   


Qualifiers: 


   Heart failure chronicity: unspecified   Qualified Code(s): I50.30 - 

Unspecified diastolic (congestive) heart failure   





(14) Edema


Code(s): R60.9 - EDEMA, UNSPECIFIED   


Qualifiers: 


   Edema type: unspecified   Qualified Code(s): R60.9 - Edema, unspecified   





(15) HTN (hypertension)


Code(s): I10 - ESSENTIAL (PRIMARY) HYPERTENSION   


Qualifiers: 


   Hypertension type: essential hypertension   Qualified Code(s): I10 - 

Essential (primary) hypertension   





(16) History of coronary artery bypass graft


Code(s): Z95.1 - PRESENCE OF AORTOCORONARY BYPASS GRAFT   





(17) History of percutaneous coronary intervention


Code(s): Z98.89 - OTHER SPECIFIED POSTPROCEDURAL STATES * DO NOT USE *   





(18) Hypercholesterolemia


Code(s): E78.0 - PURE HYPERCHOLESTEROLEMIA * DO NOT USE *   





(19) Hyperkalemia


Code(s): E87.5 - HYPERKALEMIA   





(20) Inguinal hernia


Code(s): K40.90 - UNIL INGUINAL HERNIA, W/O OBST OR GANGR, NOT SPCF AS RECUR   





(21) Intractable pain


Code(s): R52 - PAIN, UNSPECIFIED   





(22) LLQ pain


Code(s): R10.32 - LEFT LOWER QUADRANT PAIN   





(23) Left groin pain


Code(s): R10.32 - LEFT LOWER QUADRANT PAIN   





(24) Leg pain


Code(s): M79.606 - PAIN IN LEG, UNSPECIFIED   





(25) Lower extremity edema


Code(s): R60.0 - LOCALIZED EDEMA   





(26) Lumbar disc herniation


Code(s): M51.26 - OTHER INTERVERTEBRAL DISC DISPLACEMENT, LUMBAR REGION   





(27) Nosebleed


Code(s): R04.0 - EPISTAXIS   





(28) PSVT (paroxysmal supraventricular tachycardia)


Code(s): I47.1 - SUPRAVENTRICULAR TACHYCARDIA   





(29) Pain and swelling of right lower leg


Code(s): M79.661 - PAIN IN RIGHT LOWER LEG; M79.89 - OTHER SPECIFIED SOFT 

TISSUE DISORDERS   





(30) Pneumonia


Code(s): J18.9 - PNEUMONIA, UNSPECIFIED ORGANISM   





(31) Radiculopathy


Code(s): M54.10 - RADICULOPATHY, SITE UNSPECIFIED   


Qualifiers: 


   Spinal region: lumbar   Qualified Code(s): M54.16 - Radiculopathy, lumbar 

region   





(32) Rheumatoid arthritis


Code(s): M06.9 - RHEUMATOID ARTHRITIS, UNSPECIFIED   





(33) Rheumatoid arthritis involving ankle with positive rheumatoid factor


Code(s): M05.779 - RHEU ARTHRIT W RHEU FACTOR OF UNSP ANK/FT W/O ORG/SYS INVOLV

   


Qualifiers: 


   Laterality: bilateral   Qualified Code(s): M05.771 - Rheumatoid arthritis 

with rheumatoid factor of right ankle and foot without organ or systems 

involvement; M05.772 - Rheumatoid arthritis with rheumatoid factor of left 

ankle and foot without organ or systems involvement   





(34) Rheumatoid arthritis of multiple sites without organ or system involvement 

with positive rheumatoid factor


Code(s): M05.79 - RHEU ARTHRITIS W RHEU FACTOR MULT SITE W/O ORG/SYS INVOLV   





(35) Sinus tachycardia


Code(s): R00.0 - TACHYCARDIA, UNSPECIFIED   





Assessment/Plan





Acute on Chronic Hypoxic and Hypercapneic Respiratory Failure


Acute COPD Exacerbation


Emphysema


CAD


s/p CABG


HTN


DM


Hyperlipidemia


Rheumatoid Arthritis


h/o CVA


Prostate Ca


Anemia


Lactic acidosis


CABG SVG to RCA Dec. 2014 Dr. Duarte


Abnormal stress test showing inferior wall ischemia October 13, 2014 [facility]


CAD- Stent oRCA 2008 [facility]


COPD


 oRCA prior stent site, o/w nonobstructive ds Oct. 2014 Dr. France at 

[facility]


unsucesful  attempt Dec. 3 2014 [facility]





Plan


echo


BNP


comt present rx as per pulmonary and ICU team





cc time spent 37 min

## 2019-12-16 NOTE — PN
Progress Note, Physician


Chief Complaint: 





Pt A&Ox3; cannot finish a sentence without feeling dyspneic.


History of Present Illness: 





63 year old man with past medical history significant for CAD s/p CABG, end-

stage COPD-- on home O2, awaiting lung transplant; HIV, hypertension, 

hyperlipidemia, diastolic CHF, rheumatoid arthritis on methotrexate, who 

presents to the emergency department with weakness, lethargy, difficulty 

breathing.





History limited; progressively worsening ability to breathe over the last few 

days.








- Current Medication List


Current Medications: 


Active Medications





Albuterol Sulfate (Ventolin 0.083% Nebulizer Soln -)  1 amp NEB RQ4H PRN


   PRN Reason: Dyspnea


   Last Admin: 12/10/19 04:20 Dose:  1 amp


Albuterol/Ipratropium (Duoneb -)  1 amp NEB RQID [facility]


   Last Admin: 12/15/19 19:34 Dose:  1 amp


Chlorhexidine Gluconate (Hibiclens For Decolonization -)  1 applic TP HS [facility]


   Last Admin: 12/15/19 21:14 Dose:  1 applic


Clonazepam (Klonopin -)  0.5 mg PO BID PRN


   PRN Reason: ANXIETY


   Last Admin: 12/15/19 11:03 Dose:  0.5 mg


Clopidogrel Bisulfate (Plavix -)  75 mg PO DAILY [facility]


   Last Admin: 12/15/19 09:46 Dose:  75 mg


Heparin Sodium (Porcine) (Heparin -)  5,000 unit SQ TID [facility]


   Last Admin: 12/15/19 21:14 Dose:  5,000 unit


Amino Acids (Clinimix -)  1,000 mls @ 84 mls/hr IV Q12H [facility]


   Last Admin: 12/16/19 02:23 Dose:  84 mls/hr


Morphine Sulfate (Morphine Sulfate)  2 mg IVPUSH Q4H PRN


   PRN Reason: PAIN LEVEL 6-10


   Last Admin: 12/12/19 16:33 Dose:  2 mg


Multivitamins/Minerals (Infuvite Adult -)  10 ml IV DAILY [facility]


   Last Admin: 12/15/19 14:05 Dose:  10 ml


Prednisone (Deltasone -)  60 mg PO DAILY [facility]


   Last Admin: 12/15/19 10:59 Dose:  60 mg











- Objective


Vital Signs: 


 Vital Signs











Temperature  98.3 F   12/15/19 18:00


 


Pulse Rate  100 H  12/16/19 02:00


 


Respiratory Rate  20   12/16/19 02:00


 


Blood Pressure  146/82   12/16/19 02:00


 


O2 Sat by Pulse Oximetry (%)  100   12/15/19 09:00











Constitutional: Yes: Anxious, Mild Distress


Eyes: Yes: WNL


HENT: Yes: WNL


Neck: Yes: WNL


Cardiovascular: Yes: WNL, S1, S2, S4


Respiratory: Yes: Diminished


Gastrointestinal: Yes: Soft


...Rectal Exam: Yes: Deferred


Genitourinary: No: Anuria


Breast(s): Yes: WNL


Musculoskeletal: Yes: Muscle Weakness


Extremities: Yes: Cold


Edema: No


Peripheral Pulses WNL: Yes


Integumentary: Yes: WNL


Neurological: Yes: Alert, Oriented, Weakness


Psychiatric: Yes: Alert, Oriented, Other (depression)


Labs: 


 CBC, BMP





 12/15/19 05:55 





 12/15/19 05:55 











- ....Imaging


Chest X-ray: Image Reviewed


EKG: Image Reviewed


Other: Image Reviewed (sinus rhythm; periods of sinus tachycardia)





Problem List





- Problems


(1) Acute on chronic respiratory failure with hypoxia and hypercapnia


Assessment/Plan: 


Continue O2, steroids, and bronchodilators per pulmonologist.


Code(s): J96.21 - ACUTE AND CHRONIC RESPIRATORY FAILURE WITH HYPOXIA; J96.22 - 

ACUTE AND CHRONIC RESPIRATORY FAILURE WITH HYPERCAPNIA   





(2) CAD (coronary artery disease)


Assessment/Plan: 


On clopidogrel.


F/u lipid profile, and start statin as needed.


Code(s): I25.10 - ATHSCL HEART DISEASE OF NATIVE CORONARY ARTERY W/O ANG PCTRS 

  


Qualifiers: 


   Coronary Disease-Associated Artery/Lesion type: native artery   Native vs. 

transplanted heart: native heart   Associated angina: without angina   

Qualified Code(s): I25.10 - Atherosclerotic heart disease of native coronary 

artery without angina pectoris   





(3) COPD exacerbation


Assessment/Plan: 


see "...Respiratory Failure").


Code(s): J44.1 - CHRONIC OBSTRUCTIVE PULMONARY DISEASE W (ACUTE) EXACERBATION   





(4) Cough


Code(s): R05 - COUGH   





(5) Diastolic CHF


Code(s): I50.30 - UNSPECIFIED DIASTOLIC (CONGESTIVE) HEART FAILURE   


Qualifiers: 


   Heart failure chronicity: unspecified   Qualified Code(s): I50.30 - 

Unspecified diastolic (congestive) heart failure   





(6) HTN (hypertension)


Code(s): I10 - ESSENTIAL (PRIMARY) HYPERTENSION   


Qualifiers: 


   Hypertension type: essential hypertension   Qualified Code(s): I10 - 

Essential (primary) hypertension   





(7) History of coronary artery bypass graft


Assessment/Plan: 


On clopidogrel.


Start statin (elevated LDL; s/p CABG).


Code(s): Z95.1 - PRESENCE OF AORTOCORONARY BYPASS GRAFT   





(8) Hypercholesterolemia


Assessment/Plan: 


 mg/dL.


Start statin.


Code(s): E78.0 - PURE HYPERCHOLESTEROLEMIA * DO NOT USE *   





(9) PSVT (paroxysmal supraventricular tachycardia)


Assessment/Plan: 


by history.


Consider diltiazem for both HTN and arrhythmia.


Code(s): I47.1 - SUPRAVENTRICULAR TACHYCARDIA   





(10) Sinus tachycardia


Code(s): R00.0 - TACHYCARDIA, UNSPECIFIED   





(11) Acute on chronic diastolic (congestive) heart failure


Code(s): I50.33 - ACUTE ON CHRONIC DIASTOLIC (CONGESTIVE) HEART FAILURE   





(12) Elevated brain natriuretic peptide (BNP) level


Assessment/Plan: 


Major contributor likely from severe pulmonary HTN with COPD exacerbation; pt 

also with CAD and diastolic CHF.


Code(s): R79.89 - OTHER SPECIFIED ABNORMAL FINDINGS OF BLOOD CHEMISTRY   





(13) Anxiety and depression


Assessment/Plan: 


Pt is frustrated that he can no longer do even the most simple activities 

without becoming markedly short of breath; he says he sometimes feels he can no 

longer "go on".


On Klonopin.


Code(s): F41.9 - ANXIETY DISORDER, UNSPECIFIED; F32.9 - MAJOR DEPRESSIVE 

DISORDER, SINGLE EPISODE, UNSPECIFIED

## 2019-12-16 NOTE — PN
Teaching Attending Note


Name of Resident: Logan Pineda





ATTENDING PHYSICIAN STATEMENT





I saw and evaluated the patient.


I reviewed the resident's note and discussed the case with the resident.


I agree with the resident's findings and plan as documented.








SUBJECTIVE:


Patient seen and examined in the ICU.  Remains on HFOT.  


Reports feeling better. 


Less SOB.  


No CP. 





 Intake & Output











 12/13/19 12/14/19 12/15/19 12/16/19





 23:59 23:59 23:59 23:59


 


Intake Total 698 2009 1721 588


 


Output Total 1100  1550 550


 


Balance -402 2009 171 38


 


Weight 130 lb 130 lb 6.4 oz 124 lb 12.8 oz 129 lb 12.8 oz








 Last Vital Signs











Temp Pulse Resp BP Pulse Ox


 


 98.1 F   101 H  16   119/96   98 


 


 12/16/19 06:00  12/16/19 08:00  12/16/19 08:00  12/16/19 08:00  12/16/19 08:55








Active Medications





Albuterol Sulfate (Ventolin 0.083% Nebulizer Soln -)  1 amp NEB RQ4H PRN


   PRN Reason: Dyspnea


   Last Admin: 12/10/19 04:20 Dose:  1 amp


Albuterol/Ipratropium (Duoneb -)  1 amp NEB RQID [facility]


   Last Admin: 12/16/19 07:40 Dose:  1 amp


Chlorhexidine Gluconate (Hibiclens For Decolonization -)  1 applic TP HS [facility]


   Last Admin: 12/15/19 21:14 Dose:  1 applic


Clonazepam (Klonopin -)  0.5 mg PO BID PRN


   PRN Reason: ANXIETY


   Last Admin: 12/16/19 10:59 Dose:  0.5 mg


Clopidogrel Bisulfate (Plavix -)  75 mg PO DAILY [facility]


   Last Admin: 12/16/19 10:57 Dose:  75 mg


Heparin Sodium (Porcine) (Heparin -)  5,000 unit SQ TID [facility]


   Last Admin: 12/16/19 05:09 Dose:  5,000 unit


Amino Acids (Clinimix -)  1,000 mls @ 84 mls/hr IV Q12H [facility]


   Last Admin: 12/16/19 02:23 Dose:  84 mls/hr


Morphine Sulfate (Morphine Sulfate)  2 mg IVPUSH Q4H PRN


   PRN Reason: PAIN LEVEL 6-10


   Last Admin: 12/12/19 16:33 Dose:  2 mg


Multivitamins/Minerals (Infuvite Adult -)  10 ml IV DAILY [facility]


   Last Admin: 12/15/19 14:05 Dose:  10 ml


Prednisone (Deltasone -)  60 mg PO DAILY [facility]


   Last Admin: 12/16/19 10:56 Dose:  60 mg











Exam: 


General: awake and alert, NAD 


HEENT: PRRL, no JVD, anicteric sclera


CV: RRR


Pulm: diminished w/ poor airmovement


Abd: Soft, Non-tender, ND, (+) BS 


Ext: WWP, trace LE edema


Neuro: AOx3, CN grossly intact








 Laboratory Results - last 24 hr











  12/16/19 12/16/19 12/16/19





  06:38 06:38 07:10


 


WBC  11.0 H  


 


RBC  4.41  


 


Hgb  13.4  


 


Hct  40.2  


 


MCV  91.3  


 


MCH  30.4  


 


MCHC  33.3  


 


RDW  15.5  


 


Plt Count  297  


 


MPV  8.1  


 


Anticoagulation Therapy    No Result Required.


 


Puncture Site    Right radial


 


ABG pH    7.43


 


ABG pCO2 at Pt Temp    53.3 H


 


ABG pO2 at Pt Temp    62.0 L


 


ABG HCO3    35.0 H


 


ABG O2 Sat (Measured)    90.8 L


 


ABG O2 Content    16.7


 


ABG Base Excess    9.3 H


 


Mick Test    Positive


 


O2 Delivery Device    High flow/40 lpm


 


Oxygen Flow Rate    35%


 


Vent Mode    No Result Required.


 


Vent Rate    No Result Required.


 


Mechanical Rate    No Result Required.


 


Pressure Support Vent    No Result Required.


 


Sodium   139 


 


Potassium   4.6 


 


Chloride   98 


 


Carbon Dioxide   38 H 


 


Anion Gap   3 L 


 


BUN   29.1 H 


 


Creatinine   0.7 


 


Est GFR (CKD-EPI)AfAm   117.22 


 


Est GFR (CKD-EPI)NonAf   101.14 


 


Random Glucose   82 


 


Calcium   8.9 


 


Phosphorus   2.3 L 


 


Magnesium   2.5 H 














ASSESSMENT AND PLAN:


Acute on Chronic Hypoxic and Hypercapneic Respiratory Failure


Acute COPD Exacerbation


Emphysema


CAD


HTN


DM


Hyperlipidemia


Rheumatoid Arthritis


h/o CVA


Prostate Ca


Anemia








-  Wean HFOT 


-  O2 to keep Spo2 88-92%


-  BD TX PRN 


-  Nocturnal NIPPV support 


-  S/P Abx


-  Prednisone 


-  Lasix as needed


-  SQH


-  SCD


-  GI ppx


-  4W / 4S monitoring








Dr Mendoza

## 2019-12-16 NOTE — PN
Progress Note, Physician


Chief Complaint: 





Feels better


History of Present Illness: 





Admitted with reparatory  failure


S/P extubation





- Current Medication List


Current Medications: 


Active Medications





Albuterol Sulfate (Ventolin 0.083% Nebulizer Soln -)  1 amp NEB RQ4H PRN


   PRN Reason: Dyspnea


   Last Admin: 12/10/19 04:20 Dose:  1 amp


Albuterol/Ipratropium (Duoneb -)  1 amp NEB RQID [facility]


   Last Admin: 12/16/19 07:40 Dose:  1 amp


Chlorhexidine Gluconate (Hibiclens For Decolonization -)  1 applic TP HS [facility]


   Last Admin: 12/15/19 21:14 Dose:  1 applic


Clonazepam (Klonopin -)  0.5 mg PO BID PRN


   PRN Reason: ANXIETY


   Last Admin: 12/15/19 11:03 Dose:  0.5 mg


Clopidogrel Bisulfate (Plavix -)  75 mg PO DAILY [facility]


   Last Admin: 12/15/19 09:46 Dose:  75 mg


Heparin Sodium (Porcine) (Heparin -)  5,000 unit SQ TID [facility]


   Last Admin: 12/16/19 05:09 Dose:  5,000 unit


Amino Acids (Clinimix -)  1,000 mls @ 84 mls/hr IV Q12H [facility]


   Last Admin: 12/16/19 02:23 Dose:  84 mls/hr


Morphine Sulfate (Morphine Sulfate)  2 mg IVPUSH Q4H PRN


   PRN Reason: PAIN LEVEL 6-10


   Last Admin: 12/12/19 16:33 Dose:  2 mg


Multivitamins/Minerals (Infuvite Adult -)  10 ml IV DAILY [facility]


   Last Admin: 12/15/19 14:05 Dose:  10 ml


Prednisone (Deltasone -)  60 mg PO DAILY [facility]


   Last Admin: 12/15/19 10:59 Dose:  60 mg











- Objective


Vital Signs: 


 Vital Signs











Temperature  98.1 F   12/16/19 06:00


 


Pulse Rate  101 H  12/16/19 08:00


 


Respiratory Rate  16   12/16/19 08:00


 


Blood Pressure  119/96   12/16/19 08:00


 


O2 Sat by Pulse Oximetry (%)  98   12/16/19 08:55











Constitutional: Yes: Anxious


Eyes: Yes: WNL


HENT: Yes: WNL


Neck: Yes: WNL


Cardiovascular: Yes: WNL, Tachycardia


Respiratory: Yes: On Nasal O2


Gastrointestinal: Yes: Normal Bowel Sounds


...Rectal Exam: Yes: Deferred


Musculoskeletal: Yes: Muscle Weakness


Neurological: Yes: Alert


Labs: 


 CBC, BMP





 12/16/19 06:38 





 12/16/19 06:38 











- ....Imaging


X-ray: Report Reviewed





Assessment/Plan





Can be transferred to regular floor

## 2019-12-16 NOTE — PN
Progress Note, Physician


History of Present Illness: 





65 y/o M with a hx of HTN, HLD,DM<COPD CAD s/p CABG, CHF, RA,CVA  admitted 12/08 /2019  for respiratory failure with hypoxia and hypercapnia





complaining of productive cough this a.m (green phlegm) currenlty on high flow 

O2.


denies chest pain, fever, nausea, vomiting, diarrhea.


Has been tolerating PO intake 





- Current Medication List


Current Medications: 


Active Medications





Albuterol Sulfate (Ventolin 0.083% Nebulizer Soln -)  1 amp NEB RQ4H PRN


   PRN Reason: Dyspnea


   Last Admin: 12/10/19 04:20 Dose:  1 amp


Albuterol/Ipratropium (Duoneb -)  1 amp NEB RQID [facility]


   Last Admin: 12/16/19 07:40 Dose:  1 amp


Chlorhexidine Gluconate (Hibiclens For Decolonization -)  1 applic TP HS [facility]


   Last Admin: 12/15/19 21:14 Dose:  1 applic


Clonazepam (Klonopin -)  0.5 mg PO BID PRN


   PRN Reason: ANXIETY


   Last Admin: 12/15/19 11:03 Dose:  0.5 mg


Clopidogrel Bisulfate (Plavix -)  75 mg PO DAILY [facility]


   Last Admin: 12/15/19 09:46 Dose:  75 mg


Heparin Sodium (Porcine) (Heparin -)  5,000 unit SQ TID [facility]


   Last Admin: 12/16/19 05:09 Dose:  5,000 unit


Amino Acids (Clinimix -)  1,000 mls @ 84 mls/hr IV Q12H [facility]


   Last Admin: 12/16/19 02:23 Dose:  84 mls/hr


Morphine Sulfate (Morphine Sulfate)  2 mg IVPUSH Q4H PRN


   PRN Reason: PAIN LEVEL 6-10


   Last Admin: 12/12/19 16:33 Dose:  2 mg


Multivitamins/Minerals (Infuvite Adult -)  10 ml IV DAILY [facility]


   Last Admin: 12/15/19 14:05 Dose:  10 ml


Prednisone (Deltasone -)  60 mg PO DAILY [facility]


   Last Admin: 12/15/19 10:59 Dose:  60 mg











- Objective


Vital Signs: 


 Vital Signs











Temperature  98.1 F   12/16/19 06:00


 


Pulse Rate  101 H  12/16/19 08:00


 


Respiratory Rate  16   12/16/19 08:00


 


Blood Pressure  119/96   12/16/19 08:00


 


O2 Sat by Pulse Oximetry (%)  98   12/16/19 08:55











Constitutional: No: No Distress, Calm


Eyes: Yes: WNL


HENT: Yes: WNL


Cardiovascular: Yes: Regular Rate and Rhythm, S1, S2.  No: Bruit, Gallop


Respiratory: Yes: Regular, CTA Bilaterally, Cough, Other (on high glow oxygen)


Gastrointestinal: Yes: Normal Bowel Sounds, Soft.  No: Palpable Mass, Tenderness

, Epigastrium, Tenderness, Rebound


Extremities: No: Calf Tenderness, Cold, Cool, Cyanosis


Edema: No


Peripheral Pulses WNL: Yes


Labs: 


 CBC, BMP





 12/16/19 06:38 





 12/16/19 06:38 











Problem List





- Problems


(1) Acute on chronic respiratory failure with hypoxia and hypercapnia


Problems reviewed: Yes   


Code(s): J96.21 - ACUTE AND CHRONIC RESPIRATORY FAILURE WITH HYPOXIA; J96.22 - 

ACUTE AND CHRONIC RESPIRATORY FAILURE WITH HYPERCAPNIA   





Impression/Plan


Impression/Plan: 





A/P 


65 y/o M with extensive medical hx admitted for respiratory failure with 

hypoxia and hypercapnia


was intubated at onset of ICU stay


currently extubated and on high flow O2


pH improved to 7.43, PCO2 down to 53.3 from 100, PO2 62


alert and oriented and in NAD





CV:Echo and CXR


PULM: currently on high flow O2. rate has been reduced and will observed at 

reduced rate


if tolerating, will place on 3-4L O2 nasal cannula for transfer to floor.


-Okay for floor transfer per Dr. Whitaker.


-Nebs PRN


-Prednisone


RENAL:


FENGI:


ENDO:


ID:


DVT PPX:


LINES/TUBES/DRAINS:











Visit type





- Emergency Visit


Emergency Visit: Yes


ED Registration Date: 12/08/19


Care time: The patient presented to the Emergency Department on the above date 

and was hospitalized for further evaluation of their emergent condition.





- New Patient


This patient is new to me today: No





- Critical Care


Critical Care patient: No





- Discharge Referral


Referred to [facility] No

## 2019-12-17 LAB
ANION GAP SERPL CALC-SCNC: 5 MMOL/L (ref 8–16)
ANISOCYTOSIS BLD QL: (no result)
BASOPHILS # BLD: 0.1 % (ref 0–2)
BUN SERPL-MCNC: 26.1 MG/DL (ref 7–18)
CALCIUM SERPL-MCNC: 8.6 MG/DL (ref 8.5–10.1)
CHLORIDE SERPL-SCNC: 99 MMOL/L (ref 98–107)
CO2 SERPL-SCNC: 35 MMOL/L (ref 21–32)
CREAT SERPL-MCNC: 0.6 MG/DL (ref 0.55–1.3)
DEPRECATED RDW RBC AUTO: 15.2 % (ref 11.9–15.9)
EOSINOPHIL # BLD: 0.4 % (ref 0–4.5)
GLUCOSE SERPL-MCNC: 85 MG/DL (ref 74–106)
HCT VFR BLD CALC: 39.8 % (ref 35.4–49)
HGB BLD-MCNC: 13.2 GM/DL (ref 11.7–16.9)
LYMPHOCYTES # BLD: 5.8 % (ref 8–40)
MACROCYTES BLD QL: 0
MAGNESIUM SERPL-MCNC: 2.7 MG/DL (ref 1.8–2.4)
MCH RBC QN AUTO: 30.2 PG (ref 25.7–33.7)
MCHC RBC AUTO-ENTMCNC: 33.1 G/DL (ref 32–35.9)
MCV RBC: 91.4 FL (ref 80–96)
MONOCYTES # BLD AUTO: 11.3 % (ref 3.8–10.2)
NEUTROPHILS # BLD: 82.4 % (ref 42.8–82.8)
OVALOCYTES BLD QL SMEAR: (no result)
PHOSPHATE SERPL-MCNC: 3.6 MG/DL (ref 2.5–4.9)
PLATELET # BLD AUTO: 269 K/MM3 (ref 134–434)
PLATELET BLD QL SMEAR: NORMAL
PMV BLD: 8.3 FL (ref 7.5–11.1)
POTASSIUM SERPLBLD-SCNC: 4.6 MMOL/L (ref 3.5–5.1)
RBC # BLD AUTO: 4.36 M/MM3 (ref 4–5.6)
SODIUM SERPL-SCNC: 139 MMOL/L (ref 136–145)
WBC # BLD AUTO: 13.4 K/MM3 (ref 4–10)

## 2019-12-17 RX ADMIN — IPRATROPIUM BROMIDE AND ALBUTEROL SULFATE SCH AMP: .5; 3 SOLUTION RESPIRATORY (INHALATION) at 15:54

## 2019-12-17 RX ADMIN — IPRATROPIUM BROMIDE AND ALBUTEROL SULFATE SCH AMP: .5; 3 SOLUTION RESPIRATORY (INHALATION) at 08:30

## 2019-12-17 RX ADMIN — LEUCINE, PHENYLALANINE, LYSINE, METHIONINE, ISOLEUCINE, VALINE, HISTIDINE, THREONINE, TRYPTOPHAN, ALANINE, GLYCINE, ARGININE, PROLINE, SERINE, TYROSINE, DEXTROSE SCH: 311; 238; 247; 170; 255; 247; 204; 179; 77; 880; 438; 489; 289; 213; 17; 5 INJECTION INTRAVENOUS at 05:07

## 2019-12-17 RX ADMIN — HEPARIN SODIUM SCH UNIT: 5000 INJECTION, SOLUTION INTRAVENOUS; SUBCUTANEOUS at 13:24

## 2019-12-17 RX ADMIN — PREDNISONE SCH MG: 20 TABLET ORAL at 09:58

## 2019-12-17 RX ADMIN — HEPARIN SODIUM SCH UNIT: 5000 INJECTION, SOLUTION INTRAVENOUS; SUBCUTANEOUS at 05:12

## 2019-12-17 RX ADMIN — IPRATROPIUM BROMIDE AND ALBUTEROL SULFATE SCH AMP: .5; 3 SOLUTION RESPIRATORY (INHALATION) at 12:30

## 2019-12-17 RX ADMIN — I.V. FAT EMULSION SCH MLS/HR: 20 EMULSION INTRAVENOUS at 21:45

## 2019-12-17 RX ADMIN — LEUCINE, PHENYLALANINE, LYSINE, METHIONINE, ISOLEUCINE, VALINE, HISTIDINE, THREONINE, TRYPTOPHAN, ALANINE, GLYCINE, ARGININE, PROLINE, SERINE, TYROSINE, DEXTROSE SCH MLS/HR: 311; 238; 247; 170; 255; 247; 204; 179; 77; 880; 438; 489; 289; 213; 17; 5 INJECTION INTRAVENOUS at 05:11

## 2019-12-17 RX ADMIN — HEPARIN SODIUM SCH UNIT: 5000 INJECTION, SOLUTION INTRAVENOUS; SUBCUTANEOUS at 21:45

## 2019-12-17 RX ADMIN — CLOPIDOGREL BISULFATE SCH MG: 75 TABLET, FILM COATED ORAL at 09:58

## 2019-12-17 RX ADMIN — ATORVASTATIN CALCIUM SCH MG: 20 TABLET, FILM COATED ORAL at 21:45

## 2019-12-17 RX ADMIN — IPRATROPIUM BROMIDE AND ALBUTEROL SULFATE SCH AMP: .5; 3 SOLUTION RESPIRATORY (INHALATION) at 20:50

## 2019-12-17 RX ADMIN — ASCORBIC ACID, VITAMIN A PALMITATE, CHOLECALCIFEROL, THIAMINE HYDROCHLORIDE, RIBOFLAVIN-5 PHOSPHATE SODIUM, PYRIDOXINE HYDROCHLORIDE, NIACINAMIDE, DEXPANTHENOL, ALPHA-TOCOPHEROL ACETATE, VITAMIN K1, FOLIC ACID, BIOTIN, CYANOCOBALAMIN SCH ML: 200; 3300; 200; 6; 3.6; 6; 40; 15; 10; 150; 600; 60; 5 INJECTION, SOLUTION INTRAVENOUS at 09:58

## 2019-12-17 RX ADMIN — LEUCINE, PHENYLALANINE, LYSINE, METHIONINE, ISOLEUCINE, VALINE, HISTIDINE, THREONINE, TRYPTOPHAN, ALANINE, GLYCINE, ARGININE, PROLINE, SERINE, TYROSINE, DEXTROSE SCH MLS/HR: 311; 238; 247; 170; 255; 247; 204; 179; 77; 880; 438; 489; 289; 213; 17; 5 INJECTION INTRAVENOUS at 17:39

## 2019-12-17 NOTE — EKG
Test Reason : 

Blood Pressure : ***/*** mmHG

Vent. Rate : 119 BPM     Atrial Rate : 119 BPM

   P-R Int : 094 ms          QRS Dur : 120 ms

    QT Int : 324 ms       P-R-T Axes : [phone] degrees

   QTc Int : 455 ms

 

SINUS TACHYCARDIA WITH SHORT MD

POSSIBLE LEFT ATRIAL ENLARGEMENT

PULMONARY DISEASE PATTERN

RIGHT BUNDLE BRANCH BLOCK , PLUS RIGHT VENTRICULAR HYPERTROPHY

ABNORMAL ECG

WHEN COMPARED WITH ECG OF 08-DEC-2019 22:29,

MD INTERVAL HAS DECREASED

LEFT POSTERIOR FASCICULAR BLOCK IS NO LONGER PRESENT

ST NO LONGER ELEVATED IN ANTERIOR LEADS

T WAVE INVERSION NOW EVIDENT IN ANTERIOR LEADS

Confirmed by MD Terrence, Oh ([id number]) on 12/17/2019 9:45:40 AM

 

Referred By: NICOLASA ZAFAR           Confirmed By:Oh Ocampo MD

## 2019-12-17 NOTE — PN
Progress Note, Physician


Chief Complaint: 





Feels better


History of Present Illness: 





Admitted with respiratory failure





- Current Medication List


Current Medications: 


Active Medications





Albuterol Sulfate (Ventolin 0.083% Nebulizer Soln -)  1 amp NEB RQ4H PRN


   PRN Reason: Dyspnea


Albuterol/Ipratropium (Duoneb -)  1 amp NEB RQID [facility]


Clonazepam (Klonopin -)  0.5 mg PO BID PRN


   PRN Reason: ANXIETY


   Last Admin: 12/17/19 05:23 Dose:  0.5 mg


Clopidogrel Bisulfate (Plavix -)  75 mg PO DAILY [facility]


Heparin Sodium (Porcine) (Heparin -)  5,000 unit SQ TID [facility]


   Last Admin: 12/17/19 05:12 Dose:  5,000 unit


Amino Acids (Clinimix -)  1,000 mls @ 84 mls/hr IV Q12H [facility]


   Last Admin: 12/17/19 05:11 Dose:  84 mls/hr


Fat Emulsion Intravenous (Intralipid -)  250 mls @ 20.833 mls/hr IV DAILY@2200 

[facility]


Morphine Sulfate (Morphine Sulfate)  2 mg IVPUSH Q4H PRN


   PRN Reason: PAIN LEVEL 6-10


Multivitamins/Minerals (Infuvite Adult -)  10 ml IV DAILY [facility]


Prednisone (Deltasone -)  60 mg PO DAILY [facility]











- Objective


Vital Signs: 


 Vital Signs











Temperature  98 F   12/17/19 09:00


 


Pulse Rate  104 H  12/17/19 09:00


 


Respiratory Rate  20   12/17/19 09:00


 


Blood Pressure  100/60   12/17/19 09:00


 


O2 Sat by Pulse Oximetry (%)  97   12/16/19 22:00











Constitutional: Yes: No Distress


Eyes: Yes: WNL


HENT: Yes: WNL, Other


Cardiovascular: Yes: Tachycardia


Respiratory: Yes: On Nasal O2


Gastrointestinal: Yes: WNL


...Rectal Exam: Yes: Deferred


Genitourinary: Yes: WNL


Breast(s): Yes: WNL


Edema: No


Neurological: Yes: Alert


Labs: 


 CBC, BMP





 12/17/19 05:38 





 12/17/19 05:38 











- ....Imaging


X-ray: Report Reviewed, Image Reviewed





Assessment/Plan





PT for ambulation

## 2019-12-17 NOTE — PN
Progress Note (short form)





- Note


Progress Note: 





PULMONARY





Breathing improving. Less cough and wheezing.





 Vital Signs











 Period  Temp  Pulse  Resp  BP Sys/Baer  Pulse Ox


 


 Last 24 Hr  97.7 F-99.5 F    17-20  100-140/60-94  








Gen:  less tachypneic


Heart: RRR


Lung: scattered rhonchi


Abd: soft, nontender


Ext: no edema





 CBC, BMP





 12/17/19 05:38 





 12/17/19 05:38 





Active Medications





Albuterol Sulfate (Ventolin 0.083% Nebulizer Soln -)  1 amp NEB RQ4H PRN


   PRN Reason: Dyspnea


Albuterol/Ipratropium (Duoneb -)  1 amp NEB RQID [facility]


   Last Admin: 12/17/19 08:30 Dose:  1 amp


Clonazepam (Klonopin -)  0.5 mg PO BID PRN


   PRN Reason: ANXIETY


   Last Admin: 12/17/19 05:23 Dose:  0.5 mg


Clopidogrel Bisulfate (Plavix -)  75 mg PO DAILY [facility]


   Last Admin: 12/17/19 09:58 Dose:  75 mg


Heparin Sodium (Porcine) (Heparin -)  5,000 unit SQ TID [facility]


   Last Admin: 12/17/19 05:12 Dose:  5,000 unit


Amino Acids (Clinimix -)  1,000 mls @ 84 mls/hr IV Q12H [facility]


   Last Admin: 12/17/19 05:11 Dose:  84 mls/hr


Fat Emulsion Intravenous (Intralipid -)  250 mls @ 20.833 mls/hr IV DAILY@2200 

[facility]


Morphine Sulfate (Morphine Sulfate)  2 mg IVPUSH Q4H PRN


   PRN Reason: PAIN LEVEL 6-10


Multivitamins/Minerals (Infuvite Adult -)  10 ml IV DAILY [facility]


   Last Admin: 12/17/19 09:58 Dose:  10 ml


Prednisone (Deltasone -)  60 mg PO DAILY [facility]


   Last Admin: 12/17/19 09:58 Dose:  60 mg





A/P


Acute on Chronic Hypoxic and Hypercapneic Respiratory Failure


Acute COPD Exacerbation


Emphysema


CAD


HTN


DM


Hyperlipidemia


Rheumatoid Arthritis


h/o CVA


Prostate Ca


Anemia





-  BiPAP as needed


-  prednisone taper


-  inhaled bronchodilators standing and PRN


-  O2 to keep Spo2 88-92%


-  DVT prophylaxis

## 2019-12-18 RX ADMIN — ATORVASTATIN CALCIUM SCH MG: 20 TABLET, FILM COATED ORAL at 22:50

## 2019-12-18 RX ADMIN — I.V. FAT EMULSION SCH MLS/HR: 20 EMULSION INTRAVENOUS at 22:50

## 2019-12-18 RX ADMIN — IPRATROPIUM BROMIDE AND ALBUTEROL SULFATE SCH AMP: .5; 3 SOLUTION RESPIRATORY (INHALATION) at 08:05

## 2019-12-18 RX ADMIN — IPRATROPIUM BROMIDE AND ALBUTEROL SULFATE SCH AMP: .5; 3 SOLUTION RESPIRATORY (INHALATION) at 12:55

## 2019-12-18 RX ADMIN — HEPARIN SODIUM SCH UNIT: 5000 INJECTION, SOLUTION INTRAVENOUS; SUBCUTANEOUS at 22:50

## 2019-12-18 RX ADMIN — IPRATROPIUM BROMIDE AND ALBUTEROL SULFATE SCH AMP: .5; 3 SOLUTION RESPIRATORY (INHALATION) at 20:47

## 2019-12-18 RX ADMIN — CLOPIDOGREL BISULFATE SCH MG: 75 TABLET, FILM COATED ORAL at 10:07

## 2019-12-18 RX ADMIN — IPRATROPIUM BROMIDE AND ALBUTEROL SULFATE SCH AMP: .5; 3 SOLUTION RESPIRATORY (INHALATION) at 14:45

## 2019-12-18 RX ADMIN — ASCORBIC ACID, VITAMIN A PALMITATE, CHOLECALCIFEROL, THIAMINE HYDROCHLORIDE, RIBOFLAVIN-5 PHOSPHATE SODIUM, PYRIDOXINE HYDROCHLORIDE, NIACINAMIDE, DEXPANTHENOL, ALPHA-TOCOPHEROL ACETATE, VITAMIN K1, FOLIC ACID, BIOTIN, CYANOCOBALAMIN SCH ML: 200; 3300; 200; 6; 3.6; 6; 40; 15; 10; 150; 600; 60; 5 INJECTION, SOLUTION INTRAVENOUS at 10:07

## 2019-12-18 RX ADMIN — PREDNISONE SCH MG: 20 TABLET ORAL at 10:06

## 2019-12-18 RX ADMIN — LEUCINE, PHENYLALANINE, LYSINE, METHIONINE, ISOLEUCINE, VALINE, HISTIDINE, THREONINE, TRYPTOPHAN, ALANINE, GLYCINE, ARGININE, PROLINE, SERINE, TYROSINE, DEXTROSE SCH MLS/HR: 311; 238; 247; 170; 255; 247; 204; 179; 77; 880; 438; 489; 289; 213; 17; 5 INJECTION INTRAVENOUS at 13:56

## 2019-12-18 RX ADMIN — HEPARIN SODIUM SCH UNIT: 5000 INJECTION, SOLUTION INTRAVENOUS; SUBCUTANEOUS at 06:02

## 2019-12-18 RX ADMIN — LEUCINE, PHENYLALANINE, LYSINE, METHIONINE, ISOLEUCINE, VALINE, HISTIDINE, THREONINE, TRYPTOPHAN, ALANINE, GLYCINE, ARGININE, PROLINE, SERINE, TYROSINE, DEXTROSE SCH: 311; 238; 247; 170; 255; 247; 204; 179; 77; 880; 438; 489; 289; 213; 17; 5 INJECTION INTRAVENOUS at 01:42

## 2019-12-18 RX ADMIN — HEPARIN SODIUM SCH UNIT: 5000 INJECTION, SOLUTION INTRAVENOUS; SUBCUTANEOUS at 13:56

## 2019-12-18 NOTE — PN
Progress Note, Physician


History of Present Illness: 





pulmonary





alert,still dyspneic on nasal cannula





- Current Medication List


Current Medications: 


Active Medications





Albuterol Sulfate (Ventolin 0.083% Nebulizer Soln -)  1 amp NEB RQ4H PRN


   PRN Reason: Dyspnea


Albuterol/Ipratropium (Duoneb -)  1 amp NEB RQID [facility]


   Last Admin: 12/18/19 08:05 Dose:  1 amp


Atorvastatin Calcium (Lipitor -)  20 mg PO HS [facility]


   Last Admin: 12/17/19 21:45 Dose:  20 mg


Clonazepam (Klonopin -)  0.5 mg PO BID PRN


   PRN Reason: ANXIETY


   Last Admin: 12/18/19 10:13 Dose:  0.5 mg


Clopidogrel Bisulfate (Plavix -)  75 mg PO DAILY [facility]


   Last Admin: 12/18/19 10:07 Dose:  75 mg


Heparin Sodium (Porcine) (Heparin -)  5,000 unit SQ TID [facility]


   Last Admin: 12/18/19 06:02 Dose:  5,000 unit


Amino Acids (Clinimix -)  1,000 mls @ 84 mls/hr IV Q12H [facility]


   Last Admin: 12/18/19 01:42 Dose:  Not Given


Fat Emulsion Intravenous (Intralipid -)  250 mls @ 20.833 mls/hr IV DAILY@2200 

[facility]


   Last Admin: 12/17/19 21:45 Dose:  20.833 mls/hr


Morphine Sulfate (Morphine Sulfate)  2 mg IVPUSH Q4H PRN


   PRN Reason: PAIN LEVEL 6-10


Multivitamins/Minerals (Infuvite Adult -)  10 ml IV DAILY [facility]


   Last Admin: 12/18/19 10:07 Dose:  10 ml


Prednisone (Deltasone -)  60 mg PO DAILY [facility]


   Last Admin: 12/18/19 10:06 Dose:  60 mg











- Objective


Vital Signs: 


 Vital Signs











Temperature  98.0 F   12/18/19 04:00


 


Pulse Rate  107 H  12/18/19 04:00


 


Respiratory Rate  20   12/18/19 04:00


 


Blood Pressure  95/57 L  12/18/19 04:00


 


O2 Sat by Pulse Oximetry (%)  95   12/17/19 20:40











Constitutional: Yes: Well Nourished, Calm


Eyes: Yes: WNL


HENT: Yes: WNL


Neck: Yes: WNL


Cardiovascular: Yes: Regular Rate and Rhythm, S1, S2


Respiratory: Yes: Wheezes (scattered genaro wheezes)


Gastrointestinal: Yes: Normal Bowel Sounds, Soft


Extremities: Yes: WNL


Edema: No


Labs: 


 





Problem List





- Problems


(1) Acute on chronic diastolic (congestive) heart failure


Code(s): I50.33 - ACUTE ON CHRONIC DIASTOLIC (CONGESTIVE) HEART FAILURE   





(2) CAD (coronary artery disease)


Code(s): I25.10 - ATHSCL HEART DISEASE OF NATIVE CORONARY ARTERY W/O ANG PCTRS 

  


Qualifiers: 


   Coronary Disease-Associated Artery/Lesion type: native artery   Native vs. 

transplanted heart: native heart   Associated angina: without angina   

Qualified Code(s): I25.10 - Atherosclerotic heart disease of native coronary 

artery without angina pectoris   





(3) COPD exacerbation


Code(s): J44.1 - CHRONIC OBSTRUCTIVE PULMONARY DISEASE W (ACUTE) EXACERBATION   





(4) Cough


Code(s): R05 - COUGH   





(5) HTN (hypertension)


Code(s): I10 - ESSENTIAL (PRIMARY) HYPERTENSION   


Qualifiers: 


   Hypertension type: essential hypertension   Qualified Code(s): I10 - 

Essential (primary) hypertension   





(6) History of coronary artery bypass graft


Code(s): Z95.1 - PRESENCE OF AORTOCORONARY BYPASS GRAFT   





(7) Rheumatoid arthritis


Code(s): M06.9 - RHEUMATOID ARTHRITIS, UNSPECIFIED   





Assessment/Plan





A/P


Acute on Chronic Hypoxic and Hypercapneic Respiratory Failure


Acute COPD Exacerbation


Emphysema


CAD


HTN


DM


Hyperlipidemia


Rheumatoid Arthritis


h/o CVA


Prostate Ca


Anemia





-  BiPAP as needed


-  prednisone taper


-  inhaled bronchodilators standing and PRN


-  O2 to keep Spo2 88-92%


-  DVT prophylaxis





DR OROPEZA

## 2019-12-18 NOTE — PN
Progress Note, Physician


Chief Complaint: 





Feels little better





- Current Medication List


Current Medications: 


Active Medications





Albuterol Sulfate (Ventolin 0.083% Nebulizer Soln -)  1 amp NEB RQ4H PRN


   PRN Reason: Dyspnea


Albuterol/Ipratropium (Duoneb -)  1 amp NEB RQID [facility]


   Last Admin: 12/17/19 20:50 Dose:  1 amp


Atorvastatin Calcium (Lipitor -)  20 mg PO HS [facility]


   Last Admin: 12/17/19 21:45 Dose:  20 mg


Clonazepam (Klonopin -)  0.5 mg PO BID PRN


   PRN Reason: ANXIETY


   Last Admin: 12/17/19 21:45 Dose:  0.5 mg


Clopidogrel Bisulfate (Plavix -)  75 mg PO DAILY [facility]


   Last Admin: 12/17/19 09:58 Dose:  75 mg


Heparin Sodium (Porcine) (Heparin -)  5,000 unit SQ TID [facility]


   Last Admin: 12/18/19 06:02 Dose:  5,000 unit


Amino Acids (Clinimix -)  1,000 mls @ 84 mls/hr IV Q12H [facility]


   Last Admin: 12/18/19 01:42 Dose:  Not Given


Fat Emulsion Intravenous (Intralipid -)  250 mls @ 20.833 mls/hr IV DAILY@2200 

[facility]


   Last Admin: 12/17/19 21:45 Dose:  20.833 mls/hr


Morphine Sulfate (Morphine Sulfate)  2 mg IVPUSH Q4H PRN


   PRN Reason: PAIN LEVEL 6-10


Multivitamins/Minerals (Infuvite Adult -)  10 ml IV DAILY [facility]


   Last Admin: 12/17/19 09:58 Dose:  10 ml


Prednisone (Deltasone -)  60 mg PO DAILY [facility]


   Last Admin: 12/17/19 09:58 Dose:  60 mg











- Objective


Vital Signs: 


 Vital Signs











Temperature  98.0 F   12/18/19 04:00


 


Pulse Rate  107 H  12/18/19 04:00


 


Respiratory Rate  20   12/18/19 04:00


 


Blood Pressure  95/57 L  12/18/19 04:00


 


O2 Sat by Pulse Oximetry (%)  95   12/17/19 20:40











Constitutional: Yes: Calm


Eyes: Yes: WNL


HENT: Yes: WNL


Neck: Yes: WNL


Cardiovascular: Yes: WNL


Respiratory: Yes: On Nasal O2


Gastrointestinal: Yes: WNL


...Rectal Exam: Yes: WNL


Genitourinary: Yes: WNL


Edema: No


Peripheral Pulses WNL: Yes


Neurological: Yes: Alert


Psychiatric: Yes: Alert


Labs: 


 CBC, BMP





 12/17/19 05:38 





 12/17/19 05:38 











Assessment/Plan





Taper steroids

## 2019-12-19 RX ADMIN — I.V. FAT EMULSION SCH MLS/HR: 20 EMULSION INTRAVENOUS at 22:25

## 2019-12-19 RX ADMIN — IPRATROPIUM BROMIDE AND ALBUTEROL SULFATE SCH AMP: .5; 3 SOLUTION RESPIRATORY (INHALATION) at 08:25

## 2019-12-19 RX ADMIN — HEPARIN SODIUM SCH UNIT: 5000 INJECTION, SOLUTION INTRAVENOUS; SUBCUTANEOUS at 05:41

## 2019-12-19 RX ADMIN — IPRATROPIUM BROMIDE AND ALBUTEROL SULFATE SCH AMP: .5; 3 SOLUTION RESPIRATORY (INHALATION) at 12:34

## 2019-12-19 RX ADMIN — ATORVASTATIN CALCIUM SCH MG: 20 TABLET, FILM COATED ORAL at 22:26

## 2019-12-19 RX ADMIN — HEPARIN SODIUM SCH UNIT: 5000 INJECTION, SOLUTION INTRAVENOUS; SUBCUTANEOUS at 22:26

## 2019-12-19 RX ADMIN — HEPARIN SODIUM SCH UNIT: 5000 INJECTION, SOLUTION INTRAVENOUS; SUBCUTANEOUS at 13:11

## 2019-12-19 RX ADMIN — IPRATROPIUM BROMIDE AND ALBUTEROL SULFATE SCH AMP: .5; 3 SOLUTION RESPIRATORY (INHALATION) at 20:39

## 2019-12-19 RX ADMIN — LEUCINE, PHENYLALANINE, LYSINE, METHIONINE, ISOLEUCINE, VALINE, HISTIDINE, THREONINE, TRYPTOPHAN, ALANINE, GLYCINE, ARGININE, PROLINE, SERINE, TYROSINE, DEXTROSE SCH MLS/HR: 311; 238; 247; 170; 255; 247; 204; 179; 77; 880; 438; 489; 289; 213; 17; 5 INJECTION INTRAVENOUS at 00:15

## 2019-12-19 RX ADMIN — CLOPIDOGREL BISULFATE SCH MG: 75 TABLET, FILM COATED ORAL at 10:25

## 2019-12-19 RX ADMIN — ASCORBIC ACID, VITAMIN A PALMITATE, CHOLECALCIFEROL, THIAMINE HYDROCHLORIDE, RIBOFLAVIN-5 PHOSPHATE SODIUM, PYRIDOXINE HYDROCHLORIDE, NIACINAMIDE, DEXPANTHENOL, ALPHA-TOCOPHEROL ACETATE, VITAMIN K1, FOLIC ACID, BIOTIN, CYANOCOBALAMIN SCH ML: 200; 3300; 200; 6; 3.6; 6; 40; 15; 10; 150; 600; 60; 5 INJECTION, SOLUTION INTRAVENOUS at 10:25

## 2019-12-19 RX ADMIN — LEUCINE, PHENYLALANINE, LYSINE, METHIONINE, ISOLEUCINE, VALINE, HISTIDINE, THREONINE, TRYPTOPHAN, ALANINE, GLYCINE, ARGININE, PROLINE, SERINE, TYROSINE, DEXTROSE SCH MLS/HR: 311; 238; 247; 170; 255; 247; 204; 179; 77; 880; 438; 489; 289; 213; 17; 5 INJECTION INTRAVENOUS at 13:10

## 2019-12-19 RX ADMIN — PREDNISONE SCH MG: 20 TABLET ORAL at 10:24

## 2019-12-19 RX ADMIN — IPRATROPIUM BROMIDE AND ALBUTEROL SULFATE SCH AMP: .5; 3 SOLUTION RESPIRATORY (INHALATION) at 15:50

## 2019-12-19 NOTE — PN
Progress Note, Physician


Chief Complaint: 





SOB persists


History of Present Illness: 





Dr Meng note noted


Advised tappering of prednisone





- Current Medication List


Current Medications: 


Active Medications





Albuterol Sulfate (Ventolin 0.083% Nebulizer Soln -)  1 amp NEB RQ4H PRN


   PRN Reason: Dyspnea


Albuterol/Ipratropium (Duoneb -)  1 amp NEB RQID [facility]


   Last Admin: 12/19/19 08:25 Dose:  1 amp


Atorvastatin Calcium (Lipitor -)  20 mg PO HS [facility]


   Last Admin: 12/18/19 22:50 Dose:  20 mg


Clonazepam (Klonopin -)  0.5 mg PO BID PRN


   PRN Reason: ANXIETY


   Last Admin: 12/18/19 22:54 Dose:  0.5 mg


Clopidogrel Bisulfate (Plavix -)  75 mg PO DAILY [facility]


   Last Admin: 12/18/19 10:07 Dose:  75 mg


Heparin Sodium (Porcine) (Heparin -)  5,000 unit SQ TID [facility]


   Last Admin: 12/19/19 05:41 Dose:  5,000 unit


Amino Acids (Clinimix -)  1,000 mls @ 84 mls/hr IV Q12H [facility]


   Last Admin: 12/19/19 00:15 Dose:  84 mls/hr


Fat Emulsion Intravenous (Intralipid -)  250 mls @ 20.833 mls/hr IV DAILY@2200 

[facility]


   Last Admin: 12/18/19 22:50 Dose:  20.833 mls/hr


Morphine Sulfate (Morphine Sulfate)  2 mg IVPUSH Q4H PRN


   PRN Reason: PAIN LEVEL 6-10


Multivitamins/Minerals (Infuvite Adult -)  10 ml IV DAILY [facility]


   Last Admin: 12/18/19 10:07 Dose:  10 ml


Prednisone (Deltasone -)  60 mg PO DAILY [facility]


   Last Admin: 12/18/19 10:06 Dose:  60 mg











- Objective


Vital Signs: 


 Vital Signs











Temperature  98.5 F   12/19/19 06:00


 


Pulse Rate  100 H  12/19/19 06:00


 


Respiratory Rate  20   12/19/19 06:00


 


Blood Pressure  95/55 L  12/19/19 06:00


 


O2 Sat by Pulse Oximetry (%)  98   12/19/19 08:25











Constitutional: Yes: Anxious


Eyes: Yes: WNL


HENT: Yes: WNL


Neck: Yes: WNL


Cardiovascular: Yes: Regular Rate and Rhythm


Respiratory: Yes: Diminished, SOB


Gastrointestinal: Yes: WNL


...Rectal Exam: Yes: Deferred


Genitourinary: Yes: WNL


Musculoskeletal: Yes: Muscle Weakness


Edema: No


Peripheral Pulses WNL: Yes


Wound/Incision: No: Clean/Dry


Neurological: Yes: Alert


Labs: 


 CBC, BMP





 12/17/19 05:38 





 12/17/19 05:38 











Assessment/Plan





Reduce prednisone to 40mg daily

## 2019-12-19 NOTE — PN
Progress Note (short form)





- Note


Progress Note: 





PULMONARY





Breathing slowly improving. Less cough and wheezing.





 Vital Signs











 Period  Temp  Pulse  Resp  BP Sys/Baer  Pulse Ox


 


 Last 24 Hr  97.5 F-98.5 F  100-116  20-22  /54-63  95-98











Gen:  less tachypneic


Heart: RRR


Lung: distant breath sounds


Abd: soft, nontender


Ext: no edema





 CBC, BMP





 12/17/19 05:38 





 12/17/19 05:38 





Active Medications





Albuterol Sulfate (Ventolin 0.083% Nebulizer Soln -)  1 amp NEB RQ4H PRN


   PRN Reason: Dyspnea


Albuterol/Ipratropium (Duoneb -)  1 amp NEB RQID [facility]


   Last Admin: 12/19/19 12:34 Dose:  1 amp


Atorvastatin Calcium (Lipitor -)  20 mg PO HS [facility]


   Last Admin: 12/18/19 22:50 Dose:  20 mg


Clonazepam (Klonopin -)  0.5 mg PO BID PRN


   PRN Reason: ANXIETY


   Last Admin: 12/19/19 10:25 Dose:  0.5 mg


Clopidogrel Bisulfate (Plavix -)  75 mg PO DAILY [facility]


   Last Admin: 12/19/19 10:25 Dose:  75 mg


Heparin Sodium (Porcine) (Heparin -)  5,000 unit SQ TID [facility]


   Last Admin: 12/19/19 05:41 Dose:  5,000 unit


Amino Acids (Clinimix -)  1,000 mls @ 84 mls/hr IV Q12H [facility]


   Last Admin: 12/19/19 00:15 Dose:  84 mls/hr


Fat Emulsion Intravenous (Intralipid -)  250 mls @ 20.833 mls/hr IV DAILY@2200 

[facility]


   Last Admin: 12/18/19 22:50 Dose:  20.833 mls/hr


Morphine Sulfate (Morphine Sulfate)  2 mg IVPUSH Q4H PRN


   PRN Reason: PAIN LEVEL 6-10


Multivitamins/Minerals (Infuvite Adult -)  10 ml IV DAILY [facility]


   Last Admin: 12/19/19 10:25 Dose:  10 ml


Prednisone (Deltasone -)  40 mg PO DAILY [facility]


   Last Admin: 12/19/19 10:24 Dose:  40 mg








A/P


Acute on Chronic Hypoxic and Hypercapneic Respiratory Failure


Acute COPD Exacerbation


Emphysema


CAD


HTN


DM


Hyperlipidemia


Rheumatoid Arthritis


h/o CVA


Prostate Ca


Anemia





-  BiPAP as needed


-  prednisone taper


-  inhaled bronchodilators standing and PRN


-  O2 to keep Spo2 88-92%


-  PO as tolerated


-  DVT prophylaxis

## 2019-12-20 VITALS — SYSTOLIC BLOOD PRESSURE: 110 MMHG | DIASTOLIC BLOOD PRESSURE: 66 MMHG | HEART RATE: 82 BPM

## 2019-12-20 VITALS — TEMPERATURE: 97.7 F

## 2019-12-20 RX ADMIN — ASCORBIC ACID, VITAMIN A PALMITATE, CHOLECALCIFEROL, THIAMINE HYDROCHLORIDE, RIBOFLAVIN-5 PHOSPHATE SODIUM, PYRIDOXINE HYDROCHLORIDE, NIACINAMIDE, DEXPANTHENOL, ALPHA-TOCOPHEROL ACETATE, VITAMIN K1, FOLIC ACID, BIOTIN, CYANOCOBALAMIN SCH: 200; 3300; 200; 6; 3.6; 6; 40; 15; 10; 150; 600; 60; 5 INJECTION, SOLUTION INTRAVENOUS at 11:05

## 2019-12-20 RX ADMIN — IPRATROPIUM BROMIDE AND ALBUTEROL SULFATE SCH AMP: .5; 3 SOLUTION RESPIRATORY (INHALATION) at 12:00

## 2019-12-20 RX ADMIN — LEUCINE, PHENYLALANINE, LYSINE, METHIONINE, ISOLEUCINE, VALINE, HISTIDINE, THREONINE, TRYPTOPHAN, ALANINE, GLYCINE, ARGININE, PROLINE, SERINE, TYROSINE, DEXTROSE SCH MLS/HR: 311; 238; 247; 170; 255; 247; 204; 179; 77; 880; 438; 489; 289; 213; 17; 5 INJECTION INTRAVENOUS at 06:32

## 2019-12-20 RX ADMIN — CLOPIDOGREL BISULFATE SCH MG: 75 TABLET, FILM COATED ORAL at 11:04

## 2019-12-20 RX ADMIN — HEPARIN SODIUM SCH UNIT: 5000 INJECTION, SOLUTION INTRAVENOUS; SUBCUTANEOUS at 06:32

## 2019-12-20 RX ADMIN — PREDNISONE SCH MG: 20 TABLET ORAL at 11:05

## 2019-12-20 RX ADMIN — IPRATROPIUM BROMIDE AND ALBUTEROL SULFATE SCH AMP: .5; 3 SOLUTION RESPIRATORY (INHALATION) at 07:45

## 2019-12-20 NOTE — DS
Physical Examination


Vital Signs: 


 Vital Signs











Temperature  97.7 F   12/20/19 01:07


 


Pulse Rate  104 H  12/20/19 05:00


 


Respiratory Rate  20   12/20/19 05:00


 


Blood Pressure  121/63   12/20/19 05:00


 


O2 Sat by Pulse Oximetry (%)  98   12/20/19 07:43











Findings/Remarks: 





Admitted with respiratory failure, was intubated and on respirator


Now OK on nasal O2


Also diabetic


Constitutional: Yes: Anxious


Eyes: Yes: WNL


HENT: Yes: WNL


Neck: Yes: WNL


Cardiovascular: Yes: WNL


Respiratory: Yes: On Nasal O2, SOB on Exertion


Gastrointestinal: Yes: Normal Bowel Sounds


Renal/: Yes: WNL


Musculoskeletal: Yes: Muscle Weakness


Edema: No


Neurological: Yes: Alert


Psychiatric: Yes: Alert


Labs: 


 CBC, BMP





 12/17/19 05:38 





 12/17/19 05:38 











Discharge Summary


Problems reviewed: Yes


Reason For Visit: DIFFICULTY BREATHING


Current Active Problems





Acute on chronic diastolic (congestive) heart failure (Acute)


Acute on chronic respiratory failure with hypoxia and hypercapnia (Acute)


Anxiety and depression (Acute)


Elevated brain natriuretic peptide (BNP) level (Acute)








Condition: Critical





- Instructions


Referrals: 


Keanu Whitaker MD [Primary Care Provider] - 





- Home Medications


Comprehensive Discharge Medication List: 


Ambulatory Orders





Albuterol Sulfate Inhaler - [Ventolin Hfa Inhaler -] 1 - 2 inh PO Q4H PRN 11/28/ 18 


Budesonide/Formeterol Fumarate [SYMBICORT 160/4.5mcg -] 1 inh PO BID 11/28/18 


Clopidogrel Bisulfate [Plavix] 75 mg PO DAILY 11/28/18 


Folic Acid 1 mg PO DAILY 11/28/18 


Furosemide [Lasix] 40 mg PO DAILY 11/28/18 


Hydroxychloroquine Sulfate [Plaquenil] 200 mg PO BID 11/28/18 


Methotrexate [Mexate -] 15 mg PO Q7D 11/28/18 


Ramipril [Altace] 5 mg PO DAILY 11/28/18 


Simvastatin [Zocor -] 40 mg PO HS 11/28/18 


Azithromycin 1 tab PO WEEKLY 12/08/19 


Clonazepam 0.5 mg PO BID 12/08/19 


Fluticasone/Umeclidin/Vilanter [Trelegy Ellipta 100-62.5-25] 1 puff 12/08/19

## 2020-01-01 ENCOUNTER — HOSPITAL ENCOUNTER (INPATIENT)
Dept: HOSPITAL 74 - JER | Age: 65
LOS: 13 days | Discharge: NURSING, REHAB OR LONG-TERM CARE | DRG: 193 | End: 2020-01-14
Attending: INTERNAL MEDICINE | Admitting: INTERNAL MEDICINE
Payer: COMMERCIAL

## 2020-01-01 VITALS — BODY MASS INDEX: 22.8 KG/M2

## 2020-01-01 DIAGNOSIS — D72.829: ICD-10-CM

## 2020-01-01 DIAGNOSIS — M06.9: ICD-10-CM

## 2020-01-01 DIAGNOSIS — J96.21: ICD-10-CM

## 2020-01-01 DIAGNOSIS — I50.32: ICD-10-CM

## 2020-01-01 DIAGNOSIS — E87.5: ICD-10-CM

## 2020-01-01 DIAGNOSIS — J96.22: ICD-10-CM

## 2020-01-01 DIAGNOSIS — I25.10: ICD-10-CM

## 2020-01-01 DIAGNOSIS — I11.0: ICD-10-CM

## 2020-01-01 DIAGNOSIS — J44.1: ICD-10-CM

## 2020-01-01 DIAGNOSIS — J18.9: Primary | ICD-10-CM

## 2020-01-01 DIAGNOSIS — E78.5: ICD-10-CM

## 2020-01-01 DIAGNOSIS — I27.20: ICD-10-CM

## 2020-01-01 DIAGNOSIS — Z95.1: ICD-10-CM

## 2020-01-01 LAB
ALBUMIN SERPL-MCNC: 3.3 G/DL (ref 3.4–5)
ALP SERPL-CCNC: 124 U/L (ref 45–117)
ALT SERPL-CCNC: 63 U/L (ref 13–61)
ANION GAP SERPL CALC-SCNC: 6 MMOL/L (ref 8–16)
APPEARANCE UR: CLEAR
APTT BLD: 23 SECONDS (ref 25.2–36.5)
ARTERIAL BLOOD GAS PCO2: 66.3 MMHG (ref 35–45)
ARTERIAL PATENCY WRIST A: POSITIVE
AST SERPL-CCNC: 37 U/L (ref 15–37)
BACTERIA # UR AUTO: 0.8 /HPF
BASE EXCESS BLDA CALC-SCNC: 5.9 MEQ/L (ref -2–2)
BASOPHILS # BLD: 0 % (ref 0–2)
BASOPHILS # BLD: 0 % (ref 0–2)
BILIRUB SERPL-MCNC: 0.8 MG/DL (ref 0.2–1)
BILIRUB UR STRIP.AUTO-MCNC: NEGATIVE MG/DL
BUN SERPL-MCNC: 18.1 MG/DL (ref 7–18)
CALCIUM SERPL-MCNC: 8.9 MG/DL (ref 8.5–10.1)
CASTS URNS QL MICRO: 2.58 /LPF (ref 0–8)
CHLORIDE SERPL-SCNC: 94 MMOL/L (ref 98–107)
CO2 SERPL-SCNC: 35 MMOL/L (ref 21–32)
COHGB MFR BLD: 0.8 % (ref 0–2)
COLOR UR: YELLOW
CREAT SERPL-MCNC: 0.7 MG/DL (ref 0.55–1.3)
DEPRECATED RDW RBC AUTO: 16.7 % (ref 11.9–15.9)
DEPRECATED RDW RBC AUTO: 16.8 % (ref 11.9–15.9)
EOSINOPHIL # BLD: 0 % (ref 0–4.5)
EOSINOPHIL # BLD: 0 % (ref 0–4.5)
EPITH CASTS URNS QL MICRO: 0.7 /HPF
GLUCOSE SERPL-MCNC: 117 MG/DL (ref 74–106)
HCT VFR BLD CALC: 35.3 % (ref 35.4–49)
HCT VFR BLD CALC: 36.8 % (ref 35.4–49)
HGB BLD-MCNC: 11.4 GM/DL (ref 11.7–16.9)
HGB BLD-MCNC: 12.1 GM/DL (ref 11.7–16.9)
INR BLD: 0.93 (ref 0.83–1.09)
KETONES UR QL STRIP: NEGATIVE
LEUKOCYTE ESTERASE UR QL STRIP.AUTO: NEGATIVE
LYMPHOCYTES # BLD: 0.8 % (ref 8–40)
LYMPHOCYTES # BLD: 2.2 % (ref 8–40)
MCH RBC QN AUTO: 30.2 PG (ref 25.7–33.7)
MCH RBC QN AUTO: 30.5 PG (ref 25.7–33.7)
MCHC RBC AUTO-ENTMCNC: 32.4 G/DL (ref 32–35.9)
MCHC RBC AUTO-ENTMCNC: 32.8 G/DL (ref 32–35.9)
MCV RBC: 92.9 FL (ref 80–96)
MCV RBC: 93.2 FL (ref 80–96)
MONOCYTES # BLD AUTO: 1.4 % (ref 3.8–10.2)
MONOCYTES # BLD AUTO: 7.3 % (ref 3.8–10.2)
NEUTROPHILS # BLD: 90.5 % (ref 42.8–82.8)
NEUTROPHILS # BLD: 97.8 % (ref 42.8–82.8)
NITRITE UR QL STRIP: NEGATIVE
PH UR: 5 [PH] (ref 5–8)
PLATELET # BLD AUTO: 210 K/MM3 (ref 134–434)
PLATELET # BLD AUTO: 220 K/MM3 (ref 134–434)
PLATELET BLD QL SMEAR: ADEQUATE
PMV BLD: 7.9 FL (ref 7.5–11.1)
PMV BLD: 8 FL (ref 7.5–11.1)
PO2 BLDA: 139 MMHG (ref 80–100)
POTASSIUM SERPLBLD-SCNC: 4.5 MMOL/L (ref 3.5–5.1)
PROT SERPL-MCNC: 6.8 G/DL (ref 6.4–8.2)
PROT UR QL STRIP: (no result)
PROT UR QL STRIP: NEGATIVE
PT PNL PPP: 11 SEC (ref 9.7–13)
RBC # BLD AUTO: 1.3 /HPF (ref 0–4)
RBC # BLD AUTO: 3.79 M/MM3 (ref 4–5.6)
RBC # BLD AUTO: 3.96 M/MM3 (ref 4–5.6)
SAO2 % BLDA: 98.8 % (ref 95–98)
SODIUM SERPL-SCNC: 135 MMOL/L (ref 136–145)
SP GR UR: 1.05 (ref 1.01–1.03)
UROBILINOGEN UR STRIP-MCNC: 1 MG/DL (ref 0.2–1)
WBC # BLD AUTO: 10.3 K/MM3 (ref 4–10)
WBC # BLD AUTO: 7.8 K/MM3 (ref 4–10)
WBC # UR AUTO: 0.5 /HPF (ref 0–5)

## 2020-01-01 RX ADMIN — IPRATROPIUM BROMIDE AND ALBUTEROL SULFATE SCH AMP: .5; 3 SOLUTION RESPIRATORY (INHALATION) at 15:50

## 2020-01-01 RX ADMIN — IPRATROPIUM BROMIDE AND ALBUTEROL SULFATE SCH AMP: .5; 3 SOLUTION RESPIRATORY (INHALATION) at 20:18

## 2020-01-01 RX ADMIN — BUDESONIDE AND FORMOTEROL FUMARATE DIHYDRATE SCH PUFF: 160; 4.5 AEROSOL RESPIRATORY (INHALATION) at 10:56

## 2020-01-01 RX ADMIN — IPRATROPIUM BROMIDE AND ALBUTEROL SULFATE SCH AMP: .5; 3 SOLUTION RESPIRATORY (INHALATION) at 03:05

## 2020-01-01 RX ADMIN — IPRATROPIUM BROMIDE AND ALBUTEROL SULFATE SCH AMP: .5; 3 SOLUTION RESPIRATORY (INHALATION) at 12:04

## 2020-01-01 RX ADMIN — IPRATROPIUM BROMIDE AND ALBUTEROL SULFATE SCH AMP: .5; 3 SOLUTION RESPIRATORY (INHALATION) at 01:37

## 2020-01-01 RX ADMIN — METHYLPREDNISOLONE SODIUM SUCCINATE SCH MG: 40 INJECTION, POWDER, FOR SOLUTION INTRAMUSCULAR; INTRAVENOUS at 09:16

## 2020-01-01 RX ADMIN — METHYLPREDNISOLONE SODIUM SUCCINATE SCH MG: 40 INJECTION, POWDER, FOR SOLUTION INTRAMUSCULAR; INTRAVENOUS at 17:37

## 2020-01-01 RX ADMIN — BUDESONIDE AND FORMOTEROL FUMARATE DIHYDRATE SCH PUFF: 160; 4.5 AEROSOL RESPIRATORY (INHALATION) at 21:29

## 2020-01-01 RX ADMIN — IPRATROPIUM BROMIDE AND ALBUTEROL SULFATE SCH AMP: .5; 3 SOLUTION RESPIRATORY (INHALATION) at 01:24

## 2020-01-01 RX ADMIN — CEFEPIME HYDROCHLORIDE SCH MLS/HR: 1 INJECTION, POWDER, FOR SOLUTION INTRAMUSCULAR; INTRAVENOUS at 22:17

## 2020-01-01 RX ADMIN — FUROSEMIDE SCH MG: 10 INJECTION, SOLUTION INTRAVENOUS at 15:38

## 2020-01-01 RX ADMIN — ATORVASTATIN CALCIUM SCH MG: 20 TABLET, FILM COATED ORAL at 21:29

## 2020-01-01 RX ADMIN — METHOTREXATE SCH MG: 2.5 TABLET ORAL at 09:46

## 2020-01-01 RX ADMIN — RAMIPRIL SCH MG: 5 CAPSULE ORAL at 09:16

## 2020-01-01 RX ADMIN — FOLIC ACID SCH MG: 1 TABLET ORAL at 09:16

## 2020-01-01 NOTE — PDOC
Attending Attestation





- Resident


Resident Name: Art Perez





- ED Attending Attestation


I have performed the following: I have examined & evaluated the patient, The 

case was reviewed & discussed with the resident, I agree w/resident's findings 

& plan





- HPI


HPI: 





01/01/20 00:56


65yo man with a PMH of CAD s/p CABG, COPD on home O2 (on a list for lung 

transplant), HTN, HLD, HIV, CHF, RA on methotrexate, and anxiety presents to 

the emergency department from [facility] with acute onset of SOB and respiratory 

distress. Hypoxic down to 88%


Full code. But there was discussion on goals of care and pt does not elect for 

CPR or aggressive measures.








- Physicial Exam


PE: 





01/01/20 00:53





Agree with the resident's HPI and PE as documented in the electronic medical 

record.


moderate respiratory distress, malaised appearing, EOMI, PERRL,  nl conjunctiva

, anicteric; neck supple. lungs with bilateral crackles and diminished breath 

sounds, left > right. tachypneic. +tachycardic. abdomen soft nontender. no 

rebound, guarding. Back nontender. LECHUGA x4, no focal neuro deficits. 3+ pitting 

peripheral edema. normal color for ethnicity, WWP.





01/01/20 00:54








- Medical Decision Making





01/01/20 00:54


Vital Signs











Temp Pulse Resp BP Pulse Ox


 


 98.7 F   118 H  22 H  123/73   100 


 


 01/01/20 00:17  01/01/20 00:17  01/01/20 00:17  01/01/20 00:17  01/01/20 00:17





ddx, PE, ACS, arrhythmia, dehydration, viral s yndrome, flu, pneumonia, anemia, 

electrolyte/metabolic derangements





Laboratory results with mild leukocytosis nonspecific.  Electrolytes LFTs and 

creatinine are within normal limits.  Negative flu.  Chest x-ray without any 

focal infiltrate.  ABG with chronic respiratory acidosis with bicarb 

compensation.  pH is 7.32 with only CO2 of 66.  Patient getting his ventilator 

support via BiPAP.  Remains on BiPAP for his work of breathing which is much 

improved, oxygenating appropriately.  Tachycardia is downtrending.  Rectal 

temperature is negative, will treat with IV antibiotics for healthcare 

associated pneumonia given isolated B-lines seen on bedside ultrasound, 

vancomycin and cefepime, low risk of cross-reactivity with penicillins and 

cephalosporins especially with fourth-generation agent.


flu neg


cultures ordered and pending





01/01/20 03:26





01/01/20 04:36


CTA prelim neg for pna. blebs. no PE. atelectasis


?pna vs atelectasis


covered for PNA


admit to Dr Whitaker


admit tele for acute on chronic respiratory failure, likely combo COPD/asthma 

exacerbation, pneumonia.











**Heart Score/ECG Review


  ** #1


ECG reviewed & interpreted by me at: 00:15


General ECG Interpretation: Sinus Rhythm, Normal Intervals


Compared to previous ECG there are: No significant change





01/01/20 00:56





EKG sinus tachycardia 118 bpm, right bundle branch block present, no interval 

abnormalities, wide QRS, ST and T wave segments and morphology normal. 

Nonspecific T wave abnormalities likely rate related vs chronic.

## 2020-01-01 NOTE — CON.PULM
Consult


Consult Specialty:: Pulmonary


Referred by:: BRENDAN


Reason for Consultation:: SOB





- History of Present Illness


Chief Complaint: SOB/COUGH


History of Present Illness: 








Patient is 64M with history of CABG, COPD on home O2 (considered for lung 

transplant), HTN, HLD, HIV, CHF, RA on methotrexate, and anxiety admitted via 

ED complaining of 1 day of worsening shortness of breath. He states that it has 

gotten progressively harder for him to breathe . Endorses associated cough. 

Denies nausea, vomiting, fevers, chills, chest pain, and abdominal pain. Denies 

dysuria, constipation, diarrhea. Endorses a mild increase in the amount of leg 

swelling. Patient was recently intubated in the ED for hypercarbic respiratory 

failure and was discharged to [facility].








- History Source


History Provided By: Patient, Medical Record


Limitations to Obtaining History: Clinical Condition





- Past Medical History


CNS: No: Alzheimer's


Cardio/Vascular: Yes: CAD, HTN, Hyperlipdemia


Pulmonary: Yes: COPD, O2 Dependent, Other (bullous emphysema)


Gastrointestinal: No: Ascites


Heme/Onc: Yes: Anemia


Infectious Disease: Yes: HIV


Psych: Yes: Addictions (heroin), Anxiety


Musculoskeletal: Yes: Chronic low back pain


Rheumatology: Yes: Rheumatoid Arthritis





- Past Surgical History


Past Surgical History: Yes: CABG, Stent





- Alcohol/Substance Use


Hx Alcohol Use: No


History of Substance Use: reports: None





- Smoking History


Smoking history: Former smoker


Have you smoked in the past 12 months: No


Aproximately how many cigarettes per day: 0


If you are a former smoker, when did you quit?: 3YRS





Home Medications





- Allergies


Allergies/Adverse Reactions: 


 Allergies











Allergy/AdvReac Type Severity Reaction Status Date / Time


 


Penicillins Allergy Severe Rash Verified 01/01/20 00:21


 


seafood Allergy Severe Rash Uncoded 01/01/20 00:21














- Home Medications


Home Medications: 


Ambulatory Orders





Albuterol Sulfate Inhaler - [Ventolin Hfa Inhaler -] 1 - 2 inh PO Q4H PRN 11/28/ 18 


Budesonide/Formeterol Fumarate [SYMBICORT 160/4.5mcg -] 1 inh PO BID 11/28/18 


Clopidogrel Bisulfate [Plavix] 75 mg PO DAILY 11/28/18 


Folic Acid 1 mg PO DAILY 11/28/18 


Furosemide [Lasix] 40 mg PO DAILY 11/28/18 


Hydroxychloroquine Sulfate [Plaquenil] 200 mg PO BID 11/28/18 


Methotrexate [Mexate -] 15 mg PO Q7D 11/28/18 


Ramipril [Altace] 5 mg PO DAILY 11/28/18 


Simvastatin [Zocor -] 40 mg PO HS 11/28/18 


Clonazepam 0.5 mg PO BID 12/08/19 


Atorvastatin Calcium 20 mg PO HS 01/01/20 


Fluticasone/Umeclidin/Vilanter [Trelegy Ellipta 100-62.5-25] 1 each IH ASDIR 01/ 01/20 


Ramipril 5 mg PO DAILY 01/01/20 











Family Medical History


Family History: Unremarkable





Review of Systems





- Review of Systems


Constitutional: reports: Lethargy, Loss of Appetite.  denies: Fever


Eyes: denies: Double Vision


HENT: denies: Difficult Swallowing, Ear Discharge


Neck: denies: Decreased ROM


Cardiovascular: reports: Edema, Shortness of Breath.  denies: Chest Pain


Respiratory: reports: Cough, Exercise Intolerance, SOB on Exertion, Wheezing.  

denies: Hemoptysis, Orthopnea


Gastrointestinal: denies: Abdominal Pain


Genitourinary: denies: Burning





Physical Exam


Vital Sings: 


 Vital Signs











Temperature  97.6 F   01/01/20 08:32


 


Pulse Rate  118 H  01/01/20 10:00


 


Respiratory Rate  26 H  01/01/20 10:00


 


Blood Pressure  129/90   01/01/20 08:32


 


O2 Sat by Pulse Oximetry (%)  100   01/01/20 09:00











Constitutional: Yes: Anxious


Eyes: Yes: EOM Intact


HENT: Yes: Normocephalic


Neck: Yes: Trachea Midline


Cardiovascular: Yes: Regular Rate and Rhythm, S1, S2


Respiratory: Yes: Diminished


Gastrointestinal: Yes: Normal Bowel Sounds, Soft


Edema: No


Integumentary: Yes: WNL


Neurological: Yes: Alert


Labs: 


 CBC, BMP





 01/01/20 00:01 





 01/01/20 00:01 





 ABG Results











ABG pH  7.32  (7.35-7.45)  L  01/01/20  00:45    


 


ABG pCO2 at Pt Temp  66.3 mmHg (35-45)  H  01/01/20  00:45    


 


ABG pO2 at Pt Temp  139 mmHg ()  H  01/01/20  00:45    


 


ABG HCO3  33.5 mmol/L (22-27)  H  01/01/20  00:45    


 


ABG O2 Sat (Measured)  98.8 % (95-98)  H  01/01/20  00:45    


 


ABG O2 Content  17.7 % vol  01/01/20  00:45    


 


ABG Base Excess  5.9 meq/l (-2-2)  H  01/01/20  00:45    








rest reviewed





Imaging





- Results


Chest X-ray: Report Reviewed, Image Reviewed


Cat Scan: Image Reviewed





Problem List





- Problems


(1) Acute respiratory failure


Code(s): J96.00 - ACUTE RESPIRATORY FAILURE, UNSP W HYPOXIA OR HYPERCAPNIA   





(2) Pneumonia


Code(s): J18.9 - PNEUMONIA, UNSPECIFIED ORGANISM   





(3) Acute on chronic respiratory failure with hypoxia and hypercapnia


Code(s): J96.21 - ACUTE AND CHRONIC RESPIRATORY FAILURE WITH HYPOXIA; J96.22 - 

ACUTE AND CHRONIC RESPIRATORY FAILURE WITH HYPERCAPNIA   





(4) Anxiety and depression


Code(s): F41.9 - ANXIETY DISORDER, UNSPECIFIED; F32.9 - MAJOR DEPRESSIVE 

DISORDER, SINGLE EPISODE, UNSPECIFIED   





(5) COPD (chronic obstructive pulmonary disease)


Code(s): J44.9 - CHRONIC OBSTRUCTIVE PULMONARY DISEASE, UNSPECIFIED   


Qualifiers: 


   COPD type: emphysema   Emphysema type: unspecified   Qualified Code(s): 

J43.9 - Emphysema, unspecified   





(6) Cough


Code(s): R05 - COUGH   





(7) Edema


Code(s): R60.9 - EDEMA, UNSPECIFIED   


Qualifiers: 


   Edema type: unspecified   Qualified Code(s): R60.9 - Edema, unspecified   





(8) Rheumatoid arthritis


Code(s): M06.9 - RHEUMATOID ARTHRITIS, UNSPECIFIED   





Assessment/Plan





Acute on chronic hypoxemic/hypercapneic resp failure


CTA (official report pending) no obvious PE/severe bullous emphysema/?nodular 

infiltrate right base


COPD on home 02


HIV/RA on Methotrexate


Referred for lung transplant [facility] (patient did not submit paperwork)


CABG/HTN/HLD/CHF





NIPPV HS/PRN 


STEROIDS/BRONCHODILATORS


INFLU SCREEN


ID EVAL FOR HIV


OBTAIN OFFICIAL REPORT CTA





WILL FOLLOW








KARON SOLIZ MD

## 2020-01-01 NOTE — HP
Admitting History and Physical





- Primary Care Physician


PCP: Keanu Whitaker





- Admission


Chief Complaint: worsening SOB


History of Present Illness: 


 64 yrs old man multiple Co-morbidities  H/O HTN< CAD s/p CABG, HfpEF, Advanced 

COPD on Home O2, RA on Methotrexate  ( HIV -ve 12/12/2019), recently discharged 

to NH  after treated for Hypercarbic respiratory failure to NH present with 1 

day H/O worsening SOB /Cough and Yellow expectoration with Hypoxia, in the Ed 

put on BiPAP, received IV Solumedrol and Abx , ? Pneumonia in CXR , now weaned 

off BipAP saturating well on NC 3 Ltr  feels some improvement, denies any fever

, chills, nausea, vomiting or diarrhea, evaluated by Pulmonary consult.





History Source: Patient





- Past Medical History


CNS: No: Alzheimer's


Cardiovascular: Yes: CAD, HTN, Hyperlipdemia


Pulmonary: Yes: COPD, O2 Dependent, Other (bullous emphysema)


Gastrointestinal: No: Ascites


Heme/Onc: Yes: Anemia


Psych: Yes: Addictions (heroin), Anxiety


Musculoskeletal: Yes: Chronic low back pain


Rheumatology: Yes: Rheumatoid Arthritis





- Past Surgical History


Past Surgical History: Yes: CABG, Stent





- Smoking History


Smoking history: Former smoker


Have you smoked in the past 12 months: No


Aproximately how many cigarettes per day: 0


If you are a former smoker, when did you quit?: 3YRS





- Alcohol/Substance Use


Hx Alcohol Use: No


History of Substance Use: reports: None





Home Medications





- Allergies


Allergies/Adverse Reactions: 


 Allergies











Allergy/AdvReac Type Severity Reaction Status Date / Time


 


Penicillins Allergy Severe Rash Verified 01/01/20 00:21


 


seafood Allergy Severe Rash Uncoded 01/01/20 00:21














- Home Medications


Home Medications: 


Ambulatory Orders





Albuterol Sulfate Inhaler - [Ventolin Hfa Inhaler -] 1 - 2 inh PO Q4H PRN 11/28/ 18 


Budesonide/Formeterol Fumarate [SYMBICORT 160/4.5mcg -] 1 inh PO BID 11/28/18 


Clopidogrel Bisulfate [Plavix] 75 mg PO DAILY 11/28/18 


Folic Acid 1 mg PO DAILY 11/28/18 


Furosemide [Lasix] 40 mg PO DAILY 11/28/18 


Hydroxychloroquine Sulfate [Plaquenil] 200 mg PO BID 11/28/18 


Methotrexate [Mexate -] 15 mg PO Q7D 11/28/18 


Ramipril [Altace] 5 mg PO DAILY 11/28/18 


Simvastatin [Zocor -] 40 mg PO HS 11/28/18 


Clonazepam 0.5 mg PO BID 12/08/19 


Atorvastatin Calcium 20 mg PO HS 01/01/20 


Fluticasone/Umeclidin/Vilanter [Trelegy Ellipta 100-62.5-25] 1 each IH ASDIR 01/ 01/20 


Ramipril 5 mg PO DAILY 01/01/20 











Family Medical History


Family Hx Cardiac Disorders: Grandmother (paternal)





Review of Systems





- Review of Systems


Constitutional: reports: Lethargy, Loss of Appetite, Weakness.  denies: Fever


Eyes: denies: Blurred Vision, Double Vision


HENT: denies: Difficult Swallowing, Ear Discharge, Ear Pain


Neck: denies: Decreased ROM, Lumps, Pain on Movement


Cardiovascular: reports: Shortness of Breath.  denies: Chest Pain, Edema, 

Palpitations


Respiratory: reports: Cough, Exercise Intolerance, SOB, SOB on Exertion


Gastrointestinal: denies: Abdominal Pain, Bloating, Constipation, Diarrhea


Genitourinary: denies: Discharge, Dysuria, Flank Pain, Frequency


Breasts: denies: Breast Implants


Musculoskeletal: reports: Joint Swelling.  denies: Back Pain, Crepitus


Integumentary: denies: Blister, Bruising, Change in Color


Neurological: denies: Change in LOC, Change in Speech, Confusion


Endocrine: denies: Excessive Sweating, Flushing, Increased Hunger


Psychiatric: reports: Anxiety.  denies: Altered Sleep Pattern





Physical Examination


Vital Signs: 


 Vital Signs











Temperature  98.3 F   01/01/20 12:00


 


Pulse Rate  115 H  01/01/20 12:00


 


Respiratory Rate  24 H  01/01/20 12:00


 


Blood Pressure  123/63   01/01/20 12:00


 


O2 Sat by Pulse Oximetry (%)  99   01/01/20 12:03








 


Constitutional: Yes: Mild Distress


Eyes: Yes: Conjunctiva Clear, EOM Intact, PERRL


HENT: Yes: Normocephalic


Neck: Yes: Trachea Midline.  No: Decreased ROM, Lymphadenopathy


Cardiovascular: Yes: Tachycardia, S1.  No: JVD


Respiratory: Yes: Regular, Wheezes


Gastrointestinal: Yes: Normal Bowel Sounds, Soft


Musculoskeletal: Yes: Joint Stiffness, Joint Swelling.  No: Back Pain


Extremities: No: Calf Tenderness, Cold, Delayed Capillary Refill


Edema: RUE: Trace, LLE: Trace


Neurological: Yes: WNL, Alert, Oriented


...Motor Strength: LUE, LLE


Psychiatric: Yes: Alert, Oriented


Labs: 


 


CBC,CMP











WBC  10.3 K/mm3 (4.0-10.0)  H  01/01/20  00:01    


 


RBC  3.96 M/mm3 (4.00-5.60)  L  01/01/20  00:01    


 


Hgb  12.1 GM/dL (11.7-16.9)   01/01/20  00:01    


 


Hct  36.8 % (35.4-49)   01/01/20  00:01    


 


MCV  92.9 fl (80-96)   01/01/20  00:01    


 


MCH  30.5 pg (25.7-33.7)   01/01/20  00:01    


 


MCHC  32.8 g/dl (32.0-35.9)   01/01/20  00:01    


 


RDW  16.8 % (11.9-15.9)  H  01/01/20  00:01    


 


Plt Count  220 K/MM3 (134-434)   01/01/20  00:01    


 


MPV  8.0 fl (7.5-11.1)   01/01/20  00:01    


 


Absolute Neuts (auto)  10.1 K/mm3 (1.5-8.0)  H  01/01/20  00:01    


 


Total Counted  100   01/01/20  00:01    


 


Neutrophils %  97.8 % (42.8-82.8)  H  01/01/20  00:01    


 


Neutrophils % (Manual)  95.0 % (42.8-82.8)  H  01/01/20  00:01    


 


Band Neutrophils %  3.0 %  01/01/20  00:01    


 


Lymphocytes %  0.8 % (8-40)  L  01/01/20  00:01    


 


Lymphocytes % (Manual)  1.0 % (8-40)  L D 01/01/20  00:01    


 


Monocytes %  1.4 % (3.8-10.2)  L D 01/01/20  00:01    


 


Monocytes % (Manual)  1 % (3.8-10.2)  L D 01/01/20  00:01    


 


Eosinophils %  0.0 % (0-4.5)  D 01/01/20  00:01    


 


Basophils %  0.0 % (0-2.0)   01/01/20  00:01    


 


Nucleated RBC %  0 % (0-0)   01/01/20  00:01    


 


Hypochromia  1+   01/01/20  00:01    


 


Platelet Estimate  Adequate   01/01/20  00:01    


 


Platelet Comment  No clotting detected   01/01/20  00:01    


 


Sodium  135 mmol/L (136-145)  L  01/01/20  00:01    


 


Potassium  4.5 mmol/L (3.5-5.1)   01/01/20  00:01    


 


Chloride  94 mmol/L ()  L  01/01/20  00:01    


 


Carbon Dioxide  35 mmol/L (21-32)  H  01/01/20  00:01    


 


Anion Gap  6 MMOL/L (8-16)  L  01/01/20  00:01    


 


BUN  18.1 mg/dL (7-18)  H  01/01/20  00:01    


 


Creatinine  0.7 mg/dL (0.55-1.3)   01/01/20  00:01    


 


Est GFR (CKD-EPI)AfAm  117.22   01/01/20  00:01    


 


Est GFR (CKD-EPI)NonAf  101.14   01/01/20  00:01    


 


Random Glucose  117 mg/dL ()  H  01/01/20  00:01    


 


Lactic Acid  0.6 mmol/L (0.4-2.0)   01/01/20  00:00    


 


Calcium  8.9 mg/dL (8.5-10.1)   01/01/20  00:01    


 


Total Bilirubin  0.8 mg/dL (0.2-1)   01/01/20  00:01    


 


AST  37 U/L (15-37)   01/01/20  00:01    


 


ALT  63 U/L (13-61)  H  01/01/20  00:01    


 


Alkaline Phosphatase  124 U/L ()  H  01/01/20  00:01    


 


Troponin I  0.02 ng/ml (0.00-0.05)   01/01/20  00:03    


 


Total Protein  6.8 g/dl (6.4-8.2)   01/01/20  00:01    


 


Albumin  3.3 g/dl (3.4-5.0)  L  01/01/20  00:01    














Imaging





- Results


Chest X-ray: Report Reviewed (Basal infiltrate /atelctasis)


Cat Scan: Report Reviewed (No PE)


EKG: Report Reviewed (RBBB sinus tachycardia)





Problem List





- Problems


(1) Acute on chronic respiratory failure with hypoxemia


Assessment/Plan: 


Continue IV methylprednisolone 40 mg 3 times daily, bronchodilator and 

pulmonary input


Problems reviewed: Yes   


Code(s): J96.21 - ACUTE AND CHRONIC RESPIRATORY FAILURE WITH HYPOXIA   





(2) Pneumonia


Assessment/Plan: 


CT chest showed right lower lobe pneumonia, put on ceftriaxone 1 g daily and 

doxycycline ID consult, sputum culture and follow-up procalcitonin


Problems reviewed: Yes   


Code(s): J18.9 - PNEUMONIA, UNSPECIFIED ORGANISM   





(3) COPD exacerbation


Assessment/Plan: 


Severe COPD patient was referred for lung transplant, follow-up pulmonary 

recommendations, bronchodilators and IV hydration.


Problems reviewed: Yes   


Code(s): J44.1 - CHRONIC OBSTRUCTIVE PULMONARY DISEASE W (ACUTE) EXACERBATION   





(4) Diastolic CHF


Assessment/Plan: 


Compensated continue Lasix 40 mg daily


Problems reviewed: Yes   


Code(s): I50.30 - UNSPECIFIED DIASTOLIC (CONGESTIVE) HEART FAILURE   


Qualifiers: 


   Heart failure chronicity: unspecified   Qualified Code(s): I50.30 - 

Unspecified diastolic (congestive) heart failure   





(5) Hypercholesterolemia


Assessment/Plan: 


Continue statin


Problems reviewed: Yes   


Code(s): E78.0 - PURE HYPERCHOLESTEROLEMIA * DO NOT USE *

## 2020-01-01 NOTE — EKG
Test Reason : 

Blood Pressure : ***/*** mmHG

Vent. Rate : 118 BPM     Atrial Rate : 118 BPM

   P-R Int : 114 ms          QRS Dur : 128 ms

    QT Int : 344 ms       P-R-T Axes : 088 119 056 degrees

   QTc Int : 482 ms

 

SINUS TACHYCARDIA

RIGHT BUNDLE BRANCH BLOCK , PLUS RIGHT VENTRICULAR HYPERTROPHY

ABNORMAL ECG

WHEN COMPARED WITH ECG OF 16-DEC-2019 15:32,

Baseline wander

BASELINE ARTIFACT

Confirmed by NIA TIERNEY, SUKUMAR ([id number]) on 1/1/2020 5:34:58 PM

 

Referred By:             Confirmed By:SUKUMAR KRAMER MD

## 2020-01-01 NOTE — PDOC
History of Present Illness





- General


History Source: Patient


Exam Limitations: No Limitations





- History of Present Illness


Initial Comments: 





01/01/20 00:49


Patient is 64M with history of CABG, COPD on home O2 (on a list for lung 

transplant), HTN, HLD, HIV, CHF, RA on methotrexate, and anxiety here today 

complaining of 1 day of worsening shortness of breath. He states that it has 

gotten progressively harder for him to breathe today. Endorses associated 

cough. Denies nausea, vomiting, fevers, chills, chest pain, and abdominal pain. 

Denies dysuria, constipation, diarrhea. Endorses a mild increase in the amount 

of leg swelling. Patient was recently intubated in the ED for hypercarbic 

respiratory failure and was discharged to [facility]. Received two 

breathing treatments today.











<Art Perez - Last Filed: 01/01/20 07:15>





<Marian Centeno - Last Filed: 01/01/20 07:22>





- General


Chief Complaint: Shortness of Breath


Stated Complaint: DIFFICULTY BREATHING


Time Seen by Provider: 01/01/20 00:17





Past History





- Past Medical History


Anemia: No


Asthma: No


Cancer: No


Cardiac Disorders: Yes (MI, stent, BYPASS)


CVA: No


COPD: Yes (awaiting lung transplant)


CHF: Yes


Dementia: No


Diabetes: No


GI Disorders: No


 Disorders: No


HTN: Yes


Hypercholesterolemia: Yes


Liver Disease: No


Psychiatric Problems: Yes (anxiety)


Seizures: No


Thyroid Disease: No





- Surgical History


Abdominal Surgery: No


Appendectomy: No


Cardiac Surgery: Yes (Stent, cardiaccath)


Cholecystectomy: No


Lung Surgery: No


Neurologic Surgery: No


Orthopedic Surgery: Yes (KNEE)





- Immunization History


Immunization Up to Date: Yes





- Psycho Social/Smoking Cessation Hx


Smoking Status: No


Smoking History: Former smoker


Have you smoked in the past 12 months: No


Number of Cigarettes Smoked Daily: 0


If you are a former smoker, when did you quit?: 3YRS


Information on smoking cessation initiated: No


Hx Alcohol Use: No


Drug/Substance Use Hx: No


Substance Use Type: None


Hx Substance Use Treatment: No





<Art Perez - Last Filed: 01/01/20 07:15>





<Marian Centeno - Last Filed: 01/01/20 07:22>





- Past Medical History


Allergies/Adverse Reactions: 


 Allergies











Allergy/AdvReac Type Severity Reaction Status Date / Time


 


Penicillins Allergy Severe Rash Verified 01/01/20 00:21


 


seafood Allergy Severe Rash Uncoded 01/01/20 00:21











Home Medications: 


Ambulatory Orders





Albuterol Sulfate Inhaler - [Ventolin Hfa Inhaler -] 1 - 2 inh PO Q4H PRN 11/28/ 18 


Budesonide/Formeterol Fumarate [SYMBICORT 160/4.5mcg -] 1 inh PO BID 11/28/18 


Clopidogrel Bisulfate [Plavix] 75 mg PO DAILY 11/28/18 


Folic Acid 1 mg PO DAILY 11/28/18 


Furosemide [Lasix] 40 mg PO DAILY 11/28/18 


Hydroxychloroquine Sulfate [Plaquenil] 200 mg PO BID 11/28/18 


Methotrexate [Mexate -] 15 mg PO Q7D 11/28/18 


Ramipril [Altace] 5 mg PO DAILY 11/28/18 


Simvastatin [Zocor -] 40 mg PO HS 11/28/18 


Clonazepam 0.5 mg PO BID 12/08/19 


Atorvastatin Calcium 20 mg PO HS 01/01/20 


Fluticasone/Umeclidin/Vilanter [Trelegy Ellipta 100-62.5-25] 1 each IH ASDIR 01/ 01/20 


Ramipril 5 mg PO DAILY 01/01/20 











**Review of Systems





- Review of Systems


Able to Perform ROS?: Yes


Comments:: 





01/01/20 00:55


GENERAL/CONSTITUTIONAL: No fever or chills. No weakness.


HEAD, EYES, EARS, NOSE AND THROAT: No change in vision. No sore throat.


CARDIOVASCULAR: No chest pain +shortness of breath


RESPIRATORY: +cough, no wheezing, or hemoptysis.


GASTROINTESTINAL: No nausea, vomiting, diarrhea or constipation.


GENITOURINARY: No dysuria, frequency, or change in urination.


MUSCULOSKELETAL: No joint or muscle swelling or pain. No neck or back pain.


SKIN: No rash


NEUROLOGIC: No headache, vertigo, loss of consciousness, or change in strength/

sensation.


ENDOCRINE: No increased thirst. No abnormal weight change


HEMATOLOGIC/LYMPHATIC: No anemia, easy bleeding, or history of blood clots.


ALLERGIC/IMMUNOLOGIC: No hives or skin allergy.











<Art Perez - Last Filed: 01/01/20 07:15>





*Physical Exam





- Vital Signs


 Last Vital Signs











Temp Pulse Resp BP Pulse Ox


 


 98.7 F   118 H  22 H  123/73   100 


 


 01/01/20 00:17  01/01/20 00:17  01/01/20 00:17  01/01/20 00:17  01/01/20 00:17














- Physical Exam





01/01/20 00:56


GENERAL: Awake, alert, and fully oriented


HEAD: No signs of trauma, normocephalic, atraumatic


EYES: PERRLA, EOMI, sclera anicteric, conjunctiva clear


ENT: Auricles normal inspection, hearing grossly normal, nares patent, 

oropharynx clear without


exudates. Moist mucosa


NECK: Normal ROM, supple, no lymphadenopathy, JVD, or masses


LUNGS: Tachypneic, coarse bilaterally, worse on left


HEART: Tachycardic, regular, normal S1 and S2, no murmurs, rubs or gallops, 

peripheral pulses normal and equal bilaterally.


ABDOMEN: Soft, nontender, normoactive bowel sounds.  No guarding, no rebound.  

No masses


EXTREMITIES: Normal inspection, Normal range of motion, 2+ edema bilaterally.  

No clubbing or cyanosis.


NEUROLOGICAL: Cranial nerves II through XII grossly intact.  Normal speech, 

normal gait, no focal sensorimotor deficits


SKIN: Warm, Dry, normal turgor, no rashes or lesions noted.








<Art Perez - Last Filed: 01/01/20 07:15>





- Vital Signs


 Last Vital Signs











Temp Pulse Resp BP Pulse Ox


 


 97.6 F   106 H  20   112/71   100 


 


 01/01/20 00:35  01/01/20 06:39  01/01/20 06:39  01/01/20 06:39  01/01/20 06:39














<Marian Centeno - Last Filed: 01/01/20 07:22>





ED Treatment Course





- LABORATORY


CBC & Chemistry Diagram: 


 01/01/20 00:01





 01/01/20 00:01





- RADIOLOGY


Radiology Studies Ordered: 














 Category Date Time Status


 


 CHEST X-RAY PORTABLE* [RAD] Stat Radiology  01/01/20 00:37 Ordered














<Art Perez - Last Filed: 01/01/20 07:15>





- LABORATORY


CBC & Chemistry Diagram: 


 01/01/20 00:01





 01/01/20 00:01





- ADDITIONAL ORDERS


Additional order review: 


 Laboratory  Results











  01/01/20 01/01/20 01/01/20





  03:20 00:45 00:03


 


PT with INR  11.00  


 


INR  0.93  


 


PTT (Actin FS)  23.0 L  


 


Anticoagulation Therapy   No Result Required. 


 


Puncture Site   Right radial 


 


ABG pH   7.32 L 


 


ABG pCO2 at Pt Temp   66.3 H 


 


ABG pO2 at Pt Temp   139 H 


 


ABG HCO3   33.5 H 


 


ABG O2 Sat (Measured)   98.8 H 


 


ABG O2 Content   17.7 


 


ABG Base Excess   5.9 H 


 


Mick Test   Positive 


 


Carboxyhemoglobin   0.8 


 


Methemoglobin   < 1.0 


 


O2 Delivery Device   No Result Required. 


 


Oxygen Flow Rate   No Result Required. 


 


Vent Mode   No Result Required. 


 


Vent Rate   No Result Required. 


 


Mechanical Rate   No Result Required. 


 


Pressure Support Vent   No Result Required. 


 


Sodium   


 


Potassium   


 


Chloride   


 


Carbon Dioxide   


 


Anion Gap   


 


BUN   


 


Creatinine   


 


Est GFR (CKD-EPI)AfAm   


 


Est GFR (CKD-EPI)NonAf   


 


Random Glucose   


 


Lactic Acid   


 


Calcium   


 


Total Bilirubin   


 


AST   


 


ALT   


 


Alkaline Phosphatase   


 


Troponin I    0.02


 


Total Protein   


 


Albumin   














  01/01/20 01/01/20





  00:01 00:00


 


PT with INR  


 


INR  


 


PTT (Actin FS)  


 


Anticoagulation Therapy  


 


Puncture Site  


 


ABG pH  


 


ABG pCO2 at Pt Temp  


 


ABG pO2 at Pt Temp  


 


ABG HCO3  


 


ABG O2 Sat (Measured)  


 


ABG O2 Content  


 


ABG Base Excess  


 


Mick Test  


 


Carboxyhemoglobin  


 


Methemoglobin  


 


O2 Delivery Device  


 


Oxygen Flow Rate  


 


Vent Mode  


 


Vent Rate  


 


Mechanical Rate  


 


Pressure Support Vent  


 


Sodium  135 L 


 


Potassium  4.5 


 


Chloride  94 L 


 


Carbon Dioxide  35 H 


 


Anion Gap  6 L 


 


BUN  18.1 H 


 


Creatinine  0.7 


 


Est GFR (CKD-EPI)AfAm  117.22 


 


Est GFR (CKD-EPI)NonAf  101.14 


 


Random Glucose  117 H 


 


Lactic Acid   0.6


 


Calcium  8.9 


 


Total Bilirubin  0.8 


 


AST  37 


 


ALT  63 H 


 


Alkaline Phosphatase  124 H 


 


Troponin I  


 


Total Protein  6.8 


 


Albumin  3.3 L 








 











  01/01/20





  00:01


 


RBC  3.96 L


 


MCV  92.9


 


MCHC  32.8


 


RDW  16.8 H


 


MPV  8.0


 


Neutrophils %  97.8 H


 


Lymphocytes %  0.8 L


 


Monocytes %  1.4 L D


 


Eosinophils %  0.0  D


 


Basophils %  0.0














- Medications


Given in the ED: 


ED Medications














Discontinued Medications














Generic Name Dose Route Start Last Admin





  Trade Name Freq  PRN Reason Stop Dose Admin


 


Albuterol/Ipratropium  1 amp  01/01/20 01:00  01/01/20 03:05





  Duoneb -  NEB  01/01/20 01:46  1 amp





  Q15M ISAIAH   Administration





     





     





     





     


 


Sodium Chloride  1,715 mls @ 857.5 mls/hr  01/01/20 00:37  01/01/20 01:18





  Normal Saline -  30 ml/kg infuse over 2 hr (1715 ml)  01/01/20 02:36  Not 

Given





  IV   





  ONCE ONE   





     





     





     





     


 


Cefepime HCl  1 gm in 50 mls @ 100 mls/hr  01/01/20 00:52  01/01/20 01:18





  Maxipime 1 Gm Premix Ivpb  IVPB  01/01/20 01:21  Not Given





  ONCE ONE   





     





     





     





     


 


Cefepime HCl 1 gm/ Dextrose  100 mls @ 200 mls/hr  01/01/20 01:15  01/01/20 01:

52





  IVPB  01/01/20 01:44  200 mls/hr





  ONCE ONE   Administration





     





     





     





     


 


Methylprednisolone Sodium Succinate  125 mg  01/01/20 00:52  01/01/20 01:17





  Solu-Medrol -  IVPB  01/01/20 00:53  125 mg





  ONCE ONE   Administration





     





     





     





     


 


Vancomycin HCl  1,000 mg  01/01/20 00:52  01/01/20 00:55





  Vancomycin (Pre-Docked)  IVPB  01/01/20 00:53  Not Given





  ONCE ONE   





     





     





  Protocol   





     














<Marian Centeno - Last Filed: 01/01/20 07:22>





Medical Decision Making





- Medical Decision Making





01/01/20 00:57


Patient is 64M with history of CABG, COPD on home O2 (on a list for lung 

transplant), HTN, HLD, HIV, CHF, RA on methotrexate, and anxiety here today 

with shortness of breath. Tachycardic. Ultrasound shows b-lines on lower left 

field only, concerning for PNA. Patient recently admitted and working hard to 

breathe, so will initiate broad workup and cover for possible HAP immediately. 

BIPAP started. DDx includes, but is not limited to: PNA, PE, viral syndrome, 

influenza. Afebrile by rectal temp.


01/01/20 02:26


EKG shows sinus tachycardia with rate of 118. RBBB pattern. No st elevations/

depressions. Rightward axis. No significant t wave changes. RBBB pattern is old.


CBC normal


CMP reassuring.


Trop pending.


Lactic acid


ABG shows chronic compensated respiratory acidosis.





Can not explain tachycardia at this time, will do CTA to eval for PE and PNA.


Patient's bipap titrated to 30% o2, work of breathing decreased.


01/01/20 07:15


Admitted to tele under Dr Whitaker.





<Art Perez - Last Filed: 01/01/20 07:15>





Discharge





- Discharge Information


Problems reviewed: Yes





- Admission


Yes





<Art Perez - Last Filed: 01/01/20 07:15>





- Admission


Yes





<Marian Centeno - Last Filed: 01/01/20 07:22>





- Discharge Information


Clinical Impression/Diagnosis: 


 Pneumonia, Acute respiratory failure





Condition: Guarded

## 2020-01-02 LAB
ANION GAP SERPL CALC-SCNC: 2 MMOL/L (ref 8–16)
BASOPHILS # BLD: 0 % (ref 0–2)
BUN SERPL-MCNC: 17.6 MG/DL (ref 7–18)
CALCIUM SERPL-MCNC: 8.9 MG/DL (ref 8.5–10.1)
CHLORIDE SERPL-SCNC: 92 MMOL/L (ref 98–107)
CO2 SERPL-SCNC: 42 MMOL/L (ref 21–32)
CREAT SERPL-MCNC: 0.7 MG/DL (ref 0.55–1.3)
DEPRECATED RDW RBC AUTO: 16.6 % (ref 11.9–15.9)
EOSINOPHIL # BLD: 0 % (ref 0–4.5)
GLUCOSE SERPL-MCNC: 99 MG/DL (ref 74–106)
HCT VFR BLD CALC: 32.9 % (ref 35.4–49)
HGB BLD-MCNC: 10.9 GM/DL (ref 11.7–16.9)
LYMPHOCYTES # BLD: 5.5 % (ref 8–40)
MCH RBC QN AUTO: 30.4 PG (ref 25.7–33.7)
MCHC RBC AUTO-ENTMCNC: 33.1 G/DL (ref 32–35.9)
MCV RBC: 91.9 FL (ref 80–96)
MONOCYTES # BLD AUTO: 13.1 % (ref 3.8–10.2)
NEUTROPHILS # BLD: 81.4 % (ref 42.8–82.8)
PLATELET # BLD AUTO: 188 K/MM3 (ref 134–434)
PMV BLD: 7.4 FL (ref 7.5–11.1)
POTASSIUM SERPLBLD-SCNC: 5.1 MMOL/L (ref 3.5–5.1)
RBC # BLD AUTO: 3.58 M/MM3 (ref 4–5.6)
SODIUM SERPL-SCNC: 136 MMOL/L (ref 136–145)
WBC # BLD AUTO: 6.5 K/MM3 (ref 4–10)

## 2020-01-02 RX ADMIN — DOXYCYCLINE SCH MLS/HR: 100 INJECTION, POWDER, LYOPHILIZED, FOR SOLUTION INTRAVENOUS at 22:00

## 2020-01-02 RX ADMIN — ATORVASTATIN CALCIUM SCH MG: 20 TABLET, FILM COATED ORAL at 22:00

## 2020-01-02 RX ADMIN — METHYLPREDNISOLONE SODIUM SUCCINATE SCH MG: 40 INJECTION, POWDER, FOR SOLUTION INTRAMUSCULAR; INTRAVENOUS at 17:33

## 2020-01-02 RX ADMIN — METHYLPREDNISOLONE SODIUM SUCCINATE SCH MG: 40 INJECTION, POWDER, FOR SOLUTION INTRAMUSCULAR; INTRAVENOUS at 09:21

## 2020-01-02 RX ADMIN — METHYLPREDNISOLONE SODIUM SUCCINATE SCH MG: 40 INJECTION, POWDER, FOR SOLUTION INTRAMUSCULAR; INTRAVENOUS at 00:59

## 2020-01-02 RX ADMIN — FOLIC ACID SCH MG: 1 TABLET ORAL at 09:21

## 2020-01-02 RX ADMIN — IPRATROPIUM BROMIDE AND ALBUTEROL SULFATE SCH AMP: .5; 3 SOLUTION RESPIRATORY (INHALATION) at 16:13

## 2020-01-02 RX ADMIN — IPRATROPIUM BROMIDE AND ALBUTEROL SULFATE SCH AMP: .5; 3 SOLUTION RESPIRATORY (INHALATION) at 20:45

## 2020-01-02 RX ADMIN — CEFEPIME HYDROCHLORIDE SCH MLS/HR: 1 INJECTION, POWDER, FOR SOLUTION INTRAMUSCULAR; INTRAVENOUS at 09:22

## 2020-01-02 RX ADMIN — BUDESONIDE AND FORMOTEROL FUMARATE DIHYDRATE SCH PUFF: 160; 4.5 AEROSOL RESPIRATORY (INHALATION) at 09:31

## 2020-01-02 RX ADMIN — CEFTRIAXONE SCH MLS/HR: 1 INJECTION, POWDER, FOR SOLUTION INTRAMUSCULAR; INTRAVENOUS at 15:50

## 2020-01-02 RX ADMIN — BUDESONIDE AND FORMOTEROL FUMARATE DIHYDRATE SCH PUFF: 160; 4.5 AEROSOL RESPIRATORY (INHALATION) at 22:01

## 2020-01-02 RX ADMIN — FUROSEMIDE SCH MG: 10 INJECTION, SOLUTION INTRAVENOUS at 09:21

## 2020-01-02 RX ADMIN — IPRATROPIUM BROMIDE AND ALBUTEROL SULFATE SCH AMP: .5; 3 SOLUTION RESPIRATORY (INHALATION) at 08:37

## 2020-01-02 RX ADMIN — ENOXAPARIN SODIUM SCH MG: 40 INJECTION SUBCUTANEOUS at 09:21

## 2020-01-02 RX ADMIN — RAMIPRIL SCH MG: 5 CAPSULE ORAL at 09:21

## 2020-01-02 RX ADMIN — IPRATROPIUM BROMIDE AND ALBUTEROL SULFATE SCH AMP: .5; 3 SOLUTION RESPIRATORY (INHALATION) at 12:12

## 2020-01-02 NOTE — CONS
DATE OF CONSULTATION:  

 

DATE OF DICTATION:  01/02/2020 

 

INFECTIOUS DISEASE CONSULTATION 

 

REQUESTING PHYSICIAN:  Keanu Whitaker M.D. 

 

HISTORY OF PRESENT ILLNESS:  This is a 64-year-old man who was recently hospitalized

from December 8 to December 20 at [facility], where he had acute respiratory

failure, requiring intubation.  He was in the ICU.  He was treated with several days

of Zithromax.  His admission was notable for a CHF/COPD exacerbation.  He was

discharged to a nursing home.  He was now readmitted with acute onset of 1-day

history of worsening shortness of breath, with some associated cough.  There has been

no nausea, vomiting, fever or chills, chest pain or abdominal pain.  He has had some

mild increase in lower extremity swelling as well. 

 

PAST MEDICAL HISTORY:  Notable for coronary artery disease, hypertension,

hyperlipidemia, COPD (oxygen dependent).  He has a history of anemia, chronic low

back pain.  He has a history of rheumatoid arthritis. 

 

PAST SURGICAL HISTORY:  He has had a CABG and stenting in the past.  The CABG was

done 5 years ago.

 

ALLERGIES:  He states he is not allergic to PENICILLIN, although it is listed as an

allergy.  He is allergic to SEAFOOD.  

 

MEDICATIONS:  His medications at the nursing home include albuterol inhaler,

Symbicort inhaler, Plavix, folic acid, furosemide, Plaquenil, methotrexate, Ramipril,

Zocor, clonazepam, atorvastatin, and Ellipta inhaler.

 

FAMILY HISTORY:  Unremarkable.  

 

SOCIAL HISTORY:  Prior to rehab at the nursing home, he was on disability.  He is a

retired  and he was living with his daughter at home.  He stopped smoking

30 years ago. 

 

REVIEW OF SYSTEMS:  Notable for the increased shortness of breath over the course of

the last day. 

 

PHYSICAL EXAMINATION:  

General:  He is alert.  He is on BiPAP.  He is able to have a conversation with me.

Vital Signs:  Temperature is 97.7.  Pulse is 121, blood pressure 120/56.  Respiratory

rate is 22.  He is saturating 94% on 30%. 

HEENT:  He is normocephalic.  His eyes are anicteric.  He is wearing the BiPAP. 

Lungs:  Very distant lung sounds.  I do not hear any crackles. 

Heart:  Regular rate and rhythm.  He is somewhat tachycardic. 

Abdomen:  Soft, nontender. 

Extremities:  He has trace pretibial edema bilaterally.

 

LABORATORY DATA:  Notable for a white count on admission of 10.3, today 6.5;

hemoglobin 10.9, platelets 188.  BUN 17, creatinine 0.7.  Urinalysis is negative.  He

had an HIV test done, which is negative.  His influenza screen in the ER was negative

as well.  Sputum culture is normal marcy.  Urine and blood cultures are negative. 

 

DIAGNOSTIC STUDIES:  He had a CTA of his chest done that shows no evidence of PE. 

There is a very small right lower lobe infiltrate noted laterally. 

 

SUMMARY:  This is a 64-year-old man with severe chronic obstructive pulmonary disease

and congestive heart failure, admitted with worsening respiratory failure, with

evidence of a small right lower lobe pneumonia.  I would agree with the ceftriaxone

and doxycycline.  Would add a urinary antigen for completeness.  Would continue

diuresis and treatment with steroids as per Cardiology and Pulmonology. 

 

 

TARYN CARR M.D.

 

[id number]

DD: 01/02/2020 18:23

DT: 01/02/2020 19:44

Job #:  [id number]

## 2020-01-02 NOTE — PN
Progress Note (short form)





- Note


Progress Note: 





ID consult dictated





respiratory failure


small RLL pneumonia


copd exacerbation


chf exacerbation





agree with rocephin/doxy based on cultures


urinary antigen





he is HIV negative - tested last admission- 





Problem List





- Problems


(1) Acute respiratory failure


Code(s): J96.00 - ACUTE RESPIRATORY FAILURE, UNSP W HYPOXIA OR HYPERCAPNIA   





(2) Pneumonia


Code(s): J18.9 - PNEUMONIA, UNSPECIFIED ORGANISM   





(3) COPD exacerbation


Code(s): J44.1 - CHRONIC OBSTRUCTIVE PULMONARY DISEASE W (ACUTE) EXACERBATION   





(4) Diastolic CHF


Code(s): I50.30 - UNSPECIFIED DIASTOLIC (CONGESTIVE) HEART FAILURE   


Qualifiers: 


   Heart failure chronicity: unspecified   Qualified Code(s): I50.30 - 

Unspecified diastolic (congestive) heart failure

## 2020-01-02 NOTE — PN
Progress Note, Physician


History of Present Illness: 





pulmonary





alert,still dyspneic at rest





- Current Medication List


Current Medications: 


Active Medications





Acetaminophen (Tylenol -)  650 mg PO Q4H PRN


   PRN Reason: FEVER


Albuterol Sulfate (Ventolin Hfa Inhaler -)  2 puff IH Q4H PRN


   PRN Reason: SHORT OF BREATH/WHEEZING


Albuterol/Ipratropium (Duoneb -)  1 amp NEB RQID [facility]


   Last Admin: 01/02/20 08:37 Dose:  1 amp


Atorvastatin Calcium (Lipitor -)  20 mg PO HS [facility]


   Last Admin: 01/01/20 21:29 Dose:  20 mg


Budesonide/Formoterol Fumarate (Symbicort 160/4.5mcg -)  1 puff IH BID [facility]


   Last Admin: 01/02/20 09:31 Dose:  1 puff


Clonazepam (Klonopin -)  0.5 mg PO BID [facility]


   Last Admin: 01/02/20 09:21 Dose:  0.5 mg


Enoxaparin Sodium (Lovenox -)  40 mg SQ DAILY [facility]


   Last Admin: 01/02/20 09:21 Dose:  40 mg


Folic Acid (Folic Acid -)  1 mg PO DAILY [facility]


   Last Admin: 01/02/20 09:21 Dose:  1 mg


Furosemide (Lasix Injection -)  40 mg IVPUSH DAILY [facility]


   Last Admin: 01/02/20 09:21 Dose:  40 mg


Cefepime HCl (Maxipime 1 Gm Premix Ivpb)  1 gm in 50 mls @ 100 mls/hr IVPB BID 

[facility]; Protocol


Vancomycin HCl 1,000 mg/ (Dextrose)  250 mls @ 200 mls/hr IVPB Q24H [facility]; 

Protocol


Methotrexate (Mexate -)  15 mg PO Q7D@1000 [facility]


   Last Admin: 01/01/20 09:46 Dose:  15 mg


Methylprednisolone Sodium Succinate (Solu-Medrol -)  40 mg IVPUSH Q8H-IV [facility]


   Last Admin: 01/02/20 09:21 Dose:  40 mg


Ramipril (Altace -)  5 mg PO DAILY [facility]


   Last Admin: 01/02/20 09:21 Dose:  5 mg











- Objective


Vital Signs: 


 Vital Signs











Temperature  97.5 F L  01/02/20 06:00


 


Pulse Rate  103 H  01/02/20 06:00


 


Respiratory Rate  20   01/02/20 06:00


 


Blood Pressure  112/67   01/02/20 06:00


 


O2 Sat by Pulse Oximetry (%)  92 L  01/02/20 08:36











Constitutional: Yes: Calm, Thin, Other (dyspneic)


Eyes: Yes: WNL


HENT: Yes: WNL


Neck: Yes: WNL


Cardiovascular: Yes: Regular Rate and Rhythm, S1, S2


Respiratory: Yes: Diminished, Wheezes (few wheezes)


Gastrointestinal: Yes: Normal Bowel Sounds, Soft


Extremities: Yes: WNL


Edema: Yes


Labs: 


 CBC, BMP





 01/02/20 05:30 





 01/02/20 05:30 





 INR, PTT











INR  0.93  (0.83-1.09)   01/01/20  03:20    














Assessment/Plan








Problem List





- Problems


(1) Acute respiratory failure


Code(s): J96.00 - ACUTE RESPIRATORY FAILURE, UNSP W HYPOXIA OR HYPERCAPNIA   





(2) Pneumonia


Code(s): J18.9 - PNEUMONIA, UNSPECIFIED ORGANISM   





(3) Acute on chronic respiratory failure with hypoxia and hypercapnia


Code(s): J96.21 - ACUTE AND CHRONIC RESPIRATORY FAILURE WITH HYPOXIA; J96.22 - 

ACUTE AND CHRONIC RESPIRATORY FAILURE WITH HYPERCAPNIA   





(4) Anxiety and depression


Code(s): F41.9 - ANXIETY DISORDER, UNSPECIFIED; F32.9 - MAJOR DEPRESSIVE 

DISORDER, SINGLE EPISODE, UNSPECIFIED   





(5) COPD (chronic obstructive pulmonary disease)


Code(s): J44.9 - CHRONIC OBSTRUCTIVE PULMONARY DISEASE, UNSPECIFIED   


Qualifiers: 


   COPD type: emphysema   Emphysema type: unspecified   Qualified Code(s): 

J43.9 - Emphysema, unspecified   





(6) Cough


Code(s): R05 - COUGH   





(7) Edema


Code(s): R60.9 - EDEMA, UNSPECIFIED   


Qualifiers: 


   Edema type: unspecified   Qualified Code(s): R60.9 - Edema, unspecified   





(8) Rheumatoid arthritis


Code(s): M06.9 - RHEUMATOID ARTHRITIS, UNSPECIFIED   





8 LLL nodule





Assessment/Plan





Acute on chronic hypoxemic/hypercapneic resp failure


COPD on home 02


HIV/RA on Methotrexate


Pneumonia


Referred for lung transplant [facility] (patient did not submit paperwork)


CABG


HTN


HLD


CHF


LLL NODULE





NIPPV HS/PRN 


STEROIDS


BRONCHODILATORS


F/U CHEST CT OUTPATIENT





DR OROPEZA

## 2020-01-02 NOTE — PN
Progress Note, Physician


Chief Complaint: 





Patient will improve now saturating well on home O2





- Current Medication List


Current Medications: 


Active Medications





Acetaminophen (Tylenol -)  650 mg PO Q4H PRN


   PRN Reason: FEVER


Albuterol Sulfate (Ventolin Hfa Inhaler -)  2 puff IH Q4H PRN


   PRN Reason: SHORT OF BREATH/WHEEZING


Albuterol/Ipratropium (Duoneb -)  1 amp NEB RQID [facility]


   Last Admin: 01/02/20 12:12 Dose:  1 amp


Atorvastatin Calcium (Lipitor -)  20 mg PO HS [facility]


   Last Admin: 01/01/20 21:29 Dose:  20 mg


Budesonide/Formoterol Fumarate (Symbicort 160/4.5mcg -)  1 puff IH BID [facility]


   Last Admin: 01/02/20 09:31 Dose:  1 puff


Clonazepam (Klonopin -)  0.5 mg PO BID [facility]


   Last Admin: 01/02/20 09:21 Dose:  0.5 mg


Enoxaparin Sodium (Lovenox -)  40 mg SQ DAILY [facility]


   Last Admin: 01/02/20 09:21 Dose:  40 mg


Folic Acid (Folic Acid -)  1 mg PO DAILY [facility]


   Last Admin: 01/02/20 09:21 Dose:  1 mg


Furosemide (Lasix Injection -)  40 mg IVPUSH DAILY [facility]


   Last Admin: 01/02/20 09:21 Dose:  40 mg


Ceftriaxone Sodium 1 gm/ (Dextrose)  50 mls @ 100 mls/hr IVPB DAILY [facility]


Doxycycline Hyclate 100 mg/ (Dextrose)  100 mls @ 100 mls/hr IVPB BID [facility]


Methotrexate (Mexate -)  15 mg PO Q7D@1000 [facility]


   Last Admin: 01/01/20 09:46 Dose:  15 mg


Methylprednisolone Sodium Succinate (Solu-Medrol -)  40 mg IVPUSH Q8H-IV [facility]


   Last Admin: 01/02/20 09:21 Dose:  40 mg


Ramipril (Altace -)  5 mg PO DAILY [facility]


   Last Admin: 01/02/20 09:21 Dose:  5 mg











- Objective


Vital Signs: 


 Vital Signs











Temperature  97.6 F   01/02/20 10:00


 


Pulse Rate  100 H  01/02/20 10:00


 


Respiratory Rate  18   01/02/20 10:00


 


Blood Pressure  123/71   01/02/20 10:00


 


O2 Sat by Pulse Oximetry (%)  95   01/02/20 12:10














General: Young male in mild respiratory distress.


HEENT; mucous membranes moist, no anemia, no jaundice, PERRLA, no nystagmus


Neck: No JVD, supple, no bruit, thyroid palpably normal, normal carotid 

pulsations.


Chest: Nontender, bilateral wheezing.


CVS: S1-S2 regularno murmur/gallop/rub


Abdomen: Nondistended, soft, bowel sounds present.


Extremities: + edema.,  No calf  tenderness, pulses present


CNS: AO X3 , no gross motor sensory deficit





Labs: 


 CBC, BMP





 01/02/20 05:30 





 01/02/20 05:30 





 INR, PTT











INR  0.93  (0.83-1.09)   01/01/20  03:20    














Problem List





- Problems


(1) Acute on chronic respiratory failure with hypoxemia


Assessment/Plan: 


Continue IV methylprednisolone 40 mg 3 times daily, bronchodilator and 

pulmonary input


Code(s): J96.21 - ACUTE AND CHRONIC RESPIRATORY FAILURE WITH HYPOXIA   





(2) Pneumonia


Assessment/Plan: 


CT chest showed right lower lobe pneumonia, put on ceftriaxone 1 g daily and 

doxycycline ID consult, sputum culture and follow-up procalcitonin


Code(s): J18.9 - PNEUMONIA, UNSPECIFIED ORGANISM   





(3) COPD exacerbation


Assessment/Plan: 


Severe COPD patient was referred for lung transplant, follow-up pulmonary 

recommendations, bronchodilators and IV hydration.


Code(s): J44.1 - CHRONIC OBSTRUCTIVE PULMONARY DISEASE W (ACUTE) EXACERBATION   





(4) Diastolic CHF


Assessment/Plan: 


Compensated continue Lasix 40 mg daily


Code(s): I50.30 - UNSPECIFIED DIASTOLIC (CONGESTIVE) HEART FAILURE   


Qualifiers: 


   Heart failure chronicity: unspecified   Qualified Code(s): I50.30 - 

Unspecified diastolic (congestive) heart failure   





(5) Hypercholesterolemia


Assessment/Plan: 


Continue statin


Code(s): E78.0 - PURE HYPERCHOLESTEROLEMIA * DO NOT USE *

## 2020-01-03 LAB
ANION GAP SERPL CALC-SCNC: 2 MMOL/L (ref 8–16)
BASOPHILS # BLD: 0.2 % (ref 0–2)
BUN SERPL-MCNC: 19.5 MG/DL (ref 7–18)
CALCIUM SERPL-MCNC: 9 MG/DL (ref 8.5–10.1)
CHLORIDE SERPL-SCNC: 92 MMOL/L (ref 98–107)
CO2 SERPL-SCNC: 43 MMOL/L (ref 21–32)
CREAT SERPL-MCNC: 0.7 MG/DL (ref 0.55–1.3)
DEPRECATED RDW RBC AUTO: 16.3 % (ref 11.9–15.9)
EOSINOPHIL # BLD: 0 % (ref 0–4.5)
GLUCOSE SERPL-MCNC: 101 MG/DL (ref 74–106)
HCT VFR BLD CALC: 33.9 % (ref 35.4–49)
HGB BLD-MCNC: 11.3 GM/DL (ref 11.7–16.9)
LYMPHOCYTES # BLD: 5.1 % (ref 8–40)
MCH RBC QN AUTO: 30.6 PG (ref 25.7–33.7)
MCHC RBC AUTO-ENTMCNC: 33.2 G/DL (ref 32–35.9)
MCV RBC: 92.2 FL (ref 80–96)
MONOCYTES # BLD AUTO: 3.8 % (ref 3.8–10.2)
NEUTROPHILS # BLD: 90.9 % (ref 42.8–82.8)
PLATELET BLD QL SMEAR: NORMAL
POTASSIUM SERPLBLD-SCNC: 4.8 MMOL/L (ref 3.5–5.1)
RBC # BLD AUTO: 3.67 M/MM3 (ref 4–5.6)
SODIUM SERPL-SCNC: 137 MMOL/L (ref 136–145)
WBC # BLD AUTO: 5.1 K/MM3 (ref 4–10)

## 2020-01-03 RX ADMIN — IPRATROPIUM BROMIDE AND ALBUTEROL SULFATE SCH AMP: .5; 3 SOLUTION RESPIRATORY (INHALATION) at 07:25

## 2020-01-03 RX ADMIN — BUDESONIDE AND FORMOTEROL FUMARATE DIHYDRATE SCH PUFF: 160; 4.5 AEROSOL RESPIRATORY (INHALATION) at 09:23

## 2020-01-03 RX ADMIN — IPRATROPIUM BROMIDE AND ALBUTEROL SULFATE SCH AMP: .5; 3 SOLUTION RESPIRATORY (INHALATION) at 20:07

## 2020-01-03 RX ADMIN — DOXYCYCLINE SCH MLS/HR: 100 INJECTION, POWDER, LYOPHILIZED, FOR SOLUTION INTRAVENOUS at 09:22

## 2020-01-03 RX ADMIN — RAMIPRIL SCH MG: 5 CAPSULE ORAL at 09:22

## 2020-01-03 RX ADMIN — ATORVASTATIN CALCIUM SCH MG: 20 TABLET, FILM COATED ORAL at 21:22

## 2020-01-03 RX ADMIN — FOLIC ACID SCH MG: 1 TABLET ORAL at 09:22

## 2020-01-03 RX ADMIN — METHYLPREDNISOLONE SODIUM SUCCINATE SCH MG: 40 INJECTION, POWDER, FOR SOLUTION INTRAMUSCULAR; INTRAVENOUS at 17:12

## 2020-01-03 RX ADMIN — BUDESONIDE AND FORMOTEROL FUMARATE DIHYDRATE SCH PUFF: 160; 4.5 AEROSOL RESPIRATORY (INHALATION) at 21:22

## 2020-01-03 RX ADMIN — DOXYCYCLINE SCH MLS/HR: 100 INJECTION, POWDER, LYOPHILIZED, FOR SOLUTION INTRAVENOUS at 21:25

## 2020-01-03 RX ADMIN — ENOXAPARIN SODIUM SCH MG: 40 INJECTION SUBCUTANEOUS at 09:23

## 2020-01-03 RX ADMIN — METHYLPREDNISOLONE SODIUM SUCCINATE SCH MG: 40 INJECTION, POWDER, FOR SOLUTION INTRAMUSCULAR; INTRAVENOUS at 09:23

## 2020-01-03 RX ADMIN — METHYLPREDNISOLONE SODIUM SUCCINATE SCH MG: 40 INJECTION, POWDER, FOR SOLUTION INTRAMUSCULAR; INTRAVENOUS at 01:01

## 2020-01-03 RX ADMIN — CEFTRIAXONE SCH MLS/HR: 1 INJECTION, POWDER, FOR SOLUTION INTRAMUSCULAR; INTRAVENOUS at 09:22

## 2020-01-03 RX ADMIN — IPRATROPIUM BROMIDE AND ALBUTEROL SULFATE SCH AMP: .5; 3 SOLUTION RESPIRATORY (INHALATION) at 16:12

## 2020-01-03 RX ADMIN — FUROSEMIDE SCH MG: 10 INJECTION, SOLUTION INTRAVENOUS at 09:22

## 2020-01-03 RX ADMIN — IPRATROPIUM BROMIDE AND ALBUTEROL SULFATE SCH AMP: .5; 3 SOLUTION RESPIRATORY (INHALATION) at 11:35

## 2020-01-03 NOTE — PN
Progress Note, Physician


Chief Complaint: 





Patient will improve now saturating well on home O2





- Current Medication List


Current Medications: 


Active Medications





Acetaminophen (Tylenol -)  650 mg PO Q4H PRN


   PRN Reason: FEVER


Albuterol Sulfate (Ventolin Hfa Inhaler -)  2 puff IH Q4H PRN


   PRN Reason: SHORT OF BREATH/WHEEZING


Albuterol/Ipratropium (Duoneb -)  1 amp NEB RQID [facility]


   Last Admin: 01/03/20 07:25 Dose:  1 amp


Atorvastatin Calcium (Lipitor -)  20 mg PO HS [facility]


   Last Admin: 01/02/20 22:00 Dose:  20 mg


Budesonide/Formoterol Fumarate (Symbicort 160/4.5mcg -)  1 puff IH BID [facility]


   Last Admin: 01/02/20 22:01 Dose:  1 puff


Clonazepam (Klonopin -)  0.5 mg PO BID [facility]


   Last Admin: 01/02/20 22:00 Dose:  0.5 mg


Enoxaparin Sodium (Lovenox -)  40 mg SQ DAILY [facility]


   Last Admin: 01/02/20 09:21 Dose:  40 mg


Folic Acid (Folic Acid -)  1 mg PO DAILY [facility]


   Last Admin: 01/02/20 09:21 Dose:  1 mg


Furosemide (Lasix Injection -)  40 mg IVPUSH DAILY [facility]


   Last Admin: 01/02/20 09:21 Dose:  40 mg


Ceftriaxone Sodium 1 gm/ (Dextrose)  50 mls @ 100 mls/hr IVPB DAILY [facility]


   Last Admin: 01/02/20 15:50 Dose:  100 mls/hr


Doxycycline Hyclate 100 mg/ (Dextrose)  100 mls @ 50 mls/hr IVPB BID [facility]


   Last Admin: 01/02/20 22:00 Dose:  50 mls/hr


Methotrexate (Mexate -)  15 mg PO Q7D@1000 [facility]


   Last Admin: 01/01/20 09:46 Dose:  15 mg


Methylprednisolone Sodium Succinate (Solu-Medrol -)  40 mg IVPUSH Q8H-IV [facility]


   Last Admin: 01/03/20 01:01 Dose:  40 mg


Ramipril (Altace -)  5 mg PO DAILY [facility]


   Last Admin: 01/02/20 09:21 Dose:  5 mg











- Objective


Vital Signs: 


 Vital Signs











Temperature  97.4 F L  01/03/20 06:00


 


Pulse Rate  101 H  01/03/20 06:00


 


Respiratory Rate  20   01/03/20 06:00


 


Blood Pressure  136/77   01/03/20 06:00


 


O2 Sat by Pulse Oximetry (%)  97   01/03/20 08:11








General: Young male in mild respiratory distress.


HEENT; mucous membranes moist, no anemia, no jaundice, PERRLA, no nystagmus


Neck: No JVD, supple, no bruit, thyroid palpably normal, normal carotid 

pulsations.


Chest: Nontender, bilateral wheezing.


CVS: S1-S2 regularno murmur/gallop/rub


Abdomen: Nondistended, soft, bowel sounds present.


Extremities: + edema.,  No calf  tenderness, pulses present


CNS: AO X3 , no gross motor sensory deficit


Labs: 


 CBC, BMP





 01/03/20 06:13 





 01/03/20 06:13 





 INR, PTT











INR  0.93  (0.83-1.09)   01/01/20  03:20    














Problem List





- Problems


(1) Acute on chronic respiratory failure with hypoxemia


Assessment/Plan: 


Continue IV methylprednisolone 40 mg 3 times daily, bronchodilator and 

pulmonary input


Code(s): J96.21 - ACUTE AND CHRONIC RESPIRATORY FAILURE WITH HYPOXIA   





(2) Pneumonia


Assessment/Plan: 


CT chest showed right lower lobe pneumonia, put on ceftriaxone 1 g daily and 

doxycycline ID consult, sputum culture and follow-up procalcitonin


Code(s): J18.9 - PNEUMONIA, UNSPECIFIED ORGANISM   





(3) COPD exacerbation


Assessment/Plan: 


Severe COPD patient was referred for lung transplant, follow-up pulmonary 

recommendations, bronchodilators and IV hydration.


Code(s): J44.1 - CHRONIC OBSTRUCTIVE PULMONARY DISEASE W (ACUTE) EXACERBATION   





(4) Diastolic CHF


Assessment/Plan: 


Compensated continue Lasix 40 mg daily


Code(s): I50.30 - UNSPECIFIED DIASTOLIC (CONGESTIVE) HEART FAILURE   


Qualifiers: 


   Heart failure chronicity: unspecified   Qualified Code(s): I50.30 - 

Unspecified diastolic (congestive) heart failure   





(5) Hypercholesterolemia


Assessment/Plan: 


Continue statin


Code(s): E78.0 - PURE HYPERCHOLESTEROLEMIA * DO NOT USE *

## 2020-01-03 NOTE — PN
Progress Note, Physician


History of Present Illness: 





pulmonary








alert,less dypneic on nasal o2





- Current Medication List


Current Medications: 


Active Medications





Acetaminophen (Tylenol -)  650 mg PO Q4H PRN


   PRN Reason: FEVER


Albuterol Sulfate (Ventolin Hfa Inhaler -)  2 puff IH Q4H PRN


   PRN Reason: SHORT OF BREATH/WHEEZING


Albuterol/Ipratropium (Duoneb -)  1 amp NEB RQID [facility]


   Last Admin: 01/03/20 07:25 Dose:  1 amp


Atorvastatin Calcium (Lipitor -)  20 mg PO HS [facility]


   Last Admin: 01/02/20 22:00 Dose:  20 mg


Budesonide/Formoterol Fumarate (Symbicort 160/4.5mcg -)  1 puff IH BID [facility]


   Last Admin: 01/03/20 09:23 Dose:  1 puff


Clonazepam (Klonopin -)  0.5 mg PO BID [facility]


   Last Admin: 01/03/20 09:22 Dose:  0.5 mg


Enoxaparin Sodium (Lovenox -)  40 mg SQ DAILY [facility]


   Last Admin: 01/03/20 09:23 Dose:  40 mg


Folic Acid (Folic Acid -)  1 mg PO DAILY [facility]


   Last Admin: 01/03/20 09:22 Dose:  1 mg


Furosemide (Lasix Injection -)  40 mg IVPUSH DAILY [facility]


   Last Admin: 01/03/20 09:22 Dose:  40 mg


Ceftriaxone Sodium 1 gm/ (Dextrose)  50 mls @ 100 mls/hr IVPB DAILY [facility]


   Last Admin: 01/03/20 09:22 Dose:  100 mls/hr


Doxycycline Hyclate 100 mg/ (Dextrose)  100 mls @ 50 mls/hr IVPB BID [facility]


   Last Admin: 01/03/20 09:22 Dose:  50 mls/hr


Methotrexate (Mexate -)  15 mg PO Q7D@1000 [facility]


   Last Admin: 01/01/20 09:46 Dose:  15 mg


Methylprednisolone Sodium Succinate (Solu-Medrol -)  40 mg IVPUSH Q8H-IV [facility]


   Last Admin: 01/03/20 09:23 Dose:  40 mg


Ramipril (Altace -)  5 mg PO DAILY [facility]


   Last Admin: 01/03/20 09:22 Dose:  5 mg











- Objective


Vital Signs: 


 Vital Signs











Temperature  97.4 F L  01/03/20 06:00


 


Pulse Rate  101 H  01/03/20 06:00


 


Respiratory Rate  20   01/03/20 06:00


 


Blood Pressure  136/77   01/03/20 06:00


 


O2 Sat by Pulse Oximetry (%)  97   01/03/20 08:11











Constitutional: Yes: Calm, Thin, Other (dysneic)


Eyes: Yes: WNL


HENT: Yes: WNL


Neck: Yes: WNL


Cardiovascular: Yes: Regular Rate and Rhythm, S1, S2


Respiratory: Yes: Rhonchi (few scatttered genaro rhonchi,wheezes), Wheezes


Gastrointestinal: Yes: Normal Bowel Sounds, Soft


Edema: Yes


Labs: 


 CBC, BMP





 01/03/20 06:13 





 01/03/20 06:13 





 INR, PTT











INR  0.93  (0.83-1.09)   01/01/20  03:20    














Assessment/Plan








Problem List





- Problems


(1) Acute respiratory failure


Code(s): J96.00 - ACUTE RESPIRATORY FAILURE, UNSP W HYPOXIA OR HYPERCAPNIA   





(2) Pneumonia


Code(s): J18.9 - PNEUMONIA, UNSPECIFIED ORGANISM   





(3) Acute on chronic respiratory failure with hypoxia and hypercapnia


Code(s): J96.21 - ACUTE AND CHRONIC RESPIRATORY FAILURE WITH HYPOXIA; J96.22 - 

ACUTE AND CHRONIC RESPIRATORY FAILURE WITH HYPERCAPNIA   





(4) Anxiety and depression


Code(s): F41.9 - ANXIETY DISORDER, UNSPECIFIED; F32.9 - MAJOR DEPRESSIVE 

DISORDER, SINGLE EPISODE, UNSPECIFIED   





(5) COPD (chronic obstructive pulmonary disease)


Code(s): J44.9 - CHRONIC OBSTRUCTIVE PULMONARY DISEASE, UNSPECIFIED   


Qualifiers: 


   COPD type: emphysema   Emphysema type: unspecified   Qualified Code(s): 

J43.9 - Emphysema, unspecified   





(6) Cough


Code(s): R05 - COUGH   





(7) Edema


Code(s): R60.9 - EDEMA, UNSPECIFIED   


Qualifiers: 


   Edema type: unspecified   Qualified Code(s): R60.9 - Edema, unspecified   





(8) Rheumatoid arthritis


Code(s): M06.9 - RHEUMATOID ARTHRITIS, UNSPECIFIED   





8 LLL nodule





Assessment/Plan





Acute on chronic hypoxemic/hypercapneic resp failure


COPD on home 02


HIV/RA on Methotrexate


Pneumonia


Referred for lung transplant [facility] (patient did not submit paperwork)


CABG


HTN


HLD


CHF


LLL NODULE





NIPPV HS/PRN 


STEROIDS start taper in am


BRONCHODILATORS


F/U CHEST CT OUTPATIENT





DR OROPEZA

## 2020-01-03 NOTE — PN
Progress Note (short form)





- Note


Progress Note: 





off bipap since early am





nonproductive cough


feels improved





 Vital Signs











 Period  Temp  Pulse  Resp  BP Sys/Baer  Pulse Ox


 


 Last 24 Hr  97.4 F-98.1 F  100-121  18-24  /53-77  94-99








cor-rrr


lungs decreased bs at bases


abd soft,nt


ext no edema





 CBC, BMP





 01/03/20 06:13 





 01/03/20 06:13 





 Microbiology





01/02/20 20:10   Urine For Antigen Detection   Legionella Antigen - Final


01/02/20 20:10   Urine For Antigen Detection   Streptococcus pneumoniae Antigen 

(M - Final


01/01/20 14:15   Sputum - Expectorated   Gram Stain - Final


01/01/20 14:15   Sputum - Expectorated   Sputum Culture - Final


                            NORMAL RESPIRATORY EDU


01/01/20 00:01   Blood - Peripheral Venous   Blood Culture - Preliminary


                            NO GROWTH OBTAINED AFTER 48 HOURS, INCUBATION TO 

CONTINUE


                            FOR 3 DAYS.


01/01/20 00:00   Blood - Peripheral Venous   Blood Culture - Preliminary


                            NO GROWTH OBTAINED AFTER 48 HOURS, INCUBATION TO 

CONTINUE


                            FOR 3 DAYS.


01/01/20 05:15   Urine - Urine Clean Catch   Urine Culture - Final


                            NO GROWTH OBTAINED





a/p


respiratory failure


small RLL pneumonia


copd exacerbation


chf exacerbation





agree with rocephin/doxy based on cultures


urinary antigen negative 


day #3 antibiotics











he is HIV negative - tested last admission-

## 2020-01-04 LAB
ANION GAP SERPL CALC-SCNC: 4 MMOL/L (ref 8–16)
BASOPHILS # BLD: 0.3 % (ref 0–2)
BUN SERPL-MCNC: 21.1 MG/DL (ref 7–18)
CALCIUM SERPL-MCNC: 9.3 MG/DL (ref 8.5–10.1)
CHLORIDE SERPL-SCNC: 90 MMOL/L (ref 98–107)
CO2 SERPL-SCNC: 42 MMOL/L (ref 21–32)
CREAT SERPL-MCNC: 0.8 MG/DL (ref 0.55–1.3)
DEPRECATED RDW RBC AUTO: 16.2 % (ref 11.9–15.9)
EOSINOPHIL # BLD: 0 % (ref 0–4.5)
GLUCOSE SERPL-MCNC: 113 MG/DL (ref 74–106)
HCT VFR BLD CALC: 37.8 % (ref 35.4–49)
HGB BLD-MCNC: 12.4 GM/DL (ref 11.7–16.9)
LYMPHOCYTES # BLD: 8.7 % (ref 8–40)
MAGNESIUM SERPL-MCNC: 2.7 MG/DL (ref 1.8–2.4)
MCH RBC QN AUTO: 30.4 PG (ref 25.7–33.7)
MCHC RBC AUTO-ENTMCNC: 32.7 G/DL (ref 32–35.9)
MCV RBC: 92.9 FL (ref 80–96)
MONOCYTES # BLD AUTO: 5.2 % (ref 3.8–10.2)
NEUTROPHILS # BLD: 85.8 % (ref 42.8–82.8)
PLATELET # BLD AUTO: 232 K/MM3 (ref 134–434)
PMV BLD: 8.4 FL (ref 7.5–11.1)
POTASSIUM SERPLBLD-SCNC: 5.1 MMOL/L (ref 3.5–5.1)
RBC # BLD AUTO: 4.07 M/MM3 (ref 4–5.6)
SODIUM SERPL-SCNC: 135 MMOL/L (ref 136–145)
WBC # BLD AUTO: 6.9 K/MM3 (ref 4–10)

## 2020-01-04 RX ADMIN — ATORVASTATIN CALCIUM SCH MG: 20 TABLET, FILM COATED ORAL at 21:17

## 2020-01-04 RX ADMIN — FOLIC ACID SCH MG: 1 TABLET ORAL at 09:33

## 2020-01-04 RX ADMIN — ENOXAPARIN SODIUM SCH MG: 40 INJECTION SUBCUTANEOUS at 09:32

## 2020-01-04 RX ADMIN — BUDESONIDE AND FORMOTEROL FUMARATE DIHYDRATE SCH PUFF: 160; 4.5 AEROSOL RESPIRATORY (INHALATION) at 09:34

## 2020-01-04 RX ADMIN — METHYLPREDNISOLONE SODIUM SUCCINATE SCH MG: 40 INJECTION, POWDER, FOR SOLUTION INTRAMUSCULAR; INTRAVENOUS at 02:14

## 2020-01-04 RX ADMIN — DOXYCYCLINE SCH MLS/HR: 100 INJECTION, POWDER, LYOPHILIZED, FOR SOLUTION INTRAVENOUS at 10:28

## 2020-01-04 RX ADMIN — FUROSEMIDE SCH MG: 10 INJECTION, SOLUTION INTRAVENOUS at 09:33

## 2020-01-04 RX ADMIN — IPRATROPIUM BROMIDE AND ALBUTEROL SULFATE SCH AMP: .5; 3 SOLUTION RESPIRATORY (INHALATION) at 15:51

## 2020-01-04 RX ADMIN — METHYLPREDNISOLONE SODIUM SUCCINATE SCH MG: 40 INJECTION, POWDER, FOR SOLUTION INTRAMUSCULAR; INTRAVENOUS at 18:05

## 2020-01-04 RX ADMIN — IPRATROPIUM BROMIDE AND ALBUTEROL SULFATE SCH AMP: .5; 3 SOLUTION RESPIRATORY (INHALATION) at 07:57

## 2020-01-04 RX ADMIN — CEFTRIAXONE SCH MLS/HR: 1 INJECTION, POWDER, FOR SOLUTION INTRAMUSCULAR; INTRAVENOUS at 09:33

## 2020-01-04 RX ADMIN — IPRATROPIUM BROMIDE AND ALBUTEROL SULFATE SCH AMP: .5; 3 SOLUTION RESPIRATORY (INHALATION) at 12:06

## 2020-01-04 RX ADMIN — METHYLPREDNISOLONE SODIUM SUCCINATE SCH MG: 40 INJECTION, POWDER, FOR SOLUTION INTRAMUSCULAR; INTRAVENOUS at 09:33

## 2020-01-04 RX ADMIN — BUDESONIDE AND FORMOTEROL FUMARATE DIHYDRATE SCH PUFF: 160; 4.5 AEROSOL RESPIRATORY (INHALATION) at 21:17

## 2020-01-04 RX ADMIN — IPRATROPIUM BROMIDE AND ALBUTEROL SULFATE SCH AMP: .5; 3 SOLUTION RESPIRATORY (INHALATION) at 20:53

## 2020-01-04 RX ADMIN — DOXYCYCLINE SCH MLS/HR: 100 INJECTION, POWDER, LYOPHILIZED, FOR SOLUTION INTRAVENOUS at 21:17

## 2020-01-04 RX ADMIN — RAMIPRIL SCH MG: 5 CAPSULE ORAL at 09:33

## 2020-01-04 NOTE — PN
Progress Note (short form)





- Note


Progress Note: 


Breathing feels a little better today.  Used NIPPV support until about 4 AM. 


Tachypneic with eating and speaking. 


No CP. 





 Intake & Output











 01/01/20 01/02/20 01/03/20 01/04/20





 23:59 23:59 23:59 23:59


 


Intake Total 1920 470 770 360


 


Output Total   1200 400


 


Balance 1920 470 -430 -40


 


Weight 126 lb   








 Last Vital Signs











Temp Pulse Resp BP Pulse Ox


 


 98.3 F   127 H  23 H  131/93   95 


 


 01/04/20 10:00  01/04/20 10:00  01/04/20 10:00  01/04/20 10:00  01/04/20 12:06








Active Medications





Acetaminophen (Tylenol -)  650 mg PO Q4H PRN


   PRN Reason: FEVER


Albuterol Sulfate (Ventolin Hfa Inhaler -)  2 puff IH Q4H PRN


   PRN Reason: SHORT OF BREATH/WHEEZING


Albuterol/Ipratropium (Duoneb -)  1 amp NEB RQID [facility]


   Last Admin: 01/04/20 12:06 Dose:  1 amp


Atorvastatin Calcium (Lipitor -)  20 mg PO HS [facility]


   Last Admin: 01/03/20 21:22 Dose:  20 mg


Budesonide/Formoterol Fumarate (Symbicort 160/4.5mcg -)  1 puff IH BID [facility]


   Last Admin: 01/04/20 09:34 Dose:  1 puff


Clonazepam (Klonopin -)  0.5 mg PO BID [facility]


   Last Admin: 01/04/20 09:33 Dose:  0.5 mg


Enoxaparin Sodium (Lovenox -)  40 mg SQ DAILY [facility]


   Last Admin: 01/04/20 09:32 Dose:  40 mg


Folic Acid (Folic Acid -)  1 mg PO DAILY [facility]


   Last Admin: 01/04/20 09:33 Dose:  1 mg


Furosemide (Lasix Injection -)  40 mg IVPUSH DAILY [facility]


   Last Admin: 01/04/20 09:33 Dose:  40 mg


Ceftriaxone Sodium 1 gm/ (Dextrose)  50 mls @ 100 mls/hr IVPB DAILY [facility]


   Last Admin: 01/04/20 09:33 Dose:  100 mls/hr


Doxycycline Hyclate 100 mg/ (Dextrose)  100 mls @ 50 mls/hr IVPB BID [facility]


   Last Admin: 01/04/20 10:28 Dose:  50 mls/hr


Methotrexate (Mexate -)  15 mg PO Q7D@1000 [facility]


   Last Admin: 01/01/20 09:46 Dose:  15 mg


Methylprednisolone Sodium Succinate (Solu-Medrol -)  40 mg IVPUSH Q8H-IV [facility]


   Last Admin: 01/04/20 09:33 Dose:  40 mg


Ramipril (Altace -)  5 mg PO DAILY [facility]


   Last Admin: 01/04/20 09:33 Dose:  5 mg














Constitutional: Yes: Thin, Dysneic


Eyes: Yes: WNL


HENT: Yes: WNL


Neck: Yes: WNL


Cardiovascular: Yes: Regular Rate and Rhythm, S1, S2


Respiratory: Yes: Scattered Bilateral Rhonchi and Expiratory Wheezes


Gastrointestinal: Yes: Normal Bowel Sounds, Soft


Edema: Yes


Labs: 


  


 


 Laboratory Results - last 24 hr











  01/01/20 01/04/20 01/04/20





  02:00 06:15 06:15


 


WBC   6.9 


 


RBC   4.07 


 


Hgb   12.4 


 


Hct   37.8 


 


MCV   92.9 


 


MCH   30.4 


 


MCHC   32.7 


 


RDW   16.2 H 


 


Plt Count   232  D 


 


MPV   8.4  D 


 


Absolute Neuts (auto)   5.9 


 


Neutrophils %   85.8 H 


 


Lymphocytes %   8.7  D 


 


Monocytes %   5.2 


 


Eosinophils %   0.0 


 


Basophils %   0.3 


 


Nucleated RBC %   0 


 


Sodium    135 L


 


Potassium    5.1


 


Chloride    90 L


 


Carbon Dioxide    42 H


 


Anion Gap    4 L


 


BUN    21.1 H


 


Creatinine    0.8


 


Est GFR (CKD-EPI)AfAm    110.96


 


Est GFR (CKD-EPI)NonAf    95.74


 


Random Glucose    113 H


 


Calcium    9.3


 


Magnesium    2.7 H


 


Adenovirus (PCR)  Negative  


 


Human Metapneumovir PCR  Positive H  


 


Influenza A (H1) PCR  Negative  


 


Influenza B (RT-PCR)  Negative  


 


Parainfluenza 1 (PCR)  Negative  


 


Parainfluenza 2 (PCR)  Negative  


 


Parainfluenza 3 (PCR)  Negative  


 


RSV Type A (PCR)  Negative  


 


RSV Type B (PCR)  Negative  


 


Rhinovirus (PCR)  Negative  

















Assessment/Plan








Problem List





- Problems


(1) Acute respiratory failure


Code(s): J96.00 - ACUTE RESPIRATORY FAILURE, UNSP W HYPOXIA OR HYPERCAPNIA   





(2) Pneumonia


Code(s): J18.9 - PNEUMONIA, UNSPECIFIED ORGANISM   





(3) Acute on chronic respiratory failure with hypoxia and hypercapnia


Code(s): J96.21 - ACUTE AND CHRONIC RESPIRATORY FAILURE WITH HYPOXIA; J96.22 - 

ACUTE AND CHRONIC RESPIRATORY FAILURE WITH HYPERCAPNIA   





(4) Anxiety and depression


Code(s): F41.9 - ANXIETY DISORDER, UNSPECIFIED; F32.9 - MAJOR DEPRESSIVE 

DISORDER, SINGLE EPISODE, UNSPECIFIED   





(5) COPD (chronic obstructive pulmonary disease)


Code(s): J44.9 - CHRONIC OBSTRUCTIVE PULMONARY DISEASE, UNSPECIFIED   


Qualifiers: 


   COPD type: emphysema   Emphysema type: unspecified   Qualified Code(s): 

J43.9 - Emphysema, unspecified   





(6) Cough


Code(s): R05 - COUGH   





(7) Edema


Code(s): R60.9 - EDEMA, UNSPECIFIED   


Qualifiers: 


   Edema type: unspecified   Qualified Code(s): R60.9 - Edema, unspecified   





(8) Rheumatoid arthritis


Code(s): M06.9 - RHEUMATOID ARTHRITIS, UNSPECIFIED   





8 LLL nodule





Assessment/Plan


Acute on chronic hypoxemic/hypercapneic resp failure


COPD on home 02


HIV/RA on Methotrexate


Pneumonia


Referred for lung transplant [facility] (patient did not submit paperwork)


CABG


HTN


HLD


CHF


LLL NODULE





NIPPV HS/PRN 


IV STEROIDS 


BRONCHODILATORS


F/U CHEST CT OUTPATIENT


ABX PER ID 


SYMBICORT BID 





DR FUNG

## 2020-01-04 NOTE — PN
Progress Note, Physician


Chief Complaint: 





Patient feels improve now saturating well on home O2





- Current Medication List


Current Medications: 


Active Medications





Acetaminophen (Tylenol -)  650 mg PO Q4H PRN


   PRN Reason: FEVER


Albuterol Sulfate (Ventolin Hfa Inhaler -)  2 puff IH Q4H PRN


   PRN Reason: SHORT OF BREATH/WHEEZING


Albuterol/Ipratropium (Duoneb -)  1 amp [facility]B [facility]QID [facility]


   Last Admin: 01/04/20 07:57 Dose:  1 amp


Atorvastatin Calcium (Lipitor -)  20 mg PO HS [facility]


   Last Admin: 01/03/20 21:22 Dose:  20 mg


Budesonide/Formoterol Fumarate (Symbicort 160/4.5mcg -)  1 puff IH BID [facility]


   Last Admin: 01/03/20 21:22 Dose:  1 puff


Clonazepam (Klonopin -)  0.5 mg PO BID [facility]


   Last Admin: 01/03/20 21:22 Dose:  0.5 mg


Enoxaparin Sodium (Lovenox -)  40 mg SQ DAILY [facility]


   Last Admin: 01/03/20 09:23 Dose:  40 mg


Folic Acid (Folic Acid -)  1 mg PO DAILY [facility]


   Last Admin: 01/03/20 09:22 Dose:  1 mg


Furosemide (Lasix Injection -)  40 mg IVPUSH DAILY [facility]


   Last Admin: 01/03/20 09:22 Dose:  40 mg


Ceftriaxone Sodium 1 gm/ (Dextrose)  50 mls @ 100 mls/hr IVPB DAILY [facility]


   Last Admin: 01/03/20 09:22 Dose:  100 mls/hr


Doxycycline Hyclate 100 mg/ (Dextrose)  100 mls @ 50 mls/hr IVPB BID [facility]


   Last Admin: 01/03/20 21:25 Dose:  50 mls/hr


Methotrexate (Mexate -)  15 mg PO Q7D@1000 [facility]


   Last Admin: 01/01/20 09:46 Dose:  15 mg


Methylprednisolone Sodium Succinate (Solu-Medrol -)  40 mg IVPUSH Q8H-IV [facility]


   Last Admin: 01/04/20 02:14 Dose:  40 mg


Ramipril (Altace -)  5 mg PO DAILY [facility]


   Last Admin: 01/03/20 09:22 Dose:  5 mg











- Objective


Vital Signs: 


 Vital Signs











Temperature  97.8 F   01/04/20 06:00


 


Pulse Rate  96 H  01/04/20 06:00


 


Respiratory Rate  22 H  01/04/20 06:00


 


Blood Pressure  121/74   01/04/20 06:00


 


O2 Sat by Pulse Oximetry (%)  97   01/04/20 07:56











General: Young male in mild respiratory distress.


HEENT; mucous membranes moist, no anemia, no jaundice, PERRLA, no nystagmus


Neck: No JVD, supple, no bruit, thyroid palpably normal, normal carotid 

pulsations.


Chest: Nontender, bilateral wheezing.


CVS: S1-S2 regularno murmur/gallop/rub


Abdomen: Nondistended, soft, bowel sounds present.


Extremities: + edema.,  No calf  tenderness, pulses present


CNS: AO X3 , no gross motor sensory deficit


Labs: 


 CBC, BMP





 01/04/20 06:15 





 01/04/20 06:15 





 INR, PTT











INR  0.93  (0.83-1.09)   01/01/20  03:20    














Problem List





- Problems


(1) Acute on chronic respiratory failure with hypoxemia


Assessment/Plan: 


Continue IV methylprednisolone 40 mg 3 times daily, bronchodilator and 

pulmonary input


Code(s): J96.21 - ACUTE AND CHRONIC RESPIRATORY FAILURE WITH HYPOXIA   





(2) Pneumonia


Assessment/Plan: 


CT chest showed right lower lobe pneumonia, put on ceftriaxone 1 g daily and 

doxycycline ID consult, sputum culture and follow-up procalcitonin


Code(s): J18.9 - PNEUMONIA, UNSPECIFIED ORGANISM   





(3) COPD exacerbation


Assessment/Plan: 


Severe COPD patient was referred for lung transplant, follow-up pulmonary 

recommendations, bronchodilators and IV hydration.


Code(s): J44.1 - CHRONIC OBSTRUCTIVE PULMONARY DISEASE W (ACUTE) EXACERBATION   





(4) Diastolic CHF


Assessment/Plan: 


Compensated continue Lasix 40 mg daily


Code(s): I50.30 - UNSPECIFIED DIASTOLIC (CONGESTIVE) HEART FAILURE   


Qualifiers: 


   Heart failure chronicity: unspecified   Qualified Code(s): I50.30 - 

Unspecified diastolic (congestive) heart failure   





(5) Hypercholesterolemia


Assessment/Plan: 


Continue statin


Code(s): E78.0 - PURE HYPERCHOLESTEROLEMIA * DO NOT USE *   





(6) CAD (coronary artery disease)


Assessment/Plan: 


Stable


Problems reviewed: Yes   


Code(s): I25.10 - ATHSCL HEART DISEASE OF NATIVE CORONARY ARTERY W/O ANG PCTRS 

  





(7) Rheumatoid arthritis


Assessment/Plan: 


Cont home medications.


Problems reviewed: Yes   


Code(s): M06.9 - RHEUMATOID ARTHRITIS, UNSPECIFIED

## 2020-01-05 LAB
ANION GAP SERPL CALC-SCNC: 2 MMOL/L (ref 8–16)
BASOPHILS # BLD: 0 % (ref 0–2)
BUN SERPL-MCNC: 20.9 MG/DL (ref 7–18)
CALCIUM SERPL-MCNC: 9.2 MG/DL (ref 8.5–10.1)
CHLORIDE SERPL-SCNC: 91 MMOL/L (ref 98–107)
CO2 SERPL-SCNC: 44 MMOL/L (ref 21–32)
CREAT SERPL-MCNC: 0.7 MG/DL (ref 0.55–1.3)
DEPRECATED RDW RBC AUTO: 16 % (ref 11.9–15.9)
EOSINOPHIL # BLD: 0 % (ref 0–4.5)
GLUCOSE SERPL-MCNC: 101 MG/DL (ref 74–106)
HCT VFR BLD CALC: 36.6 % (ref 35.4–49)
HGB BLD-MCNC: 12.1 GM/DL (ref 11.7–16.9)
LYMPHOCYTES # BLD: 3.8 % (ref 8–40)
MCH RBC QN AUTO: 30.5 PG (ref 25.7–33.7)
MCHC RBC AUTO-ENTMCNC: 33 G/DL (ref 32–35.9)
MCV RBC: 92.5 FL (ref 80–96)
MONOCYTES # BLD AUTO: 4.4 % (ref 3.8–10.2)
NEUTROPHILS # BLD: 91.8 % (ref 42.8–82.8)
PLATELET # BLD AUTO: 253 K/MM3 (ref 134–434)
PLATELET BLD QL SMEAR: ADEQUATE
PMV BLD: 7.8 FL (ref 7.5–11.1)
POTASSIUM SERPLBLD-SCNC: 5.1 MMOL/L (ref 3.5–5.1)
RBC # BLD AUTO: 3.95 M/MM3 (ref 4–5.6)
SODIUM SERPL-SCNC: 137 MMOL/L (ref 136–145)
WBC # BLD AUTO: 6.8 K/MM3 (ref 4–10)

## 2020-01-05 RX ADMIN — DILTIAZEM HYDROCHLORIDE SCH MG: 30 TABLET, FILM COATED ORAL at 21:40

## 2020-01-05 RX ADMIN — ATORVASTATIN CALCIUM SCH MG: 20 TABLET, FILM COATED ORAL at 21:40

## 2020-01-05 RX ADMIN — CEFTRIAXONE SCH MLS/HR: 1 INJECTION, POWDER, FOR SOLUTION INTRAMUSCULAR; INTRAVENOUS at 09:12

## 2020-01-05 RX ADMIN — FOLIC ACID SCH MG: 1 TABLET ORAL at 09:12

## 2020-01-05 RX ADMIN — IPRATROPIUM BROMIDE AND ALBUTEROL SULFATE SCH AMP: .5; 3 SOLUTION RESPIRATORY (INHALATION) at 16:15

## 2020-01-05 RX ADMIN — IPRATROPIUM BROMIDE AND ALBUTEROL SULFATE SCH AMP: .5; 3 SOLUTION RESPIRATORY (INHALATION) at 07:35

## 2020-01-05 RX ADMIN — METHYLPREDNISOLONE SODIUM SUCCINATE SCH MG: 40 INJECTION, POWDER, FOR SOLUTION INTRAMUSCULAR; INTRAVENOUS at 02:02

## 2020-01-05 RX ADMIN — ENOXAPARIN SODIUM SCH MG: 40 INJECTION SUBCUTANEOUS at 09:12

## 2020-01-05 RX ADMIN — DOXYCYCLINE HYCLATE SCH MG: 100 CAPSULE ORAL at 09:12

## 2020-01-05 RX ADMIN — IPRATROPIUM BROMIDE AND ALBUTEROL SULFATE SCH AMP: .5; 3 SOLUTION RESPIRATORY (INHALATION) at 20:56

## 2020-01-05 RX ADMIN — IPRATROPIUM BROMIDE AND ALBUTEROL SULFATE SCH AMP: .5; 3 SOLUTION RESPIRATORY (INHALATION) at 11:34

## 2020-01-05 RX ADMIN — METHYLPREDNISOLONE SODIUM SUCCINATE SCH MG: 40 INJECTION, POWDER, FOR SOLUTION INTRAMUSCULAR; INTRAVENOUS at 17:13

## 2020-01-05 RX ADMIN — RAMIPRIL SCH MG: 5 CAPSULE ORAL at 09:12

## 2020-01-05 RX ADMIN — BUDESONIDE AND FORMOTEROL FUMARATE DIHYDRATE SCH PUFF: 160; 4.5 AEROSOL RESPIRATORY (INHALATION) at 21:39

## 2020-01-05 RX ADMIN — PANTOPRAZOLE SODIUM SCH MG: 20 TABLET, DELAYED RELEASE ORAL at 09:12

## 2020-01-05 RX ADMIN — DOXYCYCLINE HYCLATE SCH MG: 100 CAPSULE ORAL at 17:13

## 2020-01-05 RX ADMIN — DILTIAZEM HYDROCHLORIDE SCH MG: 30 TABLET, FILM COATED ORAL at 13:00

## 2020-01-05 RX ADMIN — FUROSEMIDE SCH MG: 10 INJECTION, SOLUTION INTRAVENOUS at 09:12

## 2020-01-05 RX ADMIN — METHYLPREDNISOLONE SODIUM SUCCINATE SCH MG: 40 INJECTION, POWDER, FOR SOLUTION INTRAMUSCULAR; INTRAVENOUS at 09:12

## 2020-01-05 RX ADMIN — BUDESONIDE AND FORMOTEROL FUMARATE DIHYDRATE SCH PUFF: 160; 4.5 AEROSOL RESPIRATORY (INHALATION) at 09:13

## 2020-01-05 NOTE — PN
Progress Note, Physician


Chief Complaint: 





Patient feels improve now saturating well on home O2





- Current Medication List


Current Medications: 


Active Medications





Acetaminophen (Tylenol -)  650 mg PO Q4H PRN


   PRN Reason: FEVER


Albuterol Sulfate (Ventolin Hfa Inhaler -)  2 puff IH Q4H PRN


   PRN Reason: SHORT OF BREATH/WHEEZING


Albuterol/Ipratropium (Duoneb -)  1 amp NEB RQID [facility]


   Last Admin: 01/05/20 07:35 Dose:  1 amp


Atorvastatin Calcium (Lipitor -)  20 mg PO HS [facility]


   Last Admin: 01/04/20 21:17 Dose:  20 mg


Budesonide/Formoterol Fumarate (Symbicort 160/4.5mcg -)  1 puff IH BID [facility]


   Last Admin: 01/04/20 21:17 Dose:  1 puff


Clonazepam (Klonopin -)  0.5 mg PO BID [facility]


   Last Admin: 01/04/20 21:17 Dose:  0.5 mg


Enoxaparin Sodium (Lovenox -)  40 mg SQ DAILY [facility]


   Last Admin: 01/04/20 09:32 Dose:  40 mg


Folic Acid (Folic Acid -)  1 mg PO DAILY [facility]


   Last Admin: 01/04/20 09:33 Dose:  1 mg


Furosemide (Lasix Injection -)  40 mg IVPUSH DAILY [facility]


   Last Admin: 01/04/20 09:33 Dose:  40 mg


Ceftriaxone Sodium 1 gm/ (Dextrose)  50 mls @ 100 mls/hr IVPB DAILY [facility]


   Last Admin: 01/04/20 09:33 Dose:  100 mls/hr


Doxycycline Hyclate 100 mg/ (Dextrose)  100 mls @ 50 mls/hr IVPB BID [facility]


   Last Admin: 01/04/20 21:17 Dose:  50 mls/hr


Methotrexate (Mexate -)  15 mg PO Q7D@1000 [facility]


   Last Admin: 01/01/20 09:46 Dose:  15 mg


Methylprednisolone Sodium Succinate (Solu-Medrol -)  40 mg IVPUSH Q8H-IV [facility]


   Last Admin: 01/05/20 02:02 Dose:  40 mg


Ramipril (Altace -)  5 mg PO DAILY [facility]


   Last Admin: 01/04/20 09:33 Dose:  5 mg











- Objective


Vital Signs: 


 Vital Signs











Temperature  98.3 F   01/05/20 06:00


 


Pulse Rate  95 H  01/05/20 06:00


 


Respiratory Rate  20   01/05/20 06:00


 


Blood Pressure  123/68   01/05/20 06:00


 


O2 Sat by Pulse Oximetry (%)  99   01/05/20 07:35











Labs: 


 CBC, BMP





 01/05/20 06:26 





 01/05/20 06:26 





 INR, PTT











INR  0.93  (0.83-1.09)   01/01/20  03:20    














Problem List





- Problems


(1) Acute on chronic respiratory failure with hypoxemia


Assessment/Plan: 


Continue IV methylprednisolone 40 mg 3 times daily, bronchodilator and 

pulmonary input


Code(s): J96.21 - ACUTE AND CHRONIC RESPIRATORY FAILURE WITH HYPOXIA   





(2) Pneumonia


Assessment/Plan: 


CT chest showed right lower lobe pneumonia, put on ceftriaxone 1 g daily and 

doxycycline ID consult, sputum culture and follow-up procalcitonin


Code(s): J18.9 - PNEUMONIA, UNSPECIFIED ORGANISM   





(3) COPD exacerbation


Assessment/Plan: 


Severe COPD patient was referred for lung transplant, follow-up pulmonary 

recommendations, bronchodilators and IV hydration.


Code(s): J44.1 - CHRONIC OBSTRUCTIVE PULMONARY DISEASE W (ACUTE) EXACERBATION   





(4) Diastolic CHF


Assessment/Plan: 


Compensated continue Lasix 40 mg daily


Code(s): I50.30 - UNSPECIFIED DIASTOLIC (CONGESTIVE) HEART FAILURE   


Qualifiers: 


   Heart failure chronicity: unspecified   Qualified Code(s): I50.30 - 

Unspecified diastolic (congestive) heart failure   





(5) Hypercholesterolemia


Assessment/Plan: 


Continue statin


Code(s): E78.0 - PURE HYPERCHOLESTEROLEMIA * DO NOT USE *   





(6) CAD (coronary artery disease)


Assessment/Plan: 


Stable


Code(s): I25.10 - ATHSCL HEART DISEASE OF NATIVE CORONARY ARTERY W/O ANG PCTRS 

  





(7) Rheumatoid arthritis


Assessment/Plan: 


Cont home medications.


Code(s): M06.9 - RHEUMATOID ARTHRITIS, UNSPECIFIED

## 2020-01-05 NOTE — PN
Progress Note (short form)





- Note


Progress Note: 


Breathing feels a little better today.  Used NIPPV support until about 6 AM. 


Appears less tachypneic with eating and speaking. 


No CP. 





 Intake & Output











 01/02/20 01/03/20 01/04/20 01/05/20





 23:59 23:59 23:59 23:59


 


Intake Total 470 770 650 20


 


Output Total  1200 1300 600


 


Balance 470 -430 -650 -580


 


Weight  126 lb  122 lb 6.4 oz











 Last Vital Signs











Temp Pulse Resp BP Pulse Ox


 


 98.3 F   95 H  20   123/68   99 


 


 01/05/20 06:00  01/05/20 06:00  01/05/20 06:00  01/05/20 06:00  01/05/20 07:35








Active Medications





Acetaminophen (Tylenol -)  650 mg PO Q4H PRN


   PRN Reason: FEVER


Albuterol Sulfate (Ventolin Hfa Inhaler -)  2 puff IH Q4H PRN


   PRN Reason: SHORT OF BREATH/WHEEZING


Albuterol/Ipratropium (Duoneb -)  1 amp NEB RQID [facility]


   Last Admin: 01/05/20 07:35 Dose:  1 amp


Atorvastatin Calcium (Lipitor -)  20 mg PO HS [facility]


   Last Admin: 01/04/20 21:17 Dose:  20 mg


Budesonide/Formoterol Fumarate (Symbicort 160/4.5mcg -)  1 puff IH BID [facility]


   Last Admin: 01/05/20 09:13 Dose:  1 puff


Clonazepam (Klonopin -)  0.5 mg PO BID [facility]


   Last Admin: 01/05/20 09:12 Dose:  0.5 mg


Diltiazem HCl (Cardizem -)  30 mg PO TID [facility]


Doxycycline Hyclate (Vibramycin -)  100 mg PO BID@1000,1800 [facility]


   Last Admin: 01/05/20 09:12 Dose:  100 mg


Enoxaparin Sodium (Lovenox -)  40 mg SQ DAILY [facility]


   Last Admin: 01/05/20 09:12 Dose:  40 mg


Folic Acid (Folic Acid -)  1 mg PO DAILY [facility]


   Last Admin: 01/05/20 09:12 Dose:  1 mg


Furosemide (Lasix Injection -)  40 mg IVPUSH DAILY [facility]


   Last Admin: 01/05/20 09:12 Dose:  40 mg


Ceftriaxone Sodium 1 gm/ (Dextrose)  50 mls @ 100 mls/hr IVPB DAILY [facility]


   Last Admin: 01/05/20 09:12 Dose:  100 mls/hr


Methotrexate (Mexate -)  15 mg PO Q7D@1000 [facility]


   Last Admin: 01/01/20 09:46 Dose:  15 mg


Methylprednisolone Sodium Succinate (Solu-Medrol -)  40 mg IVPUSH Q8H-IV [facility]


   Last Admin: 01/05/20 09:12 Dose:  40 mg


Pantoprazole Sodium (Protonix -)  20 mg PO DAILY [facility]


   Last Admin: 01/05/20 09:12 Dose:  20 mg


Ramipril (Altace -)  5 mg PO DAILY [facility]


   Last Admin: 01/05/20 09:12 Dose:  5 mg














Constitutional: Yes: Thin, Less dysneic


Eyes: Yes: WNL


HENT: Yes: WNL


Neck: Yes: WNL


Cardiovascular: Yes: Regular Rate and Rhythm, S1, S2


Respiratory: Yes: Scattered Bilateral Rhonchi and Expiratory Wheezes


Gastrointestinal: Yes: Normal Bowel Sounds, Soft


Edema: Yes


Labs: 


  


 


 Laboratory Results - last 24 hr











  01/04/20 01/05/20 01/05/20





  06:15 06:26 06:26


 


WBC   6.8 


 


RBC   3.95 L 


 


Hgb   12.1 


 


Hct   36.6 


 


MCV   92.5 


 


MCH   30.5 


 


MCHC   33.0 


 


RDW   16.0 H 


 


Plt Count   253 


 


MPV   7.8 


 


Absolute Neuts (auto)   6.2 


 


Total Counted   100 


 


Neutrophils %   91.8 H 


 


Neutrophils % (Manual)   89.0 H 


 


Band Neutrophils %   2.0 


 


Lymphocytes %   3.8 L D 


 


Lymphocytes % (Manual)   4.0 L 


 


Monocytes %   4.4 


 


Monocytes % (Manual)   5  D 


 


Eosinophils %   0.0 


 


Eosinophils % (Manual)   5.0 H D 


 


Basophils %   0.0 


 


Nucleated RBC %   0 


 


Hypochromia   1+ 


 


Platelet Estimate   Adequate 


 


Platelet Comment   No clotting detected 


 


Sodium  135 L   137


 


Potassium  5.1   5.1


 


Chloride  90 L   91 L


 


Carbon Dioxide  42 H   44 H


 


Anion Gap  4 L   2 L


 


BUN  21.1 H   20.9 H


 


Creatinine  0.8   0.7


 


Est GFR (CKD-EPI)AfAm  110.96   117.22


 


Est GFR (CKD-EPI)NonAf  95.74   101.14


 


Random Glucose  113 H   101


 


Calcium  9.3   9.2


 


Magnesium  2.7 H  

















Assessment/Plan








Problem List





- Problems


(1) Acute respiratory failure


Code(s): J96.00 - ACUTE RESPIRATORY FAILURE, UNSP W HYPOXIA OR HYPERCAPNIA   





(2) Pneumonia


Code(s): J18.9 - PNEUMONIA, UNSPECIFIED ORGANISM   





(3) Acute on chronic respiratory failure with hypoxia and hypercapnia


Code(s): J96.21 - ACUTE AND CHRONIC RESPIRATORY FAILURE WITH HYPOXIA; J96.22 - 

ACUTE AND CHRONIC RESPIRATORY FAILURE WITH HYPERCAPNIA   





(4) Anxiety and depression


Code(s): F41.9 - ANXIETY DISORDER, UNSPECIFIED; F32.9 - MAJOR DEPRESSIVE 

DISORDER, SINGLE EPISODE, UNSPECIFIED   





(5) COPD (chronic obstructive pulmonary disease)


Code(s): J44.9 - CHRONIC OBSTRUCTIVE PULMONARY DISEASE, UNSPECIFIED   


Qualifiers: 


   COPD type: emphysema   Emphysema type: unspecified   Qualified Code(s): 

J43.9 - Emphysema, unspecified   





(6) Cough


Code(s): R05 - COUGH   





(7) Edema


Code(s): R60.9 - EDEMA, UNSPECIFIED   


Qualifiers: 


   Edema type: unspecified   Qualified Code(s): R60.9 - Edema, unspecified   





(8) Rheumatoid arthritis


Code(s): M06.9 - RHEUMATOID ARTHRITIS, UNSPECIFIED   





8 LLL nodule





Assessment/Plan


Acute on chronic hypoxemic/hypercapneic resp failure


COPD on home 02


HIV/RA on Methotrexate


Pneumonia


Referred for lung transplant [facility] (patient did not submit paperwork)


CABG


HTN


HLD


CHF


LLL NODULE





NIPPV HS/PRN 


IV STEROIDS: WILL TAPER TOMORROW  


BRONCHODILATORS


F/U CHEST CT OUTPATIENT


ABX PER ID 


SYMBICORT BID 





DR FUNG

## 2020-01-06 LAB
ANION GAP SERPL CALC-SCNC: 2 MMOL/L (ref 8–16)
ANISOCYTOSIS BLD QL: 0
BASOPHILS # BLD: 0.1 % (ref 0–2)
BUN SERPL-MCNC: 31 MG/DL (ref 7–18)
CALCIUM SERPL-MCNC: 9.1 MG/DL (ref 8.5–10.1)
CHLORIDE SERPL-SCNC: 91 MMOL/L (ref 98–107)
CO2 SERPL-SCNC: 43 MMOL/L (ref 21–32)
CREAT SERPL-MCNC: 0.8 MG/DL (ref 0.55–1.3)
DEPRECATED RDW RBC AUTO: 16.2 % (ref 11.9–15.9)
EOSINOPHIL # BLD: 0.1 % (ref 0–4.5)
GLUCOSE SERPL-MCNC: 99 MG/DL (ref 74–106)
HCT VFR BLD CALC: 37 % (ref 35.4–49)
HGB BLD-MCNC: 12.2 GM/DL (ref 11.7–16.9)
LYMPHOCYTES # BLD: 3.3 % (ref 8–40)
MACROCYTES BLD QL: 0
MCH RBC QN AUTO: 30.4 PG (ref 25.7–33.7)
MCHC RBC AUTO-ENTMCNC: 32.9 G/DL (ref 32–35.9)
MCV RBC: 92.3 FL (ref 80–96)
MONOCYTES # BLD AUTO: 3.7 % (ref 3.8–10.2)
NEUTROPHILS # BLD: 92.8 % (ref 42.8–82.8)
PLATELET # BLD AUTO: 253 K/MM3 (ref 134–434)
PLATELET BLD QL SMEAR: NORMAL
PMV BLD: 7.5 FL (ref 7.5–11.1)
POTASSIUM SERPLBLD-SCNC: 5.3 MMOL/L (ref 3.5–5.1)
RBC # BLD AUTO: 4.01 M/MM3 (ref 4–5.6)
SODIUM SERPL-SCNC: 136 MMOL/L (ref 136–145)
WBC # BLD AUTO: 6.8 K/MM3 (ref 4–10)

## 2020-01-06 RX ADMIN — ATORVASTATIN CALCIUM SCH MG: 20 TABLET, FILM COATED ORAL at 21:41

## 2020-01-06 RX ADMIN — METHYLPREDNISOLONE SODIUM SUCCINATE SCH MG: 40 INJECTION, POWDER, FOR SOLUTION INTRAMUSCULAR; INTRAVENOUS at 09:47

## 2020-01-06 RX ADMIN — DILTIAZEM HYDROCHLORIDE SCH MG: 30 TABLET, FILM COATED ORAL at 05:45

## 2020-01-06 RX ADMIN — DILTIAZEM HYDROCHLORIDE SCH MG: 30 TABLET, FILM COATED ORAL at 21:41

## 2020-01-06 RX ADMIN — RAMIPRIL SCH MG: 5 CAPSULE ORAL at 09:44

## 2020-01-06 RX ADMIN — PANTOPRAZOLE SODIUM SCH MG: 20 TABLET, DELAYED RELEASE ORAL at 09:44

## 2020-01-06 RX ADMIN — METHYLPREDNISOLONE SODIUM SUCCINATE SCH MG: 40 INJECTION, POWDER, FOR SOLUTION INTRAMUSCULAR; INTRAVENOUS at 02:09

## 2020-01-06 RX ADMIN — CEFUROXIME AXETIL SCH MG: 500 TABLET ORAL at 18:26

## 2020-01-06 RX ADMIN — BUDESONIDE AND FORMOTEROL FUMARATE DIHYDRATE SCH PUFF: 160; 4.5 AEROSOL RESPIRATORY (INHALATION) at 09:47

## 2020-01-06 RX ADMIN — METHYLPREDNISOLONE SODIUM SUCCINATE SCH MG: 40 INJECTION, POWDER, FOR SOLUTION INTRAMUSCULAR; INTRAVENOUS at 21:41

## 2020-01-06 RX ADMIN — IPRATROPIUM BROMIDE AND ALBUTEROL SULFATE SCH AMP: .5; 3 SOLUTION RESPIRATORY (INHALATION) at 20:05

## 2020-01-06 RX ADMIN — CEFUROXIME AXETIL SCH MG: 500 TABLET ORAL at 11:43

## 2020-01-06 RX ADMIN — DILTIAZEM HYDROCHLORIDE SCH MG: 30 TABLET, FILM COATED ORAL at 13:01

## 2020-01-06 RX ADMIN — DOXYCYCLINE HYCLATE SCH MG: 100 CAPSULE ORAL at 09:44

## 2020-01-06 RX ADMIN — ENOXAPARIN SODIUM SCH MG: 40 INJECTION SUBCUTANEOUS at 09:47

## 2020-01-06 RX ADMIN — FOLIC ACID SCH MG: 1 TABLET ORAL at 09:44

## 2020-01-06 RX ADMIN — BUDESONIDE AND FORMOTEROL FUMARATE DIHYDRATE SCH PUFF: 160; 4.5 AEROSOL RESPIRATORY (INHALATION) at 21:43

## 2020-01-06 RX ADMIN — IPRATROPIUM BROMIDE AND ALBUTEROL SULFATE SCH AMP: .5; 3 SOLUTION RESPIRATORY (INHALATION) at 15:38

## 2020-01-06 RX ADMIN — DOXYCYCLINE HYCLATE SCH MG: 100 CAPSULE ORAL at 18:26

## 2020-01-06 RX ADMIN — IPRATROPIUM BROMIDE AND ALBUTEROL SULFATE SCH AMP: .5; 3 SOLUTION RESPIRATORY (INHALATION) at 07:31

## 2020-01-06 RX ADMIN — IPRATROPIUM BROMIDE AND ALBUTEROL SULFATE SCH AMP: .5; 3 SOLUTION RESPIRATORY (INHALATION) at 11:20

## 2020-01-06 NOTE — PN
Progress Note (short form)





- Note


Progress Note: 


Awake and alert on NIPPV support.  


Reports he was placed on this AM due to elevated HR but his breathing felt 

stable. 


No CP. 





 Intake & Output











 01/03/20 01/04/20 01/05/20 01/06/20





 23:59 23:59 23:59 23:59


 


Intake Total 770 650 640 260


 


Output Total 1200 1300 1250 


 


Balance -430 -650 -610 260


 


Weight 126 lb  122 lb 6.4 oz 133 lb 3.2 oz








 Last Vital Signs











Temp Pulse Resp BP Pulse Ox


 


 97.8 F   112 H  22 H  146/72   96 


 


 01/06/20 09:05  01/06/20 09:05  01/06/20 09:05  01/06/20 09:05  01/06/20 11:43








Active Medications





Acetaminophen (Tylenol -)  650 mg PO Q4H PRN


   PRN Reason: FEVER


Albuterol Sulfate (Ventolin Hfa Inhaler -)  2 puff IH Q4H PRN


   PRN Reason: SHORT OF BREATH/WHEEZING


Albuterol/Ipratropium (Duoneb -)  1 amp NEB RQID [facility]


   Last Admin: 01/06/20 11:20 Dose:  1 amp


Atorvastatin Calcium (Lipitor -)  20 mg PO HS [facility]


   Last Admin: 01/05/20 21:40 Dose:  20 mg


Budesonide/Formoterol Fumarate (Symbicort 160/4.5mcg -)  1 puff IH BID [facility]


   Last Admin: 01/06/20 09:47 Dose:  1 puff


Cefuroxime Axetil (Ceftin -)  500 mg PO BIDWM [facility]


   Last Admin: 01/06/20 11:43 Dose:  500 mg


Clonazepam (Klonopin -)  0.5 mg PO BID [facility]


   Last Admin: 01/06/20 09:44 Dose:  0.5 mg


Diltiazem HCl (Cardizem -)  30 mg PO TID [facility]


   Last Admin: 01/06/20 05:45 Dose:  30 mg


Doxycycline Hyclate (Vibramycin -)  100 mg PO BID@1000,1800 [facility]


   Last Admin: 01/06/20 09:44 Dose:  100 mg


Enoxaparin Sodium (Lovenox -)  40 mg SQ DAILY [facility]


   Last Admin: 01/06/20 09:47 Dose:  40 mg


Folic Acid (Folic Acid -)  1 mg PO DAILY [facility]


   Last Admin: 01/06/20 09:44 Dose:  1 mg


Furosemide (Lasix -)  40 mg PO DAILY [facility]


   Last Admin: 01/06/20 09:44 Dose:  40 mg


Methotrexate (Mexate -)  15 mg PO Q7D@1000 [facility]


   Last Admin: 01/01/20 09:46 Dose:  15 mg


Methylprednisolone Sodium Succinate (Solu-Medrol -)  40 mg IVPUSH BID [facility]


   Last Admin: 01/06/20 09:47 Dose:  40 mg


Pantoprazole Sodium (Protonix -)  20 mg PO DAILY [facility]


   Last Admin: 01/06/20 09:44 Dose:  20 mg


Ramipril (Altace -)  5 mg PO DAILY [facility]


   Last Admin: 01/06/20 09:44 Dose:  5 mg











Constitutional: Yes: Thin, NAD on NIPPV support 


Eyes: Yes: WNL


HENT: Yes: WNL


Neck: Yes: WNL


Cardiovascular: Yes: Regular Rate and Rhythm, S1, S2


Respiratory: Yes: Scattered Bilateral Rhonchi, no Expiratory Wheezes noted 


Gastrointestinal: Yes: Normal Bowel Sounds, Soft


Edema: Yes


Labs: 


  


 


 Laboratory Results - last 24 hr











  01/06/20 01/06/20





  05:25 05:25


 


WBC  6.8 


 


RBC  4.01 


 


Hgb  12.2 


 


Hct  37.0 


 


MCV  92.3 


 


MCH  30.4 


 


MCHC  32.9 


 


RDW  16.2 H 


 


Plt Count  253 


 


MPV  7.5 


 


Absolute Neuts (auto)  6.3 


 


Neutrophils %  92.8 H 


 


Neutrophils % (Manual)  93.0 H 


 


Band Neutrophils %  0.0 


 


Lymphocytes %  3.3 L 


 


Lymphocytes % (Manual)  2.0 L D 


 


Monocytes %  3.7 L 


 


Monocytes % (Manual)  3 L 


 


Eosinophils %  0.1  D 


 


Eosinophils % (Manual)  0.0  D 


 


Basophils %  0.1  D 


 


Basophils % (Manual)  0.0 


 


Myelocytes % (Man)  1  D 


 


Promyelocytes % (Man)  0 


 


Blast Cells % (Manual)  0 


 


Nucleated RBC %  0 


 


Metamyelocytes  0 


 


Hypochromia  0 


 


Platelet Estimate  Normal 


 


Polychromasia  0 


 


Poikilocytosis  0 


 


Anisocytosis  0 


 


Microcytosis  0 


 


Macrocytosis  0 


 


Sodium   136


 


Potassium   5.3 H


 


Chloride   91 L


 


Carbon Dioxide   43 H


 


Anion Gap   2 L


 


BUN   31.0 H


 


Creatinine   0.8


 


Est GFR (CKD-EPI)AfAm   110.96


 


Est GFR (CKD-EPI)NonAf   95.74


 


Random Glucose   99


 


Calcium   9.1











Assessment/Plan








Problem List





- Problems


(1) Acute respiratory failure


Code(s): J96.00 - ACUTE RESPIRATORY FAILURE, UNSP W HYPOXIA OR HYPERCAPNIA   





(2) Pneumonia


Code(s): J18.9 - PNEUMONIA, UNSPECIFIED ORGANISM   





(3) Acute on chronic respiratory failure with hypoxia and hypercapnia


Code(s): J96.21 - ACUTE AND CHRONIC RESPIRATORY FAILURE WITH HYPOXIA; J96.22 - 

ACUTE AND CHRONIC RESPIRATORY FAILURE WITH HYPERCAPNIA   





(4) Anxiety and depression


Code(s): F41.9 - ANXIETY DISORDER, UNSPECIFIED; F32.9 - MAJOR DEPRESSIVE 

DISORDER, SINGLE EPISODE, UNSPECIFIED   





(5) COPD (chronic obstructive pulmonary disease)


Code(s): J44.9 - CHRONIC OBSTRUCTIVE PULMONARY DISEASE, UNSPECIFIED   


Qualifiers: 


   COPD type: emphysema   Emphysema type: unspecified   Qualified Code(s): 

J43.9 - Emphysema, unspecified   





(6) Cough


Code(s): R05 - COUGH   





(7) Edema


Code(s): R60.9 - EDEMA, UNSPECIFIED   


Qualifiers: 


   Edema type: unspecified   Qualified Code(s): R60.9 - Edema, unspecified   





(8) Rheumatoid arthritis


Code(s): M06.9 - RHEUMATOID ARTHRITIS, UNSPECIFIED   





8 LLL nodule





Assessment/Plan


Acute on chronic hypoxemic/hypercapneic resp failure


COPD on home 02


HIV/RA on Methotrexate


Pneumonia


Referred for lung transplant [facility] (patient did not submit paperwork)


CABG


HTN


HLD


CHF


LLL NODULE





NIPPV HS/PRN 


IV STEROIDS: LEAVE AT CURRENT DOSE   


BRONCHODILATORS


F/U CHEST CT OUTPATIENT


ABX PER ID 


SYMBICORT BID 





DR FUNG

## 2020-01-06 NOTE — PN
Progress Note, Physician


Chief Complaint: 





Patient feels improve now saturating well on NC 2-3 Ltr  





- Current Medication List


Current Medications: 


Active Medications





Acetaminophen (Tylenol -)  650 mg PO Q4H PRN


   PRN Reason: FEVER


Albuterol Sulfate (Ventolin Hfa Inhaler -)  2 puff IH Q4H PRN


   PRN Reason: SHORT OF BREATH/WHEEZING


Albuterol/Ipratropium (Duoneb -)  1 amp NEB RQID [facility]


   Last Admin: 01/06/20 07:31 Dose:  1 amp


Amoxicillin/Clavulanate Potassium (Augmentin - 500mg Tablet)  1 tab PO BID@0800,

1730 [facility]


Atorvastatin Calcium (Lipitor -)  20 mg PO HS [facility]


   Last Admin: 01/05/20 21:40 Dose:  20 mg


Budesonide/Formoterol Fumarate (Symbicort 160/4.5mcg -)  1 puff IH BID [facility]


   Last Admin: 01/05/20 21:39 Dose:  1 puff


Clonazepam (Klonopin -)  0.5 mg PO BID [facility]


   Last Admin: 01/05/20 21:40 Dose:  0.5 mg


Diltiazem HCl (Cardizem -)  30 mg PO TID [facility]


   Last Admin: 01/06/20 05:45 Dose:  30 mg


Doxycycline Hyclate (Vibramycin -)  100 mg PO BID@1000,1800 [facility]


   Last Admin: 01/05/20 17:13 Dose:  100 mg


Enoxaparin Sodium (Lovenox -)  40 mg SQ DAILY [facility]


   Last Admin: 01/05/20 09:12 Dose:  40 mg


Folic Acid (Folic Acid -)  1 mg PO DAILY [facility]


   Last Admin: 01/05/20 09:12 Dose:  1 mg


Furosemide (Lasix -)  40 mg PO DAILY [facility]


Methotrexate (Mexate -)  15 mg PO Q7D@1000 [facility]


   Last Admin: 01/01/20 09:46 Dose:  15 mg


Methylprednisolone Sodium Succinate (Solu-Medrol -)  40 mg IVPUSH BID [facility]


Pantoprazole Sodium (Protonix -)  20 mg PO DAILY [facility]


   Last Admin: 01/05/20 09:12 Dose:  20 mg


Ramipril (Altace -)  5 mg PO DAILY [facility]


   Last Admin: 01/05/20 09:12 Dose:  5 mg











- Objective


Vital Signs: 


 Vital Signs











Temperature  97.6 F   01/06/20 06:00


 


Pulse Rate  99 H  01/06/20 06:00


 


Respiratory Rate  20   01/06/20 06:00


 


Blood Pressure  107/69   01/06/20 06:00


 


O2 Sat by Pulse Oximetry (%)  96   01/06/20 07:32








General: Young male in mild respiratory distress.


HEENT; mucous membranes moist, no anemia, no jaundice, PERRLA, no nystagmus


Neck: No JVD, supple, no bruit, thyroid palpably normal, normal carotid 

pulsations.


Chest: Nontender, bilateral wheezing.


CVS: S1-S2 regularno murmur/gallop/rub


Abdomen: Nondistended, soft, bowel sounds present.


Extremities: + edema.,  No calf  tenderness, pulses present


CNS: AO X3 , no gross motor sensory deficit


Labs: 


 CBC, BMP





 01/06/20 05:25 





 01/06/20 05:25 





 INR, PTT











INR  0.93  (0.83-1.09)   01/01/20  03:20    














Problem List





- Problems


(1) Acute on chronic respiratory failure with hypoxemia


Assessment/Plan: 


Taper  IV methylprednisolone 40 mg BID , bronchodilator and pulmonary input


Code(s): J96.21 - ACUTE AND CHRONIC RESPIRATORY FAILURE WITH HYPOXIA   





(2) Pneumonia


Assessment/Plan: 


CT chest showed right lower lobe pneumonia, completed 6 days IV abx will switch 

to PO augmentin 500 mg BID and Doxycycline Legionella and all bacterial 

cultures are -ve.


Code(s): J18.9 - PNEUMONIA, UNSPECIFIED ORGANISM   





(3) COPD exacerbation


Assessment/Plan: 


Severe COPD patient was referred for lung transplant, follow-up pulmonary 

recommendations, bronchodilators and IV hydration.


Code(s): J44.1 - CHRONIC OBSTRUCTIVE PULMONARY DISEASE W (ACUTE) EXACERBATION   





(4) Diastolic CHF


Assessment/Plan: 


Compensated continue Po Lasix 40 mg daily


Code(s): I50.30 - UNSPECIFIED DIASTOLIC (CONGESTIVE) HEART FAILURE   


Qualifiers: 


   Heart failure chronicity: unspecified   Qualified Code(s): I50.30 - 

Unspecified diastolic (congestive) heart failure   





(5) Hypercholesterolemia


Assessment/Plan: 


Continue statin


Code(s): E78.0 - PURE HYPERCHOLESTEROLEMIA * DO NOT USE *   





(6) CAD (coronary artery disease)


Assessment/Plan: 


Stable


Code(s): I25.10 - ATHSCL HEART DISEASE OF NATIVE CORONARY ARTERY W/O ANG PCTRS 

  





(7) Rheumatoid arthritis


Assessment/Plan: 


Cont home medications.


Code(s): M06.9 - RHEUMATOID ARTHRITIS, UNSPECIFIED

## 2020-01-07 LAB
ANION GAP SERPL CALC-SCNC: 2 MMOL/L (ref 8–16)
ARTERIAL BLOOD GAS PCO2: 59.2 MMHG (ref 35–45)
ARTERIAL PATENCY WRIST A: POSITIVE
BASE EXCESS BLDA CALC-SCNC: 12.2 MEQ/L (ref -2–2)
BUN SERPL-MCNC: 28.6 MG/DL (ref 7–18)
CALCIUM SERPL-MCNC: 9.1 MG/DL (ref 8.5–10.1)
CHLORIDE SERPL-SCNC: 93 MMOL/L (ref 98–107)
CO2 SERPL-SCNC: 43 MMOL/L (ref 21–32)
CREAT SERPL-MCNC: 0.8 MG/DL (ref 0.55–1.3)
GLUCOSE SERPL-MCNC: 112 MG/DL (ref 74–106)
PO2 BLDA: 90 MMHG (ref 80–100)
POTASSIUM SERPLBLD-SCNC: 5 MMOL/L (ref 3.5–5.1)
SAO2 % BLDA: 97.1 % (ref 95–98)
SODIUM SERPL-SCNC: 138 MMOL/L (ref 136–145)

## 2020-01-07 RX ADMIN — PANTOPRAZOLE SODIUM SCH MG: 20 TABLET, DELAYED RELEASE ORAL at 10:44

## 2020-01-07 RX ADMIN — DILTIAZEM HYDROCHLORIDE SCH MG: 30 TABLET, FILM COATED ORAL at 21:15

## 2020-01-07 RX ADMIN — IPRATROPIUM BROMIDE AND ALBUTEROL SULFATE SCH AMP: .5; 3 SOLUTION RESPIRATORY (INHALATION) at 21:05

## 2020-01-07 RX ADMIN — DILTIAZEM HYDROCHLORIDE SCH MG: 30 TABLET, FILM COATED ORAL at 05:14

## 2020-01-07 RX ADMIN — METHYLPREDNISOLONE SODIUM SUCCINATE SCH MG: 40 INJECTION, POWDER, FOR SOLUTION INTRAMUSCULAR; INTRAVENOUS at 21:15

## 2020-01-07 RX ADMIN — IPRATROPIUM BROMIDE AND ALBUTEROL SULFATE SCH AMP: .5; 3 SOLUTION RESPIRATORY (INHALATION) at 15:43

## 2020-01-07 RX ADMIN — IPRATROPIUM BROMIDE AND ALBUTEROL SULFATE SCH AMP: .5; 3 SOLUTION RESPIRATORY (INHALATION) at 07:25

## 2020-01-07 RX ADMIN — FUROSEMIDE SCH MG: 20 TABLET ORAL at 10:44

## 2020-01-07 RX ADMIN — BUDESONIDE AND FORMOTEROL FUMARATE DIHYDRATE SCH PUFF: 160; 4.5 AEROSOL RESPIRATORY (INHALATION) at 10:45

## 2020-01-07 RX ADMIN — RAMIPRIL SCH MG: 5 CAPSULE ORAL at 10:44

## 2020-01-07 RX ADMIN — DILTIAZEM HYDROCHLORIDE SCH MG: 30 TABLET, FILM COATED ORAL at 14:54

## 2020-01-07 RX ADMIN — TIOTROPIUM BROMIDE INHALATION SPRAY SCH PUFF: 3.12 SPRAY, METERED RESPIRATORY (INHALATION) at 10:45

## 2020-01-07 RX ADMIN — FOLIC ACID SCH MG: 1 TABLET ORAL at 10:44

## 2020-01-07 RX ADMIN — DOXYCYCLINE HYCLATE SCH MG: 100 CAPSULE ORAL at 10:44

## 2020-01-07 RX ADMIN — BUDESONIDE AND FORMOTEROL FUMARATE DIHYDRATE SCH PUFF: 160; 4.5 AEROSOL RESPIRATORY (INHALATION) at 21:15

## 2020-01-07 RX ADMIN — CEFUROXIME AXETIL SCH MG: 500 TABLET ORAL at 18:17

## 2020-01-07 RX ADMIN — DOXYCYCLINE HYCLATE SCH MG: 100 CAPSULE ORAL at 18:17

## 2020-01-07 RX ADMIN — ATORVASTATIN CALCIUM SCH MG: 20 TABLET, FILM COATED ORAL at 21:15

## 2020-01-07 RX ADMIN — IPRATROPIUM BROMIDE AND ALBUTEROL SULFATE SCH AMP: .5; 3 SOLUTION RESPIRATORY (INHALATION) at 11:49

## 2020-01-07 RX ADMIN — ENOXAPARIN SODIUM SCH MG: 40 INJECTION SUBCUTANEOUS at 10:45

## 2020-01-07 RX ADMIN — CEFUROXIME AXETIL SCH MG: 500 TABLET ORAL at 12:32

## 2020-01-07 RX ADMIN — METHYLPREDNISOLONE SODIUM SUCCINATE SCH MG: 40 INJECTION, POWDER, FOR SOLUTION INTRAMUSCULAR; INTRAVENOUS at 10:44

## 2020-01-07 NOTE — PN
Progress Note, Physician





- Current Medication List


Current Medications: 


Active Medications





Acetaminophen (Tylenol -)  650 mg PO Q4H PRN


   PRN Reason: FEVER


Albuterol Sulfate (Ventolin Hfa Inhaler -)  2 puff IH Q4H PRN


   PRN Reason: SHORT OF BREATH/WHEEZING


Albuterol/Ipratropium (Duoneb -)  1 amp NEB RQID [facility]


   Last Admin: 01/06/20 20:05 Dose:  1 amp


Atorvastatin Calcium (Lipitor -)  20 mg PO HS [facility]


   Last Admin: 01/06/20 21:41 Dose:  20 mg


Budesonide/Formoterol Fumarate (Symbicort 160/4.5mcg -)  1 puff IH BID [facility]


   Last Admin: 01/06/20 21:43 Dose:  1 puff


Cefuroxime Axetil (Ceftin -)  500 mg PO BIDWM [facility]


   Last Admin: 01/06/20 18:26 Dose:  500 mg


Clonazepam (Klonopin -)  0.5 mg PO BID [facility]


   Last Admin: 01/06/20 21:41 Dose:  0.5 mg


Diltiazem HCl (Cardizem -)  30 mg PO TID [facility]


   Last Admin: 01/07/20 05:14 Dose:  30 mg


Doxycycline Hyclate (Vibramycin -)  100 mg PO BID@1000,1800 [facility]


   Last Admin: 01/06/20 18:26 Dose:  100 mg


Enoxaparin Sodium (Lovenox -)  40 mg SQ DAILY [facility]


   Last Admin: 01/06/20 09:47 Dose:  40 mg


Folic Acid (Folic Acid -)  1 mg PO DAILY [facility]


   Last Admin: 01/06/20 09:44 Dose:  1 mg


Furosemide (Lasix -)  40 mg PO DAILY [facility]


   Last Admin: 01/06/20 09:44 Dose:  40 mg


Methotrexate (Mexate -)  15 mg PO Q7D@1000 [facility]


   Last Admin: 01/01/20 09:46 Dose:  15 mg


Methylprednisolone Sodium Succinate (Solu-Medrol -)  40 mg IVPUSH BID [facility]


   Last Admin: 01/06/20 21:41 Dose:  40 mg


Pantoprazole Sodium (Protonix -)  20 mg PO DAILY [facility]


   Last Admin: 01/06/20 09:44 Dose:  20 mg


Ramipril (Altace -)  5 mg PO DAILY [facility]


   Last Admin: 01/06/20 09:44 Dose:  5 mg


Tiotropium Bromide (Spiriva Respimat)  2 puff IH DAILY [facility]











- Objective


Vital Signs: 


 Vital Signs











Temperature  97.8 F   01/07/20 05:32


 


Pulse Rate  98 H  01/07/20 05:32


 


Respiratory Rate  20   01/07/20 05:32


 


Blood Pressure  122/78   01/07/20 05:32


 


O2 Sat by Pulse Oximetry (%)  98   01/07/20 04:24








General: Young male in mild respiratory distress.


HEENT; mucous membranes moist, no anemia, no jaundice, PERRLA, no nystagmus


Neck: No JVD, supple, no bruit, thyroid palpably normal, normal carotid 

pulsations.


Chest: Nontender, bilateral wheezing.


CVS: S1-S2 regularno murmur/gallop/rub


Abdomen: Nondistended, soft, bowel sounds present.


Extremities: + edema.,  No calf  tenderness, pulses present


CNS: AO X3 , no gross motor sensory deficit


Labs: 


 CBC, BMP





 01/06/20 05:25 





 INR, PTT











INR  0.93  (0.83-1.09)   01/01/20  03:20    














Problem List





- Problems


(1) Acute on chronic respiratory failure with hypoxemia


Assessment/Plan: 


Taper  IV methylprednisolone 40 mg BID , bronchodilator and pulmonary input


Code(s): J96.21 - ACUTE AND CHRONIC RESPIRATORY FAILURE WITH HYPOXIA   





(2) Pneumonia


Assessment/Plan: 


CT chest showed right lower lobe pneumonia, completed 7 days IV abx will switch 

to PO  ceftin 500 mg BID and Doxycycline Legionella and all bacterial cultures 

are -ve.


Code(s): J18.9 - PNEUMONIA, UNSPECIFIED ORGANISM   





(3) COPD exacerbation


Assessment/Plan: 


Severe COPD patient was referred for lung transplant, follow-up pulmonary 

recommendations, bronchodilators and IV hydration.F/U VBG for Hypercarbia add 

Spiriva


Code(s): J44.1 - CHRONIC OBSTRUCTIVE PULMONARY DISEASE W (ACUTE) EXACERBATION   





(4) Diastolic CHF


Assessment/Plan: 


Compensated continue Po Lasix 20 mg daily


Code(s): I50.30 - UNSPECIFIED DIASTOLIC (CONGESTIVE) HEART FAILURE   


Qualifiers: 


   Heart failure chronicity: unspecified   Qualified Code(s): I50.30 - 

Unspecified diastolic (congestive) heart failure   





(5) Hypercholesterolemia


Assessment/Plan: 


Continue statin


Code(s): E78.0 - PURE HYPERCHOLESTEROLEMIA * DO NOT USE *   





(6) CAD (coronary artery disease)


Assessment/Plan: 


Stable


Code(s): I25.10 - ATHSCL HEART DISEASE OF NATIVE CORONARY ARTERY W/O ANG PCTRS 

  





(7) Rheumatoid arthritis


Assessment/Plan: 


Cont home medications.


Problems reviewed: Yes   


Code(s): M06.9 - RHEUMATOID ARTHRITIS, UNSPECIFIED   





(8) Hypercarbia


Assessment/Plan: 


Due to advanced COPD will decrease Lasix to 20 mg F/U VBG 


Problems reviewed: Yes   


Code(s): R06.89 - OTHER ABNORMALITIES OF BREATHING

## 2020-01-07 NOTE — PN
Progress Note (short form)





- Note


Progress Note: 


Awake and alert mildly dyspneic at rest and increases with with speaking.    


Used NIPPV overnight. 


No CP. 





  


 Intake & Output











 01/04/20 01/05/20 01/06/20 01/07/20





 23:59 23:59 23:59 23:59


 


Intake Total 650 640 520 250


 


Output Total 1300 1250  


 


Balance -650 -610 520 250


 


Weight  122 lb 6.4 oz 133 lb 3.2 oz 








 Last Vital Signs











Temp Pulse Resp BP Pulse Ox


 


 97.8 F   98 H  20   122/78   99 


 


 01/07/20 05:32  01/07/20 05:32  01/07/20 05:32  01/07/20 05:32  01/07/20 11:48








Active Medications





Acetaminophen (Tylenol -)  650 mg PO Q4H PRN


   PRN Reason: FEVER


Albuterol Sulfate (Ventolin Hfa Inhaler -)  2 puff IH Q4H PRN


   PRN Reason: SHORT OF BREATH/WHEEZING


Albuterol/Ipratropium (Duoneb -)  1 amp NEB RQID [facility]


   Last Admin: 01/07/20 11:49 Dose:  1 amp


Atorvastatin Calcium (Lipitor -)  20 mg PO HS [facility]


   Last Admin: 01/06/20 21:41 Dose:  20 mg


Budesonide/Formoterol Fumarate (Symbicort 160/4.5mcg -)  1 puff IH BID [facility]


   Last Admin: 01/07/20 10:45 Dose:  1 puff


Cefuroxime Axetil (Ceftin -)  500 mg PO BIDWM [facility]


   Last Admin: 01/07/20 12:32 Dose:  500 mg


Clonazepam (Klonopin -)  0.5 mg PO BID [facility]


   Last Admin: 01/07/20 10:44 Dose:  0.5 mg


Diltiazem HCl (Cardizem -)  30 mg PO TID [facility]


   Last Admin: 01/07/20 05:14 Dose:  30 mg


Doxycycline Hyclate (Vibramycin -)  100 mg PO BID@1000,1800 [facility]


   Last Admin: 01/07/20 10:44 Dose:  100 mg


Enoxaparin Sodium (Lovenox -)  40 mg SQ DAILY [facility]


   Last Admin: 01/07/20 10:45 Dose:  40 mg


Folic Acid (Folic Acid -)  1 mg PO DAILY [facility]


   Last Admin: 01/07/20 10:44 Dose:  1 mg


Furosemide (Lasix -)  20 mg PO DAILY [facility]


   Last Admin: 01/07/20 10:44 Dose:  20 mg


Methotrexate (Mexate -)  15 mg PO Q7D@1000 [facility]


   Last Admin: 01/01/20 09:46 Dose:  15 mg


Methylprednisolone Sodium Succinate (Solu-Medrol -)  40 mg IVPUSH BID [facility]


   Last Admin: 01/07/20 10:44 Dose:  40 mg


Pantoprazole Sodium (Protonix -)  20 mg PO DAILY [facility]


   Last Admin: 01/07/20 10:44 Dose:  20 mg


Ramipril (Altace -)  5 mg PO DAILY [facility]


   Last Admin: 01/07/20 10:44 Dose:  5 mg


Tiotropium Bromide (Spiriva Respimat)  2 puff IH DAILY [facility]


   Last Admin: 01/07/20 10:45 Dose:  2 puff

















Constitutional: Yes: Thin, Mildly dyspneic at rest 


Eyes: Yes: WNL


HENT: Yes: WNL


Neck: Yes: WNL


Cardiovascular: Yes: Regular Rate and Rhythm, S1, S2


Respiratory: Yes: Scattered Bilateral Rhonchi, no Expiratory Wheezes noted 


Gastrointestinal: Yes: Normal Bowel Sounds, Soft


Edema: Yes


Labs: 


  


 


 Laboratory Results - last 24 hr











  01/01/20 01/07/20





  16:15 05:30


 


Sodium   138


 


Potassium   5.0


 


Chloride   93 L


 


Carbon Dioxide   43 H


 


Anion Gap   2 L


 


BUN   28.6 H


 


Creatinine   0.8


 


Est GFR (CKD-EPI)AfAm   110.96


 


Est GFR (CKD-EPI)NonAf   95.74


 


Random Glucose   112 H


 


Calcium   9.1


 


Procalcitonin  0.04 











Assessment/Plan








Problem List





- Problems


(1) Acute respiratory failure


Code(s): J96.00 - ACUTE RESPIRATORY FAILURE, UNSP W HYPOXIA OR HYPERCAPNIA   





(2) Pneumonia


Code(s): J18.9 - PNEUMONIA, UNSPECIFIED ORGANISM   





(3) Acute on chronic respiratory failure with hypoxia and hypercapnia


Code(s): J96.21 - ACUTE AND CHRONIC RESPIRATORY FAILURE WITH HYPOXIA; J96.22 - 

ACUTE AND CHRONIC RESPIRATORY FAILURE WITH HYPERCAPNIA   





(4) Anxiety and depression


Code(s): F41.9 - ANXIETY DISORDER, UNSPECIFIED; F32.9 - MAJOR DEPRESSIVE 

DISORDER, SINGLE EPISODE, UNSPECIFIED   





(5) COPD (chronic obstructive pulmonary disease)


Code(s): J44.9 - CHRONIC OBSTRUCTIVE PULMONARY DISEASE, UNSPECIFIED   


Qualifiers: 


   COPD type: emphysema   Emphysema type: unspecified   Qualified Code(s): 

J43.9 - Emphysema, unspecified   





(6) Cough


Code(s): R05 - COUGH   





(7) Edema


Code(s): R60.9 - EDEMA, UNSPECIFIED   


Qualifiers: 


   Edema type: unspecified   Qualified Code(s): R60.9 - Edema, unspecified   





(8) Rheumatoid arthritis


Code(s): M06.9 - RHEUMATOID ARTHRITIS, UNSPECIFIED   





8 LLL nodule





Assessment/Plan


Acute on chronic hypoxemic/hypercapneic resp failure


COPD on home 02


HIV/RA on Methotrexate


Pneumonia


Referred for lung transplant [facility] (patient did not submit paperwork)


CABG


HTN


HLD


CHF


LLL NODULE





ABG 


NIPPV HS/PRN 


IV STEROIDS: LEAVE AT CURRENT DOSE   


BRONCHODILATORS


F/U CHEST CT OUTPATIENT


ABX PER ID 


SYMBICORT BID 





DR FUNG

## 2020-01-08 LAB
ANION GAP SERPL CALC-SCNC: 6 MMOL/L (ref 8–16)
ANISOCYTOSIS BLD QL: (no result)
BASOPHILS # BLD: 0 % (ref 0–2)
BUN SERPL-MCNC: 29.6 MG/DL (ref 7–18)
CALCIUM SERPL-MCNC: 9.2 MG/DL (ref 8.5–10.1)
CHLORIDE SERPL-SCNC: 93 MMOL/L (ref 98–107)
CO2 SERPL-SCNC: 38 MMOL/L (ref 21–32)
CREAT SERPL-MCNC: 0.8 MG/DL (ref 0.55–1.3)
DEPRECATED RDW RBC AUTO: 16.4 % (ref 11.9–15.9)
EOSINOPHIL # BLD: 0 % (ref 0–4.5)
GLUCOSE SERPL-MCNC: 91 MG/DL (ref 74–106)
HCT VFR BLD CALC: 36.3 % (ref 35.4–49)
HGB BLD-MCNC: 11.9 GM/DL (ref 11.7–16.9)
LYMPHOCYTES # BLD: 3.7 % (ref 8–40)
MACROCYTES BLD QL: (no result)
MCH RBC QN AUTO: 30.2 PG (ref 25.7–33.7)
MCHC RBC AUTO-ENTMCNC: 32.6 G/DL (ref 32–35.9)
MCV RBC: 92.7 FL (ref 80–96)
MONOCYTES # BLD AUTO: 6.3 % (ref 3.8–10.2)
NEUTROPHILS # BLD: 90 % (ref 42.8–82.8)
OVALOCYTES BLD QL SMEAR: (no result)
PLATELET # BLD AUTO: 258 K/MM3 (ref 134–434)
PLATELET BLD QL SMEAR: NORMAL
PMV BLD: 7.8 FL (ref 7.5–11.1)
POTASSIUM SERPLBLD-SCNC: 4.9 MMOL/L (ref 3.5–5.1)
RBC # BLD AUTO: 3.92 M/MM3 (ref 4–5.6)
SODIUM SERPL-SCNC: 136 MMOL/L (ref 136–145)
WBC # BLD AUTO: 12.9 K/MM3 (ref 4–10)

## 2020-01-08 RX ADMIN — DOXYCYCLINE HYCLATE SCH MG: 100 CAPSULE ORAL at 09:35

## 2020-01-08 RX ADMIN — ATORVASTATIN CALCIUM SCH MG: 20 TABLET, FILM COATED ORAL at 21:38

## 2020-01-08 RX ADMIN — CEFUROXIME AXETIL SCH MG: 500 TABLET ORAL at 09:36

## 2020-01-08 RX ADMIN — TIOTROPIUM BROMIDE INHALATION SPRAY SCH PUFF: 3.12 SPRAY, METERED RESPIRATORY (INHALATION) at 09:41

## 2020-01-08 RX ADMIN — METHYLPREDNISOLONE SODIUM SUCCINATE SCH MG: 40 INJECTION, POWDER, FOR SOLUTION INTRAMUSCULAR; INTRAVENOUS at 09:35

## 2020-01-08 RX ADMIN — METHOTREXATE SCH MG: 2.5 TABLET ORAL at 09:36

## 2020-01-08 RX ADMIN — BUDESONIDE AND FORMOTEROL FUMARATE DIHYDRATE SCH PUFF: 160; 4.5 AEROSOL RESPIRATORY (INHALATION) at 09:42

## 2020-01-08 RX ADMIN — IPRATROPIUM BROMIDE AND ALBUTEROL SULFATE SCH AMP: .5; 3 SOLUTION RESPIRATORY (INHALATION) at 13:23

## 2020-01-08 RX ADMIN — METHYLPREDNISOLONE SODIUM SUCCINATE SCH MG: 40 INJECTION, POWDER, FOR SOLUTION INTRAMUSCULAR; INTRAVENOUS at 21:38

## 2020-01-08 RX ADMIN — DILTIAZEM HYDROCHLORIDE SCH MG: 30 TABLET, FILM COATED ORAL at 06:36

## 2020-01-08 RX ADMIN — IPRATROPIUM BROMIDE AND ALBUTEROL SULFATE SCH AMP: .5; 3 SOLUTION RESPIRATORY (INHALATION) at 08:10

## 2020-01-08 RX ADMIN — FOLIC ACID SCH MG: 1 TABLET ORAL at 09:35

## 2020-01-08 RX ADMIN — FUROSEMIDE SCH MG: 20 TABLET ORAL at 09:35

## 2020-01-08 RX ADMIN — IPRATROPIUM BROMIDE AND ALBUTEROL SULFATE SCH AMP: .5; 3 SOLUTION RESPIRATORY (INHALATION) at 17:22

## 2020-01-08 RX ADMIN — IPRATROPIUM BROMIDE AND ALBUTEROL SULFATE SCH AMP: .5; 3 SOLUTION RESPIRATORY (INHALATION) at 21:10

## 2020-01-08 RX ADMIN — RAMIPRIL SCH MG: 5 CAPSULE ORAL at 09:35

## 2020-01-08 RX ADMIN — DILTIAZEM HYDROCHLORIDE SCH MG: 30 TABLET, FILM COATED ORAL at 13:55

## 2020-01-08 RX ADMIN — CEFUROXIME AXETIL SCH MG: 500 TABLET ORAL at 17:18

## 2020-01-08 RX ADMIN — ENOXAPARIN SODIUM SCH MG: 40 INJECTION SUBCUTANEOUS at 09:35

## 2020-01-08 RX ADMIN — DOXYCYCLINE HYCLATE SCH MG: 100 CAPSULE ORAL at 17:18

## 2020-01-08 RX ADMIN — DILTIAZEM HYDROCHLORIDE SCH MG: 30 TABLET, FILM COATED ORAL at 21:37

## 2020-01-08 RX ADMIN — PANTOPRAZOLE SODIUM SCH MG: 20 TABLET, DELAYED RELEASE ORAL at 09:35

## 2020-01-08 RX ADMIN — BUDESONIDE AND FORMOTEROL FUMARATE DIHYDRATE SCH PUFF: 160; 4.5 AEROSOL RESPIRATORY (INHALATION) at 21:39

## 2020-01-08 NOTE — PN
Progress Note (short form)





- Note


Progress Note: 





PULMONARY





States breathing slighty worse than yesterday. Feels weak. +nonproductive cough.





 Vital Signs











 Period  Temp  Pulse  Resp  BP Sys/Baer  Pulse Ox


 


 Last 24 Hr  97.8 F-98.2 F    20-20  /54-70  








Gen:  tachypneic with speaking


Heart: RRR


Lung: distant breath sounds


Abd: soft, nontender


Ext: no edema





 CBC, BMP





 01/08/20 06:08 





 01/08/20 06:08 





Active Medications





Acetaminophen (Tylenol -)  650 mg PO Q4H PRN


   PRN Reason: FEVER


Albuterol Sulfate (Ventolin Hfa Inhaler -)  2 puff IH Q4H PRN


   PRN Reason: SHORT OF BREATH/WHEEZING


Albuterol/Ipratropium (Duoneb -)  1 amp NEB RQID [facility]


   Last Admin: 01/08/20 13:23 Dose:  1 amp


Atorvastatin Calcium (Lipitor -)  20 mg PO HS [facility]


   Last Admin: 01/07/20 21:15 Dose:  20 mg


Budesonide/Formoterol Fumarate (Symbicort 160/4.5mcg -)  1 puff IH BID [facility]


   Last Admin: 01/08/20 09:42 Dose:  1 puff


Cefuroxime Axetil (Ceftin -)  500 mg PO BIDWM [facility]


   Last Admin: 01/08/20 09:36 Dose:  500 mg


Clonazepam (Klonopin -)  0.5 mg PO BID [facility]


   Last Admin: 01/08/20 09:35 Dose:  0.5 mg


Diltiazem HCl (Cardizem -)  30 mg PO TID [facility]


   Last Admin: 01/08/20 13:55 Dose:  30 mg


Doxycycline Hyclate (Vibramycin -)  100 mg PO BID@1000,1800 [facility]


   Last Admin: 01/08/20 09:35 Dose:  100 mg


Enoxaparin Sodium (Lovenox -)  40 mg SQ DAILY [facility]


   Last Admin: 01/08/20 09:35 Dose:  40 mg


Folic Acid (Folic Acid -)  1 mg PO DAILY [facility]


   Last Admin: 01/08/20 09:35 Dose:  1 mg


Furosemide (Lasix -)  20 mg PO DAILY [facility]


   Last Admin: 01/08/20 09:35 Dose:  20 mg


Methotrexate (Mexate -)  15 mg PO Q7D@1000 [facility]


   Last Admin: 01/08/20 09:36 Dose:  15 mg


Methylprednisolone Sodium Succinate (Solu-Medrol -)  40 mg IVPUSH BID [facility]


   Last Admin: 01/08/20 09:35 Dose:  40 mg


Pantoprazole Sodium (Protonix -)  20 mg PO DAILY [facility]


   Last Admin: 01/08/20 09:35 Dose:  20 mg


Ramipril (Altace -)  5 mg PO DAILY [facility]


   Last Admin: 01/08/20 09:35 Dose:  5 mg


Tiotropium Bromide (Spiriva Respimat)  2 puff IH DAILY [facility]


   Last Admin: 01/08/20 09:41 Dose:  2 puff





A/P


Acute on Chronic Hypoxic and Hypercapneic Respiratory Failure


Acute COPD Exacerbation


Pneumonia


Rheumatoid Arthritis


CAD s/p CABG


Lung Nodule


LV Diastolic Dysfunction


Pulmonary HTN


HTN


Hyperlipidemia


Pt does NOT have HIV





-  continue medrol at current dose


-  inhaled bronchodilators


-  complete antibiotics


-  O2 to keep SpO2 >90%


-  BiPAP as needed to assist in work of breathing


-  DVT prophylaxis

## 2020-01-08 NOTE — PN
Progress Note, Physician


Chief Complaint: 


today feels  more SOB and cough with expectoration , afebrile   





- Current Medication List


Current Medications: 


Active Medications





Acetaminophen (Tylenol -)  650 mg PO Q4H PRN


   PRN Reason: FEVER


Albuterol Sulfate (Ventolin Hfa Inhaler -)  2 puff IH Q4H PRN


   PRN Reason: SHORT OF BREATH/WHEEZING


Albuterol/Ipratropium (Duoneb -)  1 amp NEB RQID [facility]


   Last Admin: 01/08/20 17:22 Dose:  1 amp


Atorvastatin Calcium (Lipitor -)  20 mg PO HS [facility]


   Last Admin: 01/07/20 21:15 Dose:  20 mg


Budesonide/Formoterol Fumarate (Symbicort 160/4.5mcg -)  1 puff IH BID [facility]


   Last Admin: 01/08/20 09:42 Dose:  1 puff


Cefuroxime Axetil (Ceftin -)  500 mg PO BIDWM [facility]


   Last Admin: 01/08/20 17:18 Dose:  500 mg


Clonazepam (Klonopin -)  0.5 mg PO BID [facility]


   Last Admin: 01/08/20 09:35 Dose:  0.5 mg


Diltiazem HCl (Cardizem -)  30 mg PO TID [facility]


   Last Admin: 01/08/20 13:55 Dose:  30 mg


Doxycycline Hyclate (Vibramycin -)  100 mg PO BID@1000,1800 [facility]


   Last Admin: 01/08/20 17:18 Dose:  100 mg


Enoxaparin Sodium (Lovenox -)  40 mg SQ DAILY [facility]


   Last Admin: 01/08/20 09:35 Dose:  40 mg


Folic Acid (Folic Acid -)  1 mg PO DAILY [facility]


   Last Admin: 01/08/20 09:35 Dose:  1 mg


Furosemide (Lasix -)  20 mg PO DAILY [facility]


   Last Admin: 01/08/20 09:35 Dose:  20 mg


Methotrexate (Mexate -)  15 mg PO Q7D@1000 [facility]


   Last Admin: 01/08/20 09:36 Dose:  15 mg


Methylprednisolone Sodium Succinate (Solu-Medrol -)  40 mg IVPUSH BID [facility]


   Last Admin: 01/08/20 09:35 Dose:  40 mg


Pantoprazole Sodium (Protonix -)  20 mg PO DAILY [facility]


   Last Admin: 01/08/20 09:35 Dose:  20 mg


Ramipril (Altace -)  5 mg PO DAILY [facility]


   Last Admin: 01/08/20 09:35 Dose:  5 mg


Tiotropium Bromide (Spiriva Respimat)  2 puff IH DAILY [facility]


   Last Admin: 01/08/20 09:41 Dose:  2 puff











- Objective


Vital Signs: 


 Vital Signs











Temperature  98.8 F   01/08/20 14:00


 


Pulse Rate  110 H  01/08/20 14:00


 


Respiratory Rate  20 01/08/20 14:00


 


Blood Pressure  97/65   01/08/20 14:00


 


O2 Sat by Pulse Oximetry (%)  96   01/08/20 17:28











Labs: 


 CBC, BMP





 01/08/20 06:08 





 01/08/20 06:08 





 INR, PTT











INR  0.93  (0.83-1.09)   01/01/20  03:20    














Problem List





- Problems


(1) Acute on chronic respiratory failure with hypoxemia


Assessment/Plan: 


Taper  IV methylprednisolone 40 mg BID , bronchodilator and pulmonary input


Code(s): J96.21 - ACUTE AND CHRONIC RESPIRATORY FAILURE WITH HYPOXIA   





(2) Pneumonia


Assessment/Plan: 


today c/o cough and SOB cont curret abx will F/U CXR and sputum culture


Code(s): J18.9 - PNEUMONIA, UNSPECIFIED ORGANISM   





(3) COPD exacerbation


Assessment/Plan: 


Severe COPD patient was referred for lung transplant, follow-up pulmonary 

recommendations, bronchodilators and IV hydration.F/U VBG for Hypercarbia added 

Spiriva


Code(s): J44.1 - CHRONIC OBSTRUCTIVE PULMONARY DISEASE W (ACUTE) EXACERBATION   





(4) Diastolic CHF


Assessment/Plan: 


Compensated continue Po Lasix 20 mg daily


Code(s): I50.30 - UNSPECIFIED DIASTOLIC (CONGESTIVE) HEART FAILURE   


Qualifiers: 


   Heart failure chronicity: unspecified   Qualified Code(s): I50.30 - 

Unspecified diastolic (congestive) heart failure   





(5) Hypercholesterolemia


Assessment/Plan: 


Continue statin


Code(s): E78.0 - PURE HYPERCHOLESTEROLEMIA * DO NOT USE *   





(6) CAD (coronary artery disease)


Assessment/Plan: 


Stable


Code(s): I25.10 - ATHSCL HEART DISEASE OF NATIVE CORONARY ARTERY W/O ANG PCTRS 

  





(7) Rheumatoid arthritis


Assessment/Plan: 


Cont home medications.


Code(s): M06.9 - RHEUMATOID ARTHRITIS, UNSPECIFIED   





(8) Hypercarbia


Assessment/Plan: 


Due to advanced COPD will decrease Lasix to 20 mg  


Code(s): R06.89 - OTHER ABNORMALITIES OF BREATHING

## 2020-01-09 LAB
ANION GAP SERPL CALC-SCNC: 2 MMOL/L (ref 8–16)
ANISOCYTOSIS BLD QL: 0
BASOPHILS # BLD: 0.1 % (ref 0–2)
BUN SERPL-MCNC: 33.6 MG/DL (ref 7–18)
CALCIUM SERPL-MCNC: 9.6 MG/DL (ref 8.5–10.1)
CHLORIDE SERPL-SCNC: 95 MMOL/L (ref 98–107)
CO2 SERPL-SCNC: 40 MMOL/L (ref 21–32)
CREAT SERPL-MCNC: 0.8 MG/DL (ref 0.55–1.3)
DEPRECATED RDW RBC AUTO: 16.2 % (ref 11.9–15.9)
EOSINOPHIL # BLD: 0 % (ref 0–4.5)
GLUCOSE SERPL-MCNC: 111 MG/DL (ref 74–106)
HCT VFR BLD CALC: 38.5 % (ref 35.4–49)
HGB BLD-MCNC: 12.7 GM/DL (ref 11.7–16.9)
LYMPHOCYTES # BLD: 2.2 % (ref 8–40)
MACROCYTES BLD QL: 0
MCH RBC QN AUTO: 30.5 PG (ref 25.7–33.7)
MCHC RBC AUTO-ENTMCNC: 32.9 G/DL (ref 32–35.9)
MCV RBC: 92.7 FL (ref 80–96)
MONOCYTES # BLD AUTO: 3.2 % (ref 3.8–10.2)
NEUTROPHILS # BLD: 94.5 % (ref 42.8–82.8)
PLATELET # BLD AUTO: 317 K/MM3 (ref 134–434)
PLATELET BLD QL SMEAR: NORMAL
PMV BLD: 8.5 FL (ref 7.5–11.1)
POTASSIUM SERPLBLD-SCNC: 5.6 MMOL/L (ref 3.5–5.1)
RBC # BLD AUTO: 4.16 M/MM3 (ref 4–5.6)
SODIUM SERPL-SCNC: 137 MMOL/L (ref 136–145)
WBC # BLD AUTO: 16.4 K/MM3 (ref 4–10)

## 2020-01-09 RX ADMIN — FOLIC ACID SCH MG: 1 TABLET ORAL at 09:06

## 2020-01-09 RX ADMIN — DOXYCYCLINE HYCLATE SCH MG: 100 CAPSULE ORAL at 09:06

## 2020-01-09 RX ADMIN — PANTOPRAZOLE SODIUM SCH MG: 20 TABLET, DELAYED RELEASE ORAL at 09:06

## 2020-01-09 RX ADMIN — IPRATROPIUM BROMIDE AND ALBUTEROL SULFATE SCH AMP: .5; 3 SOLUTION RESPIRATORY (INHALATION) at 17:14

## 2020-01-09 RX ADMIN — DILTIAZEM HYDROCHLORIDE SCH MG: 30 TABLET, FILM COATED ORAL at 05:59

## 2020-01-09 RX ADMIN — TIOTROPIUM BROMIDE INHALATION SPRAY SCH PUFF: 3.12 SPRAY, METERED RESPIRATORY (INHALATION) at 09:24

## 2020-01-09 RX ADMIN — DILTIAZEM HYDROCHLORIDE SCH MG: 30 TABLET, FILM COATED ORAL at 22:18

## 2020-01-09 RX ADMIN — CEFUROXIME AXETIL SCH MG: 500 TABLET ORAL at 17:57

## 2020-01-09 RX ADMIN — DOXYCYCLINE HYCLATE SCH MG: 100 CAPSULE ORAL at 17:57

## 2020-01-09 RX ADMIN — ENOXAPARIN SODIUM SCH MG: 40 INJECTION SUBCUTANEOUS at 09:05

## 2020-01-09 RX ADMIN — BUDESONIDE AND FORMOTEROL FUMARATE DIHYDRATE SCH PUFF: 160; 4.5 AEROSOL RESPIRATORY (INHALATION) at 09:24

## 2020-01-09 RX ADMIN — RAMIPRIL SCH MG: 5 CAPSULE ORAL at 09:05

## 2020-01-09 RX ADMIN — METHYLPREDNISOLONE SODIUM SUCCINATE SCH MG: 40 INJECTION, POWDER, FOR SOLUTION INTRAMUSCULAR; INTRAVENOUS at 09:05

## 2020-01-09 RX ADMIN — BUDESONIDE AND FORMOTEROL FUMARATE DIHYDRATE SCH PUFF: 160; 4.5 AEROSOL RESPIRATORY (INHALATION) at 22:18

## 2020-01-09 RX ADMIN — FUROSEMIDE SCH MG: 20 TABLET ORAL at 09:06

## 2020-01-09 RX ADMIN — IPRATROPIUM BROMIDE AND ALBUTEROL SULFATE SCH AMP: .5; 3 SOLUTION RESPIRATORY (INHALATION) at 11:59

## 2020-01-09 RX ADMIN — DILTIAZEM HYDROCHLORIDE SCH MG: 30 TABLET, FILM COATED ORAL at 14:13

## 2020-01-09 RX ADMIN — IPRATROPIUM BROMIDE AND ALBUTEROL SULFATE SCH AMP: .5; 3 SOLUTION RESPIRATORY (INHALATION) at 08:59

## 2020-01-09 RX ADMIN — CEFUROXIME AXETIL SCH MG: 500 TABLET ORAL at 09:16

## 2020-01-09 RX ADMIN — IPRATROPIUM BROMIDE AND ALBUTEROL SULFATE SCH AMP: .5; 3 SOLUTION RESPIRATORY (INHALATION) at 20:40

## 2020-01-09 RX ADMIN — ATORVASTATIN CALCIUM SCH MG: 20 TABLET, FILM COATED ORAL at 22:17

## 2020-01-09 RX ADMIN — METHYLPREDNISOLONE SODIUM SUCCINATE SCH MG: 40 INJECTION, POWDER, FOR SOLUTION INTRAMUSCULAR; INTRAVENOUS at 22:17

## 2020-01-09 NOTE — PN
Progress Note, Physician


Chief Complaint: 


Feels bettr since yeserday move out of the bed , afebrile   





- Current Medication List


Current Medications: 


Active Medications





Acetaminophen (Tylenol -)  650 mg PO Q4H PRN


   PRN Reason: FEVER


Albuterol Sulfate (Ventolin Hfa Inhaler -)  2 puff IH Q4H PRN


   PRN Reason: SHORT OF BREATH/WHEEZING


Albuterol/Ipratropium (Duoneb -)  1 amp NEB RQID [facility]


   Last Admin: 01/09/20 17:14 Dose:  1 amp


Atorvastatin Calcium (Lipitor -)  20 mg PO HS [facility]


   Last Admin: 01/08/20 21:38 Dose:  20 mg


Budesonide/Formoterol Fumarate (Symbicort 160/4.5mcg -)  2 puff IH BID [facility]


Cefuroxime Axetil (Ceftin -)  500 mg PO BIDWM [facility]


   Last Admin: 01/09/20 09:16 Dose:  500 mg


Clonazepam (Klonopin -)  0.5 mg PO BID [facility]


   Last Admin: 01/09/20 09:06 Dose:  0.5 mg


Diltiazem HCl (Cardizem -)  30 mg PO TID [facility]


   Last Admin: 01/09/20 14:13 Dose:  30 mg


Doxycycline Hyclate (Vibramycin -)  100 mg PO BID@1000,1800 [facility]


   Last Admin: 01/09/20 09:06 Dose:  100 mg


Enoxaparin Sodium (Lovenox -)  40 mg SQ DAILY [facility]


   Last Admin: 01/09/20 09:05 Dose:  40 mg


Folic Acid (Folic Acid -)  1 mg PO DAILY [facility]


   Last Admin: 01/09/20 09:06 Dose:  1 mg


Furosemide (Lasix -)  20 mg PO DAILY [facility]


   Last Admin: 01/09/20 09:06 Dose:  20 mg


Methotrexate (Mexate -)  15 mg PO Q7D@1000 [facility]


   Last Admin: 01/08/20 09:36 Dose:  15 mg


Methylprednisolone Sodium Succinate (Solu-Medrol -)  40 mg IVPUSH BID [facility]


   Last Admin: 01/09/20 09:05 Dose:  40 mg


Pantoprazole Sodium (Protonix -)  20 mg PO DAILY [facility]


   Last Admin: 01/09/20 09:06 Dose:  20 mg


Ramipril (Altace -)  5 mg PO DAILY [facility]


   Last Admin: 01/09/20 09:05 Dose:  5 mg


Tiotropium Bromide (Spiriva Respimat)  2 puff IH DAILY [facility]


   Last Admin: 01/09/20 09:24 Dose:  2 puff











- Objective


Vital Signs: 


 Vital Signs











Temperature  97.9 F   01/09/20 14:14


 


Pulse Rate  118 H  01/09/20 14:14


 


Respiratory Rate  22 H  01/09/20 14:14


 


Blood Pressure  146/54 L  01/09/20 14:14


 


O2 Sat by Pulse Oximetry (%)  98   01/09/20 10:00

















General: Young male in mild respiratory distress.


HEENT; mucous membranes moist, no anemia, no jaundice, PERRLA, no nystagmus


Neck: No JVD, supple, no bruit, thyroid palpably normal, normal carotid 

pulsations.


Chest: Nontender, bilateral wheezing.


CVS: S1-S2 regular 


no murmur/gallop/rub


Abdomen: Nondistended, soft, bowel sounds present.


Extremities: Trace edema.,  No calf  tenderness, pulses present


CNS: AO X3 , no gross motor sensory deficit


Labs: 


 CBC, BMP





 01/09/20 06:20 





 01/09/20 06:20 





 INR, PTT











INR  0.93  (0.83-1.09)   01/01/20  03:20    














Problem List





- Problems


(1) Acute on chronic respiratory failure with hypoxemia


Assessment/Plan: 


Taper  IV methylprednisolone 40 mg BID , bronchodilator and pulmonary input


Code(s): J96.21 - ACUTE AND CHRONIC RESPIRATORY FAILURE WITH HYPOXIA   





(2) Pneumonia


Assessment/Plan: 


today c/o cough and SOB cont curret abx will F/U CXR and sputum culture, ID 

input appreciated 


Code(s): J18.9 - PNEUMONIA, UNSPECIFIED ORGANISM   





(3) COPD exacerbation


Assessment/Plan: 


Severe COPD patient was referred for lung transplant, follow-up pulmonary 

recommendations, bronchodilators and IV hydration.F/U VBG for Hypercarbia added 

Spiriva


Code(s): J44.1 - CHRONIC OBSTRUCTIVE PULMONARY DISEASE W (ACUTE) EXACERBATION   





(4) Diastolic CHF


Assessment/Plan: 


Compensated continue Po Lasix 20 mg daily


Code(s): I50.30 - UNSPECIFIED DIASTOLIC (CONGESTIVE) HEART FAILURE   


Qualifiers: 


   Heart failure chronicity: unspecified   Qualified Code(s): I50.30 - 

Unspecified diastolic (congestive) heart failure   





(5) Hypercholesterolemia


Assessment/Plan: 


Continue statin


Code(s): E78.0 - PURE HYPERCHOLESTEROLEMIA * DO NOT USE *   





(6) CAD (coronary artery disease)


Assessment/Plan: 


Stable


Code(s): I25.10 - ATHSCL HEART DISEASE OF NATIVE CORONARY ARTERY W/O ANG PCTRS 

  





(7) Rheumatoid arthritis


Assessment/Plan: 


Cont home medications.


Code(s): M06.9 - RHEUMATOID ARTHRITIS, UNSPECIFIED   





(8) Hypercarbia


Assessment/Plan: 


Due to advanced COPD will decrease Lasix to 20 mg  


Code(s): R06.89 - OTHER ABNORMALITIES OF BREATHING

## 2020-01-09 NOTE — PN
Progress Note (short form)





- Note


Progress Note: 


Awake and alert mildly dyspneic at rest and increases with with speaking.    


Said he went onto the commode today and felt less SOB. 


Used NIPPV overnight. 


No CP. 





  


 Intake & Output











 01/06/20 01/07/20 01/08/20 01/09/20





 23:59 23:59 23:59 23:59


 


Intake Total 520 760 480 50


 


Output Total    200


 


Balance 520 760 480 -150


 


Weight 133 lb 3.2 oz   122 lb











 Last Vital Signs











Temp Pulse Resp BP Pulse Ox


 


 98.0 F   90   22 H  120/70   98 


 


 01/09/20 10:00  01/09/20 10:00  01/09/20 10:00  01/09/20 10:00  01/08/20 22:00








Active Medications





Acetaminophen (Tylenol -)  650 mg PO Q4H PRN


   PRN Reason: FEVER


Albuterol Sulfate (Ventolin Hfa Inhaler -)  2 puff IH Q4H PRN


   PRN Reason: SHORT OF BREATH/WHEEZING


Albuterol/Ipratropium (Duoneb -)  1 amp NEB RQID [facility]


   Last Admin: 01/09/20 08:59 Dose:  1 amp


Atorvastatin Calcium (Lipitor -)  20 mg PO HS [facility]


   Last Admin: 01/08/20 21:38 Dose:  20 mg


Budesonide/Formoterol Fumarate (Symbicort 160/4.5mcg -)  2 puff IH BID [facility]


Cefuroxime Axetil (Ceftin -)  500 mg PO BIDWM [facility]


   Last Admin: 01/09/20 09:16 Dose:  500 mg


Clonazepam (Klonopin -)  0.5 mg PO BID [facility]


   Last Admin: 01/09/20 09:06 Dose:  0.5 mg


Diltiazem HCl (Cardizem -)  30 mg PO TID [facility]


   Last Admin: 01/09/20 05:59 Dose:  30 mg


Doxycycline Hyclate (Vibramycin -)  100 mg PO BID@1000,1800 [facility]


   Last Admin: 01/09/20 09:06 Dose:  100 mg


Enoxaparin Sodium (Lovenox -)  40 mg SQ DAILY [facility]


   Last Admin: 01/09/20 09:05 Dose:  40 mg


Folic Acid (Folic Acid -)  1 mg PO DAILY [facility]


   Last Admin: 01/09/20 09:06 Dose:  1 mg


Furosemide (Lasix -)  20 mg PO DAILY [facility]


   Last Admin: 01/09/20 09:06 Dose:  20 mg


Methotrexate (Mexate -)  15 mg PO Q7D@1000 [facility]


   Last Admin: 01/08/20 09:36 Dose:  15 mg


Methylprednisolone Sodium Succinate (Solu-Medrol -)  40 mg IVPUSH BID [facility]


   Last Admin: 01/09/20 09:05 Dose:  40 mg


Pantoprazole Sodium (Protonix -)  20 mg PO DAILY [facility]


   Last Admin: 01/09/20 09:06 Dose:  20 mg


Ramipril (Altace -)  5 mg PO DAILY [facility]


   Last Admin: 01/09/20 09:05 Dose:  5 mg


Tiotropium Bromide (Spiriva Respimat)  2 puff IH DAILY [facility]


   Last Admin: 01/09/20 09:24 Dose:  2 puff








Constitutional: Yes: Thin, Mildly dyspneic at rest 


Eyes: Yes: WNL


HENT: Yes: WNL


Neck: Yes: WNL


Cardiovascular: Yes: Regular Rate and Rhythm, S1, S2


Respiratory: Yes: Scattered Bilateral Rhonchi, no Expiratory Wheezes noted 


Gastrointestinal: Yes: Normal Bowel Sounds, Soft


Edema: Yes


Labs: 


  


 Laboratory Results - last 24 hr











  01/08/20 01/09/20 01/09/20





  06:08 06:20 06:20


 


WBC   16.4 H 


 


RBC   4.16 


 


Hgb   12.7 


 


Hct   38.5 


 


MCV   92.7 


 


MCH   30.5 


 


MCHC   32.9 


 


RDW   16.2 H 


 


Plt Count   317  D 


 


MPV   8.5 


 


Absolute Neuts (auto)   15.5 H 


 


Neutrophils %   94.5 H 


 


Neutrophils % (Manual)  91.2 H  88.0 H 


 


Band Neutrophils %  0.0  3.0 


 


Lymphocytes %   2.2 L D 


 


Lymphocytes % (Manual)  3.9 L D  2.0 L D 


 


Monocytes %   3.2 L 


 


Monocytes % (Manual)  5  5 


 


Eosinophils %   0.0 


 


Eosinophils % (Manual)  0.0  0.0 


 


Basophils %   0.1  D 


 


Basophils % (Manual)  0.0  0.0 


 


Myelocytes % (Man)  0  D  0 


 


Promyelocytes % (Man)  0  1  D 


 


Blast Cells % (Manual)  0  0 


 


Nucleated RBC %   0 


 


Metamyelocytes  0  1  D 


 


Hypochromia  0  0 


 


Platelet Estimate  Normal  Normal 


 


Polychromasia  0  0 


 


Poikilocytosis  1+  0 


 


Anisocytosis  1+  0 


 


Microcytosis  0  0 


 


Macrocytosis  1+  0 


 


Ovalocytes  1+  


 


Sodium    137


 


Potassium    5.6 H


 


Chloride    95 L


 


Carbon Dioxide    40 H


 


Anion Gap    2 L


 


BUN    33.6 H


 


Creatinine    0.8


 


Est GFR (CKD-EPI)AfAm    110.96


 


Est GFR (CKD-EPI)NonAf    95.74


 


Random Glucose    111 H


 


Calcium    9.6














Assessment/Plan








Problem List





- Problems


(1) Acute respiratory failure


Code(s): J96.00 - ACUTE RESPIRATORY FAILURE, UNSP W HYPOXIA OR HYPERCAPNIA   





(2) Pneumonia


Code(s): J18.9 - PNEUMONIA, UNSPECIFIED ORGANISM   





(3) Acute on chronic respiratory failure with hypoxia and hypercapnia


Code(s): J96.21 - ACUTE AND CHRONIC RESPIRATORY FAILURE WITH HYPOXIA; J96.22 - 

ACUTE AND CHRONIC RESPIRATORY FAILURE WITH HYPERCAPNIA   





(4) Anxiety and depression


Code(s): F41.9 - ANXIETY DISORDER, UNSPECIFIED; F32.9 - MAJOR DEPRESSIVE 

DISORDER, SINGLE EPISODE, UNSPECIFIED   





(5) COPD (chronic obstructive pulmonary disease)


Code(s): J44.9 - CHRONIC OBSTRUCTIVE PULMONARY DISEASE, UNSPECIFIED   


Qualifiers: 


   COPD type: emphysema   Emphysema type: unspecified   Qualified Code(s): 

J43.9 - Emphysema, unspecified   





(6) Cough


Code(s): R05 - COUGH   





(7) Edema


Code(s): R60.9 - EDEMA, UNSPECIFIED   


Qualifiers: 


   Edema type: unspecified   Qualified Code(s): R60.9 - Edema, unspecified   





(8) Rheumatoid arthritis


Code(s): M06.9 - RHEUMATOID ARTHRITIS, UNSPECIFIED   





8 LLL nodule





Assessment/Plan


Acute on chronic hypoxemic/hypercapneic resp failure


COPD on home 02


HIV/RA on Methotrexate


Pneumonia


Referred for lung transplant [facility] (patient did not submit paperwork)


CABG


HTN


HLD


CHF


LLL NODULE





ABG 


NIPPV HS/PRN 


IV STEROIDS: LEAVE AT CURRENT DOSE : CAN TRIAL PREDNISONE ON NEXT 24 TO 48 

HOURS   


BRONCHODILATORS


F/U CHEST CT OUTPATIENT


ABX PER ID 


SYMBICORT BID 


HIGH RISK FOR READMISSION 


CONSIDER INPATIENT PULMONARY REHAB 





DR FUNG

## 2020-01-09 NOTE — PN
Progress Note (short form)





- Note


Progress Note: 


asked to f/u for leukocytosis


feels much improved today


ambulated, wearing NC





+BM this am 


no diarrhea





 Vital Signs











 Period  Temp  Pulse  Resp  BP Sys/Baer  Pulse Ox


 


 Last 24 Hr  97.4 F-98.0 F    20-22  /54-75  96-98








no thrush


cor rrr


lungs decreased bs at bases


abd soft,nt


ext no edema





 CBC, BMP





 01/09/20 06:20 





 01/09/20 06:20 





 Microbiology





01/08/20 18:00   Sputum - Expectorated   Gram Stain - Final


01/01/20 00:01   Blood - Peripheral Venous   Blood Culture - Final


                            NO GROWTH AFTER 5 DAYS INCUBATION


01/01/20 00:00   Blood - Peripheral Venous   Blood Culture - Final


                            NO GROWTH AFTER 5 DAYS INCUBATION


01/02/20 20:10   Urine For Antigen Detection   Legionella Antigen - Final


01/02/20 20:10   Urine For Antigen Detection   Streptococcus pneumoniae Antigen 

(M - Final


01/01/20 14:15   Sputum - Expectorated   Gram Stain - Final


01/01/20 14:15   Sputum - Expectorated   Sputum Culture - Final


                            NORMAL RESPIRATORY EDU


01/01/20 05:15   Urine - Urine Clean Catch   Urine Culture - Final


                            NO GROWTH OBTAINED





cxray 1/8 unchanged, basilar atelectasis











a/p


leukoctosis but clinically improving


suspect increased wbc due to steroids, will observe on present meds


if clinically he worsens would get repeat chest ct





small RLL pneumonia-on ceftin/doxy





copd exacerbation


chf exacerbation











he is HIV negative - tested last admission-

## 2020-01-10 LAB
ANION GAP SERPL CALC-SCNC: 2 MMOL/L (ref 8–16)
ANISOCYTOSIS BLD QL: 0
BASOPHILS # BLD: 0.1 % (ref 0–2)
BUN SERPL-MCNC: 30 MG/DL (ref 7–18)
CALCIUM SERPL-MCNC: 9.6 MG/DL (ref 8.5–10.1)
CHLORIDE SERPL-SCNC: 95 MMOL/L (ref 98–107)
CO2 SERPL-SCNC: 41 MMOL/L (ref 21–32)
CREAT SERPL-MCNC: 0.8 MG/DL (ref 0.55–1.3)
DEPRECATED RDW RBC AUTO: 16.4 % (ref 11.9–15.9)
EOSINOPHIL # BLD: 0 % (ref 0–4.5)
GLUCOSE SERPL-MCNC: 116 MG/DL (ref 74–106)
HCT VFR BLD CALC: 38.9 % (ref 35.4–49)
HGB BLD-MCNC: 12.7 GM/DL (ref 11.7–16.9)
LYMPHOCYTES # BLD: 2.3 % (ref 8–40)
MACROCYTES BLD QL: 0
MCH RBC QN AUTO: 30.3 PG (ref 25.7–33.7)
MCHC RBC AUTO-ENTMCNC: 32.6 G/DL (ref 32–35.9)
MCV RBC: 92.9 FL (ref 80–96)
MONOCYTES # BLD AUTO: 3 % (ref 3.8–10.2)
NEUTROPHILS # BLD: 94.6 % (ref 42.8–82.8)
PLATELET # BLD AUTO: 317 K/MM3 (ref 134–434)
PLATELET BLD QL SMEAR: NORMAL
PMV BLD: 7.9 FL (ref 7.5–11.1)
POTASSIUM SERPLBLD-SCNC: 4.7 MMOL/L (ref 3.5–5.1)
RBC # BLD AUTO: 4.19 M/MM3 (ref 4–5.6)
SODIUM SERPL-SCNC: 138 MMOL/L (ref 136–145)
WBC # BLD AUTO: 14.8 K/MM3 (ref 4–10)

## 2020-01-10 RX ADMIN — METHYLPREDNISOLONE SODIUM SUCCINATE SCH MG: 40 INJECTION, POWDER, FOR SOLUTION INTRAMUSCULAR; INTRAVENOUS at 09:35

## 2020-01-10 RX ADMIN — DILTIAZEM HYDROCHLORIDE SCH MG: 30 TABLET, FILM COATED ORAL at 06:15

## 2020-01-10 RX ADMIN — RAMIPRIL SCH MG: 5 CAPSULE ORAL at 09:35

## 2020-01-10 RX ADMIN — DILTIAZEM HYDROCHLORIDE SCH MG: 30 TABLET, FILM COATED ORAL at 13:52

## 2020-01-10 RX ADMIN — METHYLPREDNISOLONE SODIUM SUCCINATE SCH MG: 40 INJECTION, POWDER, FOR SOLUTION INTRAMUSCULAR; INTRAVENOUS at 21:34

## 2020-01-10 RX ADMIN — PANTOPRAZOLE SODIUM SCH MG: 20 TABLET, DELAYED RELEASE ORAL at 09:35

## 2020-01-10 RX ADMIN — CEFUROXIME AXETIL SCH MG: 500 TABLET ORAL at 17:30

## 2020-01-10 RX ADMIN — FOLIC ACID SCH MG: 1 TABLET ORAL at 09:35

## 2020-01-10 RX ADMIN — ATORVASTATIN CALCIUM SCH MG: 20 TABLET, FILM COATED ORAL at 21:33

## 2020-01-10 RX ADMIN — CEFUROXIME AXETIL SCH MG: 500 TABLET ORAL at 09:34

## 2020-01-10 RX ADMIN — ENOXAPARIN SODIUM SCH MG: 40 INJECTION SUBCUTANEOUS at 09:35

## 2020-01-10 RX ADMIN — FUROSEMIDE SCH MG: 20 TABLET ORAL at 09:35

## 2020-01-10 RX ADMIN — DILTIAZEM HYDROCHLORIDE SCH MG: 30 TABLET, FILM COATED ORAL at 21:34

## 2020-01-10 RX ADMIN — IPRATROPIUM BROMIDE AND ALBUTEROL SULFATE SCH AMP: .5; 3 SOLUTION RESPIRATORY (INHALATION) at 20:40

## 2020-01-10 RX ADMIN — IPRATROPIUM BROMIDE AND ALBUTEROL SULFATE SCH AMP: .5; 3 SOLUTION RESPIRATORY (INHALATION) at 11:35

## 2020-01-10 RX ADMIN — TIOTROPIUM BROMIDE INHALATION SPRAY SCH PUFF: 3.12 SPRAY, METERED RESPIRATORY (INHALATION) at 09:37

## 2020-01-10 RX ADMIN — BUDESONIDE AND FORMOTEROL FUMARATE DIHYDRATE SCH PUFF: 160; 4.5 AEROSOL RESPIRATORY (INHALATION) at 21:39

## 2020-01-10 RX ADMIN — IPRATROPIUM BROMIDE AND ALBUTEROL SULFATE SCH AMP: .5; 3 SOLUTION RESPIRATORY (INHALATION) at 07:40

## 2020-01-10 RX ADMIN — IPRATROPIUM BROMIDE AND ALBUTEROL SULFATE SCH AMP: .5; 3 SOLUTION RESPIRATORY (INHALATION) at 15:45

## 2020-01-10 RX ADMIN — BUDESONIDE AND FORMOTEROL FUMARATE DIHYDRATE SCH PUFF: 160; 4.5 AEROSOL RESPIRATORY (INHALATION) at 09:37

## 2020-01-10 NOTE — PN
Progress Note (short form)





- Note


Progress Note: 


Developed respiratory distress after PT.  Required to be placed back on NIPPV 

support with improvement. 


No CP. 





 Intake & Output











 01/07/20 01/08/20 01/09/20 01/10/20





 23:59 23:59 23:59 23:59


 


Intake Total  360


 


Output Total   200 200


 


Balance 760 480 950 160


 


Weight   122 lb 122 lb











 Last Vital Signs











Temp Pulse Resp BP Pulse Ox


 


 98.2 F   109 H  18   105/64   97 


 


 01/10/20 10:00  01/10/20 10:00  01/10/20 10:00  01/10/20 10:00  01/10/20 11:20








Active Medications





Acetaminophen (Tylenol -)  650 mg PO Q4H PRN


   PRN Reason: FEVER


Albuterol Sulfate (Ventolin Hfa Inhaler -)  2 puff IH Q4H PRN


   PRN Reason: SHORT OF BREATH/WHEEZING


   Last Admin: 01/10/20 12:40 Dose:  2 puff


Albuterol/Ipratropium (Duoneb -)  1 amp NEB RQID [facility]


   Last Admin: 01/10/20 11:35 Dose:  1 amp


Atorvastatin Calcium (Lipitor -)  20 mg PO HS [facility]


   Last Admin: 01/09/20 22:17 Dose:  20 mg


Budesonide/Formoterol Fumarate (Symbicort 160/4.5mcg -)  2 puff IH BID [facility]


   Last Admin: 01/10/20 09:37 Dose:  2 puff


Cefuroxime Axetil (Ceftin -)  500 mg PO BIDWM [facility]


   Last Admin: 01/10/20 09:34 Dose:  500 mg


Clonazepam (Klonopin -)  0.5 mg PO BID [facility]


   Last Admin: 01/10/20 09:36 Dose:  0.5 mg


Diltiazem HCl (Cardizem -)  30 mg PO TID [facility]


   Last Admin: 01/10/20 13:52 Dose:  30 mg


Enoxaparin Sodium (Lovenox -)  40 mg SQ DAILY [facility]


   Last Admin: 01/10/20 09:35 Dose:  40 mg


Folic Acid (Folic Acid -)  1 mg PO DAILY [facility]


   Last Admin: 01/10/20 09:35 Dose:  1 mg


Furosemide (Lasix -)  20 mg PO DAILY [facility]


   Last Admin: 01/10/20 09:35 Dose:  20 mg


Methotrexate (Mexate -)  15 mg PO Q7D@1000 [facility]


   Last Admin: 01/08/20 09:36 Dose:  15 mg


Methylprednisolone Sodium Succinate (Solu-Medrol -)  40 mg IVPUSH BID [facility]


   Last Admin: 01/10/20 09:35 Dose:  40 mg


Pantoprazole Sodium (Protonix -)  20 mg PO DAILY [facility]


   Last Admin: 01/10/20 09:35 Dose:  20 mg


Ramipril (Altace -)  5 mg PO DAILY [facility]


   Last Admin: 01/10/20 09:35 Dose:  5 mg


Tiotropium Bromide (Spiriva Respimat)  2 puff IH DAILY [facility]


   Last Admin: 01/10/20 09:37 Dose:  2 puff








Constitutional: Yes: Thin, Respiratory distress  


Eyes: Yes: WNL


HENT: Yes: WNL


Neck: Yes: WNL


Cardiovascular: Yes: Regular Rate and Rhythm, S1, S2


Respiratory: Yes: TachypneicScattered Bilateral Rhonchi, no Expiratory Wheezes 

noted 


Gastrointestinal: Yes: Normal Bowel Sounds, Soft


Edema: Yes


Labs: 


  


 Laboratory Results - last 24 hr











  01/10/20 01/10/20





  06:03 06:03


 


WBC  14.8 H 


 


RBC  4.19 


 


Hgb  12.7 


 


Hct  38.9 


 


MCV  92.9 


 


MCH  30.3 


 


MCHC  32.6 


 


RDW  16.4 H 


 


Plt Count  317 


 


MPV  7.9 


 


Absolute Neuts (auto)  14.0 H 


 


Neutrophils %  94.6 H 


 


Neutrophils % (Manual)  94.0 H 


 


Band Neutrophils %  1.0 


 


Lymphocytes %  2.3 L 


 


Lymphocytes % (Manual)  2.0 L 


 


Monocytes %  3.0 L 


 


Monocytes % (Manual)  1 L 


 


Eosinophils %  0.0 


 


Eosinophils % (Manual)  0.0 


 


Basophils %  0.1 


 


Basophils % (Manual)  0.0 


 


Myelocytes % (Man)  0 


 


Promyelocytes % (Man)  0  D 


 


Blast Cells % (Manual)  0 


 


Nucleated RBC %  0 


 


Metamyelocytes  2  D 


 


Hypochromia  0 


 


Platelet Estimate  Normal 


 


Polychromasia  0 


 


Poikilocytosis  0 


 


Anisocytosis  0 


 


Microcytosis  0 


 


Macrocytosis  0 


 


Sodium   138


 


Potassium   4.7


 


Chloride   95 L


 


Carbon Dioxide   41 H


 


Anion Gap   2 L


 


BUN   30.0 H


 


Creatinine   0.8


 


Est GFR (CKD-EPI)AfAm   110.96


 


Est GFR (CKD-EPI)NonAf   95.74


 


Random Glucose   116 H


 


Calcium   9.6














Assessment/Plan








Problem List





- Problems


(1) Acute respiratory failure


Code(s): J96.00 - ACUTE RESPIRATORY FAILURE, UNSP W HYPOXIA OR HYPERCAPNIA   





(2) Pneumonia


Code(s): J18.9 - PNEUMONIA, UNSPECIFIED ORGANISM   





(3) Acute on chronic respiratory failure with hypoxia and hypercapnia


Code(s): J96.21 - ACUTE AND CHRONIC RESPIRATORY FAILURE WITH HYPOXIA; J96.22 - 

ACUTE AND CHRONIC RESPIRATORY FAILURE WITH HYPERCAPNIA   





(4) Anxiety and depression


Code(s): F41.9 - ANXIETY DISORDER, UNSPECIFIED; F32.9 - MAJOR DEPRESSIVE 

DISORDER, SINGLE EPISODE, UNSPECIFIED   





(5) COPD (chronic obstructive pulmonary disease)


Code(s): J44.9 - CHRONIC OBSTRUCTIVE PULMONARY DISEASE, UNSPECIFIED   


Qualifiers: 


   COPD type: emphysema   Emphysema type: unspecified   Qualified Code(s): 

J43.9 - Emphysema, unspecified   





(6) Cough


Code(s): R05 - COUGH   





(7) Edema


Code(s): R60.9 - EDEMA, UNSPECIFIED   


Qualifiers: 


   Edema type: unspecified   Qualified Code(s): R60.9 - Edema, unspecified   





(8) Rheumatoid arthritis


Code(s): M06.9 - RHEUMATOID ARTHRITIS, UNSPECIFIED   





8 LLL nodule





Assessment/Plan


Acute on chronic hypoxemic/hypercapneic resp failure


COPD on home 02


HIV/RA on Methotrexate


Pneumonia


Referred for lung transplant [facility] (patient did not submit paperwork)


CABG


HTN


HLD


CHF


LLL NODULE





NIPPV SUPPORT  


IV STEROIDS: LEAVE AT CURRENT DOSE    


BRONCHODILATORS


F/U CHEST CT OUTPATIENT


ABX PER ID 


SYMBICORT BID 


HIGH RISK FOR READMISSION 


CONSIDER INPATIENT PULMONARY REHAB 


PATIENT SEEMS TO HAVE PLATEAUED: DEPENDING ON NEXT FEW DAYS: IF HE DOES NOT 

IMPROVE CLINICALLY: WILL DISCUSS CHRONIC VENT SUPPORT VIA TRACH 





DR FUNG

## 2020-01-10 NOTE — PN
Progress Note, Physician





- Current Medication List


Current Medications: 


Active Medications





Acetaminophen (Tylenol -)  650 mg PO Q4H PRN


   PRN Reason: FEVER


Albuterol Sulfate (Ventolin Hfa Inhaler -)  2 puff IH Q4H PRN


   PRN Reason: SHORT OF BREATH/WHEEZING


Albuterol/Ipratropium (Duoneb -)  1 amp NEB RQID [facility]


   Last Admin: 01/10/20 07:40 Dose:  1 amp


Atorvastatin Calcium (Lipitor -)  20 mg PO HS [facility]


   Last Admin: 01/09/20 22:17 Dose:  20 mg


Budesonide/Formoterol Fumarate (Symbicort 160/4.5mcg -)  2 puff IH BID [facility]


   Last Admin: 01/09/20 22:18 Dose:  2 puff


Cefuroxime Axetil (Ceftin -)  500 mg PO BIDWM [facility]


   Last Admin: 01/09/20 17:57 Dose:  500 mg


Clonazepam (Klonopin -)  0.5 mg PO BID [facility]


   Last Admin: 01/09/20 22:17 Dose:  0.5 mg


Diltiazem HCl (Cardizem -)  30 mg PO TID [facility]


   Last Admin: 01/10/20 06:15 Dose:  30 mg


Enoxaparin Sodium (Lovenox -)  40 mg SQ DAILY [facility]


   Last Admin: 01/09/20 09:05 Dose:  40 mg


Folic Acid (Folic Acid -)  1 mg PO DAILY [facility]


   Last Admin: 01/09/20 09:06 Dose:  1 mg


Furosemide (Lasix -)  20 mg PO DAILY [facility]


   Last Admin: 01/09/20 09:06 Dose:  20 mg


Methotrexate (Mexate -)  15 mg PO Q7D@1000 [facility]


   Last Admin: 01/08/20 09:36 Dose:  15 mg


Methylprednisolone Sodium Succinate (Solu-Medrol -)  40 mg IVPUSH BID [facility]


   Last Admin: 01/09/20 22:17 Dose:  40 mg


Pantoprazole Sodium (Protonix -)  20 mg PO DAILY [facility]


   Last Admin: 01/09/20 09:06 Dose:  20 mg


Ramipril (Altace -)  5 mg PO DAILY [facility]


   Last Admin: 01/09/20 09:05 Dose:  5 mg


Tiotropium Bromide (Spiriva Respimat)  2 puff IH DAILY [facility]


   Last Admin: 01/09/20 09:24 Dose:  2 puff











- Objective


Vital Signs: 


 Vital Signs











Temperature  97.5 F L  01/10/20 02:00


 


Pulse Rate  108 H  01/10/20 06:00


 


Respiratory Rate  18   01/10/20 06:00


 


Blood Pressure  128/72   01/10/20 06:00


 


O2 Sat by Pulse Oximetry (%)  97   01/10/20 08:05








General: Young male in mild respiratory distress.


HEENT; mucous membranes moist, no anemia, no jaundice, PERRLA, no nystagmus


Neck: No JVD, supple, no bruit, thyroid palpably normal, normal carotid 

pulsations.


Chest: Nontender, bilateral wheezing.


CVS: S1-S2 regular 


no murmur/gallop/rub


Abdomen: Nondistended, soft, bowel sounds present.


Extremities: Trace edema.,  No calf  tenderness, pulses present


CNS: AO X3 , no gross motor sensory deficit


Labs: 


 CBC, BMP





 01/10/20 06:03 





 01/10/20 06:03 





 INR, PTT











INR  0.93  (0.83-1.09)   01/01/20  03:20    














Problem List





- Problems


(1) Acute on chronic respiratory failure with hypoxemia


Assessment/Plan: 


Taper  IV methylprednisolone 40 mg BID , bronchodilator and pulmonary input


Code(s): J96.21 - ACUTE AND CHRONIC RESPIRATORY FAILURE WITH HYPOXIA   





(2) Pneumonia


Assessment/Plan: 


today c/o cough and SOB cont curret abx will F/U CXR and sputum culture, ID 

input appreciated 


Code(s): J18.9 - PNEUMONIA, UNSPECIFIED ORGANISM   





(3) COPD exacerbation


Code(s): J44.1 - CHRONIC OBSTRUCTIVE PULMONARY DISEASE W (ACUTE) EXACERBATION   





(4) Diastolic CHF


Assessment/Plan: 


Compensated continue Po Lasix 20 mg daily


Code(s): I50.30 - UNSPECIFIED DIASTOLIC (CONGESTIVE) HEART FAILURE   


Qualifiers: 


   Heart failure chronicity: unspecified   Qualified Code(s): I50.30 - 

Unspecified diastolic (congestive) heart failure   





(5) Hypercholesterolemia


Assessment/Plan: 


Continue statin


Code(s): E78.0 - PURE HYPERCHOLESTEROLEMIA * DO NOT USE *   





(6) CAD (coronary artery disease)


Assessment/Plan: 


Stable


Code(s): I25.10 - ATHSCL HEART DISEASE OF NATIVE CORONARY ARTERY W/O ANG PCTRS 

  





(7) Rheumatoid arthritis


Assessment/Plan: 


Cont home medications.


Code(s): M06.9 - RHEUMATOID ARTHRITIS, UNSPECIFIED   





(8) Hypercarbia


Assessment/Plan: 


Due to advanced COPD will decrease Lasix to 20 mg  


Code(s): R06.89 - OTHER ABNORMALITIES OF BREATHING   





(9) Elevated WBC count


Assessment/Plan: 


most likly due to steroid trending normal


Problems reviewed: Yes   


Code(s): D72.829 - ELEVATED WHITE BLOOD CELL COUNT, UNSPECIFIED   





(10) Hyperkalemia


Assessment/Plan: 


corrected  will F/u BMP


Problems reviewed: Yes   


Code(s): E87.5 - HYPERKALEMIA

## 2020-01-11 LAB
ANION GAP SERPL CALC-SCNC: 3 MMOL/L (ref 8–16)
ANISOCYTOSIS BLD QL: 0
BASO STIPL BLD QL SMEAR: (no result)
BASOPHILS # BLD: 0.1 % (ref 0–2)
BUN SERPL-MCNC: 32.4 MG/DL (ref 7–18)
CALCIUM SERPL-MCNC: 9 MG/DL (ref 8.5–10.1)
CHLORIDE SERPL-SCNC: 97 MMOL/L (ref 98–107)
CO2 SERPL-SCNC: 39 MMOL/L (ref 21–32)
CREAT SERPL-MCNC: 0.7 MG/DL (ref 0.55–1.3)
DEPRECATED RDW RBC AUTO: 16.6 % (ref 11.9–15.9)
EOSINOPHIL # BLD: 0 % (ref 0–4.5)
GLUCOSE SERPL-MCNC: 111 MG/DL (ref 74–106)
HCT VFR BLD CALC: 34.3 % (ref 35.4–49)
HGB BLD-MCNC: 11.3 GM/DL (ref 11.7–16.9)
LYMPHOCYTES # BLD: 2.1 % (ref 8–40)
MACROCYTES BLD QL: 0
MCH RBC QN AUTO: 30.3 PG (ref 25.7–33.7)
MCHC RBC AUTO-ENTMCNC: 32.9 G/DL (ref 32–35.9)
MCV RBC: 92.2 FL (ref 80–96)
MONOCYTES # BLD AUTO: 2.5 % (ref 3.8–10.2)
NEUTROPHILS # BLD: 95.3 % (ref 42.8–82.8)
PLATELET # BLD AUTO: 250 K/MM3 (ref 134–434)
PLATELET BLD QL SMEAR: NORMAL
PMV BLD: 7.8 FL (ref 7.5–11.1)
POTASSIUM SERPLBLD-SCNC: 4.6 MMOL/L (ref 3.5–5.1)
RBC # BLD AUTO: 3.73 M/MM3 (ref 4–5.6)
SODIUM SERPL-SCNC: 139 MMOL/L (ref 136–145)
WBC # BLD AUTO: 11.3 K/MM3 (ref 4–10)

## 2020-01-11 RX ADMIN — TIOTROPIUM BROMIDE INHALATION SPRAY SCH PUFF: 3.12 SPRAY, METERED RESPIRATORY (INHALATION) at 10:19

## 2020-01-11 RX ADMIN — BUDESONIDE AND FORMOTEROL FUMARATE DIHYDRATE SCH PUFF: 160; 4.5 AEROSOL RESPIRATORY (INHALATION) at 10:20

## 2020-01-11 RX ADMIN — ATORVASTATIN CALCIUM SCH MG: 20 TABLET, FILM COATED ORAL at 22:22

## 2020-01-11 RX ADMIN — METHYLPREDNISOLONE SODIUM SUCCINATE SCH MG: 40 INJECTION, POWDER, FOR SOLUTION INTRAMUSCULAR; INTRAVENOUS at 11:20

## 2020-01-11 RX ADMIN — DILTIAZEM HYDROCHLORIDE SCH MG: 30 TABLET, FILM COATED ORAL at 14:20

## 2020-01-11 RX ADMIN — FOLIC ACID SCH MG: 1 TABLET ORAL at 11:20

## 2020-01-11 RX ADMIN — PANTOPRAZOLE SODIUM SCH MG: 20 TABLET, DELAYED RELEASE ORAL at 11:21

## 2020-01-11 RX ADMIN — IPRATROPIUM BROMIDE AND ALBUTEROL SULFATE SCH AMP: .5; 3 SOLUTION RESPIRATORY (INHALATION) at 16:18

## 2020-01-11 RX ADMIN — CEFUROXIME AXETIL SCH MG: 500 TABLET ORAL at 11:20

## 2020-01-11 RX ADMIN — CEFUROXIME AXETIL SCH MG: 500 TABLET ORAL at 20:22

## 2020-01-11 RX ADMIN — DILTIAZEM HYDROCHLORIDE SCH MG: 30 TABLET, FILM COATED ORAL at 22:22

## 2020-01-11 RX ADMIN — RAMIPRIL SCH MG: 5 CAPSULE ORAL at 11:21

## 2020-01-11 RX ADMIN — IPRATROPIUM BROMIDE AND ALBUTEROL SULFATE SCH AMP: .5; 3 SOLUTION RESPIRATORY (INHALATION) at 21:25

## 2020-01-11 RX ADMIN — IPRATROPIUM BROMIDE AND ALBUTEROL SULFATE SCH AMP: .5; 3 SOLUTION RESPIRATORY (INHALATION) at 12:29

## 2020-01-11 RX ADMIN — BUDESONIDE AND FORMOTEROL FUMARATE DIHYDRATE SCH PUFF: 160; 4.5 AEROSOL RESPIRATORY (INHALATION) at 22:22

## 2020-01-11 RX ADMIN — IPRATROPIUM BROMIDE AND ALBUTEROL SULFATE SCH AMP: .5; 3 SOLUTION RESPIRATORY (INHALATION) at 09:00

## 2020-01-11 RX ADMIN — DILTIAZEM HYDROCHLORIDE SCH MG: 30 TABLET, FILM COATED ORAL at 05:55

## 2020-01-11 RX ADMIN — ENOXAPARIN SODIUM SCH MG: 40 INJECTION SUBCUTANEOUS at 11:20

## 2020-01-11 RX ADMIN — FUROSEMIDE SCH MG: 20 TABLET ORAL at 11:21

## 2020-01-11 NOTE — PN
Progress Note (short form)





- Note


Progress Note: 





PULMONARY





VSS/AFEBRILE


ANICTERIC


CHEST DISTAANT


S1S2


BS+


NO EDEMA





LABS/MEDS/NOTES IMAGES REVIEWED








(1) Acute respiratory failure


Code(s): J96.00 - ACUTE RESPIRATORY FAILURE, UNSP W HYPOXIA OR HYPERCAPNIA   





(2) Pneumonia


Code(s): J18.9 - PNEUMONIA, UNSPECIFIED ORGANISM   





(3) Acute on chronic respiratory failure with hypoxia and hypercapnia


Code(s): J96.21 - ACUTE AND CHRONIC RESPIRATORY FAILURE WITH HYPOXIA; J96.22 - 

ACUTE AND CHRONIC RESPIRATORY FAILURE WITH HYPERCAPNIA   





(4) Anxiety and depression


Code(s): F41.9 - ANXIETY DISORDER, UNSPECIFIED; F32.9 - MAJOR DEPRESSIVE 

DISORDER, SINGLE EPISODE, UNSPECIFIED   





(5) COPD (chronic obstructive pulmonary disease)


Code(s): J44.9 - CHRONIC OBSTRUCTIVE PULMONARY DISEASE, UNSPECIFIED   


Qualifiers: 


   COPD type: emphysema   Emphysema type: unspecified   Qualified Code(s): 

J43.9 - Emphysema, unspecified   





(6) Cough


Code(s): R05 - COUGH   





(7) Edema


Code(s): R60.9 - EDEMA, UNSPECIFIED   


Qualifiers: 


   Edema type: unspecified   Qualified Code(s): R60.9 - Edema, unspecified   





(8) Rheumatoid arthritis


Code(s): M06.9 - RHEUMATOID ARTHRITIS, UNSPECIFIED   





Assessment/Plan





Acute on chronic hypoxemic/hypercapneic resp failure


CTA  no obvious PE/severe bullous emphysema/?nodular infiltrate right base


COPD on home 02


HIV/RA on Methotrexate


Referred for lung transplant [facility] (patient did not submit paperwork)


CABG/HTN/HLD/CHF





NIPPV HS/PRN 


STEROIDS/BRONCHODILATORS





KARON SOLIZ MD














Problem List





- Problems


(1) Acute respiratory failure


Code(s): J96.00 - ACUTE RESPIRATORY FAILURE, UNSP W HYPOXIA OR HYPERCAPNIA   





(2) Pneumonia


Code(s): J18.9 - PNEUMONIA, UNSPECIFIED ORGANISM   





(3) Acute on chronic respiratory failure with hypoxia and hypercapnia


Code(s): J96.21 - ACUTE AND CHRONIC RESPIRATORY FAILURE WITH HYPOXIA; J96.22 - 

ACUTE AND CHRONIC RESPIRATORY FAILURE WITH HYPERCAPNIA   





(4) Anxiety and depression


Code(s): F41.9 - ANXIETY DISORDER, UNSPECIFIED; F32.9 - MAJOR DEPRESSIVE 

DISORDER, SINGLE EPISODE, UNSPECIFIED   





(5) COPD (chronic obstructive pulmonary disease)


Code(s): J44.9 - CHRONIC OBSTRUCTIVE PULMONARY DISEASE, UNSPECIFIED   


Qualifiers: 


   COPD type: emphysema   Emphysema type: unspecified   Qualified Code(s): 

J43.9 - Emphysema, unspecified   





(6) Cough


Code(s): R05 - COUGH   





(7) Edema


Code(s): R60.9 - EDEMA, UNSPECIFIED   


Qualifiers: 


   Edema type: unspecified   Qualified Code(s): R60.9 - Edema, unspecified   





(8) Rheumatoid arthritis


Code(s): M06.9 - RHEUMATOID ARTHRITIS, UNSPECIFIED

## 2020-01-11 NOTE — PN
Progress Note, Physician


Chief Complaint: 


Feels better since yesterday move out of the bed , afebrile   





- Current Medication List


Current Medications: 


Active Medications





Acetaminophen (Tylenol -)  650 mg PO Q4H PRN


   PRN Reason: FEVER


Albuterol Sulfate (Ventolin Hfa Inhaler -)  2 puff IH Q4H PRN


   PRN Reason: SHORT OF BREATH/WHEEZING


   Last Admin: 01/10/20 12:40 Dose:  2 puff


Albuterol/Ipratropium (Duoneb -)  1 amp NEB RQID [facility]


   Last Admin: 01/11/20 09:00 Dose:  1 amp


Atorvastatin Calcium (Lipitor -)  20 mg PO HS [facility]


   Last Admin: 01/10/20 21:33 Dose:  20 mg


Budesonide/Formoterol Fumarate (Symbicort 160/4.5mcg -)  2 puff IH BID [facility]


   Last Admin: 01/10/20 21:39 Dose:  2 puff


Cefuroxime Axetil (Ceftin -)  500 mg PO BIDWM [facility]


   Last Admin: 01/10/20 17:30 Dose:  500 mg


Clonazepam (Klonopin -)  0.5 mg PO BID [facility]


   Last Admin: 01/10/20 21:34 Dose:  0.5 mg


Diltiazem HCl (Cardizem -)  30 mg PO TID [facility]


   Last Admin: 01/11/20 05:55 Dose:  30 mg


Enoxaparin Sodium (Lovenox -)  40 mg SQ DAILY [facility]


   Last Admin: 01/10/20 09:35 Dose:  40 mg


Folic Acid (Folic Acid -)  1 mg PO DAILY [facility]


   Last Admin: 01/10/20 09:35 Dose:  1 mg


Furosemide (Lasix -)  20 mg PO DAILY [facility]


   Last Admin: 01/10/20 09:35 Dose:  20 mg


Methotrexate (Mexate -)  15 mg PO Q7D@1000 [facility]


   Last Admin: 01/08/20 09:36 Dose:  15 mg


Methylprednisolone Sodium Succinate (Solu-Medrol -)  40 mg IVPUSH BID [facility]


   Last Admin: 01/10/20 21:34 Dose:  40 mg


Pantoprazole Sodium (Protonix -)  20 mg PO DAILY [facility]


   Last Admin: 01/10/20 09:35 Dose:  20 mg


Ramipril (Altace -)  5 mg PO DAILY [facility]


   Last Admin: 01/10/20 09:35 Dose:  5 mg


Tiotropium Bromide (Spiriva Respimat)  2 puff IH DAILY [facility]


   Last Admin: 01/10/20 09:37 Dose:  2 puff











- Objective


Vital Signs: 


 Vital Signs











Temperature  96.8 F L  01/11/20 02:00


 


Pulse Rate  90   01/11/20 02:00


 


Respiratory Rate  20   01/11/20 02:00


 


Blood Pressure  105/64   01/11/20 02:00


 


O2 Sat by Pulse Oximetry (%)  97   01/11/20 00:27








General: Young male in mild respiratory distress.


HEENT; mucous membranes moist, no anemia, no jaundice, PERRLA, no nystagmus


Neck: No JVD, supple, no bruit, thyroid palpably normal, normal carotid 

pulsations.


Chest: Nontender, bilateral wheezing.


CVS: S1-S2 regular 


no murmur/gallop/rub


Abdomen: Nondistended, soft, bowel sounds present.


Extremities: Trace edema.,  No calf  tenderness, pulses present


CNS: AO X3 , no gross motor sensory deficit


Labs: 


 CBC, BMP





 01/11/20 06:00 





 01/11/20 06:00 





 INR, PTT











INR  0.93  (0.83-1.09)   01/01/20  03:20    














Problem List





- Problems


(1) Acute on chronic respiratory failure with hypoxemia


Assessment/Plan: 


Taper  IV methylprednisolone 40 mg BID , bronchodilator and pulmonary input


Code(s): J96.21 - ACUTE AND CHRONIC RESPIRATORY FAILURE WITH HYPOXIA   





(2) Pneumonia


Assessment/Plan: 


today c/o cough and SOB cont curret abx will F/U CXR and sputum culture, ID 

input appreciated 


Code(s): J18.9 - PNEUMONIA, UNSPECIFIED ORGANISM   





(3) COPD exacerbation


Assessment/Plan: 


Severe COPD patient was referred for lung transplant, follow-up pulmonary 

recommendations, bronchodilators and IV hydration.F/U VBG for Hypercarbia added 

Spiriva


Code(s): J44.1 - CHRONIC OBSTRUCTIVE PULMONARY DISEASE W (ACUTE) EXACERBATION   





(4) Diastolic CHF


Assessment/Plan: 


Compensated continue Po Lasix 20 mg daily


Code(s): I50.30 - UNSPECIFIED DIASTOLIC (CONGESTIVE) HEART FAILURE   


Qualifiers: 


   Heart failure chronicity: unspecified   Qualified Code(s): I50.30 - 

Unspecified diastolic (congestive) heart failure   





(5) Hypercholesterolemia


Assessment/Plan: 


Continue statin


Code(s): E78.0 - PURE HYPERCHOLESTEROLEMIA * DO NOT USE *   





(6) CAD (coronary artery disease)


Assessment/Plan: 


Stable


Code(s): I25.10 - ATHSCL HEART DISEASE OF NATIVE CORONARY ARTERY W/O ANG PCTRS 

  





(7) Rheumatoid arthritis


Assessment/Plan: 


Cont home medications.


Code(s): M06.9 - RHEUMATOID ARTHRITIS, UNSPECIFIED   





(8) Hypercarbia


Assessment/Plan: 


Due to advanced COPD will decrease Lasix to 20 mg  


Code(s): R06.89 - OTHER ABNORMALITIES OF BREATHING   





(9) Elevated WBC count


Assessment/Plan: 


most likly due to steroid trending normal


Code(s): D72.829 - ELEVATED WHITE BLOOD CELL COUNT, UNSPECIFIED   





(10) Hyperkalemia


Assessment/Plan: 


corrected  will F/u BMP


Code(s): E87.5 - HYPERKALEMIA

## 2020-01-12 LAB
MAGNESIUM SERPL-MCNC: 2.7 MG/DL (ref 1.8–2.4)
PHOSPHATE SERPL-MCNC: 4.2 MG/DL (ref 2.5–4.9)

## 2020-01-12 RX ADMIN — ENOXAPARIN SODIUM SCH MG: 40 INJECTION SUBCUTANEOUS at 10:57

## 2020-01-12 RX ADMIN — CEFUROXIME AXETIL SCH MG: 500 TABLET ORAL at 09:20

## 2020-01-12 RX ADMIN — IPRATROPIUM BROMIDE AND ALBUTEROL SULFATE SCH AMP: .5; 3 SOLUTION RESPIRATORY (INHALATION) at 11:03

## 2020-01-12 RX ADMIN — TIOTROPIUM BROMIDE INHALATION SPRAY SCH PUFF: 3.12 SPRAY, METERED RESPIRATORY (INHALATION) at 10:58

## 2020-01-12 RX ADMIN — DILTIAZEM HYDROCHLORIDE SCH MG: 30 TABLET, FILM COATED ORAL at 15:07

## 2020-01-12 RX ADMIN — ATORVASTATIN CALCIUM SCH MG: 20 TABLET, FILM COATED ORAL at 22:18

## 2020-01-12 RX ADMIN — BUDESONIDE AND FORMOTEROL FUMARATE DIHYDRATE SCH PUFF: 160; 4.5 AEROSOL RESPIRATORY (INHALATION) at 10:58

## 2020-01-12 RX ADMIN — IPRATROPIUM BROMIDE AND ALBUTEROL SULFATE SCH AMP: .5; 3 SOLUTION RESPIRATORY (INHALATION) at 21:25

## 2020-01-12 RX ADMIN — IPRATROPIUM BROMIDE AND ALBUTEROL SULFATE SCH AMP: .5; 3 SOLUTION RESPIRATORY (INHALATION) at 08:08

## 2020-01-12 RX ADMIN — BUDESONIDE AND FORMOTEROL FUMARATE DIHYDRATE SCH PUFF: 160; 4.5 AEROSOL RESPIRATORY (INHALATION) at 22:18

## 2020-01-12 RX ADMIN — CEFUROXIME AXETIL SCH MG: 500 TABLET ORAL at 17:17

## 2020-01-12 RX ADMIN — IPRATROPIUM BROMIDE AND ALBUTEROL SULFATE SCH AMP: .5; 3 SOLUTION RESPIRATORY (INHALATION) at 16:31

## 2020-01-12 RX ADMIN — RAMIPRIL SCH MG: 5 CAPSULE ORAL at 10:57

## 2020-01-12 RX ADMIN — DILTIAZEM HYDROCHLORIDE SCH MG: 30 TABLET, FILM COATED ORAL at 23:07

## 2020-01-12 RX ADMIN — PANTOPRAZOLE SODIUM SCH MG: 20 TABLET, DELAYED RELEASE ORAL at 10:57

## 2020-01-12 RX ADMIN — PREDNISONE SCH MG: 10 TABLET ORAL at 10:57

## 2020-01-12 RX ADMIN — DILTIAZEM HYDROCHLORIDE SCH MG: 30 TABLET, FILM COATED ORAL at 06:19

## 2020-01-12 RX ADMIN — FUROSEMIDE SCH MG: 20 TABLET ORAL at 10:57

## 2020-01-12 RX ADMIN — FOLIC ACID SCH MG: 1 TABLET ORAL at 10:57

## 2020-01-12 NOTE — CON.CARD
Consult


Consult Specialty:: cardiology


Reason for Consultation:: arrhythmia





- History of Present Illness


Chief Complaint: Pt A&Ox3; no chest pain or palpitations; no dizziness; +SOB


History of Present Illness: 





65yo man with a PMH of CAD s/p CABG, COPD on home O2 (on a list for lung 

transplant), HTN, HLD, HIV, CHF, RA on methotrexate, and anxiety presents to 

the emergency department from [facility] with acute onset of SOB and respiratory 

distress. Hypoxic down to 88%


Full code. But there was discussion on goals of care and pt does not elect for 

CPR or aggressive measures.





Pt states he developed "pneumonia" after recent discharge from [facility] to [facility].








- History Source


History Provided By: Patient, Medical Record


Limitations to Obtaining History: No Limitations





- Past Medical History


CNS: No: Alzheimer's


Cardio/Vascular: Yes: CAD, CHF, HTN, Hyperlipdemia


Pulmonary: Yes: COPD, O2 Dependent, Other (bullous emphysema)


Gastrointestinal: No: Ascites


Infectious Disease: Yes: HIV


Psych: Yes: Addictions (heroin), Anxiety


Musculoskeletal: Yes: Chronic low back pain


Rheumatology: Yes: Rheumatoid Arthritis





- Past Surgical History


Past Surgical History: Yes: CABG, Stent





- Alcohol/Substance Use


Hx Alcohol Use: No


History of Substance Use: reports: None





- Smoking History


Smoking history: Former smoker


Have you smoked in the past 12 months: No


Aproximately how many cigarettes per day: 0


If you are a former smoker, when did you quit?: 3YRS





Home Medications





- Allergies


Allergies/Adverse Reactions: 


 Allergies











Allergy/AdvReac Type Severity Reaction Status Date / Time


 


Penicillins Allergy Severe Rash Verified 01/01/20 00:21


 


seafood Allergy Severe Rash Uncoded 01/01/20 00:21














- Home Medications


Home Medications: 


Ambulatory Orders





Albuterol Sulfate Inhaler - [Ventolin Hfa Inhaler -] 1 - 2 inh PO Q4H PRN 11/28/ 18 


Budesonide/Formeterol Fumarate [SYMBICORT 160/4.5mcg -] 1 inh PO BID 11/28/18 


Clopidogrel Bisulfate [Plavix] 75 mg PO DAILY 11/28/18 


Folic Acid 1 mg PO DAILY 11/28/18 


Furosemide [Lasix] 40 mg PO DAILY 11/28/18 


Hydroxychloroquine Sulfate [Plaquenil] 200 mg PO BID 11/28/18 


Methotrexate [Mexate -] 15 mg PO Q7D 11/28/18 


Ramipril [Altace] 5 mg PO DAILY 11/28/18 


Simvastatin [Zocor -] 40 mg PO HS 11/28/18 


Clonazepam 0.5 mg PO BID 12/08/19 


Atorvastatin Calcium 20 mg PO HS 01/01/20 


Fluticasone/Umeclidin/Vilanter [Trelegy Ellipta 100-62.5-25] 1 each IH ASDIR 01/ 01/20 


Ramipril 5 mg PO DAILY 01/01/20 











Family Medical History


Family History: Denies





Review of Systems





- Review of Systems


Constitutional: reports: Lethargy, Loss of Appetite, Weakness


Eyes: reports: No Symptoms


HENT: reports: No Symptoms


Neck: reports: No Symptoms


Cardiovascular: reports: Shortness of Breath


Respiratory: reports: Exercise Intolerance, SOB, Wheezing


Gastrointestinal: reports: No Symptoms


Genitourinary: reports: No Symptoms


Breasts: reports: No Symptoms Reported


Integumentary: reports: No Symptoms


Neurological: reports: No Symptoms


Endocrine: reports: No Symptoms


Hematology/Lymphatic: reports: No Symptoms


Psychiatric: reports: Anxiety, Depression





- Risk Factors


Known Risk Factors: Yes: Age, Gender, Hypercholesterolemia, Hypertension, 

Physical Inactivity, Smoking (former), Other (diastolic CHF: depression; COPD)


Vital Signs: 


 Vital Signs











Temperature  97.2 F L  01/12/20 18:00


 


Pulse Rate  78   01/12/20 18:00


 


Respiratory Rate  20   01/12/20 18:00


 


Blood Pressure  126/74   01/12/20 18:00


 


O2 Sat by Pulse Oximetry (%)  97   01/12/20 16:31











Constitutional: Yes: Anxious, Thin


Eyes: Yes: WNL


HENT: Yes: WNL


Neck: Yes: WNL


Respiratory: Yes: Diminished, Poor Air Entry, SOB


Gastrointestinal: Yes: WNL


Renal/: Yes: WNL


Cardiovascular: Yes: Tachycardia (episode of PSVT)


JVD: No


Carotid Bruit: No


PMI: Non-Displaced


Heart Sounds: Yes: S1, Split S2, S4


Murmur: Yes: Systolic Murmur, Grade 2


Musculoskeletal: Yes: Muscle Weakness


Extremities: Yes: Cool


Edema: No


Peripheral Pulses WNL: Yes


Integumentary: Yes: WNL


Neurological: Yes: Alert, Oriented, Weakness


Psychiatric: Yes: Alert, Oriented, Other (depression)





- Other Data


Labs, Other Data: 


 CBC, BMP





 01/11/20 06:00 





 01/11/20 06:00 





 INR, PTT











INR  0.93  (0.83-1.09)   01/01/20  03:20    








 Abnormal Lab Results











  01/13/20 01/13/20





  12:12 12:12


 


WBC  20.9 H 


 


RDW  16.6 H 


 


Absolute Neuts (auto)  19.8 H 


 


Neutrophils %  95.0 H 


 


Neutrophils % (Manual)  94.0 H 


 


Lymphocytes %  1.8 L 


 


Lymphocytes % (Manual)  2.0 L D 


 


Monocytes %  3.1 L 


 


Monocytes % (Manual)  1 L 


 


Chloride   97 L


 


Carbon Dioxide   37 H


 


Anion Gap   3 L


 


BUN   31.1 H














Imaging





- Results


Chest X-ray: Image Reviewed (hyperinflation consistent with COPD)


EKG: Image Reviewed (sinus tachycardia; RBBB (unchanged))





Problem List





- Problems


(1) Acute respiratory failure


Code(s): J96.00 - ACUTE RESPIRATORY FAILURE, UNSP W HYPOXIA OR HYPERCAPNIA   





(2) Elevated WBC count


Code(s): D72.829 - ELEVATED WHITE BLOOD CELL COUNT, UNSPECIFIED   





(3) Hypercarbia


Code(s): R06.89 - OTHER ABNORMALITIES OF BREATHING   





(4) Hyperkalemia


Code(s): E87.5 - HYPERKALEMIA   





(5) Pneumonia


Code(s): J18.9 - PNEUMONIA, UNSPECIFIED ORGANISM   





(6) Abnormal LFTs


Assessment/Plan: 


mildly elevated ALT.


F/u serially (especially while on statin).


Code(s): R79.89 - OTHER SPECIFIED ABNORMAL FINDINGS OF BLOOD CHEMISTRY   





(7) Acute on chronic diastolic (congestive) heart failure


Assessment/Plan: 


F/u BNP


Hold lisinopril (no acute infiltrate on cxr; rising BUN/ development of PSVT)


f/U echo for LVEF, diastolic compliance, vave status.   .


Code(s): I50.33 - ACUTE ON CHRONIC DIASTOLIC (CONGESTIVE) HEART FAILURE   





(8) Acute on chronic respiratory failure with hypoxia and hypercapnia


Code(s): J96.21 - ACUTE AND CHRONIC RESPIRATORY FAILURE WITH HYPOXIA; J96.22 - 

ACUTE AND CHRONIC RESPIRATORY FAILURE WITH HYPERCAPNIA   





(9) Anxiety and depression


Code(s): F41.9 - ANXIETY DISORDER, UNSPECIFIED; F32.9 - MAJOR DEPRESSIVE 

DISORDER, SINGLE EPISODE, UNSPECIFIED   





(10) COPD exacerbation


Code(s): J44.1 - CHRONIC OBSTRUCTIVE PULMONARY DISEASE W (ACUTE) EXACERBATION   





(11) HTN (hypertension)


Code(s): I10 - ESSENTIAL (PRIMARY) HYPERTENSION   


Qualifiers: 


   Hypertension type: essential hypertension   Qualified Code(s): I10 - 

Essential (primary) hypertension   





(12) History of coronary artery bypass graft


Code(s): Z95.1 - PRESENCE OF AORTOCORONARY BYPASS GRAFT   





(13) History of percutaneous coronary intervention


Code(s): Z98.89 - OTHER SPECIFIED POSTPROCEDURAL STATES * DO NOT USE *   





(14) Hypercholesterolemia


Assessment/Plan: 


on atorvastatin (increase to 40 mg daily due to elevated LDL); f/u LFTs serally 

(milely elevated ALT).


Code(s): E78.0 - PURE HYPERCHOLESTEROLEMIA * DO NOT USE *   





(15) PSVT (paroxysmal supraventricular tachycardia)


Assessment/Plan: 


Started diltiazem (increase to 30 mg q6h, and increase as necessasry and as BP 

tolerates).


F/u electrolytes.


Serial EKG.


Avoid excessive dehydration (furosemide held).


Code(s): I47.1 - SUPRAVENTRICULAR TACHYCARDIA   





(16) Rheumatoid arthritis


Code(s): M06.9 - RHEUMATOID ARTHRITIS, UNSPECIFIED   





(17) Sinus tachycardia


Code(s): R00.0 - TACHYCARDIA, UNSPECIFIED

## 2020-01-12 NOTE — PN
Progress Note, Physician


Chief Complaint: 


Feels better since yesterday move out of the bed , afebrile   





- Current Medication List


Current Medications: 


Active Medications





Acetaminophen (Tylenol -)  650 mg PO Q4H PRN


   PRN Reason: FEVER


Albuterol Sulfate (Ventolin Hfa Inhaler -)  2 puff IH Q4H PRN


   PRN Reason: SHORT OF BREATH/WHEEZING


   Last Admin: 01/10/20 12:40 Dose:  2 puff


Albuterol/Ipratropium (Duoneb -)  1 amp NEB RQID [facility]


   Last Admin: 01/12/20 11:03 Dose:  1 amp


Atorvastatin Calcium (Lipitor -)  20 mg PO HS [facility]


   Last Admin: 01/11/20 22:22 Dose:  20 mg


Budesonide/Formoterol Fumarate (Symbicort 160/4.5mcg -)  2 puff IH BID [facility]


   Last Admin: 01/12/20 10:58 Dose:  2 puff


Cefuroxime Axetil (Ceftin -)  500 mg PO BIDWM [facility]


   Last Admin: 01/12/20 09:20 Dose:  500 mg


Clonazepam (Klonopin -)  0.5 mg PO BID [facility]


   Last Admin: 01/12/20 10:57 Dose:  0.5 mg


Diltiazem HCl (Cardizem -)  30 mg PO TID [facility]


   Last Admin: 01/12/20 06:19 Dose:  30 mg


Enoxaparin Sodium (Lovenox -)  40 mg SQ DAILY [facility]


   Last Admin: 01/12/20 10:57 Dose:  40 mg


Folic Acid (Folic Acid -)  1 mg PO DAILY [facility]


   Last Admin: 01/12/20 10:57 Dose:  1 mg


Furosemide (Lasix -)  20 mg PO DAILY [facility]


   Last Admin: 01/12/20 10:57 Dose:  20 mg


Methotrexate (Mexate -)  15 mg PO Q7D@1000 [facility]


   Last Admin: 01/08/20 09:36 Dose:  15 mg


Pantoprazole Sodium (Protonix -)  20 mg PO DAILY [facility]


   Last Admin: 01/12/20 10:57 Dose:  20 mg


Prednisone (Deltasone -)  30 mg PO DAILY [facility]


   Last Admin: 01/12/20 10:57 Dose:  30 mg


Ramipril (Altace -)  5 mg PO DAILY [facility]


   Last Admin: 01/12/20 10:57 Dose:  5 mg


Tiotropium Bromide (Spiriva Respimat)  2 puff IH DAILY [facility]


   Last Admin: 01/12/20 10:58 Dose:  2 puff











- Objective


Vital Signs: 


 Vital Signs











Temperature  97.8 F   01/12/20 10:00


 


Pulse Rate  108 H  01/12/20 10:00


 


Respiratory Rate  20 01/12/20 10:00


 


Blood Pressure  98/72   01/12/20 10:00


 


O2 Sat by Pulse Oximetry (%)  98   01/12/20 10:00








General: Young male in mild respiratory distress.


HEENT; mucous membranes moist, no anemia, no jaundice, PERRLA, no nystagmus


Neck: No JVD, supple, no bruit, thyroid palpably normal, normal carotid 

pulsations.


Chest: Nontender, bilateral wheezing.


CVS: S1-S2 regular 


no murmur/gallop/rub


Abdomen: Nondistended, soft, bowel sounds present.


Extremities: Trace edema.,  No calf  tenderness, pulses present


CNS: AO X3 , no gross motor sensory deficit


Labs: 


 CBC, BMP





 01/11/20 06:00 





 01/11/20 06:00 





 INR, PTT











INR  0.93  (0.83-1.09)   01/01/20  03:20    














Problem List





- Problems


(1) Acute on chronic respiratory failure with hypoxemia


Assessment/Plan: 


On PO Prednisone 40 day 2  , bronchodilator and pulmonary input


Code(s): J96.21 - ACUTE AND CHRONIC RESPIRATORY FAILURE WITH HYPOXIA   





(2) Pneumonia


Assessment/Plan: 


Completed abx


Code(s): J18.9 - PNEUMONIA, UNSPECIFIED ORGANISM   





(3) COPD exacerbation


Assessment/Plan: 


cont Duo Neb Spiriva symbicort and Po prednisone


Code(s): J44.1 - CHRONIC OBSTRUCTIVE PULMONARY DISEASE W (ACUTE) EXACERBATION   





(4) Diastolic CHF


Assessment/Plan: 


Compensated continue Po Lasix 20 mg daily


Code(s): I50.30 - UNSPECIFIED DIASTOLIC (CONGESTIVE) HEART FAILURE   


Qualifiers: 


   Heart failure chronicity: unspecified   Qualified Code(s): I50.30 - 

Unspecified diastolic (congestive) heart failure   





(5) Hypercholesterolemia


Assessment/Plan: 


Continue statin


Code(s): E78.0 - PURE HYPERCHOLESTEROLEMIA * DO NOT USE *   





(6) CAD (coronary artery disease)


Assessment/Plan: 


Stable


Code(s): I25.10 - ATHSCL HEART DISEASE OF NATIVE CORONARY ARTERY W/O ANG PCTRS 

  





(7) Rheumatoid arthritis


Assessment/Plan: 


Cont home medications.


Code(s): M06.9 - RHEUMATOID ARTHRITIS, UNSPECIFIED   





(8) Hypercarbia


Assessment/Plan: 


Stable 


Code(s): R06.89 - OTHER ABNORMALITIES OF BREATHING   





(9) Hyperkalemia


Assessment/Plan: 


corrected  will F/u BMP


Code(s): E87.5 - HYPERKALEMIA

## 2020-01-12 NOTE — PN
Progress Note (short form)





- Note


Progress Note: 





PULMONARY





VSS/AFEBRILE


ANICTERIC


CHEST DISTAANT


S1S2


BS+


NO EDEMA





LABS/MEDS/NOTES IMAGES REVIEWED








(1) Acute respiratory failure


Code(s): J96.00 - ACUTE RESPIRATORY FAILURE, UNSP W HYPOXIA OR HYPERCAPNIA   





(2) Pneumonia


Code(s): J18.9 - PNEUMONIA, UNSPECIFIED ORGANISM   





(3) Acute on chronic respiratory failure with hypoxia and hypercapnia


Code(s): J96.21 - ACUTE AND CHRONIC RESPIRATORY FAILURE WITH HYPOXIA; J96.22 - 

ACUTE AND CHRONIC RESPIRATORY FAILURE WITH HYPERCAPNIA   





(4) Anxiety and depression


Code(s): F41.9 - ANXIETY DISORDER, UNSPECIFIED; F32.9 - MAJOR DEPRESSIVE 

DISORDER, SINGLE EPISODE, UNSPECIFIED   





(5) COPD (chronic obstructive pulmonary disease)


Code(s): J44.9 - CHRONIC OBSTRUCTIVE PULMONARY DISEASE, UNSPECIFIED   


Qualifiers: 


   COPD type: emphysema   Emphysema type: unspecified   Qualified Code(s): 

J43.9 - Emphysema, unspecified   





(6) Cough


Code(s): R05 - COUGH   





(7) Edema


Code(s): R60.9 - EDEMA, UNSPECIFIED   


Qualifiers: 


   Edema type: unspecified   Qualified Code(s): R60.9 - Edema, unspecified   





(8) Rheumatoid arthritis


Code(s): M06.9 - RHEUMATOID ARTHRITIS, UNSPECIFIED   





Assessment/Plan





Acute on chronic hypoxemic/hypercapneic resp failure


CTA  no obvious PE/severe bullous emphysema/?nodular infiltrate right base


COPD on home 02


HIV/RA on Methotrexate


Referred for lung transplant [facility] (patient did not submit paperwork)


CABG/HTN/HLD/CHF





NIPPV HS/PRN 


STEROIDS/BRONCHODILATORS


D/C PLANNING





R HEYDI TIERNEY














Problem List





- Problems


(1) Acute respiratory failure


Code(s): J96.00 - ACUTE RESPIRATORY FAILURE, UNSP W HYPOXIA OR HYPERCAPNIA   





(2) Pneumonia


Code(s): J18.9 - PNEUMONIA, UNSPECIFIED ORGANISM   





(3) Acute on chronic respiratory failure with hypoxia and hypercapnia


Code(s): J96.21 - ACUTE AND CHRONIC RESPIRATORY FAILURE WITH HYPOXIA; J96.22 - 

ACUTE AND CHRONIC RESPIRATORY FAILURE WITH HYPERCAPNIA   





(4) Anxiety and depression


Code(s): F41.9 - ANXIETY DISORDER, UNSPECIFIED; F32.9 - MAJOR DEPRESSIVE 

DISORDER, SINGLE EPISODE, UNSPECIFIED   





(5) COPD (chronic obstructive pulmonary disease)


Code(s): J44.9 - CHRONIC OBSTRUCTIVE PULMONARY DISEASE, UNSPECIFIED   


Qualifiers: 


   COPD type: emphysema   Emphysema type: unspecified   Qualified Code(s): 

J43.9 - Emphysema, unspecified   





(6) Cough


Code(s): R05 - COUGH   





(7) Edema


Code(s): R60.9 - EDEMA, UNSPECIFIED   


Qualifiers: 


   Edema type: unspecified   Qualified Code(s): R60.9 - Edema, unspecified   





(8) Rheumatoid arthritis


Code(s): M06.9 - RHEUMATOID ARTHRITIS, UNSPECIFIED

## 2020-01-13 LAB
ANION GAP SERPL CALC-SCNC: 3 MMOL/L (ref 8–16)
ANISOCYTOSIS BLD QL: (no result)
BASOPHILS # BLD: 0.1 % (ref 0–2)
BUN SERPL-MCNC: 31.1 MG/DL (ref 7–18)
CALCIUM SERPL-MCNC: 8.7 MG/DL (ref 8.5–10.1)
CHLORIDE SERPL-SCNC: 97 MMOL/L (ref 98–107)
CO2 SERPL-SCNC: 37 MMOL/L (ref 21–32)
CREAT SERPL-MCNC: 0.8 MG/DL (ref 0.55–1.3)
DEPRECATED RDW RBC AUTO: 16.6 % (ref 11.9–15.9)
EOSINOPHIL # BLD: 0 % (ref 0–4.5)
GLUCOSE SERPL-MCNC: 94 MG/DL (ref 74–106)
HCT VFR BLD CALC: 38.8 % (ref 35.4–49)
HGB BLD-MCNC: 12.9 GM/DL (ref 11.7–16.9)
LYMPHOCYTES # BLD: 1.8 % (ref 8–40)
MCH RBC QN AUTO: 30.7 PG (ref 25.7–33.7)
MCHC RBC AUTO-ENTMCNC: 33.2 G/DL (ref 32–35.9)
MCV RBC: 92.7 FL (ref 80–96)
MONOCYTES # BLD AUTO: 3.1 % (ref 3.8–10.2)
NEUTROPHILS # BLD: 95 % (ref 42.8–82.8)
PLATELET # BLD AUTO: 279 K/MM3 (ref 134–434)
PLATELET BLD QL SMEAR: NORMAL
PMV BLD: 8.4 FL (ref 7.5–11.1)
POTASSIUM SERPLBLD-SCNC: 4.9 MMOL/L (ref 3.5–5.1)
RBC # BLD AUTO: 4.19 M/MM3 (ref 4–5.6)
SODIUM SERPL-SCNC: 137 MMOL/L (ref 136–145)
WBC # BLD AUTO: 20.9 K/MM3 (ref 4–10)

## 2020-01-13 RX ADMIN — DILTIAZEM HYDROCHLORIDE SCH MG: 30 TABLET, FILM COATED ORAL at 11:38

## 2020-01-13 RX ADMIN — IPRATROPIUM BROMIDE AND ALBUTEROL SULFATE SCH AMP: .5; 3 SOLUTION RESPIRATORY (INHALATION) at 16:25

## 2020-01-13 RX ADMIN — TIOTROPIUM BROMIDE INHALATION SPRAY SCH PUFF: 3.12 SPRAY, METERED RESPIRATORY (INHALATION) at 09:12

## 2020-01-13 RX ADMIN — ATORVASTATIN CALCIUM SCH MG: 20 TABLET, FILM COATED ORAL at 22:41

## 2020-01-13 RX ADMIN — IPRATROPIUM BROMIDE AND ALBUTEROL SULFATE SCH: .5; 3 SOLUTION RESPIRATORY (INHALATION) at 08:00

## 2020-01-13 RX ADMIN — PANTOPRAZOLE SODIUM SCH MG: 20 TABLET, DELAYED RELEASE ORAL at 09:10

## 2020-01-13 RX ADMIN — DILTIAZEM HYDROCHLORIDE SCH MG: 30 TABLET, FILM COATED ORAL at 23:53

## 2020-01-13 RX ADMIN — ENOXAPARIN SODIUM SCH MG: 40 INJECTION SUBCUTANEOUS at 09:11

## 2020-01-13 RX ADMIN — CEFUROXIME AXETIL SCH MG: 500 TABLET ORAL at 09:10

## 2020-01-13 RX ADMIN — CEFUROXIME AXETIL SCH MG: 500 TABLET ORAL at 18:51

## 2020-01-13 RX ADMIN — BUDESONIDE AND FORMOTEROL FUMARATE DIHYDRATE SCH PUFF: 160; 4.5 AEROSOL RESPIRATORY (INHALATION) at 22:41

## 2020-01-13 RX ADMIN — FOLIC ACID SCH MG: 1 TABLET ORAL at 09:10

## 2020-01-13 RX ADMIN — DILTIAZEM HYDROCHLORIDE SCH MG: 30 TABLET, FILM COATED ORAL at 18:51

## 2020-01-13 RX ADMIN — RAMIPRIL SCH MG: 5 CAPSULE ORAL at 09:10

## 2020-01-13 RX ADMIN — IPRATROPIUM BROMIDE AND ALBUTEROL SULFATE SCH AMP: .5; 3 SOLUTION RESPIRATORY (INHALATION) at 11:25

## 2020-01-13 RX ADMIN — BUDESONIDE AND FORMOTEROL FUMARATE DIHYDRATE SCH PUFF: 160; 4.5 AEROSOL RESPIRATORY (INHALATION) at 09:11

## 2020-01-13 RX ADMIN — IPRATROPIUM BROMIDE AND ALBUTEROL SULFATE SCH AMP: .5; 3 SOLUTION RESPIRATORY (INHALATION) at 20:55

## 2020-01-13 RX ADMIN — DILTIAZEM HYDROCHLORIDE SCH MG: 30 TABLET, FILM COATED ORAL at 06:26

## 2020-01-13 RX ADMIN — PREDNISONE SCH MG: 10 TABLET ORAL at 09:10

## 2020-01-13 NOTE — EKG
Test Reason : 

Blood Pressure : ***/*** mmHG

Vent. Rate : 103 BPM     Atrial Rate : 103 BPM

   P-R Int : 130 ms          QRS Dur : 124 ms

    QT Int : 330 ms       P-R-T Axes : [phone] degrees

   QTc Int : 432 ms

 

SINUS TACHYCARDIA

RIGHT BUNDLE BRANCH BLOCK

ABNORMAL ECG

WHEN COMPARED WITH ECG OF 01-JAN-2020 00:15,

NO SIGNIFICANT CHANGE WAS FOUND

Confirmed by PETR SALINAS MD ([id number]) on 1/13/2020 9:40:30 AM

 

Referred By:             Confirmed By:PETR SALINAS MD

## 2020-01-14 VITALS — SYSTOLIC BLOOD PRESSURE: 92 MMHG | HEART RATE: 76 BPM | TEMPERATURE: 98.2 F | DIASTOLIC BLOOD PRESSURE: 53 MMHG

## 2020-01-14 LAB
ANION GAP SERPL CALC-SCNC: 2 MMOL/L (ref 8–16)
ANISOCYTOSIS BLD QL: (no result)
BUN SERPL-MCNC: 25.9 MG/DL (ref 7–18)
CALCIUM SERPL-MCNC: 9 MG/DL (ref 8.5–10.1)
CHLORIDE SERPL-SCNC: 97 MMOL/L (ref 98–107)
CO2 SERPL-SCNC: 38 MMOL/L (ref 21–32)
CREAT SERPL-MCNC: 0.7 MG/DL (ref 0.55–1.3)
DEPRECATED RDW RBC AUTO: 16.5 % (ref 11.9–15.9)
GLUCOSE SERPL-MCNC: 104 MG/DL (ref 74–106)
HCT VFR BLD CALC: 38.6 % (ref 35.4–49)
HGB BLD-MCNC: 12.7 GM/DL (ref 11.7–16.9)
MACROCYTES BLD QL: 0
MCH RBC QN AUTO: 30.4 PG (ref 25.7–33.7)
MCHC RBC AUTO-ENTMCNC: 32.9 G/DL (ref 32–35.9)
MCV RBC: 92.4 FL (ref 80–96)
PLATELET # BLD AUTO: 276 K/MM3 (ref 134–434)
PLATELET BLD QL SMEAR: NORMAL
PMV BLD: 8.2 FL (ref 7.5–11.1)
POTASSIUM SERPLBLD-SCNC: 4.8 MMOL/L (ref 3.5–5.1)
RBC # BLD AUTO: 4.18 M/MM3 (ref 4–5.6)
RBC MORPH BLD: YES
SODIUM SERPL-SCNC: 137 MMOL/L (ref 136–145)
WBC # BLD AUTO: 22.4 K/MM3 (ref 4–10)

## 2020-01-14 RX ADMIN — CEFUROXIME AXETIL SCH MG: 500 TABLET ORAL at 10:50

## 2020-01-14 RX ADMIN — IPRATROPIUM BROMIDE AND ALBUTEROL SULFATE SCH AMP: .5; 3 SOLUTION RESPIRATORY (INHALATION) at 09:17

## 2020-01-14 RX ADMIN — TIOTROPIUM BROMIDE INHALATION SPRAY SCH PUFF: 3.12 SPRAY, METERED RESPIRATORY (INHALATION) at 10:51

## 2020-01-14 RX ADMIN — ENOXAPARIN SODIUM SCH MG: 40 INJECTION SUBCUTANEOUS at 10:50

## 2020-01-14 RX ADMIN — BUDESONIDE AND FORMOTEROL FUMARATE DIHYDRATE SCH PUFF: 160; 4.5 AEROSOL RESPIRATORY (INHALATION) at 10:51

## 2020-01-14 RX ADMIN — DILTIAZEM HYDROCHLORIDE SCH MG: 30 TABLET, FILM COATED ORAL at 14:40

## 2020-01-14 RX ADMIN — FOLIC ACID SCH MG: 1 TABLET ORAL at 10:50

## 2020-01-14 RX ADMIN — DILTIAZEM HYDROCHLORIDE SCH MG: 30 TABLET, FILM COATED ORAL at 06:29

## 2020-01-14 RX ADMIN — PREDNISONE SCH MG: 10 TABLET ORAL at 10:50

## 2020-01-14 RX ADMIN — RAMIPRIL SCH MG: 5 CAPSULE ORAL at 10:50

## 2020-01-14 RX ADMIN — PANTOPRAZOLE SODIUM SCH MG: 20 TABLET, DELAYED RELEASE ORAL at 10:50

## 2020-01-14 NOTE — DS
Physical Examination


Vital Signs: 


 Vital Signs











Temperature  97.5 F L  01/14/20 06:25


 


Pulse Rate  107 H  01/14/20 06:25


 


Respiratory Rate  20   01/14/20 06:25


 


Blood Pressure  100/62   01/14/20 06:25


 


O2 Sat by Pulse Oximetry (%)  95   01/13/20 23:00











General: Young male in mild respiratory distress.


HEENT; mucous membranes moist, no anemia, no jaundice, PERRLA, no nystagmus


Neck: No JVD, supple, no bruit, thyroid palpably normal, normal carotid 

pulsations.


Chest: Nontender, bilateral wheezing.


CVS: S1-S2 regular 


no murmur/gallop/rub


Abdomen: Nondistended, soft, bowel sounds present.


Extremities: Trace edema.,  No calf  tenderness, pulses present


CNS: AO X3 , no gross motor sensory deficit


Labs: 


 CBC, BMP





 01/14/20 11:06 





 01/14/20 11:06 











Discharge Summary


Problems reviewed: Yes


Reason For Visit: PNEUMONIA


Current Active Problems





Acute respiratory failure (Acute)


Elevated WBC count (Acute)


Hypercarbia (Acute)


Hyperkalemia (Acute)


Pneumonia (Acute)








Condition: Improved





- Instructions


Diet, Activity, Other Instructions: 


low salt low cholesterol


BIPAP at night and PRN  setting 5 and 12


Needs  PT and Respiratory PT


2-3 Ltr NC target O2 sat  > 90%


CBC BMP om Thurs


Pulmonary consult





Prednisonbe taper;


30 mg for 3 days





25 mg for 3 days


20 mg for 3 days


15 mg for 3 days


10 mg for 3 days


5 mg for 3 days.


Referrals: 


Keanu Whitaker MD [Primary Care Provider] - 2 Weeks


Felix Mojica MD [Staff Physician] - 2 Weeks


Disposition: SKILLED NURSING FACILITY





- Home Medications


Comprehensive Discharge Medication List: 


Ambulatory Orders





Albuterol Sulfate Inhaler - [Ventolin HFA Inhaler -] 1 - 2 inh PO Q4H PRN 11/28/ 18 


Budesonide/Formeterol Fumarate [SYMBICORT 160/4.5mcg -] 1 inh PO BID 11/28/18 


Clopidogrel Bisulfate [Plavix] 75 mg PO DAILY 11/28/18 


Folic Acid 1 mg PO DAILY 11/28/18 


Furosemide [Lasix] 40 mg PO DAILY 11/28/18 


Hydroxychloroquine Sulfate [Plaquenil] 200 mg PO BID 11/28/18 


Methotrexate [Mexate -] 15 mg PO Q7D 11/28/18 


Ramipril [Altace] 5 mg PO DAILY 11/28/18 


Simvastatin [Zocor -] 40 mg PO HS 11/28/18 


Atorvastatin Calcium 20 mg PO HS 01/01/20 


Fluticasone/Umeclidin/Vilanter [Trelegy Ellipta 100-62.5-25] 1 each IH ASDIR 01/ 01/20 


Ramipril 5 mg PO DAILY 01/01/20 


Acetaminophen [Tylenol .Regular Strength -] 650 mg PO Q4H PRN  tablet 01/14/20 


Albuterol 2.5/Ipratropium 0.5 [Duoneb -] 1 amp NEB RQID  amp 01/14/20 


Clonazepam 0.5 mg PO BID #60 tablet MDD 2 01/14/20 


Diltiazem [Cardizem -] 30 mg PO Q6HPO  tablet 01/14/20 


Enoxaparin [Lovenox -] 40 mg SQ DAILY  disp.syrin 01/14/20 


Pantoprazole Sodium [Protonix -] 20 mg PO DAILY  tablet.ec 01/14/20 


Tiotropium Bromide [Spiriva Respimat] 2 puff IH DAILY  inhaler 01/14/20 


predniSONE [Deltasone -] 30 mg PO DAILY  tablet 01/14/20

## 2020-01-14 NOTE — PN
Progress Note, Physician


Chief Complaint: 


Still c/o SOB 





- Current Medication List


Current Medications: 


Active Medications





Acetaminophen (Tylenol -)  650 mg PO Q4H PRN


   PRN Reason: FEVER


Albuterol Sulfate (Ventolin Hfa Inhaler -)  2 puff IH Q4H PRN


   PRN Reason: SHORT OF BREATH/WHEEZING


   Last Admin: 01/10/20 12:40 Dose:  2 puff


Albuterol/Ipratropium (Duoneb -)  1 amp NEB RQID [facility]


   Last Admin: 01/13/20 20:55 Dose:  1 amp


Atorvastatin Calcium (Lipitor -)  20 mg PO HS [facility]


   Last Admin: 01/13/20 22:41 Dose:  20 mg


Budesonide/Formoterol Fumarate (Symbicort 160/4.5mcg -)  2 puff IH BID [facility]


   Last Admin: 01/13/20 22:41 Dose:  2 puff


Cefuroxime Axetil (Ceftin -)  500 mg PO BIDWM [facility]


   Last Admin: 01/13/20 18:51 Dose:  500 mg


Clonazepam (Klonopin -)  0.5 mg PO BID [facility]


   Last Admin: 01/13/20 22:41 Dose:  0.5 mg


Diltiazem HCl (Cardizem -)  30 mg PO Q6HPO [facility]


   Last Admin: 01/14/20 06:29 Dose:  30 mg


Enoxaparin Sodium (Lovenox -)  40 mg SQ DAILY [facility]


   Last Admin: 01/13/20 09:11 Dose:  40 mg


Folic Acid (Folic Acid -)  1 mg PO DAILY [facility]


   Last Admin: 01/13/20 09:10 Dose:  1 mg


Methotrexate (Mexate -)  15 mg PO Q7D@1000 [facility]


   Last Admin: 01/08/20 09:36 Dose:  15 mg


Pantoprazole Sodium (Protonix -)  20 mg PO DAILY [facility]


   Last Admin: 01/13/20 09:10 Dose:  20 mg


Prednisone (Deltasone -)  30 mg PO DAILY [facility]


   Last Admin: 01/13/20 09:10 Dose:  30 mg


Ramipril (Altace -)  5 mg PO DAILY [facility]


   Last Admin: 01/13/20 09:10 Dose:  5 mg


Tiotropium Bromide (Spiriva Respimat)  2 puff IH DAILY [facility]


   Last Admin: 01/13/20 09:12 Dose:  2 puff











- Objective


Vital Signs: 


 Vital Signs











Temperature  97.5 F L  01/13/20 06:25


 


Pulse Rate  107 H  01/13/20 06:25


 


Respiratory Rate  20   01/13/20 06:25


 


Blood Pressure  100/62   01/13/20 06:25


 


O2 Sat by Pulse Oximetry (%)  95   01/13/20 23:00














General: Young male in mild respiratory distress.


HEENT; mucous membranes moist, no anemia, no jaundice, PERRLA, no nystagmus


Neck: No JVD, supple, no bruit, thyroid palpably normal, normal carotid 

pulsations.


Chest: Nontender, bilateral wheezing.


CVS: S1-S2 regular 


no murmur/gallop/rub


Abdomen: Nondistended, soft, bowel sounds present.


Extremities: Trace edema.,  No calf  tenderness, pulses present


CNS: AO X3 , no gross motor sensory deficit


Labs: 


 CBC, BMP





 01/13/20 12:12 





 01/13/20 12:12 





 INR, PTT











INR  0.93  (0.83-1.09)   01/01/20  03:20    














Problem List





- Problems


(1) Acute on chronic respiratory failure with hypoxemia


Assessment/Plan: 


On PO Prednisone 30  mg  , bronchodilator and pulmonary input


Code(s): J96.21 - ACUTE AND CHRONIC RESPIRATORY FAILURE WITH HYPOXIA   





(2) Pneumonia


Assessment/Plan: 


Completed abx


Code(s): J18.9 - PNEUMONIA, UNSPECIFIED ORGANISM   





(3) COPD exacerbation


Assessment/Plan: 


cont Duo Neb Spiriva symbicort and Po prednisone


Code(s): J44.1 - CHRONIC OBSTRUCTIVE PULMONARY DISEASE W (ACUTE) EXACERBATION   





(4) Diastolic CHF


Assessment/Plan: 


Compensated continue Po Lasix 20 mg daily


Code(s): I50.30 - UNSPECIFIED DIASTOLIC (CONGESTIVE) HEART FAILURE   


Qualifiers: 


   Heart failure chronicity: unspecified   Qualified Code(s): I50.30 - 

Unspecified diastolic (congestive) heart failure   





(5) Hypercholesterolemia


Assessment/Plan: 


Continue statin


Code(s): E78.0 - PURE HYPERCHOLESTEROLEMIA * DO NOT USE *   





(6) CAD (coronary artery disease)


Assessment/Plan: 


Stable


Code(s): I25.10 - ATHSCL HEART DISEASE OF NATIVE CORONARY ARTERY W/O ANG PCTRS 

  





(7) Rheumatoid arthritis


Assessment/Plan: 


Cont home medications.


Code(s): M06.9 - RHEUMATOID ARTHRITIS, UNSPECIFIED   





(8) Hypercarbia


Assessment/Plan: 


Stable 


Code(s): R06.89 - OTHER ABNORMALITIES OF BREATHING

## 2020-01-19 ENCOUNTER — HOSPITAL ENCOUNTER (INPATIENT)
Dept: HOSPITAL 74 - JER | Age: 65
LOS: 9 days | Discharge: NURSING, REHAB OR LONG-TERM CARE | DRG: 189 | End: 2020-01-28
Attending: INTERNAL MEDICINE | Admitting: INTERNAL MEDICINE
Payer: COMMERCIAL

## 2020-01-19 VITALS — BODY MASS INDEX: 21.2 KG/M2

## 2020-01-19 DIAGNOSIS — E87.2: ICD-10-CM

## 2020-01-19 DIAGNOSIS — I27.20: ICD-10-CM

## 2020-01-19 DIAGNOSIS — Z21: ICD-10-CM

## 2020-01-19 DIAGNOSIS — Z95.1: ICD-10-CM

## 2020-01-19 DIAGNOSIS — E78.5: ICD-10-CM

## 2020-01-19 DIAGNOSIS — J96.22: ICD-10-CM

## 2020-01-19 DIAGNOSIS — J96.21: Primary | ICD-10-CM

## 2020-01-19 DIAGNOSIS — F41.8: ICD-10-CM

## 2020-01-19 DIAGNOSIS — J44.1: ICD-10-CM

## 2020-01-19 DIAGNOSIS — F32.2: ICD-10-CM

## 2020-01-19 DIAGNOSIS — D72.829: ICD-10-CM

## 2020-01-19 DIAGNOSIS — J44.9: ICD-10-CM

## 2020-01-19 DIAGNOSIS — I25.10: ICD-10-CM

## 2020-01-19 DIAGNOSIS — I50.30: ICD-10-CM

## 2020-01-19 DIAGNOSIS — I11.0: ICD-10-CM

## 2020-01-19 LAB
ALBUMIN SERPL-MCNC: 3.2 G/DL (ref 3.4–5)
ALP SERPL-CCNC: 127 U/L (ref 45–117)
ALT SERPL-CCNC: 60 U/L (ref 13–61)
ANION GAP SERPL CALC-SCNC: 5 MMOL/L (ref 8–16)
ANISOCYTOSIS BLD QL: (no result)
ARTERIAL BLOOD GAS PCO2: 59.5 MMHG (ref 35–45)
ARTERIAL BLOOD GAS PCO2: > 100 MMHG (ref 35–45)
ARTERIAL PATENCY WRIST A: POSITIVE
ARTERIAL PATENCY WRIST A: POSITIVE
AST SERPL-CCNC: 39 U/L (ref 15–37)
BASE EXCESS BLDA CALC-SCNC: 8.5 MEQ/L (ref -2–2)
BASOPHILS # BLD: 0.2 % (ref 0–2)
BILIRUB SERPL-MCNC: 0.8 MG/DL (ref 0.2–1)
BNP SERPL-MCNC: 118.4 PG/ML (ref 5–125)
BUN SERPL-MCNC: 12.6 MG/DL (ref 7–18)
CALCIUM SERPL-MCNC: 8.8 MG/DL (ref 8.5–10.1)
CHLORIDE SERPL-SCNC: 98 MMOL/L (ref 98–107)
CO2 SERPL-SCNC: 35 MMOL/L (ref 21–32)
COHGB MFR BLD: 0.6 % (ref 0–2)
COHGB MFR BLD: 1.2 % (ref 0–2)
CREAT SERPL-MCNC: 0.5 MG/DL (ref 0.55–1.3)
DEPRECATED RDW RBC AUTO: 16.7 % (ref 11.9–15.9)
EOSINOPHIL # BLD: 0.2 % (ref 0–4.5)
GLUCOSE SERPL-MCNC: 98 MG/DL (ref 74–106)
HCT VFR BLD CALC: 34.6 % (ref 35.4–49)
HGB BLD-MCNC: 11.4 GM/DL (ref 11.7–16.9)
LYMPHOCYTES # BLD: 1.9 % (ref 8–40)
MACROCYTES BLD QL: 0
MAGNESIUM SERPL-MCNC: 2.3 MG/DL (ref 1.8–2.4)
MCH RBC QN AUTO: 30.6 PG (ref 25.7–33.7)
MCHC RBC AUTO-ENTMCNC: 33 G/DL (ref 32–35.9)
MCV RBC: 92.8 FL (ref 80–96)
MONOCYTES # BLD AUTO: 3.9 % (ref 3.8–10.2)
NEUTROPHILS # BLD: 93.8 % (ref 42.8–82.8)
OVALOCYTES BLD QL SMEAR: (no result)
PLATELET # BLD AUTO: 256 K/MM3 (ref 134–434)
PLATELET BLD QL SMEAR: NORMAL
PMV BLD: 9 FL (ref 7.5–11.1)
PO2 BLDA: 162 MMHG (ref 80–100)
PO2 BLDA: 59.7 MMHG (ref 80–100)
POTASSIUM SERPLBLD-SCNC: 4.6 MMOL/L (ref 3.5–5.1)
PROT SERPL-MCNC: 6.8 G/DL (ref 6.4–8.2)
RBC # BLD AUTO: 3.73 M/MM3 (ref 4–5.6)
SAO2 % BLDA: 91.1 % (ref 95–98)
SAO2 % BLDA: 98.3 % (ref 95–98)
SODIUM SERPL-SCNC: 137 MMOL/L (ref 136–145)
WBC # BLD AUTO: 19.6 K/MM3 (ref 4–10)

## 2020-01-19 PROCEDURE — 5A09557 ASSISTANCE WITH RESPIRATORY VENTILATION, GREATER THAN 96 CONSECUTIVE HOURS, CONTINUOUS POSITIVE AIRWAY PRESSURE: ICD-10-PCS | Performed by: INTERNAL MEDICINE

## 2020-01-19 RX ADMIN — IPRATROPIUM BROMIDE AND ALBUTEROL SULFATE SCH: .5; 3 SOLUTION RESPIRATORY (INHALATION) at 21:15

## 2020-01-19 RX ADMIN — IPRATROPIUM BROMIDE AND ALBUTEROL SULFATE SCH AMP: .5; 3 SOLUTION RESPIRATORY (INHALATION) at 08:32

## 2020-01-19 RX ADMIN — IPRATROPIUM BROMIDE AND ALBUTEROL SULFATE SCH AMP: .5; 3 SOLUTION RESPIRATORY (INHALATION) at 08:15

## 2020-01-19 RX ADMIN — IPRATROPIUM BROMIDE AND ALBUTEROL SULFATE SCH AMP: .5; 3 SOLUTION RESPIRATORY (INHALATION) at 16:12

## 2020-01-19 RX ADMIN — DILTIAZEM HYDROCHLORIDE SCH MG: 30 TABLET, FILM COATED ORAL at 18:43

## 2020-01-19 RX ADMIN — METHYLPREDNISOLONE SODIUM SUCCINATE SCH MG: 40 INJECTION, POWDER, FOR SOLUTION INTRAMUSCULAR; INTRAVENOUS at 18:43

## 2020-01-19 RX ADMIN — AZTREONAM SCH MLS/HR: 1 INJECTION, POWDER, LYOPHILIZED, FOR SOLUTION INTRAMUSCULAR; INTRAVENOUS at 18:43

## 2020-01-19 RX ADMIN — IPRATROPIUM BROMIDE AND ALBUTEROL SULFATE SCH AMP: .5; 3 SOLUTION RESPIRATORY (INHALATION) at 07:45

## 2020-01-19 RX ADMIN — IPRATROPIUM BROMIDE AND ALBUTEROL SULFATE SCH AMP: .5; 3 SOLUTION RESPIRATORY (INHALATION) at 08:00

## 2020-01-19 RX ADMIN — ATORVASTATIN CALCIUM SCH MG: 20 TABLET, FILM COATED ORAL at 22:30

## 2020-01-19 NOTE — HP
Admitting History and Physical





- Primary Care Physician


PCP: Keanu Whitaker





- Admission


Chief Complaint: Confusion with respiratory distress


History of Present Illness: 





64 yrs old man e advanced COPD , Pulmonary Fibrosis, Home O2 and BIPAP at Night 

and PRN, CAD s/p CABG, Rheumatoid arthritis, Pulmonary HTN<, HfpEF, recently 

discharged to [facility] on 1/14/2020 after prolonged hospitalization on Prednisone 

taper gradually improving, last night slept off BiPAP due to hot weather, 

patient was found confused and SOB on arrival to ED O2 sat reported low with 

PCO2 98 patient was put on BIPAP rpt O2 sat ABG shows PCO2 59 patient patient 

denies any chest pain, fever or expectoration, feels weak saturating well on  

BIAPAP


History Source: Patient





- Past Medical History


Cardiovascular: Yes: CAD, CHF, HTN, Hyperlipdemia


Pulmonary: Yes: COPD, O2 Dependent, Other (bullous emphysema)


Heme/Onc: Yes: Anemia


Infectious Disease: Yes: HIV


Psych: Yes: Addictions (heroin), Anxiety


Musculoskeletal: Yes: Chronic low back pain


Rheumatology: Yes: Rheumatoid Arthritis





- Past Surgical History


Past Surgical History: Yes: CABG, Stent





- Smoking History


Smoking history: Unknown if ever smoked


Have you smoked in the past 12 months: No


Aproximately how many cigarettes per day: 0


If you are a former smoker, when did you quit?: 3YRS





- Alcohol/Substance Use


Hx Alcohol Use: No


History of Substance Use: reports: None





Home Medications





- Allergies


Allergies/Adverse Reactions: 


 Allergies











Allergy/AdvReac Type Severity Reaction Status Date / Time


 


Penicillins Allergy Severe Rash Verified 01/19/20 06:46


 


seafood Allergy Severe Rash Uncoded 01/19/20 06:46














- Home Medications


Home Medications: 


Ambulatory Orders





Clopidogrel Bisulfate [Plavix] 75 mg PO DAILY 11/28/18 


Folic Acid 1 mg PO DAILY 11/28/18 


Furosemide [Lasix] 40 mg PO DAILY 11/28/18 


Methotrexate [Mexate -] 7.5 mg PO Q7D 11/28/18 


Atorvastatin Calcium 20 mg PO HS 01/01/20 


Fluticasone/Umeclidin/Vilanter [Trelegy Ellipta 100-62.5-25] 1 each IH ASDIR 01/ 01/20 


Ramipril 5 mg PO DAILY 01/01/20 


Albuterol 2.5/Ipratropium 0.5 [Duoneb -] 1 amp NEB RQID  amp 01/14/20 


Clonazepam 0.5 mg PO BID #60 tablet MDD 2 01/14/20 


Diltiazem [Cardizem -] 30 mg PO Q6HPO  tablet 01/14/20 


Enoxaparin [Lovenox -] 40 mg SQ DAILY  disp.syrin 01/14/20 


predniSONE [Deltasone -] 30 mg PO DAILY  tablet 01/14/20 


Acetaminophen [Tylenol .Regular Strength -] 650 mg PO Q6H PRN 01/19/20 


Omeprazole 20 mg PO DAILY 01/19/20 











Family Medical History


Family Hx Cancer: Father (HTN)





Review of Systems





- Review of Systems


Constitutional: reports: Lethargy.  denies: Chills, Diaphoresis, Fever


Eyes: denies: Blind Spots, Blurred Vision


HENT: denies: Difficult Swallowing, Ear Discharge


Neck: denies: Decreased ROM, Lumps, Pain on Movement


Cardiovascular: reports: Edema, Shortness of Breath.  denies: Chest Pain


Respiratory: reports: Cough, Exercise Intolerance, Orthopnea


Gastrointestinal: denies: Abdominal Pain, Bloating, Constipation, Diarrhea


Genitourinary: denies: Burning, Discharge, Dysuria


Integumentary: denies: Blister, Bruising, Change in Color


Neurological: reports: Confusion.  denies: Change in LOC, Change in Speech


Hematology/Lymphatic: denies: Easily Bruised, Excessive Bleeding


Psychiatric: reports: Anxiety.  denies: Altered Sleep Pattern





Physical Examination


Vital Signs: 


 Vital Signs











Temperature  98.6 F   01/19/20 05:45


 


Pulse Rate  111 H  01/19/20 14:09


 


Respiratory Rate  15   01/19/20 14:09


 


Blood Pressure  104/77   01/19/20 14:09


 


O2 Sat by Pulse Oximetry (%)  100   01/19/20 14:09











Middle aged sick looking man on BIPAP





HEENT: Mm moist, no anemia, PERRLA EOMI





NECK: No JVD No Bruit





CHEST: B/L minimal wheezes





CVS: S1S2 Tachycardia





ABD: Non tender Bs +





EXT: Trace edema





CNS: AOX3 Non Focal








Labs: 


 


 ABG Results











ABG pH  7.39  (7.35-7.45)   01/19/20  10:06    


 


ABG pCO2 at Pt Temp  59.5 mmHg (35-45)  H  01/19/20  10:06    


 


ABG pO2 at Pt Temp  59.7 mmHg ()  L  01/19/20  10:06    


 


ABG HCO3  34.8 mmol/L (22-27)  H  01/19/20  10:06    


 


ABG O2 Sat (Measured)  91.1 % (95-98)  L  01/19/20  10:06    


 


ABG O2 Content  13.8 % vol  01/19/20  10:06    


 


ABG Base Excess  8.5 meq/l (-2-2)  H  01/19/20  10:06    




















WBC  19.6 K/mm3 (4.0-10.0)  H  01/19/20  07:54    


 


RBC  3.73 M/mm3 (4.00-5.60)  L  01/19/20  07:54    


 


Hgb  11.4 GM/dL (11.7-16.9)  L  01/19/20  07:54    


 


Hct  34.6 % (35.4-49)  L  01/19/20  07:54    


 


MCV  92.8 fl (80-96)   01/19/20  07:54    


 


MCH  30.6 pg (25.7-33.7)   01/19/20  07:54    


 


MCHC  33.0 g/dl (32.0-35.9)   01/19/20  07:54    


 


RDW  16.7 % (11.9-15.9)  H  01/19/20  07:54    


 


Plt Count  256 K/MM3 (134-434)   01/19/20  07:54    


 


MPV  9.0 fl (7.5-11.1)   01/19/20  07:54    


 


Absolute Neuts (auto)  18.4 K/mm3 (1.5-8.0)  H  01/19/20  07:54    


 


Neutrophils %  93.8 % (42.8-82.8)  H  01/19/20  07:54    


 


Neutrophils % (Manual)  86.0 % (42.8-82.8)  H  01/19/20  07:54    


 


Band Neutrophils %  4.0 %  01/19/20  07:54    


 


Lymphocytes %  1.9 % (8-40)  L  01/19/20  07:54    


 


Lymphocytes % (Manual)  3.0 % (8-40)  L D 01/19/20  07:54    


 


Monocytes %  3.9 % (3.8-10.2)   01/19/20  07:54    


 


Monocytes % (Manual)  4 % (3.8-10.2)  D 01/19/20  07:54    


 


Eosinophils %  0.2 % (0-4.5)  D 01/19/20  07:54    


 


Eosinophils % (Manual)  0.0 % (0-4.5)   01/19/20  07:54    


 


Basophils %  0.2 % (0-2.0)   01/19/20  07:54    


 


Basophils % (Manual)  0.0 % (0-2.0)   01/19/20  07:54    


 


Myelocytes % (Man)  0 % (0-2)  D 01/19/20  07:54    


 


Promyelocytes % (Man)  0 % (0-2)   01/19/20  07:54    


 


Blast Cells % (Manual)  0 % (0-0)   01/19/20  07:54    


 


Nucleated RBC %  1 % (0-0)  H  01/19/20  07:54    


 


Metamyelocytes  2 % (0-2)  D 01/19/20  07:54    


 


Hypochromia  0   01/19/20  07:54    


 


Platelet Estimate  Normal   01/19/20  07:54    


 


Platelet Comment  Present   01/19/20  07:54    


 


Polychromasia  0   01/19/20  07:54    


 


Poikilocytosis  1+   01/19/20  07:54    


 


Anisocytosis  1+   01/19/20  07:54    


 


Microcytosis  1+   01/19/20  07:54    


 


Macrocytosis  0   01/19/20  07:54    


 


Spherocytes  1+   01/19/20  07:54    


 


Ovalocytes  1+   01/19/20  07:54    


 


Sodium  137 mmol/L (136-145)   01/19/20  07:54    


 


Potassium  4.6 mmol/L (3.5-5.1)   01/19/20  07:54    


 


Chloride  98 mmol/L ()   01/19/20  07:54    


 


Carbon Dioxide  35 mmol/L (21-32)  H  01/19/20  07:54    


 


Anion Gap  5 MMOL/L (8-16)  L  01/19/20  07:54    


 


BUN  12.6 mg/dL (7-18)   01/19/20  07:54    


 


Creatinine  0.5 mg/dL (0.55-1.3)  L  01/19/20  07:54    


 


Est GFR (CKD-EPI)AfAm  134.61   01/19/20  07:54    


 


Est GFR (CKD-EPI)NonAf  116.14   01/19/20  07:54    


 


Random Glucose  98 mg/dL ()   01/19/20  07:54    


 


Calcium  8.8 mg/dL (8.5-10.1)   01/19/20  07:54    


 


Magnesium  2.3 mg/dL (1.8-2.4)   01/19/20  07:54    


 


Total Bilirubin  0.8 mg/dL (0.2-1)   01/19/20  07:54    


 


AST  39 U/L (15-37)  H  01/19/20  07:54    


 


ALT  60 U/L (13-61)   01/19/20  07:54    


 


Alkaline Phosphatase  127 U/L ()  H  01/19/20  07:54    


 


Troponin I  < 0.02 ng/ml (0.00-0.05)   01/19/20  07:54    


 


B-Natriuretic Peptide  118.4 pg/ml (5-125)   01/19/20  07:54    


 


Total Protein  6.8 g/dl (6.4-8.2)   01/19/20  07:54    


 


Albumin  3.2 g/dl (3.4-5.0)  L  01/19/20  07:54    














Imaging





- Results


X-ray: Report Reviewed (CXR: No inftrates)


EKG: Report Reviewed (Sinus Tachycradia no acute St T changes)





Problem List





- Problems


(1) Acute respiratory failure with hypoxia and hypercarbia


Assessment/Plan: 


Due to advanced COPD and non complince with BIAPA, cont BIPAP, Pulmonary consult

, Dun[place] add Spiriva , cont current abx, improved PCO2


Problems reviewed: Yes   


Code(s): J96.01 - ACUTE RESPIRATORY FAILURE WITH HYPOXIA; J96.02 - ACUTE 

RESPIRATORY FAILURE WITH HYPERCAPNIA   





(2) COPD exacerbation


Assessment/Plan: 


cont IV asteroids Nebs and BiPAP


Problems reviewed: Yes   


Code(s): J44.1 - CHRONIC OBSTRUCTIVE PULMONARY DISEASE W (ACUTE) EXACERBATION   





(3) Respiratory acidosis


Assessment/Plan: 


Due to Hypercarbia now improved


Problems reviewed: Yes   


Code(s): E87.2 - ACIDOSIS   





(4) Anxiety and depression


Assessment/Plan: 


Cont Home meds


Problems reviewed: Yes   


Code(s): F41.9 - ANXIETY DISORDER, UNSPECIFIED; F32.9 - MAJOR DEPRESSIVE 

DISORDER, SINGLE EPISODE, UNSPECIFIED   





(5) Diastolic CHF


Assessment/Plan: 


F/U BMP PRN Lasix and Ramipril


Problems reviewed: Yes   


Code(s): I50.30 - UNSPECIFIED DIASTOLIC (CONGESTIVE) HEART FAILURE   


Qualifiers: 


   Heart failure chronicity: unspecified   Qualified Code(s): I50.30 - 

Unspecified diastolic (congestive) heart failure   





(6) History of coronary artery bypass graft


Assessment/Plan: 


Compensated


Code(s): Z95.1 - PRESENCE OF AORTOCORONARY BYPASS GRAFT   





(7) Rheumatoid arthritis


Assessment/Plan: 


Cont Methotrexate wkly and Folic acid


Problems reviewed: Yes   


Code(s): M06.9 - RHEUMATOID ARTHRITIS, UNSPECIFIED

## 2020-01-19 NOTE — CON.PULM
Consult


Consult Specialty:: PULMONARY


Referred by:: Dr Whitaker


Reason for Consultation:: respiratory failure





- History of Present Illness


Chief Complaint: shortness of breath


History of Present Illness: 





63yo male with h/o end stage COPD, chronic hypoxic and hypercapneic respiratory 

failure, CAD s/p CABG, HTN, hyperlipidemia, rheumatoid arthritis, LV diastolic 

dysfunction, pulmonary HTN, recently admitted with COPD exacerbation who was 

transferred from the nursing home for worsening shortness of breath and cough. 

No fevers, chills or sweats. States that he slept last night with the BiPAP 

off. Found to be hypercapneic with pCO2 >100, placed on BiPAP with improvement 

in symptoms.








- History Source


History Provided By: Patient, Medical Record


Limitations to Obtaining History: No Limitations





- Past Medical History


Cardio/Vascular: Yes: CAD, CHF, HTN, Hyperlipdemia


Pulmonary: Yes: COPD, O2 Dependent, Other (bullous emphysema)


Infectious Disease: Yes: HIV


Psych: Yes: Addictions (heroin), Anxiety


Musculoskeletal: Yes: Chronic low back pain


Rheumatology: Yes: Rheumatoid Arthritis





- Past Surgical History


Past Surgical History: Yes: CABG, Stent





- Alcohol/Substance Use


Hx Alcohol Use: No


History of Substance Use: reports: None





- Smoking History


Smoking history: Unknown if ever smoked


Have you smoked in the past 12 months: No


Aproximately how many cigarettes per day: 0


If you are a former smoker, when did you quit?: 3YRS





Home Medications





- Allergies


Allergies/Adverse Reactions: 


 Allergies











Allergy/AdvReac Type Severity Reaction Status Date / Time


 


Penicillins Allergy Severe Rash Verified 01/19/20 06:46


 


seafood Allergy Severe Rash Uncoded 01/19/20 06:46














- Home Medications


Home Medications: 


Ambulatory Orders





Clopidogrel Bisulfate [Plavix] 75 mg PO DAILY 11/28/18 


Folic Acid 1 mg PO DAILY 11/28/18 


Furosemide [Lasix] 40 mg PO DAILY 11/28/18 


Methotrexate [Mexate -] 7.5 mg PO Q7D 11/28/18 


Atorvastatin Calcium 20 mg PO HS 01/01/20 


Fluticasone/Umeclidin/Vilanter [Trelegy Ellipta 100-62.5-25] 1 each IH ASDIR 01/ 01/20 


Ramipril 5 mg PO DAILY 01/01/20 


Albuterol 2.5/Ipratropium 0.5 [Duoneb -] 1 amp NEB RQID  amp 01/14/20 


Clonazepam 0.5 mg PO BID #60 tablet MDD 2 01/14/20 


Diltiazem [Cardizem -] 30 mg PO Q6HPO  tablet 01/14/20 


Enoxaparin [Lovenox -] 40 mg SQ DAILY  disp.syrin 01/14/20 


predniSONE [Deltasone -] 30 mg PO DAILY  tablet 01/14/20 


Acetaminophen [Tylenol .Regular Strength -] 650 mg PO Q6H PRN 01/19/20 


Omeprazole 20 mg PO DAILY 01/19/20 











Review of Systems





- Review of Systems


Constitutional: reports: Weakness.  denies: Chills, Fever


Eyes: denies: Recent Change in Vision


HENT: denies: Nasal Congestion, Throat Pain


Neck: denies: Stiffness, Tenderness


Cardiovascular: reports: Shortness of Breath.  denies: Chest Pain, Edema, 

Palpitations


Respiratory: reports: Cough, Wheezing.  denies: Hemoptysis


Gastrointestinal: denies: Abdominal Pain, Nausea, Vomiting


Genitourinary: denies: Dysuria, Hematuria


Neurological: denies: Dizziness, Headache


Endocrine: denies: Unexplained Weight Loss





Physical Exam


Vital Sings: 


 Vital Signs











Temperature  98.6 F   01/19/20 05:45


 


Pulse Rate  111 H  01/19/20 14:09


 


Respiratory Rate  15   01/19/20 14:09


 


Blood Pressure  104/77   01/19/20 14:09


 


O2 Sat by Pulse Oximetry (%)  100   01/19/20 14:09











Constitutional: Yes: Mild Distress


Eyes: Yes: Conjunctiva Clear, EOM Intact


HENT: Yes: Atraumatic, Normocephalic


Neck: Yes: Supple, Trachea Midline


Cardiovascular: Yes: Regular Rate and Rhythm


Respiratory: Yes: Poor Air Entry


...Clubbing: No


Gastrointestinal: Yes: Normal Bowel Sounds, Soft.  No: Tenderness


Edema: No


Neurological: Yes: Alert, Oriented


Labs: 


 CBC, BMP





 01/19/20 07:54 





 01/19/20 07:54 





 ABG Results











ABG pH  7.39  (7.35-7.45)   01/19/20  10:06    


 


ABG pCO2 at Pt Temp  59.5 mmHg (35-45)  H  01/19/20  10:06    


 


ABG pO2 at Pt Temp  59.7 mmHg ()  L  01/19/20  10:06    


 


ABG HCO3  34.8 mmol/L (22-27)  H  01/19/20  10:06    


 


ABG O2 Sat (Measured)  91.1 % (95-98)  L  01/19/20  10:06    


 


ABG O2 Content  13.8 % vol  01/19/20  10:06    


 


ABG Base Excess  8.5 meq/l (-2-2)  H  01/19/20  10:06    














Imaging





- Results


Chest X-ray: Report Reviewed, Image Reviewed (bibasilar atelectasis vs 

infiltrates)





Problem List





- Problems


(1) Acute on chronic respiratory failure with hypoxia and hypercapnia


Code(s): J96.21 - ACUTE AND CHRONIC RESPIRATORY FAILURE WITH HYPOXIA; J96.22 - 

ACUTE AND CHRONIC RESPIRATORY FAILURE WITH HYPERCAPNIA   





(2) COPD exacerbation


Code(s): J44.1 - CHRONIC OBSTRUCTIVE PULMONARY DISEASE W (ACUTE) EXACERBATION   





Assessment/Plan





Acute on Chronic Hypoxic and Hypercapneic Respiratory Failure 


Acute COPD Exacerbation/End Stage COPD


LV Diastolic Dysfunction


Pulmonary HTN


CAD s/p CABG


HTN


Hyperlipidemia


Rheumatoid Arthritis





-  IV medrol


-  inhaled bronchodilators


-  O2 to keep SpO2 >90%


-  must have BiPAP at night and PRN during day


-  can likely monitor off antibiotics


-  f/u cultures


-  DVT prophylaxis


-  prognosis guarded





Thank you for this consult


Eduardo Villar MD

## 2020-01-19 NOTE — PDOC
History of Present Illness





- General


Chief Complaint: Respiratory


Stated Complaint: Hyperventilation





- History of Present Illness


Initial Comments: 





01/19/20 09:33


63 y/o M hx of COPD (awaiting lung transplant) CAD s/p CABG, HTN, HLD, HIV, CHF

, RA on methotrexate. He was recently discharged from ICU after treatment for 

pneumonia and acute hypoxic respiratory failure. He presents this a.m from 

[facility] with shortness of breath and wheezing which began at around 6a.m this 

morning which woke him up from sleep. He uses BIPAP at night and was on it when 

this occurred. He received one breathing treatment at [facility] and reported 

temporary relief of his symptoms, prior to presentation. He endorses cough 

productive of greenish sputum and tinged with blood. He denies any chest pain, 

nausea, vomiting, fevers, chills.  





Past History





- Past Medical History


Allergies/Adverse Reactions: 


 Allergies











Allergy/AdvReac Type Severity Reaction Status Date / Time


 


Penicillins Allergy Severe Rash Verified 01/19/20 06:46


 


shellfish derived Allergy Severe Rash Verified 01/27/20 14:23











Home Medications: 


Ambulatory Orders





Clopidogrel Bisulfate [Plavix] 75 mg PO DAILY 11/28/18 


Folic Acid 1 mg PO DAILY 11/28/18 


Furosemide [Lasix] 40 mg PO DAILY 11/28/18 


Methotrexate [Mexate -] 7.5 mg PO Q7D 11/28/18 


Atorvastatin Calcium 20 mg PO HS 01/01/20 


Fluticasone/Umeclidin/Vilanter [Trelegy Ellipta 100-62.5-25] 1 each IH ASDIR 01/ 01/20 


Albuterol 2.5/Ipratropium 0.5 [Duoneb -] 1 amp NEB RQID  amp 01/14/20 


Clonazepam 0.5 mg PO BID #60 tablet MDD 2 01/14/20 


Diltiazem [Cardizem -] 30 mg PO Q6HPO  tablet 01/14/20 


Enoxaparin [Lovenox -] 40 mg SQ DAILY  disp.syrin 01/14/20 


predniSONE [Deltasone -] 30 mg PO DAILY  tablet 01/14/20 


Acetaminophen [Tylenol .Regular Strength -] 650 mg PO Q6H PRN 01/19/20 


Omeprazole 20 mg PO DAILY 01/19/20 


Budesonide/Formeterol Fumarate [SYMBICORT 160/4.5mcg -] 2 puff IH BID  inhaler 

01/28/20 


Tiotropium Bromide [Spiriva Respimat] 2 puff IH DAILY  inhaler 01/28/20 


predniSONE [Deltasone -] 30 mg PO DAILY  tablet 01/28/20 








Anemia: No


Asthma: No


Cancer: No


Cardiac Disorders: Yes (MI, stent, BYPASS)


CVA: No


COPD: Yes (awaiting lung transplant, O2 Dep)


CHF: Yes


Dementia: No


Diabetes: No


GI Disorders: No


 Disorders: No


HTN: Yes


Hypercholesterolemia: Yes


Liver Disease: No


Psychiatric Problems: Yes (anxiety)


Seizures: No


Thyroid Disease: No





- Surgical History


Abdominal Surgery: No


Appendectomy: No


Cardiac Surgery: Yes (Stent, cardiaccath)


Cholecystectomy: No


Lung Surgery: No


Neurologic Surgery: No


Orthopedic Surgery: Yes (KNEE)





- Immunization History


Immunization Up to Date: Yes





- Psycho Social/Smoking Cessation Hx


Smoking Status: No


Smoking History: Unknown if ever smoked


Have you smoked in the past 12 months: No


Number of Cigarettes Smoked Daily: 0


If you are a former smoker, when did you quit?: 3YRS


Hx Alcohol Use: No


Drug/Substance Use Hx: No


Substance Use Type: None


Hx Substance Use Treatment: No





**Review of Systems





- Review of Systems


Constitutional: No: Chills, Fever


HEENTM: No: Eye Pain, Blurred Vision


Respiratory: Yes: Shortness of Breath, Wheezing.  No: Cough


Cardiac (ROS): No: Chest Pain, Palpitations


ABD/GI: No: Nausea, Vomiting


: No: Dysuria, Hematuria


Musculoskeletal: No: Back Pain


Integumentary: No: Pallor, Rash


Neurological: No: Headache, Numbness





*Physical Exam





- Vital Signs


 Last Vital Signs











Temp Pulse Resp BP Pulse Ox


 


 98.6 F   113 H  24 H  107/78   100 


 


 01/19/20 05:45  01/19/20 05:45  01/19/20 05:45  01/19/20 05:45  01/19/20 05:45














- Physical Exam





01/19/20 08:45


GENERAL: Awake, alert, and fully oriented.


HEAD: No signs of trauma, normocephalic, atraumatic 


EYES: PERRL EOMI, sclera anicteric, conjunctiva clear


ENT: Auricles normal inspection, hearing grossly normal, nares patent, 

oropharynx clear without exudates. Moist mucosa


NECK: Normal ROM, supple, no lymphadenopathy, JVD, or masses


LUNGS: wheezing anteriorly, crackles at lung bases bilaterally, subcostal 

retractions. on 5L O2 NC on assessment. speaking in short sentences


HEART: tacchycardic and  regular rhythm, normal S1 and S2, no murmurs, rubs or 

gallops, 1+pedal pulses bilaterally. 


ABDOMEN: Soft, nontender, normoactive bowel sounds.  No guarding, no rebound.  

No masses


EXTREMITIES : 2+ pitting edema bilateral lower extremities to below the knees. 

Homans sign negative


NEUROLOGICAL: Cranial nerves II through XII grossly intact.  Normal speech, no 

focal sensorimotor deficits 


SKIN: Warm, Dry, normal turgor, no rashes or lesions noted. ecchymoses on left 

upper extremity and the abdomen. 








ED Treatment Course





- LABORATORY


CBC & Chemistry Diagram: 


 01/26/20 05:50





 01/27/20 06:40





Medical Decision Making





- Medical Decision Making





01/19/20 08:55





63 y/o M hx of COPD (awaiting lung transplant) CAD s/p CABG, HTN, HLD, HIV, CHF

, RA on methotrexate. He was recently discharged from ICU after treatment for 

pneumonia and acute hypoxic respiratory failure. 





Pt on nasal cannula 5L at this time


workup : ekg, cbc, cmp, abg, troponin, chest x-ray





EKG:RBBB with right ventricular hypertrophy, new t wave inversion in V1 and V2 

compared to EKG 01/13/2020.





Meds: Duonebs x 4, Lasix, solumedrol, Abx (vancomycin, aztreonam)





ABG


PH 7.18


pco2 >100


Respiratory called for BIPAP placement. 








01/19/20 10:17


Sent repeat ABG


patient is still retracting on BIPAP


01/19/20 12:31


Pt ABG improved after BIPAP. 


pH on abg 7.39


PCo2 at 59





Admitted to Dr. Keanu Whitaker's service (tele, floor)








Discharge





- Discharge Information


Problems reviewed: Yes


Clinical Impression/Diagnosis: 


Acute respiratory failure


Qualifiers:


 Respiratory failure complication: unspecified whether with hypoxia or 

hypercapnia Qualified Code(s): J96.00 - Acute respiratory failure, unspecified 

whether with hypoxia or hypercapnia





Condition: Guarded


Disposition: SKILLED NURSING FACILITY





- Follow up/Referral





- Patient Discharge Instructions





- Post Discharge Activity

## 2020-01-19 NOTE — PDOC
Attending Attestation





- Resident


Resident Name: JadynfrediFarrukhronald





- ED Attending Attestation


I have performed the following: I have examined & evaluated the patient, The 

case was reviewed & discussed with the resident, I agree w/resident's findings 

& plan, Exceptions are as noted





- HPI


HPI: 





01/19/20 08:20


64 M with h/o HTN, CAD s/p CABG, HfpEF, Advanced COPD on Home O2, RA on 

Methotrexate, HIV, recent admission for COPD, presenting to ED for worsening 

SOB and cough. Pt denies F/C. Denies CP. Denies any worsening lower extremity 

edema.





- Physicial Exam


PE: 





01/19/20 08:27


"GENERAL: Awake, alert, and fully oriented, in no acute distress.


HEAD: No signs of trauma


EYES: PERRLA, EOMI, sclera anicteric, conjunctiva clear


ENT: Auricles normal inspection, hearing grossly normal, nares patent, 

oropharynx clear without exudates. Moist mucosa


NECK: Nontender, no stepoffs, Normal ROM, supple, no lymphadenopathy, JVD, or 

masses


LUNGS: + diffuse rhonchi and wheezing


HEART: Regular rate and rhythm, normal S1 and S2, no murmurs, rubs or gallops


ABDOMEN: Soft, nontender, normoactive bowel sounds.  No guarding, no rebound.  

No masses


EXTREMITIES: + 1 PE BLE.  No clubbing or cyanosis. No cords, erythema, or 

tenderness


NEUROLOGICAL: Cranial nerves II through XII intact. 5/5 strength and sensation 

in all extremities, Normal speech, normal gait, normal cerebellar function


SKIN: Warm, Dry, normal turgor, no rashes or lesions noted.








- Critical Care Time


Total Critical Care Time: 60


Critical Care Statement: The care of this patient involved high complexity 

decision making to prevent further life threatening deterioration of the patient

's condition and/or to evaluate & treat vital organ system(s) failure or risk 

of failure.





- Medical Decision Making





01/19/20 08:27


64 M with SOB and cough. Wheezing and rhonchorous on exam. Suspect COPD flare, 

hypercapnic respiratory failure. Less likely volume overload.


- Labs


- CXR


- Nebs, steroids, lasix





01/19/20 09:24


ABG pH 7.18, CO2 >100


Pt on BiPAP with continuous nebs





01/19/20 11:23


Repeat gas significantly improved, acidosis resolved, CO2 now 59

## 2020-01-20 LAB
ANION GAP SERPL CALC-SCNC: 4 MMOL/L (ref 8–16)
ANISOCYTOSIS BLD QL: (no result)
BASOPHILS # BLD: 0.2 % (ref 0–2)
BUN SERPL-MCNC: 17.3 MG/DL (ref 7–18)
CALCIUM SERPL-MCNC: 8.8 MG/DL (ref 8.5–10.1)
CHLORIDE SERPL-SCNC: 95 MMOL/L (ref 98–107)
CO2 SERPL-SCNC: 37 MMOL/L (ref 21–32)
CREAT SERPL-MCNC: 0.7 MG/DL (ref 0.55–1.3)
DEPRECATED RDW RBC AUTO: 16.3 % (ref 11.9–15.9)
EOSINOPHIL # BLD: 0 % (ref 0–4.5)
GLUCOSE SERPL-MCNC: 114 MG/DL (ref 74–106)
HCT VFR BLD CALC: 30.5 % (ref 35.4–49)
HGB BLD-MCNC: 10.2 GM/DL (ref 11.7–16.9)
LYMPHOCYTES # BLD: 1.5 % (ref 8–40)
MACROCYTES BLD QL: 0
MCH RBC QN AUTO: 31 PG (ref 25.7–33.7)
MCHC RBC AUTO-ENTMCNC: 33.2 G/DL (ref 32–35.9)
MCV RBC: 93.1 FL (ref 80–96)
MONOCYTES # BLD AUTO: 1.4 % (ref 3.8–10.2)
NEUTROPHILS # BLD: 96.9 % (ref 42.8–82.8)
PLATELET # BLD AUTO: 278 K/MM3 (ref 134–434)
PLATELET BLD QL SMEAR: NORMAL
PMV BLD: 7.7 FL (ref 7.5–11.1)
POTASSIUM SERPLBLD-SCNC: 4.5 MMOL/L (ref 3.5–5.1)
RBC # BLD AUTO: 3.28 M/MM3 (ref 4–5.6)
SODIUM SERPL-SCNC: 136 MMOL/L (ref 136–145)
WBC # BLD AUTO: 11.6 K/MM3 (ref 4–10)

## 2020-01-20 RX ADMIN — BUDESONIDE AND FORMOTEROL FUMARATE DIHYDRATE SCH PUFF: 160; 4.5 AEROSOL RESPIRATORY (INHALATION) at 10:46

## 2020-01-20 RX ADMIN — BUDESONIDE AND FORMOTEROL FUMARATE DIHYDRATE SCH PUFF: 160; 4.5 AEROSOL RESPIRATORY (INHALATION) at 22:21

## 2020-01-20 RX ADMIN — IPRATROPIUM BROMIDE AND ALBUTEROL SULFATE SCH AMP: .5; 3 SOLUTION RESPIRATORY (INHALATION) at 20:27

## 2020-01-20 RX ADMIN — CLOPIDOGREL BISULFATE SCH MG: 75 TABLET, FILM COATED ORAL at 10:13

## 2020-01-20 RX ADMIN — IPRATROPIUM BROMIDE AND ALBUTEROL SULFATE SCH AMP: .5; 3 SOLUTION RESPIRATORY (INHALATION) at 08:25

## 2020-01-20 RX ADMIN — METHYLPREDNISOLONE SODIUM SUCCINATE SCH MG: 40 INJECTION, POWDER, FOR SOLUTION INTRAMUSCULAR; INTRAVENOUS at 10:12

## 2020-01-20 RX ADMIN — ENOXAPARIN SODIUM SCH MG: 40 INJECTION SUBCUTANEOUS at 10:13

## 2020-01-20 RX ADMIN — FOLIC ACID SCH MG: 1 TABLET ORAL at 10:13

## 2020-01-20 RX ADMIN — IPRATROPIUM BROMIDE AND ALBUTEROL SULFATE SCH AMP: .5; 3 SOLUTION RESPIRATORY (INHALATION) at 11:49

## 2020-01-20 RX ADMIN — AZITHROMYCIN SCH MG: 250 TABLET, FILM COATED ORAL at 15:52

## 2020-01-20 RX ADMIN — AZTREONAM SCH MLS/HR: 1 INJECTION, POWDER, LYOPHILIZED, FOR SOLUTION INTRAMUSCULAR; INTRAVENOUS at 10:12

## 2020-01-20 RX ADMIN — AZTREONAM SCH MLS/HR: 1 INJECTION, POWDER, LYOPHILIZED, FOR SOLUTION INTRAMUSCULAR; INTRAVENOUS at 02:01

## 2020-01-20 RX ADMIN — TIOTROPIUM BROMIDE INHALATION SPRAY SCH PUFF: 3.12 SPRAY, METERED RESPIRATORY (INHALATION) at 10:46

## 2020-01-20 RX ADMIN — DILTIAZEM HYDROCHLORIDE SCH MG: 30 TABLET, FILM COATED ORAL at 18:00

## 2020-01-20 RX ADMIN — PANTOPRAZOLE SODIUM SCH MG: 20 TABLET, DELAYED RELEASE ORAL at 10:13

## 2020-01-20 RX ADMIN — DILTIAZEM HYDROCHLORIDE SCH MG: 30 TABLET, FILM COATED ORAL at 11:46

## 2020-01-20 RX ADMIN — IPRATROPIUM BROMIDE AND ALBUTEROL SULFATE SCH AMP: .5; 3 SOLUTION RESPIRATORY (INHALATION) at 16:05

## 2020-01-20 RX ADMIN — METHYLPREDNISOLONE SODIUM SUCCINATE SCH MG: 40 INJECTION, POWDER, FOR SOLUTION INTRAMUSCULAR; INTRAVENOUS at 02:03

## 2020-01-20 RX ADMIN — ATORVASTATIN CALCIUM SCH MG: 20 TABLET, FILM COATED ORAL at 22:23

## 2020-01-20 RX ADMIN — DILTIAZEM HYDROCHLORIDE SCH MG: 30 TABLET, FILM COATED ORAL at 00:39

## 2020-01-20 RX ADMIN — DILTIAZEM HYDROCHLORIDE SCH MG: 30 TABLET, FILM COATED ORAL at 05:47

## 2020-01-20 RX ADMIN — METHYLPREDNISOLONE SODIUM SUCCINATE SCH MG: 40 INJECTION, POWDER, FOR SOLUTION INTRAMUSCULAR; INTRAVENOUS at 18:00

## 2020-01-20 NOTE — PN
Progress Note, Physician


Chief Complaint: 





Patient remained stable less shortness of breath





- Current Medication List


Current Medications: 


Active Medications





Acetaminophen (Tylenol -)  650 mg PO Q6H PRN


   PRN Reason: FEVER


Albuterol/Ipratropium (Duoneb -)  1 amp NEB RQID [facility]


   Last Admin: 01/20/20 08:25 Dose:  1 amp


Atorvastatin Calcium (Lipitor -)  20 mg PO HS [facility]


   Last Admin: 01/19/20 22:30 Dose:  20 mg


Aztreonam (Azactam (Restricted To Id) -)  1 gm IVPB Q8H [facility]; Protocol


Clonazepam (Klonopin -)  0.5 mg PO BID [facility]


   Last Admin: 01/19/20 22:30 Dose:  0.5 mg


Clopidogrel Bisulfate (Plavix -)  75 mg PO DAILY [facility]


Diltiazem HCl (Cardizem -)  30 mg PO Q6HPO [facility]


   Last Admin: 01/20/20 05:47 Dose:  30 mg


Enoxaparin Sodium (Lovenox -)  40 mg SQ DAILY [facility]


Folic Acid (Folic Acid -)  1 mg PO DAILY [facility]


Aztreonam 1 gm/ Dextrose  50 mls @ 100 mls/hr IVPB Q8H-IV [facility]


   Stop: 01/20/20 10:29


   Last Admin: 01/20/20 02:01 Dose:  100 mls/hr


Methotrexate (Mexate -)  7.5 mg PO We@1000 [facility]


Methylprednisolone Sodium Succinate (Solu-Medrol -)  60 mg IVPB Q8H-IV [facility]


   Last Admin: 01/20/20 02:03 Dose:  60 mg


Pantoprazole Sodium (Protonix -)  20 mg PO DAILY [facility]











- Objective


Vital Signs: 


 Vital Signs











Temperature  97.5 F L  01/20/20 06:00


 


Pulse Rate  100 H  01/20/20 06:00


 


Respiratory Rate  20   01/20/20 06:00


 


Blood Pressure  125/82   01/20/20 06:00


 


O2 Sat by Pulse Oximetry (%)  100   01/20/20 08:26








Middle aged sick looking man on BIPAP





HEENT: Mm moist, no anemia, PERRLA EOMI





NECK: No JVD No Bruit





CHEST: B/L minimal wheezes





CVS: S1S2 Tachycardia





ABD: Non tender Bs +





EXT: Trace edema





CNS: AOX3 Non Focal


Labs: 


 CBC, BMP





 01/20/20 06:50 





 01/20/20 06:50 











Problem List





- Problems


(1) Acute respiratory failure with hypoxia and hypercarbia


Assessment/Plan: 


Due to advanced COPD and non compliance with BIAPA, cont BIPAP, Pulmonary 

consult, Duneb add Spiriva , cont current abx, follow-up ID and pulmonary 

recommendations


Code(s): J96.01 - ACUTE RESPIRATORY FAILURE WITH HYPOXIA; J96.02 - ACUTE 

RESPIRATORY FAILURE WITH HYPERCAPNIA   





(2) COPD exacerbation


Assessment/Plan: 


cont IV asteroids Nebs and BiPAP


Code(s): J44.1 - CHRONIC OBSTRUCTIVE PULMONARY DISEASE W (ACUTE) EXACERBATION   





(3) Respiratory acidosis


Assessment/Plan: 


Due to Hypercarbia now improved


Code(s): E87.2 - ACIDOSIS   





(4) Anxiety and depression


Assessment/Plan: 


Cont Home meds


Code(s): F41.9 - ANXIETY DISORDER, UNSPECIFIED; F32.9 - MAJOR DEPRESSIVE 

DISORDER, SINGLE EPISODE, UNSPECIFIED   





(5) Diastolic CHF


Assessment/Plan: 


F/U BMP PRN Lasix and Ramipril


Code(s): I50.30 - UNSPECIFIED DIASTOLIC (CONGESTIVE) HEART FAILURE   


Qualifiers: 


   Heart failure chronicity: unspecified   Qualified Code(s): I50.30 - 

Unspecified diastolic (congestive) heart failure   





(6) History of coronary artery bypass graft


Assessment/Plan: 


Compensated


Code(s): Z95.1 - PRESENCE OF AORTOCORONARY BYPASS GRAFT   





(7) Rheumatoid arthritis


Assessment/Plan: 


Cont Methotrexate wkly and Folic acid


Code(s): M06.9 - RHEUMATOID ARTHRITIS, UNSPECIFIED   





(8) Palliative care encounter


Assessment/Plan: 


For goals of care


Problems reviewed: Yes   


Code(s): Z51.5 - ENCOUNTER FOR PALLIATIVE CARE

## 2020-01-20 NOTE — EKG
Test Reason : 

Blood Pressure : ***/*** mmHG

Vent. Rate : 124 BPM     Atrial Rate : 124 BPM

   P-R Int : 100 ms          QRS Dur : 122 ms

    QT Int : 308 ms       P-R-T Axes : 081 112 065 degrees

   QTc Int : 442 ms

 

SINUS TACHYCARDIA WITH SHORT ME

RIGHT BUNDLE BRANCH BLOCK , PLUS RIGHT VENTRICULAR HYPERTROPHY

ABNORMAL ECG

WHEN COMPARED WITH ECG OF 13-JAN-2020 09:09,

NO SIGNIFICANT CHANGE WAS FOUND

Confirmed by PETR SALINAS MD ([id number]) on 1/20/2020 10:05:06 AM

 

Referred By:             Confirmed By:PETR SALINAS MD

## 2020-01-20 NOTE — CONS
INFECTIOUS DISEASE CONSULTATION 

 

DATE OF CONSULTATION:  

 

DATE OF DICTATION:  01/20/2020

 

HISTORY:  The patient is a 64-year-old who was evaluated for possible pneumonia.  He

has a history of end-stage COPD.  He has had several recent hospital admissions, one

recently of which resulted in patient being intubated in the intensive care unit.  He

now returns from the nursing home with complaints of worsening shortness of breath

and wheeze.  The patient was admitted to the hospital on January 19, 2020.  He

reported cough productive of greenish sputum with occasional blood streaks at times. 

He was empirically treated with vancomycin and aztreonam.  His course has been

notable for elevated white blood cell count.  At the present time, he is awake.  He

is seated in bed on nasal cannula O2.  He denies any shortness of breath, chest pain.

 No fever or chills.

 

PAST MEDICAL HISTORY:  Positive for COPD, coronary artery disease, hypertension,

hyperlipidemia, congestive heart failure, history of rheumatoid arthritis on

methotrexate.

 

PAST SURGICAL HISTORY:  Status post coronary artery bypass graft.

 

ALLERGIES:  Listed to PENICILLIN, however, patient denies PENICILLIN allergy.

 

MEDICATIONS:  Plavix, folic acid, Lasix, methotrexate, Lipitor, ramipril, clonazepam,

Cardizem, prednisone, omeprazole.

 

SOCIAL HISTORY:  Residing in a skilled nursing facility.

 

SYSTEMS REVIEW:

Neurologic:  No loss of consciousness, seizure activity, focal weakness.

Cardiac:  Negative chest pain or palpitations.

Respiratory:  As per HPI.  

Gastrointestinal:  Negative vomiting or diarrhea.

Genitourinary:  Negative for urinary tract infection.

 

LABORATORY DATA:  White count on admission 19.6, presently 11.6, hematocrit 30.5,

platelets 278, creatinine 0.7.  Blood cultures are pending. 

 

Chest x-ray, negative for acute infiltrate.

 

PHYSICAL EXAMINATION: 

General:  He is seated in bed.  He appears slightly short of breath at rest on nasal

cannula.  

Vital Signs:  Temperature 97.8, blood pressure 104/62, pulse 105 regular,

respirations 24 per minute.

HEENT:  Sclerae anicteric.

Heart:  Sounds S1, S2.  

Lungs:  Diminished breath sounds bilaterally. 

Abdomen:  Soft, nontender.  

Extremities:  Positive for peripheral edema.  Positive clubbing. 

 

IMPRESSION: 

1.  Acute exacerbation chronic obstructive pulmonary disease.

2.  Rule out tracheobronchitis.

3.  Leukocytosis likely steroid induced.

 

PLAN:  Doubt recurrent pneumonia.  We will discontinue IV antibiotics.  Short course

Zithromax for tracheobronchitis.  Continue bronchodilators and steroids.  Pulmonary

follow up.

 

Thank you for the kind referral.

 

 

JALIL CANADA M.D.

 

[id number]

DD: 01/20/2020 14:30

DT: 01/20/2020 14:44

Job #:  [id number]

## 2020-01-20 NOTE — CON.CARD
Consult


Consult Specialty:: Cardiology





- History of Present Illness


History of Present Illness: 





64 yrs old man e advanced COPD , Pulmonary Fibrosis, Home O2 and BIPAP at Night 

and PRN, CAD s/p CABG, Rheumatoid arthritis, Pulmonary HTN<, HfpEF, recently 

discharged to [facility], [place] on 1/14/2020 after prolonged hospitalization on Prednisone 

taper gradually improving, last night slept off BiPAP  patient was found 

confused and SOB on arrival to ED O2 sat reported low with PCO2 98 patient was 

put on BIPAP rpt O2 sat ABG shows PCO2 59 patient patient denies any chest pain

, fever or expectoration, feels weak saturating well on  BIAPAP





- History Source


History Provided By: Patient, Medical Record





- Past Medical History


Cardio/Vascular: Yes: CAD, CHF, HTN, Hyperlipdemia


Pulmonary: Yes: COPD, O2 Dependent, Other (bullous emphysema)


Infectious Disease: Yes: HIV


Psych: Yes: Addictions (heroin), Anxiety


Musculoskeletal: Yes: Chronic low back pain


Rheumatology: Yes: Rheumatoid Arthritis





- Past Surgical History


Past Surgical History: Yes: CABG, Stent





- Alcohol/Substance Use


Hx Alcohol Use: No


History of Substance Use: reports: None





- Smoking History


Smoking history: Former smoker


Have you smoked in the past 12 months: No


Aproximately how many cigarettes per day: 0


If you are a former smoker, when did you quit?: 3YRS





Home Medications





- Allergies


Allergies/Adverse Reactions: 


 Allergies











Allergy/AdvReac Type Severity Reaction Status Date / Time


 


Penicillins Allergy Severe Rash Verified 01/19/20 06:46


 


seafood Allergy Severe Rash Uncoded 01/19/20 06:46














- Home Medications


Home Medications: 


Ambulatory Orders





Clopidogrel Bisulfate [Plavix] 75 mg PO DAILY 11/28/18 


Folic Acid 1 mg PO DAILY 11/28/18 


Furosemide [Lasix] 40 mg PO DAILY 11/28/18 


Methotrexate [Mexate -] 7.5 mg PO Q7D 11/28/18 


Atorvastatin Calcium 20 mg PO HS 01/01/20 


Fluticasone/Umeclidin/Vilanter [Trelegy Ellipta 100-62.5-25] 1 each IH ASDIR 01/ 01/20 


Ramipril 5 mg PO DAILY 01/01/20 


Albuterol 2.5/Ipratropium 0.5 [Duoneb -] 1 amp NEB RQID  amp 01/14/20 


Clonazepam 0.5 mg PO BID #60 tablet MDD 2 01/14/20 


Diltiazem [Cardizem -] 30 mg PO Q6HPO  tablet 01/14/20 


Enoxaparin [Lovenox -] 40 mg SQ DAILY  disp.syrin 01/14/20 


predniSONE [Deltasone -] 30 mg PO DAILY  tablet 01/14/20 


Acetaminophen [Tylenol .Regular Strength -] 650 mg PO Q6H PRN 01/19/20 


Omeprazole 20 mg PO DAILY 01/19/20 











Review of Systems





- Review of Systems


Constitutional: reports: No Symptoms


Eyes: reports: No Symptoms


HENT: reports: No Symptoms


Neck: reports: No Symptoms


Cardiovascular: reports: Shortness of Breath


Respiratory: reports: SOB, SOB on Exertion


Gastrointestinal: reports: No Symptoms


Genitourinary: reports: No Symptoms


Breasts: reports: No Symptoms Reported


Musculoskeletal: reports: No Symptoms


Integumentary: reports: No Symptoms


Neurological: reports: No Symptoms


Endocrine: reports: No Symptoms


Hematology/Lymphatic: reports: No Symptoms


Psychiatric: reports: No Symptoms


Vital Signs: 


 Vital Signs











Temperature  97.8 F   01/20/20 09:40


 


Pulse Rate  105 H  01/20/20 09:40


 


Respiratory Rate  24 H  01/20/20 09:40


 


Blood Pressure  104/62   01/20/20 09:40


 


O2 Sat by Pulse Oximetry (%)  100   01/20/20 09:00











Constitutional: Yes: Well Nourished, No Distress, Calm


Eyes: Yes: WNL, Conjunctiva Clear, EOM Intact


HENT: Yes: WNL, Atraumatic, Normocephalic


Neck: Yes: WNL, Supple, Trachea Midline


Respiratory: Yes: Diminished, On BiPap, Poor Air Entry


Gastrointestinal: Yes: WNL, Normal Bowel Sounds


Renal/: Yes: WNL


Cardiovascular: Yes: WNL, Regular Rate and Rhythm


Heart Sounds: Yes: S1, S2


Musculoskeletal: Yes: WNL


Extremities: Yes: WNL


Integumentary: Yes: WNL


Neurological: Yes: WNL, Alert, Oriented


...Motor Strength: WNL


Psychiatric: Yes: WNL, Alert, Oriented





- Other Data


Labs, Other Data: 


 CBC, BMP





 01/20/20 06:50 





 01/20/20 06:50 





 Troponin, BNP











  01/19/20





  07:54


 


B-Natriuretic Peptide  118.4








 Troponin, BNP











  01/19/20





  07:54


 


B-Natriuretic Peptide  118.4














Imaging





- Results


Chest X-ray: Image Reviewed (no i/e)


EKG: Image Reviewed (s tachy RBBB)





Problem List





- Problems


(1) Acute respiratory failure with hypoxia and hypercarbia


Code(s): J96.01 - ACUTE RESPIRATORY FAILURE WITH HYPOXIA; J96.02 - ACUTE 

RESPIRATORY FAILURE WITH HYPERCAPNIA   





(2) Respiratory acidosis


Code(s): E87.2 - ACIDOSIS   





(3) Abnormal LFTs


Code(s): R79.89 - OTHER SPECIFIED ABNORMAL FINDINGS OF BLOOD CHEMISTRY   





(4) Abscess of calf


Code(s): L02.419 - CUTANEOUS ABSCESS OF LIMB, UNSPECIFIED   





(5) Acute on chronic diastolic (congestive) heart failure


Code(s): I50.33 - ACUTE ON CHRONIC DIASTOLIC (CONGESTIVE) HEART FAILURE   





(6) Acute on chronic respiratory failure with hypoxemia


Code(s): J96.21 - ACUTE AND CHRONIC RESPIRATORY FAILURE WITH HYPOXIA   





(7) Acute on chronic respiratory failure with hypoxia and hypercapnia


Code(s): J96.21 - ACUTE AND CHRONIC RESPIRATORY FAILURE WITH HYPOXIA; J96.22 - 

ACUTE AND CHRONIC RESPIRATORY FAILURE WITH HYPERCAPNIA   





(8) Acute respiratory failure


Code(s): J96.00 - ACUTE RESPIRATORY FAILURE, UNSP W HYPOXIA OR HYPERCAPNIA   





(9) Anxiety and depression


Code(s): F41.9 - ANXIETY DISORDER, UNSPECIFIED; F32.9 - MAJOR DEPRESSIVE 

DISORDER, SINGLE EPISODE, UNSPECIFIED   





(10) CAD (coronary artery disease)


Code(s): I25.10 - ATHSCL HEART DISEASE OF NATIVE CORONARY ARTERY W/O ANG PCTRS 

  


Qualifiers: 


   Coronary Disease-Associated Artery/Lesion type: native artery   Native vs. 

transplanted heart: native heart   Associated angina: without angina   

Qualified Code(s): I25.10 - Atherosclerotic heart disease of native coronary 

artery without angina pectoris   





(11) COPD (chronic obstructive pulmonary disease)


Code(s): J44.9 - CHRONIC OBSTRUCTIVE PULMONARY DISEASE, UNSPECIFIED   


Qualifiers: 


   COPD type: emphysema   Emphysema type: unspecified   Qualified Code(s): 

J43.9 - Emphysema, unspecified   





(12) COPD exacerbation


Code(s): J44.1 - CHRONIC OBSTRUCTIVE PULMONARY DISEASE W (ACUTE) EXACERBATION   





(13) Chronic pain


Code(s): G89.29 - OTHER CHRONIC PAIN   





(14) Compression fracture of L1 lumbar vertebra


Code(s): S32.010A - WEDGE COMPRESSION FRACTURE OF FIRST LUMBAR VERTEBRA, INIT   


Qualifiers: 


   Encounter type: initial encounter 





(15) Congestive cardiac failure


Code(s): I50.9 - HEART FAILURE, UNSPECIFIED   





(16) Cough


Code(s): R05 - COUGH   





(17) Diastolic CHF


Code(s): I50.30 - UNSPECIFIED DIASTOLIC (CONGESTIVE) HEART FAILURE   


Qualifiers: 


   Heart failure chronicity: unspecified   Qualified Code(s): I50.30 - 

Unspecified diastolic (congestive) heart failure   





(18) Edema


Code(s): R60.9 - EDEMA, UNSPECIFIED   


Qualifiers: 


   Edema type: unspecified   Qualified Code(s): R60.9 - Edema, unspecified   





(19) Elevated WBC count


Code(s): D72.829 - ELEVATED WHITE BLOOD CELL COUNT, UNSPECIFIED   





(20) Elevated brain natriuretic peptide (BNP) level


Code(s): R79.89 - OTHER SPECIFIED ABNORMAL FINDINGS OF BLOOD CHEMISTRY   





(21) HTN (hypertension)


Code(s): I10 - ESSENTIAL (PRIMARY) HYPERTENSION   


Qualifiers: 


   Hypertension type: essential hypertension   Qualified Code(s): I10 - 

Essential (primary) hypertension   





(22) History of coronary artery bypass graft


Code(s): Z95.1 - PRESENCE OF AORTOCORONARY BYPASS GRAFT   





(23) History of percutaneous coronary intervention


Code(s): Z98.89 - OTHER SPECIFIED POSTPROCEDURAL STATES * DO NOT USE *   





(24) Hypercarbia


Code(s): R06.89 - OTHER ABNORMALITIES OF BREATHING   





(25) Hypercholesterolemia


Code(s): E78.0 - PURE HYPERCHOLESTEROLEMIA * DO NOT USE *   





(26) Hyperkalemia


Code(s): E87.5 - HYPERKALEMIA   





(27) Hyperkalemia


Code(s): E87.5 - HYPERKALEMIA   





(28) Inguinal hernia


Code(s): K40.90 - UNIL INGUINAL HERNIA, W/O OBST OR GANGR, NOT SPCF AS RECUR   





(29) Intractable pain


Code(s): R52 - PAIN, UNSPECIFIED   





(30) LLQ pain


Code(s): R10.32 - LEFT LOWER QUADRANT PAIN   





(31) Left groin pain


Code(s): R10.32 - LEFT LOWER QUADRANT PAIN   





(32) Leg pain


Code(s): M79.606 - PAIN IN LEG, UNSPECIFIED   





(33) Lower extremity edema


Code(s): R60.0 - LOCALIZED EDEMA   





(34) Lumbar disc herniation


Code(s): M51.26 - OTHER INTERVERTEBRAL DISC DISPLACEMENT, LUMBAR REGION   





(35) Nosebleed


Code(s): R04.0 - EPISTAXIS   





(36) PSVT (paroxysmal supraventricular tachycardia)


Code(s): I47.1 - SUPRAVENTRICULAR TACHYCARDIA   





(37) Pain and swelling of right lower leg


Code(s): M79.661 - PAIN IN RIGHT LOWER LEG; M79.89 - OTHER SPECIFIED SOFT 

TISSUE DISORDERS   





(38) Pneumonia


Code(s): J18.9 - PNEUMONIA, UNSPECIFIED ORGANISM   





(39) Radiculopathy


Code(s): M54.10 - RADICULOPATHY, SITE UNSPECIFIED   


Qualifiers: 


   Spinal region: lumbar   Qualified Code(s): M54.16 - Radiculopathy, lumbar 

region   





(40) Rheumatoid arthritis


Code(s): M06.9 - RHEUMATOID ARTHRITIS, UNSPECIFIED   





(41) Rheumatoid arthritis involving ankle with positive rheumatoid factor


Code(s): M05.779 - RHEU ARTHRIT W RHEU FACTOR OF UNSP ANK/FT W/O ORG/SYS INVOLV

   


Qualifiers: 


   Laterality: bilateral   Qualified Code(s): M05.771 - Rheumatoid arthritis 

with rheumatoid factor of right ankle and foot without organ or systems 

involvement; M05.772 - Rheumatoid arthritis with rheumatoid factor of left 

ankle and foot without organ or systems involvement   





(42) Rheumatoid arthritis of multiple sites without organ or system involvement 

with positive rheumatoid factor


Code(s): M05.79 - RHEU ARTHRITIS W RHEU FACTOR MULT SITE W/O ORG/SYS INVOLV   





(43) Sinus tachycardia


Code(s): R00.0 - TACHYCARDIA, UNSPECIFIED   





Assessment/Plan





Advanced COPD , Pulmonary Fibrosis, Home O2 and BIPAP at Night and PRN, CAD s/p 

CABG, Rheumatoid arthritis, Pulmonary HTN<, HfpEF,


no evidence of CHF


bnp normal 


Cont telemetry


Treatment as per pulmonary.

## 2020-01-20 NOTE — PN
Progress Note (short form)





- Note


Progress Note: 


Awake and alert on NIPPV support. 


Used all night and has been using for a few hours today. 


Less SOB.  No CP. 





 Intake & Output











 01/17/20 01/18/20 01/19/20 01/20/20





 23:59 23:59 23:59 23:59


 


Intake Total   700 10


 


Balance   700 10


 


Weight   132 lb 6.4 oz 132 lb 6.4 oz








 Last Vital Signs











Temp Pulse Resp BP Pulse Ox


 


 97.8 F   105 H  24 H  104/62   100 


 


 01/20/20 09:40  01/20/20 09:40  01/20/20 09:40  01/20/20 09:40  01/20/20 09:00








Active Medications





Acetaminophen (Tylenol -)  650 mg PO Q6H PRN


   PRN Reason: FEVER


Albuterol/Ipratropium (Duoneb -)  1 amp NEB RQID [facility]


   Last Admin: 01/20/20 08:25 Dose:  1 amp


Atorvastatin Calcium (Lipitor -)  20 mg PO HS [facility]


   Last Admin: 01/19/20 22:30 Dose:  20 mg


Aztreonam (Azactam (Restricted To Id) -)  1 gm IVPB Q8H [facility]; Protocol


Budesonide/Formoterol Fumarate (Symbicort 160/4.5mcg -)  2 puff IH BID [facility]


   Last Admin: 01/20/20 10:46 Dose:  2 puff


Clonazepam (Klonopin -)  0.5 mg PO BID [facility]


   Last Admin: 01/20/20 10:13 Dose:  0.5 mg


Clopidogrel Bisulfate (Plavix -)  75 mg PO DAILY [facility]


   Last Admin: 01/20/20 10:13 Dose:  75 mg


Diltiazem HCl (Cardizem -)  30 mg PO Q6HPO [facility]


   Last Admin: 01/20/20 05:47 Dose:  30 mg


Enoxaparin Sodium (Lovenox -)  40 mg SQ DAILY [facility]


   Last Admin: 01/20/20 10:13 Dose:  40 mg


Folic Acid (Folic Acid -)  1 mg PO DAILY [facility]


   Last Admin: 01/20/20 10:13 Dose:  1 mg


Methotrexate (Mexate -)  7.5 mg PO We@1000 [facility]


Methylprednisolone Sodium Succinate (Solu-Medrol -)  60 mg IVPB Q8H-IV [facility]


   Last Admin: 01/20/20 10:12 Dose:  60 mg


Pantoprazole Sodium (Protonix -)  20 mg PO DAILY [facility]


   Last Admin: 01/20/20 10:13 Dose:  20 mg


Tiotropium Bromide (Spiriva Respimat)  2 puff IH DAILY [facility]


   Last Admin: 01/20/20 10:46 Dose:  2 puff











Constitutional: Yes: Mildly tachypneic on NIPPV support 


Eyes: Yes: Conjunctiva Clear, EOM Intact


HENT: Yes: Atraumatic, Normocephalic


Neck: Yes: Supple, Trachea Midline


Cardiovascular: Yes: Regular Rate and Rhythm


Respiratory: Yes: Poor Air Entry


...Clubbing: No


Gastrointestinal: Yes: Normal Bowel Sounds, Soft.  No: Tenderness


Edema: No


Neurological: Yes: Alert, Oriented


Labs: 


 Laboratory Results - last 24 hr











  01/19/20 01/19/20 01/20/20





  07:54 07:54 06:50


 


WBC    11.6 H


 


RBC    3.28 L


 


Hgb    10.2 L


 


Hct    30.5 L


 


MCV    93.1


 


MCH    31.0


 


MCHC    33.2


 


RDW    16.3 H


 


Plt Count    278


 


MPV    7.7  D


 


Absolute Neuts (auto)    11.2 H


 


Neutrophils %    96.9 H


 


Neutrophils % (Manual)  86.0 H  


 


Band Neutrophils %  4.0  


 


Lymphocytes %    1.5 L D


 


Lymphocytes % (Manual)  3.0 L D  


 


Monocytes %    1.4 L


 


Monocytes % (Manual)  4  D  


 


Eosinophils %    0.0  D


 


Eosinophils % (Manual)  0.0  


 


Basophils %    0.2


 


Basophils % (Manual)  0.0  


 


Myelocytes % (Man)  0  D  


 


Promyelocytes % (Man)  0  


 


Blast Cells % (Manual)  0  


 


Nucleated RBC %  1 H   0


 


Metamyelocytes  2  D  


 


Hypochromia  0  


 


Platelet Estimate  Normal  


 


Platelet Comment  Present  


 


Polychromasia  0  


 


Poikilocytosis  1+  


 


Anisocytosis  1+  


 


Microcytosis  1+  


 


Macrocytosis  0  


 


Spherocytes  1+  


 


Ovalocytes  1+  


 


Sodium   137 


 


Potassium   4.6 


 


Chloride   98 


 


Carbon Dioxide   35 H 


 


Anion Gap   5 L 


 


BUN   12.6 


 


Creatinine   0.5 L 


 


Est GFR (CKD-EPI)AfAm   134.61 


 


Est GFR (CKD-EPI)NonAf   116.14 


 


Random Glucose   98 


 


Calcium   8.8 


 


Magnesium   2.3 


 


Total Bilirubin   0.8 


 


AST   39 H 


 


ALT   60 


 


Alkaline Phosphatase   127 H 


 


B-Natriuretic Peptide   118.4 


 


Total Protein   6.8 


 


Albumin   3.2 L 














  01/20/20





  06:50


 


WBC 


 


RBC 


 


Hgb 


 


Hct 


 


MCV 


 


MCH 


 


MCHC 


 


RDW 


 


Plt Count 


 


MPV 


 


Absolute Neuts (auto) 


 


Neutrophils % 


 


Neutrophils % (Manual) 


 


Band Neutrophils % 


 


Lymphocytes % 


 


Lymphocytes % (Manual) 


 


Monocytes % 


 


Monocytes % (Manual) 


 


Eosinophils % 


 


Eosinophils % (Manual) 


 


Basophils % 


 


Basophils % (Manual) 


 


Myelocytes % (Man) 


 


Promyelocytes % (Man) 


 


Blast Cells % (Manual) 


 


Nucleated RBC % 


 


Metamyelocytes 


 


Hypochromia 


 


Platelet Estimate 


 


Platelet Comment 


 


Polychromasia 


 


Poikilocytosis 


 


Anisocytosis 


 


Microcytosis 


 


Macrocytosis 


 


Spherocytes 


 


Ovalocytes 


 


Sodium  136


 


Potassium  4.5


 


Chloride  95 L


 


Carbon Dioxide  37 H


 


Anion Gap  4 L


 


BUN  17.3


 


Creatinine  0.7


 


Est GFR (CKD-EPI)AfAm  117.22


 


Est GFR (CKD-EPI)NonAf  101.14


 


Random Glucose  114 H


 


Calcium  8.8


 


Magnesium 


 


Total Bilirubin 


 


AST 


 


ALT 


 


Alkaline Phosphatase 


 


B-Natriuretic Peptide 


 


Total Protein 


 


Albumin 











Problem List





- Problems


(1) Acute on chronic respiratory failure with hypoxia and hypercapnia


Code(s): J96.21 - ACUTE AND CHRONIC RESPIRATORY FAILURE WITH HYPOXIA; J96.22 - 

ACUTE AND CHRONIC RESPIRATORY FAILURE WITH HYPERCAPNIA   





(2) COPD exacerbation


Code(s): J44.1 - CHRONIC OBSTRUCTIVE PULMONARY DISEASE W (ACUTE) EXACERBATION   





Assessment/Plan


Acute on Chronic Hypoxic and Hypercapneic Respiratory Failure 


Acute COPD Exacerbation/End Stage COPD


LV Diastolic Dysfunction


Pulmonary HTN


CAD s/p CABG


HTN


Hyperlipidemia


Rheumatoid Arthritis





-  IV medrol


-  inhaled bronchodilators


-  O2 to keep SpO2 >90%


-  must have NIPPV support at night and PRN during day


-  Low threshold to DC ABX if cultures are (-) 


-  DVT prophylaxis


-  prognosis guarded





Dr Ramesh

## 2020-01-20 NOTE — PN
Progress Note (short form)





- Note


Progress Note: 





ID CONSULT DICTATED


ACUTE EXACERBATION COPD


?TRACHEOBRONCHITIS


LEUKOCYTOSIS   LIKELY STEROID-INDUCED


D/C IV ANTIBIOTICS


SHORT COURSE ZITHROMAX

## 2020-01-21 RX ADMIN — DILTIAZEM HYDROCHLORIDE SCH MG: 30 TABLET, FILM COATED ORAL at 06:15

## 2020-01-21 RX ADMIN — IPRATROPIUM BROMIDE AND ALBUTEROL SULFATE SCH AMP: .5; 3 SOLUTION RESPIRATORY (INHALATION) at 21:05

## 2020-01-21 RX ADMIN — PANTOPRAZOLE SODIUM SCH MG: 20 TABLET, DELAYED RELEASE ORAL at 09:25

## 2020-01-21 RX ADMIN — TIOTROPIUM BROMIDE INHALATION SPRAY SCH PUFF: 3.12 SPRAY, METERED RESPIRATORY (INHALATION) at 09:25

## 2020-01-21 RX ADMIN — IPRATROPIUM BROMIDE AND ALBUTEROL SULFATE SCH AMP: .5; 3 SOLUTION RESPIRATORY (INHALATION) at 12:59

## 2020-01-21 RX ADMIN — AZITHROMYCIN SCH MG: 250 TABLET, FILM COATED ORAL at 09:25

## 2020-01-21 RX ADMIN — DILTIAZEM HYDROCHLORIDE SCH MG: 30 TABLET, FILM COATED ORAL at 01:00

## 2020-01-21 RX ADMIN — METHYLPREDNISOLONE SODIUM SUCCINATE SCH MG: 40 INJECTION, POWDER, FOR SOLUTION INTRAMUSCULAR; INTRAVENOUS at 18:18

## 2020-01-21 RX ADMIN — IPRATROPIUM BROMIDE AND ALBUTEROL SULFATE SCH AMP: .5; 3 SOLUTION RESPIRATORY (INHALATION) at 08:06

## 2020-01-21 RX ADMIN — ATORVASTATIN CALCIUM SCH MG: 20 TABLET, FILM COATED ORAL at 21:07

## 2020-01-21 RX ADMIN — METHYLPREDNISOLONE SODIUM SUCCINATE SCH MG: 40 INJECTION, POWDER, FOR SOLUTION INTRAMUSCULAR; INTRAVENOUS at 01:02

## 2020-01-21 RX ADMIN — DILTIAZEM HYDROCHLORIDE SCH MG: 30 TABLET, FILM COATED ORAL at 12:21

## 2020-01-21 RX ADMIN — DILTIAZEM HYDROCHLORIDE SCH MG: 30 TABLET, FILM COATED ORAL at 23:37

## 2020-01-21 RX ADMIN — DILTIAZEM HYDROCHLORIDE SCH MG: 30 TABLET, FILM COATED ORAL at 18:18

## 2020-01-21 RX ADMIN — FOLIC ACID SCH MG: 1 TABLET ORAL at 09:25

## 2020-01-21 RX ADMIN — METHYLPREDNISOLONE SODIUM SUCCINATE SCH MG: 40 INJECTION, POWDER, FOR SOLUTION INTRAMUSCULAR; INTRAVENOUS at 09:24

## 2020-01-21 RX ADMIN — IPRATROPIUM BROMIDE AND ALBUTEROL SULFATE SCH AMP: .5; 3 SOLUTION RESPIRATORY (INHALATION) at 16:00

## 2020-01-21 RX ADMIN — ENOXAPARIN SODIUM SCH MG: 40 INJECTION SUBCUTANEOUS at 09:25

## 2020-01-21 RX ADMIN — BUDESONIDE AND FORMOTEROL FUMARATE DIHYDRATE SCH PUFF: 160; 4.5 AEROSOL RESPIRATORY (INHALATION) at 21:15

## 2020-01-21 RX ADMIN — CLOPIDOGREL BISULFATE SCH MG: 75 TABLET, FILM COATED ORAL at 09:25

## 2020-01-21 RX ADMIN — BUDESONIDE AND FORMOTEROL FUMARATE DIHYDRATE SCH PUFF: 160; 4.5 AEROSOL RESPIRATORY (INHALATION) at 09:25

## 2020-01-21 NOTE — PN
Progress Note, Physician





- Current Medication List


Current Medications: 


Active Medications





Acetaminophen (Tylenol -)  650 mg PO Q6H PRN


   PRN Reason: FEVER


Albuterol/Ipratropium (Duoneb -)  1 amp NEB RQID [facility]


   Last Admin: 01/20/20 20:27 Dose:  1 amp


Atorvastatin Calcium (Lipitor -)  20 mg PO HS [facility]


   Last Admin: 01/20/20 22:23 Dose:  20 mg


Azithromycin (Zithromax -)  250 mg PO DAILY [facility]


   Last Admin: 01/20/20 15:52 Dose:  250 mg


Budesonide/Formoterol Fumarate (Symbicort 160/4.5mcg -)  2 puff IH BID [facility]


   Last Admin: 01/20/20 22:21 Dose:  2 puff


Clonazepam (Klonopin -)  0.5 mg PO BID [facility]


   Last Admin: 01/20/20 22:22 Dose:  0.5 mg


Clopidogrel Bisulfate (Plavix -)  75 mg PO DAILY [facility]


   Last Admin: 01/20/20 10:13 Dose:  75 mg


Diltiazem HCl (Cardizem -)  30 mg PO Q6HPO [facility]


   Last Admin: 01/21/20 06:15 Dose:  30 mg


Enoxaparin Sodium (Lovenox -)  40 mg SQ DAILY [facility]


   Last Admin: 01/20/20 10:13 Dose:  40 mg


Folic Acid (Folic Acid -)  1 mg PO DAILY [facility]


   Last Admin: 01/20/20 10:13 Dose:  1 mg


Methotrexate (Mexate -)  7.5 mg PO We@1000 [facility]


Methylprednisolone Sodium Succinate (Solu-Medrol -)  60 mg IVPB Q8H-IV [facility]


   Last Admin: 01/21/20 01:02 Dose:  60 mg


Pantoprazole Sodium (Protonix -)  20 mg PO DAILY [facility]


   Last Admin: 01/20/20 10:13 Dose:  20 mg


Tiotropium Bromide (Spiriva Respimat)  2 puff IH DAILY [facility]


   Last Admin: 01/20/20 10:46 Dose:  2 puff











- Objective


Vital Signs: 


 Vital Signs











Temperature  97.7 F   01/21/20 06:00


 


Pulse Rate  93 H  01/21/20 06:00


 


Respiratory Rate  20   01/21/20 06:00


 


Blood Pressure  99/61   01/21/20 06:00


 


O2 Sat by Pulse Oximetry (%)  99   01/20/20 20:30














Middle aged sick looking man on BIPAP





HEENT: Mm moist, no anemia, PERRLA EOMI





NECK: No JVD No Bruit





CHEST: B/L minimal wheezes





CVS: S1S2 Tachycardia





ABD: Non tender Bs +





EXT: Trace edema





CNS: AOX3 Non Focal


Labs: 


 CBC, BMP





 01/20/20 06:50 





 01/20/20 06:50 











Problem List





- Problems


(1) Acute respiratory failure with hypoxia and hypercarbia


Assessment/Plan: 


Due to advanced COPD and non compliance with BIAPA, cont BIPAP, Pulmonary 

consult, [place] add Spiriva , on p.o. azithromycin follow-up pulmonary 

recommendations


Code(s): J96.01 - ACUTE RESPIRATORY FAILURE WITH HYPOXIA; J96.02 - ACUTE 

RESPIRATORY FAILURE WITH HYPERCAPNIA   





(2) COPD exacerbation


Assessment/Plan: 


cont IV asteroids Nebs and BiPAP


Code(s): J44.1 - CHRONIC OBSTRUCTIVE PULMONARY DISEASE W (ACUTE) EXACERBATION   





(3) Respiratory acidosis


Assessment/Plan: 


Due to Hypercarbia now improved


Code(s): E87.2 - ACIDOSIS   





(4) Anxiety and depression


Assessment/Plan: 


Cont Home meds


Code(s): F41.9 - ANXIETY DISORDER, UNSPECIFIED; F32.9 - MAJOR DEPRESSIVE 

DISORDER, SINGLE EPISODE, UNSPECIFIED   





(5) Diastolic CHF


Assessment/Plan: 


F/U BMP PRN Lasix and Ramipril


Code(s): I50.30 - UNSPECIFIED DIASTOLIC (CONGESTIVE) HEART FAILURE   


Qualifiers: 


   Heart failure chronicity: unspecified   Qualified Code(s): I50.30 - 

Unspecified diastolic (congestive) heart failure   





(6) History of coronary artery bypass graft


Assessment/Plan: 


Compensated


Code(s): Z95.1 - PRESENCE OF AORTOCORONARY BYPASS GRAFT   





(7) Rheumatoid arthritis


Assessment/Plan: 


Cont Methotrexate wkly and Folic acid


Code(s): M06.9 - RHEUMATOID ARTHRITIS, UNSPECIFIED   





(8) Palliative care encounter


Assessment/Plan: 


For goals of care


Code(s): Z51.5 - ENCOUNTER FOR PALLIATIVE CARE

## 2020-01-21 NOTE — PN
Progress Note (short form)





- Note


Progress Note: 


Awake and alert on NC O2. 


Clinically improved today but remains mildly tachypneic just at rest. 


Used NIPPV all night. 


 Intake & Output











 01/18/20 01/19/20 01/20/20 01/21/20





 23:59 23:59 23:59 23:59


 


Intake Total  700 600 690


 


Output Total   300 


 


Balance  700 300 690


 


Weight  132 lb 6.4 oz 132 lb 6.4 oz 131 lb 11.2 oz








 Last Vital Signs











Temp Pulse Resp BP Pulse Ox


 


 97.7 F   93 H  20   99/61   99 


 


 01/21/20 06:00  01/21/20 06:00  01/21/20 06:00  01/21/20 06:00  01/20/20 20:30








Active Medications





Acetaminophen (Tylenol -)  650 mg PO Q6H PRN


   PRN Reason: FEVER


Albuterol/Ipratropium (Duoneb -)  1 amp NEB RQID [facility]


   Last Admin: 01/21/20 08:06 Dose:  1 amp


Atorvastatin Calcium (Lipitor -)  20 mg PO HS [facility]


   Last Admin: 01/20/20 22:23 Dose:  20 mg


Azithromycin (Zithromax -)  250 mg PO DAILY [facility]


   Last Admin: 01/21/20 09:25 Dose:  250 mg


Budesonide/Formoterol Fumarate (Symbicort 160/4.5mcg -)  2 puff IH BID [facility]


   Last Admin: 01/21/20 09:25 Dose:  2 puff


Clonazepam (Klonopin -)  0.5 mg PO BID [facility]


   Last Admin: 01/21/20 09:25 Dose:  0.5 mg


Clopidogrel Bisulfate (Plavix -)  75 mg PO DAILY [facility]


   Last Admin: 01/21/20 09:25 Dose:  75 mg


Diltiazem HCl (Cardizem -)  30 mg PO Q6HPO [facility]


   Last Admin: 01/21/20 12:21 Dose:  30 mg


Enoxaparin Sodium (Lovenox -)  40 mg SQ DAILY [facility]


   Last Admin: 01/21/20 09:25 Dose:  40 mg


Folic Acid (Folic Acid -)  1 mg PO DAILY [facility]


   Last Admin: 01/21/20 09:25 Dose:  1 mg


Methotrexate (Mexate -)  7.5 mg PO We@1000 [facility]


Methylprednisolone Sodium Succinate (Solu-Medrol -)  60 mg IVPB Q8H-IV [facility]


   Last Admin: 01/21/20 09:24 Dose:  60 mg


Pantoprazole Sodium (Protonix -)  20 mg PO DAILY [facility]


   Last Admin: 01/21/20 09:25 Dose:  20 mg


Tiotropium Bromide (Spiriva Respimat)  2 puff IH DAILY [facility]


   Last Admin: 01/21/20 09:25 Dose:  2 puff














Constitutional: Yes: Mildly tachypneic on NC O2 


Eyes: Yes: Conjunctiva Clear, EOM Intact


HENT: Yes: Atraumatic, Normocephalic


Neck: Yes: Supple, Trachea Midline


Cardiovascular: Yes: Regular Rate and Rhythm


Respiratory: Yes: Poor Air Entry


...Clubbing: No


Gastrointestinal: Yes: Normal Bowel Sounds, Soft.  No: Tenderness


Edema: No


Neurological: Yes: Alert, Oriented


Labs: 


 


Problem List





- Problems


(1) Acute on chronic respiratory failure with hypoxia and hypercapnia


Code(s): J96.21 - ACUTE AND CHRONIC RESPIRATORY FAILURE WITH HYPOXIA; J96.22 - 

ACUTE AND CHRONIC RESPIRATORY FAILURE WITH HYPERCAPNIA   





(2) COPD exacerbation


Code(s): J44.1 - CHRONIC OBSTRUCTIVE PULMONARY DISEASE W (ACUTE) EXACERBATION   





Assessment/Plan


Acute on Chronic Hypoxic and Hypercapneic Respiratory Failure 


Acute COPD Exacerbation/End Stage COPD


LV Diastolic Dysfunction


Pulmonary HTN


CAD s/p CABG


HTN


Hyperlipidemia


Rheumatoid Arthritis





-  Would keep IV medrol at current dose 


-  inhaled bronchodilators


-  O2 to keep SpO2 >90%


-  must have NIPPV support at night and PRN during day


-  Zithromax  


-  DVT prophylaxis


-  Prognosis guarded





Dr Ramesh

## 2020-01-22 LAB
ANION GAP SERPL CALC-SCNC: 4 MMOL/L (ref 8–16)
BASOPHILS # BLD: 0.1 % (ref 0–2)
BUN SERPL-MCNC: 16.4 MG/DL (ref 7–18)
CALCIUM SERPL-MCNC: 8.9 MG/DL (ref 8.5–10.1)
CHLORIDE SERPL-SCNC: 99 MMOL/L (ref 98–107)
CO2 SERPL-SCNC: 39 MMOL/L (ref 21–32)
CREAT SERPL-MCNC: 0.6 MG/DL (ref 0.55–1.3)
DEPRECATED RDW RBC AUTO: 16.4 % (ref 11.9–15.9)
EOSINOPHIL # BLD: 0 % (ref 0–4.5)
GLUCOSE SERPL-MCNC: 107 MG/DL (ref 74–106)
HCT VFR BLD CALC: 26 % (ref 35.4–49)
HGB BLD-MCNC: 8.7 GM/DL (ref 11.7–16.9)
LYMPHOCYTES # BLD: 1.5 % (ref 8–40)
MCH RBC QN AUTO: 30.8 PG (ref 25.7–33.7)
MCHC RBC AUTO-ENTMCNC: 33.6 G/DL (ref 32–35.9)
MCV RBC: 91.5 FL (ref 80–96)
MONOCYTES # BLD AUTO: 3.1 % (ref 3.8–10.2)
NEUTROPHILS # BLD: 95.3 % (ref 42.8–82.8)
OVALOCYTES BLD QL SMEAR: (no result)
PLATELET # BLD AUTO: 226 K/MM3 (ref 134–434)
PLATELET BLD QL SMEAR: ADEQUATE
PMV BLD: 7.5 FL (ref 7.5–11.1)
POTASSIUM SERPLBLD-SCNC: 4.5 MMOL/L (ref 3.5–5.1)
RBC # BLD AUTO: 2.84 M/MM3 (ref 4–5.6)
SODIUM SERPL-SCNC: 142 MMOL/L (ref 136–145)
WBC # BLD AUTO: 11.4 K/MM3 (ref 4–10)

## 2020-01-22 RX ADMIN — BUDESONIDE AND FORMOTEROL FUMARATE DIHYDRATE SCH PUFF: 160; 4.5 AEROSOL RESPIRATORY (INHALATION) at 10:45

## 2020-01-22 RX ADMIN — METHYLPREDNISOLONE SODIUM SUCCINATE SCH MG: 40 INJECTION, POWDER, FOR SOLUTION INTRAMUSCULAR; INTRAVENOUS at 10:44

## 2020-01-22 RX ADMIN — IPRATROPIUM BROMIDE AND ALBUTEROL SULFATE SCH AMP: .5; 3 SOLUTION RESPIRATORY (INHALATION) at 15:35

## 2020-01-22 RX ADMIN — DILTIAZEM HYDROCHLORIDE SCH MG: 30 TABLET, FILM COATED ORAL at 06:12

## 2020-01-22 RX ADMIN — IPRATROPIUM BROMIDE AND ALBUTEROL SULFATE SCH AMP: .5; 3 SOLUTION RESPIRATORY (INHALATION) at 11:05

## 2020-01-22 RX ADMIN — ATORVASTATIN CALCIUM SCH MG: 20 TABLET, FILM COATED ORAL at 21:23

## 2020-01-22 RX ADMIN — METHYLPREDNISOLONE SODIUM SUCCINATE SCH MG: 40 INJECTION, POWDER, FOR SOLUTION INTRAMUSCULAR; INTRAVENOUS at 00:59

## 2020-01-22 RX ADMIN — PANTOPRAZOLE SODIUM SCH MG: 20 TABLET, DELAYED RELEASE ORAL at 10:43

## 2020-01-22 RX ADMIN — DILTIAZEM HYDROCHLORIDE SCH MG: 30 TABLET, FILM COATED ORAL at 17:46

## 2020-01-22 RX ADMIN — TIOTROPIUM BROMIDE INHALATION SPRAY SCH PUFF: 3.12 SPRAY, METERED RESPIRATORY (INHALATION) at 10:45

## 2020-01-22 RX ADMIN — METHYLPREDNISOLONE SODIUM SUCCINATE SCH MG: 40 INJECTION, POWDER, FOR SOLUTION INTRAMUSCULAR; INTRAVENOUS at 17:46

## 2020-01-22 RX ADMIN — CLOPIDOGREL BISULFATE SCH MG: 75 TABLET, FILM COATED ORAL at 10:43

## 2020-01-22 RX ADMIN — DILTIAZEM HYDROCHLORIDE SCH MG: 30 TABLET, FILM COATED ORAL at 23:52

## 2020-01-22 RX ADMIN — AZITHROMYCIN SCH MG: 250 TABLET, FILM COATED ORAL at 10:45

## 2020-01-22 RX ADMIN — ENOXAPARIN SODIUM SCH MG: 40 INJECTION SUBCUTANEOUS at 10:43

## 2020-01-22 RX ADMIN — BUDESONIDE AND FORMOTEROL FUMARATE DIHYDRATE SCH PUFF: 160; 4.5 AEROSOL RESPIRATORY (INHALATION) at 21:22

## 2020-01-22 RX ADMIN — IPRATROPIUM BROMIDE AND ALBUTEROL SULFATE SCH AMP: .5; 3 SOLUTION RESPIRATORY (INHALATION) at 20:16

## 2020-01-22 RX ADMIN — IPRATROPIUM BROMIDE AND ALBUTEROL SULFATE SCH AMP: .5; 3 SOLUTION RESPIRATORY (INHALATION) at 07:35

## 2020-01-22 RX ADMIN — DILTIAZEM HYDROCHLORIDE SCH MG: 30 TABLET, FILM COATED ORAL at 12:00

## 2020-01-22 RX ADMIN — FOLIC ACID SCH MG: 1 TABLET ORAL at 10:42

## 2020-01-22 NOTE — PN
Progress Note, Physician


Chief Complaint: 





Patient remained stable less shortness of breath





- Current Medication List


Current Medications: 


Active Medications





Acetaminophen (Tylenol -)  650 mg PO Q6H PRN


   PRN Reason: FEVER


Albuterol/Ipratropium (Duoneb -)  1 amp NEB RQID [facility]


   Last Admin: 01/22/20 07:35 Dose:  1 amp


Atorvastatin Calcium (Lipitor -)  20 mg PO HS [facility]


   Last Admin: 01/21/20 21:07 Dose:  20 mg


Azithromycin (Zithromax -)  250 mg PO DAILY [facility]


   Last Admin: 01/21/20 09:25 Dose:  250 mg


Budesonide/Formoterol Fumarate (Symbicort 160/4.5mcg -)  2 puff IH BID [facility]


   Last Admin: 01/21/20 21:15 Dose:  2 puff


Clonazepam (Klonopin -)  0.5 mg PO BID [facility]


   Last Admin: 01/21/20 21:07 Dose:  0.5 mg


Clopidogrel Bisulfate (Plavix -)  75 mg PO DAILY [facility]


   Last Admin: 01/21/20 09:25 Dose:  75 mg


Diltiazem HCl (Cardizem -)  30 mg PO Q6HPO [facility]


   Last Admin: 01/22/20 06:12 Dose:  30 mg


Enoxaparin Sodium (Lovenox -)  40 mg SQ DAILY [facility]


   Last Admin: 01/21/20 09:25 Dose:  40 mg


Folic Acid (Folic Acid -)  1 mg PO DAILY [facility]


   Last Admin: 01/21/20 09:25 Dose:  1 mg


Methotrexate (Mexate -)  7.5 mg PO We@1000 [facility]


Methylprednisolone Sodium Succinate (Solu-Medrol -)  60 mg IVPB Q8H-IV [facility]


   Last Admin: 01/22/20 00:59 Dose:  60 mg


Pantoprazole Sodium (Protonix -)  20 mg PO DAILY [facility]


   Last Admin: 01/21/20 09:25 Dose:  20 mg


Tiotropium Bromide (Spiriva Respimat)  2 puff IH DAILY [facility]


   Last Admin: 01/21/20 09:25 Dose:  2 puff











- Objective


Vital Signs: 


 Vital Signs











Temperature  97.4 F L  01/22/20 06:00


 


Pulse Rate  90   01/22/20 06:00


 


Respiratory Rate  20   01/22/20 06:00


 


Blood Pressure  116/59 L  01/22/20 06:00


 


O2 Sat by Pulse Oximetry (%)  98   01/22/20 00:35








Middle aged sick looking man on BIPAP





HEENT: Mm moist, no anemia, PERRLA EOMI





NECK: No JVD No Bruit





CHEST: B/L minimal wheezes





CVS: S1S2 Tachycardia





ABD: Non tender Bs +





EXT: Trace edema





CNS: AOX3 Non Focal


Labs: 


Labs: 


 CBC, BMP





 01/22/20 06:40 





 01/22/20 06:40 











Problem List





- Problems


(1) Acute respiratory failure with hypoxia and hypercarbia


Assessment/Plan: 


Due to advanced COPD and non compliance with BIAPA, cont BIPAP, Pulmonary 

consult, Rodri add Spiriva , on p.o. azithromycin follow-up pulmonary 

recommendations


Code(s): J96.01 - ACUTE RESPIRATORY FAILURE WITH HYPOXIA; J96.02 - ACUTE 

RESPIRATORY FAILURE WITH HYPERCAPNIA   





(2) COPD exacerbation


Assessment/Plan: 


cont IV asteroids Nebs and BiPAP


Code(s): J44.1 - CHRONIC OBSTRUCTIVE PULMONARY DISEASE W (ACUTE) EXACERBATION   





(3) Respiratory acidosis


Assessment/Plan: 


Due to Hypercarbia now improved


Code(s): E87.2 - ACIDOSIS   





(4) Anxiety and depression


Assessment/Plan: 


Cont Home meds


Code(s): F41.9 - ANXIETY DISORDER, UNSPECIFIED; F32.9 - MAJOR DEPRESSIVE 

DISORDER, SINGLE EPISODE, UNSPECIFIED   





(5) Diastolic CHF


Assessment/Plan: 


F/U BMP PRN Lasix and Ramipril


Code(s): I50.30 - UNSPECIFIED DIASTOLIC (CONGESTIVE) HEART FAILURE   


Qualifiers: 


   Heart failure chronicity: unspecified   Qualified Code(s): I50.30 - 

Unspecified diastolic (congestive) heart failure   





(6) History of coronary artery bypass graft


Assessment/Plan: 


Compensated


Code(s): Z95.1 - PRESENCE OF AORTOCORONARY BYPASS GRAFT   





(7) Rheumatoid arthritis


Assessment/Plan: 


Cont Methotrexate wkly and Folic acid


Code(s): M06.9 - RHEUMATOID ARTHRITIS, UNSPECIFIED   





(8) Palliative care encounter


Assessment/Plan: 


For goals of care


Code(s): Z51.5 - ENCOUNTER FOR PALLIATIVE CARE

## 2020-01-22 NOTE — PN
Progress Note (short form)





- Note


Progress Note: 





PULMONARY





Remains on BiPAP. Alert, awake but more tired today.





 Vital Signs











 Period  Temp  Pulse  Resp  BP Sys/Baer  Pulse Ox


 


 Last 24 Hr  97.4 F-98.0 F    20-22  /58-68  








Gen:  mildly tachypneic on BiPAP


Heart: RRR


Lung: decreased breath sounds at the bases


Abd: soft, nontender


Ext: no edema





 CBC, BMP





 01/22/20 06:40 





 01/22/20 06:40 





Active Medications





Acetaminophen (Tylenol -)  650 mg PO Q6H PRN


   PRN Reason: FEVER


Albuterol/Ipratropium (Duoneb -)  1 amp NEB RQID [facility]


   Last Admin: 01/22/20 11:05 Dose:  1 amp


Atorvastatin Calcium (Lipitor -)  20 mg PO HS [facility]


   Last Admin: 01/21/20 21:07 Dose:  20 mg


Azithromycin (Zithromax -)  250 mg PO DAILY [facility]


   Last Admin: 01/22/20 10:45 Dose:  250 mg


Budesonide/Formoterol Fumarate (Symbicort 160/4.5mcg -)  2 puff IH BID [facility]


   Last Admin: 01/22/20 10:45 Dose:  2 puff


Clonazepam (Klonopin -)  0.5 mg PO BID [facility]


   Last Admin: 01/22/20 10:42 Dose:  0.5 mg


Clopidogrel Bisulfate (Plavix -)  75 mg PO DAILY [facility]


   Last Admin: 01/22/20 10:43 Dose:  75 mg


Diltiazem HCl (Cardizem -)  30 mg PO Q6HPO [facility]


   Last Admin: 01/22/20 12:00 Dose:  30 mg


Enoxaparin Sodium (Lovenox -)  40 mg SQ DAILY [facility]


   Last Admin: 01/22/20 10:43 Dose:  40 mg


Folic Acid (Folic Acid -)  1 mg PO DAILY [facility]


   Last Admin: 01/22/20 10:42 Dose:  1 mg


Methotrexate (Mexate -)  7.5 mg PO We@1000 [facility]


   Last Admin: 01/22/20 10:43 Dose:  7.5 mg


Methylprednisolone Sodium Succinate (Solu-Medrol -)  60 mg IVPB Q8H-IV [facility]


   Last Admin: 01/22/20 10:44 Dose:  60 mg


Pantoprazole Sodium (Protonix -)  20 mg PO DAILY [facility]


   Last Admin: 01/22/20 10:43 Dose:  20 mg


Tiotropium Bromide (Spiriva Respimat)  2 puff IH DAILY [facility]


   Last Admin: 01/22/20 10:45 Dose:  2 puff





A/P


Acute on Chronic Hypoxic and Hypercapneic Respiratory Failure 


Acute COPD Exacerbation/End Stage COPD


LV Diastolic Dysfunction


Pulmonary HTN


CAD s/p CABG


HTN


Hyperlipidemia


Rheumatoid Arthritis





-  continue medrol


-  inhaled bronchodilators


-  O2 to keep SpO2 >90%


-  must have BiPAP at night and PRN during day


-  f/u cultures


-  DVT prophylaxis








Problem List





- Problems


(1) Acute on chronic respiratory failure with hypoxia and hypercapnia


Code(s): J96.21 - ACUTE AND CHRONIC RESPIRATORY FAILURE WITH HYPOXIA; J96.22 - 

ACUTE AND CHRONIC RESPIRATORY FAILURE WITH HYPERCAPNIA   





(2) COPD exacerbation


Code(s): J44.1 - CHRONIC OBSTRUCTIVE PULMONARY DISEASE W (ACUTE) EXACERBATION

## 2020-01-22 NOTE — PN
Progress Note, Physician


History of Present Illness: 





64 yrs old man e advanced COPD , Pulmonary Fibrosis, Home O2 and BIPAP at Night 

and PRN, CAD s/p CABG, Rheumatoid arthritis, Pulmonary HTN<, HfpEF, recently 

discharged to [facility] on 1/14/2020 after prolonged hospitalization on Prednisone 

taper gradually improving, last night slept off BiPAP  patient was found 

confused and SOB on arrival to ED O2 sat reported low with PCO2 98 patient was 

put on BIPAP rpt O2 sat ABG shows PCO2 59 patient patient denies any chest pain

, fever or expectoration, feels weak saturating well on  BIAPAP





- Current Medication List


Current Medications: 


Active Medications





Acetaminophen (Tylenol -)  650 mg PO Q6H PRN


   PRN Reason: FEVER


Albuterol/Ipratropium (Duoneb -)  1 amp NEB RQID [facility]


   Last Admin: 01/22/20 07:35 Dose:  1 amp


Atorvastatin Calcium (Lipitor -)  20 mg PO HS [facility]


   Last Admin: 01/21/20 21:07 Dose:  20 mg


Azithromycin (Zithromax -)  250 mg PO DAILY [facility]


   Last Admin: 01/22/20 10:45 Dose:  250 mg


Budesonide/Formoterol Fumarate (Symbicort 160/4.5mcg -)  2 puff IH BID [facility]


   Last Admin: 01/22/20 10:45 Dose:  2 puff


Clonazepam (Klonopin -)  0.5 mg PO BID [facility]


   Last Admin: 01/22/20 10:42 Dose:  0.5 mg


Clopidogrel Bisulfate (Plavix -)  75 mg PO DAILY [facility]


   Last Admin: 01/22/20 10:43 Dose:  75 mg


Diltiazem HCl (Cardizem -)  30 mg PO Q6HPO [facility]


   Last Admin: 01/22/20 06:12 Dose:  30 mg


Enoxaparin Sodium (Lovenox -)  40 mg SQ DAILY [facility]


   Last Admin: 01/22/20 10:43 Dose:  40 mg


Folic Acid (Folic Acid -)  1 mg PO DAILY [facility]


   Last Admin: 01/22/20 10:42 Dose:  1 mg


Methotrexate (Mexate -)  7.5 mg PO We@1000 [facility]


   Last Admin: 01/22/20 10:43 Dose:  7.5 mg


Methylprednisolone Sodium Succinate (Solu-Medrol -)  60 mg IVPB Q8H-IV [facility]


   Last Admin: 01/22/20 10:44 Dose:  60 mg


Pantoprazole Sodium (Protonix -)  20 mg PO DAILY [facility]


   Last Admin: 01/22/20 10:43 Dose:  20 mg


Tiotropium Bromide (Spiriva Respimat)  2 puff IH DAILY [facility]


   Last Admin: 01/22/20 10:45 Dose:  2 puff











- Objective


Vital Signs: 


 Vital Signs











Temperature  97.4 F L  01/22/20 06:00


 


Pulse Rate  90   01/22/20 06:00


 


Respiratory Rate  20   01/22/20 06:00


 


Blood Pressure  116/59 L  01/22/20 06:00


 


O2 Sat by Pulse Oximetry (%)  98   01/22/20 00:35











Eyes: Yes: WNL, Conjunctiva Clear, EOM Intact


HENT: Yes: WNL, Atraumatic, Normocephalic


Neck: Yes: WNL, Supple, Trachea Midline


Cardiovascular: Yes: WNL, Regular Rate and Rhythm


Respiratory: Yes: Diminished


Gastrointestinal: Yes: WNL, Normal Bowel Sounds


Genitourinary: Yes: WNL


Musculoskeletal: Yes: WNL


Extremities: Yes: WNL


Edema: No


Integumentary: Yes: WNL


Neurological: Yes: WNL, Alert, Oriented


...Motor Strength: WNL


Psychiatric: Yes: WNL


Labs: 


 CBC, BMP





 01/22/20 06:40 





 01/22/20 06:40 











Problem List





- Problems


(1) Acute respiratory failure with hypoxia and hypercarbia


Code(s): J96.01 - ACUTE RESPIRATORY FAILURE WITH HYPOXIA; J96.02 - ACUTE 

RESPIRATORY FAILURE WITH HYPERCAPNIA   





(2) Respiratory acidosis


Code(s): E87.2 - ACIDOSIS   





(3) Abnormal LFTs


Code(s): R79.89 - OTHER SPECIFIED ABNORMAL FINDINGS OF BLOOD CHEMISTRY   





(4) Abscess of calf


Code(s): L02.419 - CUTANEOUS ABSCESS OF LIMB, UNSPECIFIED   





(5) Acute on chronic diastolic (congestive) heart failure


Code(s): I50.33 - ACUTE ON CHRONIC DIASTOLIC (CONGESTIVE) HEART FAILURE   





(6) Acute on chronic respiratory failure with hypoxemia


Code(s): J96.21 - ACUTE AND CHRONIC RESPIRATORY FAILURE WITH HYPOXIA   





(7) Acute on chronic respiratory failure with hypoxia and hypercapnia


Code(s): J96.21 - ACUTE AND CHRONIC RESPIRATORY FAILURE WITH HYPOXIA; J96.22 - 

ACUTE AND CHRONIC RESPIRATORY FAILURE WITH HYPERCAPNIA   





(8) Acute respiratory failure


Code(s): J96.00 - ACUTE RESPIRATORY FAILURE, UNSP W HYPOXIA OR HYPERCAPNIA   





(9) Anxiety and depression


Code(s): F41.9 - ANXIETY DISORDER, UNSPECIFIED; F32.9 - MAJOR DEPRESSIVE 

DISORDER, SINGLE EPISODE, UNSPECIFIED   





(10) CAD (coronary artery disease)


Code(s): I25.10 - ATHSCL HEART DISEASE OF NATIVE CORONARY ARTERY W/O ANG PCTRS 

  


Qualifiers: 


   Coronary Disease-Associated Artery/Lesion type: native artery   Native vs. 

transplanted heart: native heart   Associated angina: without angina   

Qualified Code(s): I25.10 - Atherosclerotic heart disease of native coronary 

artery without angina pectoris   





(11) COPD (chronic obstructive pulmonary disease)


Code(s): J44.9 - CHRONIC OBSTRUCTIVE PULMONARY DISEASE, UNSPECIFIED   


Qualifiers: 


   COPD type: emphysema   Emphysema type: unspecified   Qualified Code(s): 

J43.9 - Emphysema, unspecified   





(12) COPD exacerbation


Code(s): J44.1 - CHRONIC OBSTRUCTIVE PULMONARY DISEASE W (ACUTE) EXACERBATION   





(13) Chronic pain


Code(s): G89.29 - OTHER CHRONIC PAIN   





(14) Compression fracture of L1 lumbar vertebra


Code(s): S32.010A - WEDGE COMPRESSION FRACTURE OF FIRST LUMBAR VERTEBRA, INIT   


Qualifiers: 


   Encounter type: initial encounter 





(15) Congestive cardiac failure


Code(s): I50.9 - HEART FAILURE, UNSPECIFIED   





(16) Cough


Code(s): R05 - COUGH   





(17) Diastolic CHF


Code(s): I50.30 - UNSPECIFIED DIASTOLIC (CONGESTIVE) HEART FAILURE   


Qualifiers: 


   Heart failure chronicity: unspecified   Qualified Code(s): I50.30 - 

Unspecified diastolic (congestive) heart failure   





(18) Edema


Code(s): R60.9 - EDEMA, UNSPECIFIED   


Qualifiers: 


   Edema type: unspecified   Qualified Code(s): R60.9 - Edema, unspecified   





(19) Elevated WBC count


Code(s): D72.829 - ELEVATED WHITE BLOOD CELL COUNT, UNSPECIFIED   





(20) Elevated brain natriuretic peptide (BNP) level


Code(s): R79.89 - OTHER SPECIFIED ABNORMAL FINDINGS OF BLOOD CHEMISTRY   





(21) HTN (hypertension)


Code(s): I10 - ESSENTIAL (PRIMARY) HYPERTENSION   


Qualifiers: 


   Hypertension type: essential hypertension   Qualified Code(s): I10 - 

Essential (primary) hypertension   





(22) History of coronary artery bypass graft


Code(s): Z95.1 - PRESENCE OF AORTOCORONARY BYPASS GRAFT   





(23) History of percutaneous coronary intervention


Code(s): Z98.89 - OTHER SPECIFIED POSTPROCEDURAL STATES * DO NOT USE *   





(24) Hypercarbia


Code(s): R06.89 - OTHER ABNORMALITIES OF BREATHING   





(25) Hypercholesterolemia


Code(s): E78.0 - PURE HYPERCHOLESTEROLEMIA * DO NOT USE *   





(26) Hyperkalemia


Code(s): E87.5 - HYPERKALEMIA   





(27) Hyperkalemia


Code(s): E87.5 - HYPERKALEMIA   





(28) Inguinal hernia


Code(s): K40.90 - UNIL INGUINAL HERNIA, W/O OBST OR GANGR, NOT SPCF AS RECUR   





(29) Intractable pain


Code(s): R52 - PAIN, UNSPECIFIED   





(30) LLQ pain


Code(s): R10.32 - LEFT LOWER QUADRANT PAIN   





(31) Left groin pain


Code(s): R10.32 - LEFT LOWER QUADRANT PAIN   





(32) Leg pain


Code(s): M79.606 - PAIN IN LEG, UNSPECIFIED   





(33) Lower extremity edema


Code(s): R60.0 - LOCALIZED EDEMA   





(34) Lumbar disc herniation


Code(s): M51.26 - OTHER INTERVERTEBRAL DISC DISPLACEMENT, LUMBAR REGION   





(35) Nosebleed


Code(s): R04.0 - EPISTAXIS   





(36) PSVT (paroxysmal supraventricular tachycardia)


Code(s): I47.1 - SUPRAVENTRICULAR TACHYCARDIA   





(37) Pain and swelling of right lower leg


Code(s): M79.661 - PAIN IN RIGHT LOWER LEG; M79.89 - OTHER SPECIFIED SOFT 

TISSUE DISORDERS   





(38) Pneumonia


Code(s): J18.9 - PNEUMONIA, UNSPECIFIED ORGANISM   





(39) Radiculopathy


Code(s): M54.10 - RADICULOPATHY, SITE UNSPECIFIED   


Qualifiers: 


   Spinal region: lumbar   Qualified Code(s): M54.16 - Radiculopathy, lumbar 

region   





(40) Rheumatoid arthritis


Code(s): M06.9 - RHEUMATOID ARTHRITIS, UNSPECIFIED   





(41) Rheumatoid arthritis involving ankle with positive rheumatoid factor


Code(s): M05.779 - RHEU ARTHRIT W RHEU FACTOR OF UNSP ANK/FT W/O ORG/SYS INVOLV

   


Qualifiers: 


   Laterality: bilateral   Qualified Code(s): M05.771 - Rheumatoid arthritis 

with rheumatoid factor of right ankle and foot without organ or systems 

involvement; M05.772 - Rheumatoid arthritis with rheumatoid factor of left 

ankle and foot without organ or systems involvement   





(42) Rheumatoid arthritis of multiple sites without organ or system involvement 

with positive rheumatoid factor


Code(s): M05.79 - RHEU ARTHRITIS W RHEU FACTOR MULT SITE W/O ORG/SYS INVOLV   





(43) Sinus tachycardia


Code(s): R00.0 - TACHYCARDIA, UNSPECIFIED   





Assessment/Plan








- Problems


(1) Palliative care encounter


Code(s): Z51.5 - ENCOUNTER FOR PALLIATIVE CARE   





(2) Acute on chronic diastolic (congestive) heart failure


Assessment/Plan: 


on diltiazem


F/u BUN/Cr Is and Os, daily weight, electrolytes.


Code(s): I50.33 - ACUTE ON CHRONIC DIASTOLIC (CONGESTIVE) HEART FAILURE   





(3) Acute on chronic respiratory failure with hypoxia and hypercapnia


Assessment/Plan: 


Bronchodilators, O2, steroids per pulmonologist.


Code(s): J96.21 - ACUTE AND CHRONIC RESPIRATORY FAILURE WITH HYPOXIA; J96.22 - 

ACUTE AND CHRONIC RESPIRATORY FAILURE WITH HYPERCAPNIA   





(4) Anxiety and depression


Code(s): F41.9 - ANXIETY DISORDER, UNSPECIFIED; F32.9 - MAJOR DEPRESSIVE 

DISORDER, SINGLE EPISODE, UNSPECIFIED   





(5) CAD (coronary artery disease)


Assessment/Plan: 


ischemic inferior wall on stress MIBI 2014


LDL cholesteol 122 mg/dL on 12/2019.


Continue atorvastatin.


On clopidogrel.


Code(s): I25.10 - ATHSCL HEART DISEASE OF NATIVE CORONARY ARTERY W/O ANG PCTRS 

  


Qualifiers: 


   Coronary Disease-Associated Artery/Lesion type: native artery   Native vs. 

transplanted heart: native heart   Associated angina: without angina   

Qualified Code(s): I25.10 - Atherosclerotic heart disease of native coronary 

artery without angina pectoris   





(6) HTN (hypertension)


Code(s): I10 - ESSENTIAL (PRIMARY) HYPERTENSION   


Qualifiers: 


   Hypertension type: essential hypertension   Qualified Code(s): I10 - 

Essential (primary) hypertension   





(7) History of coronary artery bypass graft


Code(s): Z95.1 - PRESENCE OF AORTOCORONARY BYPASS GRAFT   





(8) Hypercholesterolemia


Code(s): E78.0 - PURE HYPERCHOLESTEROLEMIA * DO NOT USE *

## 2020-01-22 NOTE — PN
Progress Note, Physician


Chief Complaint: 





Pt A&Ox3; +SOB, but "tolerable"


History of Present Illness: 





64 M with h/o HTN, CAD s/p CABG, HfpEF, Advanced COPD on Home O2, RA on 

Methotrexate, HIV, recent admission for COPD, presenting to ED for worsening 

SOB (pt blames this on a faulty Bipap machine) and cough. Pt denies F/C. Denies 

CP. Denies any worsening lower extremity edema.








- Current Medication List


Current Medications: 


Active Medications





Acetaminophen (Tylenol -)  650 mg PO Q6H PRN


   PRN Reason: FEVER


Albuterol/Ipratropium (Duoneb -)  1 amp NEB RQID [facility]


   Last Admin: 01/21/20 21:05 Dose:  1 amp


Atorvastatin Calcium (Lipitor -)  20 mg PO HS [facility]


   Last Admin: 01/21/20 21:07 Dose:  20 mg


Azithromycin (Zithromax -)  250 mg PO DAILY [facility]


   Last Admin: 01/21/20 09:25 Dose:  250 mg


Budesonide/Formoterol Fumarate (Symbicort 160/4.5mcg -)  2 puff IH BID [facility]


   Last Admin: 01/21/20 21:15 Dose:  2 puff


Clonazepam (Klonopin -)  0.5 mg PO BID [facility]


   Last Admin: 01/21/20 21:07 Dose:  0.5 mg


Clopidogrel Bisulfate (Plavix -)  75 mg PO DAILY [facility]


   Last Admin: 01/21/20 09:25 Dose:  75 mg


Diltiazem HCl (Cardizem -)  30 mg PO Q6HPO [facility]


   Last Admin: 01/21/20 23:37 Dose:  30 mg


Enoxaparin Sodium (Lovenox -)  40 mg SQ DAILY [facility]


   Last Admin: 01/21/20 09:25 Dose:  40 mg


Folic Acid (Folic Acid -)  1 mg PO DAILY [facility]


   Last Admin: 01/21/20 09:25 Dose:  1 mg


Methotrexate (Mexate -)  7.5 mg PO We@1000 [facility]


Methylprednisolone Sodium Succinate (Solu-Medrol -)  60 mg IVPB Q8H-IV [facility]


   Last Admin: 01/22/20 00:59 Dose:  60 mg


Pantoprazole Sodium (Protonix -)  20 mg PO DAILY [facility]


   Last Admin: 01/21/20 09:25 Dose:  20 mg


Tiotropium Bromide (Spiriva Respimat)  2 puff IH DAILY [facility]


   Last Admin: 01/21/20 09:25 Dose:  2 puff











- Objective


Vital Signs: 


 Vital Signs











Temperature  98 F   01/22/20 02:00


 


Pulse Rate  91 H  01/22/20 02:00


 


Respiratory Rate  20   01/22/20 02:00


 


Blood Pressure  108/68   01/22/20 02:00


 


O2 Sat by Pulse Oximetry (%)  98   01/22/20 00:35











Constitutional: Yes: Calm


Eyes: Yes: WNL


HENT: Yes: WNL


Neck: Yes: WNL


Cardiovascular: Yes: Tachycardia, S1, S2, S4


Respiratory: Yes: Diminished, On Nasal O2, SOB, Tachypnea


Gastrointestinal: Yes: Soft


...Rectal Exam: Yes: Deferred


Genitourinary: No: Anuria


Extremities: Yes: WNL


Edema: No


Peripheral Pulses WNL: Yes


Integumentary: Yes: Venous Stasis Changes, Other (dusky)


Neurological: Yes: Alert, Oriented, Weakness


Psychiatric: Yes: Alert, Oriented, Other (anxiety/depression)


Labs: 


 CBC, BMP





 01/20/20 06:50 





 01/20/20 06:50 











- ....Imaging


Chest X-ray: Image Reviewed (copd; new changes at bases (infiltrate/atelectasis)

)


EKG: Image Reviewed (sinus tachycardia; short IA)





Problem List





- Problems


(1) Palliative care encounter


Code(s): Z51.5 - ENCOUNTER FOR PALLIATIVE CARE   





(2) Acute on chronic diastolic (congestive) heart failure


Assessment/Plan: 


on diltiazem


F/u BUN/Cr Is and Os, daily weight, electrolytes.


Code(s): I50.33 - ACUTE ON CHRONIC DIASTOLIC (CONGESTIVE) HEART FAILURE   





(3) Acute on chronic respiratory failure with hypoxia and hypercapnia


Assessment/Plan: 


Bronchodilators, O2, steroids per pulmonologist.


Code(s): J96.21 - ACUTE AND CHRONIC RESPIRATORY FAILURE WITH HYPOXIA; J96.22 - 

ACUTE AND CHRONIC RESPIRATORY FAILURE WITH HYPERCAPNIA   





(4) Anxiety and depression


Code(s): F41.9 - ANXIETY DISORDER, UNSPECIFIED; F32.9 - MAJOR DEPRESSIVE 

DISORDER, SINGLE EPISODE, UNSPECIFIED   





(5) CAD (coronary artery disease)


Assessment/Plan: 


ischemic inferior wall on stress MIBI 2014


LDL cholesteol 122 mg/dL on 12/2019.


Continue atorvastatin.


On clopidogrel.


Code(s): I25.10 - ATHSCL HEART DISEASE OF NATIVE CORONARY ARTERY W/O ANG PCTRS 

  


Qualifiers: 


   Coronary Disease-Associated Artery/Lesion type: native artery   Native vs. 

transplanted heart: native heart   Associated angina: without angina   

Qualified Code(s): I25.10 - Atherosclerotic heart disease of native coronary 

artery without angina pectoris   





(6) HTN (hypertension)


Code(s): I10 - ESSENTIAL (PRIMARY) HYPERTENSION   


Qualifiers: 


   Hypertension type: essential hypertension   Qualified Code(s): I10 - 

Essential (primary) hypertension   





(7) History of coronary artery bypass graft


Code(s): Z95.1 - PRESENCE OF AORTOCORONARY BYPASS GRAFT   





(8) Hypercholesterolemia


Code(s): E78.0 - PURE HYPERCHOLESTEROLEMIA * DO NOT USE *

## 2020-01-23 RX ADMIN — PANTOPRAZOLE SODIUM SCH MG: 20 TABLET, DELAYED RELEASE ORAL at 10:28

## 2020-01-23 RX ADMIN — ENOXAPARIN SODIUM SCH MG: 40 INJECTION SUBCUTANEOUS at 10:27

## 2020-01-23 RX ADMIN — IPRATROPIUM BROMIDE AND ALBUTEROL SULFATE SCH AMP: .5; 3 SOLUTION RESPIRATORY (INHALATION) at 20:08

## 2020-01-23 RX ADMIN — BUDESONIDE AND FORMOTEROL FUMARATE DIHYDRATE SCH PUFF: 160; 4.5 AEROSOL RESPIRATORY (INHALATION) at 10:29

## 2020-01-23 RX ADMIN — DILTIAZEM HYDROCHLORIDE SCH MG: 30 TABLET, FILM COATED ORAL at 17:46

## 2020-01-23 RX ADMIN — DILTIAZEM HYDROCHLORIDE SCH MG: 30 TABLET, FILM COATED ORAL at 12:50

## 2020-01-23 RX ADMIN — IPRATROPIUM BROMIDE AND ALBUTEROL SULFATE SCH AMP: .5; 3 SOLUTION RESPIRATORY (INHALATION) at 08:00

## 2020-01-23 RX ADMIN — METHYLPREDNISOLONE SODIUM SUCCINATE SCH MG: 40 INJECTION, POWDER, FOR SOLUTION INTRAMUSCULAR; INTRAVENOUS at 17:47

## 2020-01-23 RX ADMIN — METHYLPREDNISOLONE SODIUM SUCCINATE SCH MG: 40 INJECTION, POWDER, FOR SOLUTION INTRAMUSCULAR; INTRAVENOUS at 10:28

## 2020-01-23 RX ADMIN — IPRATROPIUM BROMIDE AND ALBUTEROL SULFATE SCH AMP: .5; 3 SOLUTION RESPIRATORY (INHALATION) at 11:31

## 2020-01-23 RX ADMIN — BUDESONIDE AND FORMOTEROL FUMARATE DIHYDRATE SCH PUFF: 160; 4.5 AEROSOL RESPIRATORY (INHALATION) at 22:00

## 2020-01-23 RX ADMIN — DILTIAZEM HYDROCHLORIDE SCH MG: 30 TABLET, FILM COATED ORAL at 06:17

## 2020-01-23 RX ADMIN — METHYLPREDNISOLONE SODIUM SUCCINATE SCH MG: 40 INJECTION, POWDER, FOR SOLUTION INTRAMUSCULAR; INTRAVENOUS at 01:03

## 2020-01-23 RX ADMIN — CLOPIDOGREL BISULFATE SCH MG: 75 TABLET, FILM COATED ORAL at 10:28

## 2020-01-23 RX ADMIN — IPRATROPIUM BROMIDE AND ALBUTEROL SULFATE SCH AMP: .5; 3 SOLUTION RESPIRATORY (INHALATION) at 16:00

## 2020-01-23 RX ADMIN — AZITHROMYCIN SCH MG: 250 TABLET, FILM COATED ORAL at 10:29

## 2020-01-23 RX ADMIN — TIOTROPIUM BROMIDE INHALATION SPRAY SCH PUFF: 3.12 SPRAY, METERED RESPIRATORY (INHALATION) at 10:28

## 2020-01-23 RX ADMIN — ATORVASTATIN CALCIUM SCH MG: 20 TABLET, FILM COATED ORAL at 22:00

## 2020-01-23 RX ADMIN — FOLIC ACID SCH MG: 1 TABLET ORAL at 10:27

## 2020-01-23 NOTE — PN
Progress Note, Physician


Chief Complaint: 





Patient remained stable less shortness of breath, feeling weak





- Current Medication List


Current Medications: 


Active Medications





Acetaminophen (Tylenol -)  650 mg PO Q6H PRN


   PRN Reason: FEVER


Albuterol/Ipratropium (Duoneb -)  1 amp NEB RQID [facility]


   Last Admin: 01/23/20 08:00 Dose:  1 amp


Atorvastatin Calcium (Lipitor -)  20 mg PO HS [facility]


   Last Admin: 01/22/20 21:23 Dose:  20 mg


Azithromycin (Zithromax -)  250 mg PO DAILY [facility]


   Last Admin: 01/22/20 10:45 Dose:  250 mg


Budesonide/Formoterol Fumarate (Symbicort 160/4.5mcg -)  2 puff IH BID [facility]


   Last Admin: 01/22/20 21:22 Dose:  2 puff


Clonazepam (Klonopin -)  0.5 mg PO BID [facility]


   Last Admin: 01/22/20 21:23 Dose:  0.5 mg


Clopidogrel Bisulfate (Plavix -)  75 mg PO DAILY [facility]


   Last Admin: 01/22/20 10:43 Dose:  75 mg


Diltiazem HCl (Cardizem -)  30 mg PO Q6HPO [facility]


   Last Admin: 01/23/20 06:17 Dose:  30 mg


Enoxaparin Sodium (Lovenox -)  40 mg SQ DAILY [facility]


   Last Admin: 01/22/20 10:43 Dose:  40 mg


Folic Acid (Folic Acid -)  1 mg PO DAILY [facility]


   Last Admin: 01/22/20 10:42 Dose:  1 mg


Methotrexate (Mexate -)  7.5 mg PO We@1000 [facility]


   Last Admin: 01/22/20 10:43 Dose:  7.5 mg


Methylprednisolone Sodium Succinate (Solu-Medrol -)  60 mg IVPB Q8H-IV [facility]


   Last Admin: 01/23/20 01:03 Dose:  60 mg


Pantoprazole Sodium (Protonix -)  20 mg PO DAILY [facility]


   Last Admin: 01/22/20 10:43 Dose:  20 mg


Tiotropium Bromide (Spiriva Respimat)  2 puff IH DAILY [facility]


   Last Admin: 01/22/20 10:45 Dose:  2 puff











- Objective


Vital Signs: 


 Vital Signs











Temperature  98.0 F   01/23/20 06:00


 


Pulse Rate  90   01/23/20 06:00


 


Respiratory Rate  20   01/23/20 06:00


 


Blood Pressure  115/60   01/23/20 06:00


 


O2 Sat by Pulse Oximetry (%)  99   01/22/20 21:00








Middle aged sick looking man on BIPAP





HEENT: Mm moist, no anemia, PERRLA EOMI





NECK: No JVD No Bruit





CHEST: B/L minimal wheezes





CVS: S1S2 Tachycardia





ABD: Non tender Bs +





EXT: Trace edema





CNS: AOX3 Non Focal


Labs: 


 CBC, BMP





 01/22/20 06:40 





 01/22/20 06:40 











Problem List





- Problems


(1) Acute respiratory failure with hypoxia and hypercarbia


Assessment/Plan: 


Due to advanced COPD and non compliance with BIAPA, cont BIPAP, Pulmonary 

consult, Dun[place] add Spiriva , on p.o. azithromycin follow-up pulmonary 

recommendations


Code(s): J96.01 - ACUTE RESPIRATORY FAILURE WITH HYPOXIA; J96.02 - ACUTE 

RESPIRATORY FAILURE WITH HYPERCAPNIA   





(2) COPD exacerbation


Assessment/Plan: 


cont IV asteroids Nebs and BiPAP


Code(s): J44.1 - CHRONIC OBSTRUCTIVE PULMONARY DISEASE W (ACUTE) EXACERBATION   





(3) Respiratory acidosis


Assessment/Plan: 


Due to Hypercarbia now improved


Code(s): E87.2 - ACIDOSIS   





(4) Anxiety and depression


Assessment/Plan: 


Cont Home meds


Code(s): F41.9 - ANXIETY DISORDER, UNSPECIFIED; F32.9 - MAJOR DEPRESSIVE 

DISORDER, SINGLE EPISODE, UNSPECIFIED   





(5) Diastolic CHF


Assessment/Plan: 


F/U BMP PRN Lasix and Ramipril


Code(s): I50.30 - UNSPECIFIED DIASTOLIC (CONGESTIVE) HEART FAILURE   


Qualifiers: 


   Heart failure chronicity: unspecified   Qualified Code(s): I50.30 - 

Unspecified diastolic (congestive) heart failure   





(6) History of coronary artery bypass graft


Assessment/Plan: 


Compensated


Code(s): Z95.1 - PRESENCE OF AORTOCORONARY BYPASS GRAFT   





(7) Rheumatoid arthritis


Assessment/Plan: 


Cont Methotrexate wkly and Folic acid


Code(s): M06.9 - RHEUMATOID ARTHRITIS, UNSPECIFIED   





(8) Palliative care encounter


Assessment/Plan: 


For goals of care


Code(s): Z51.5 - ENCOUNTER FOR PALLIATIVE CARE

## 2020-01-23 NOTE — CON.PSL
Psychology Consult


History Provided By: Patient


Limitations to Obtaining History: Clinical Condition


Current Medications: 


Active Medications





Acetaminophen (Tylenol -)  650 mg PO Q6H PRN


   PRN Reason: FEVER


Albuterol/Ipratropium (Duoneb -)  1 amp NEB RQID [facility]


   Last Admin: 01/23/20 16:00 Dose:  1 amp


Atorvastatin Calcium (Lipitor -)  20 mg PO HS [facility]


   Last Admin: 01/22/20 21:23 Dose:  20 mg


Azithromycin (Zithromax -)  250 mg PO DAILY [facility]


   Last Admin: 01/23/20 10:29 Dose:  250 mg


Budesonide/Formoterol Fumarate (Symbicort 160/4.5mcg -)  2 puff IH BID [facility]


   Last Admin: 01/23/20 10:29 Dose:  2 puff


Clonazepam (Klonopin -)  0.5 mg PO BID [facility]


   Last Admin: 01/23/20 10:27 Dose:  0.5 mg


Clopidogrel Bisulfate (Plavix -)  75 mg PO DAILY [facility]


   Last Admin: 01/23/20 10:28 Dose:  75 mg


Diltiazem HCl (Cardizem -)  30 mg PO Q6HPO [facility]


   Last Admin: 01/23/20 17:46 Dose:  30 mg


Enoxaparin Sodium (Lovenox -)  40 mg SQ DAILY [facility]


   Last Admin: 01/23/20 10:27 Dose:  40 mg


Folic Acid (Folic Acid -)  1 mg PO DAILY [facility]


   Last Admin: 01/23/20 10:27 Dose:  1 mg


Methotrexate (Mexate -)  7.5 mg PO We@1000 [facility]


   Last Admin: 01/22/20 10:43 Dose:  7.5 mg


Methylprednisolone Sodium Succinate (Solu-Medrol -)  40 mg IVPB Q8H-IV [facility]


   Last Admin: 01/23/20 17:47 Dose:  40 mg


Pantoprazole Sodium (Protonix -)  20 mg PO DAILY [facility]


   Last Admin: 01/23/20 10:28 Dose:  20 mg


Tiotropium Bromide (Spiriva Respimat)  2 puff IH DAILY [facility]


   Last Admin: 01/23/20 10:28 Dose:  2 puff








Allergies: 


 Allergies











Allergy/AdvReac Type Severity Reaction Status Date / Time


 


Penicillins Allergy Severe Rash Verified 01/19/20 06:46


 


seafood Allergy Severe Rash Uncoded 01/19/20 06:46











Does patient have pain?: Yes (Appears to be in significant discomfort.)


Hx Alcohol Use: No


Hx Substance Use: No


Hx Substance Use Treatment: No





Current Medical Exam-Psy


Attention: Alert


Orientation: Time, Person, Place


Expressive: Coherent


Receptive: Age Appropriate Comprehension of Spoken Words


Hallucinations: Absent


Thought Process: Intact


Depression: Severe


Hopelessness: Yes


Loss of Interest: Yes


Anxiety Level: Severe


Danger to Self and Others: No (He admitted to wishing he would not wake up if 

he were unconscious, but he clearly denied that he would attempt to take his 

life.)


Sleep: Poorly


Appetite: Good, Fair





Problem List





- Problem


(1) Major depress dis, severe


Code(s): F32.2 - MAJOR DEPRESSV DISORD, SINGLE EPSD, SEV W/O PSYCH FEATURES   





(2) Anxiety about health


Code(s): F41.8 - OTHER SPECIFIED ANXIETY DISORDERS   





Assessment/Plan





Mr. Morales was receptive to the assessment session. However, he struggled with 

speaking due to breathing difficulty and 


mucus being coughed up during the session. Although formal memory testing was 

not performed, he was able to share past, recent and current information about 

family, jobs and other experiences. He did not appear to be easily distracted.


He  indicated that he requires assistance with filling out paperwork for 

getting on the lung transplant list. Perhaps a volunteer or nurse's aide could 

be assigned to that task.





Supportive counseling will continue to be provided.

## 2020-01-23 NOTE — PN
Progress Note (short form)





- Note


Progress Note: 


Awake and alert on NIPPV support in a chair. 


Could not stand with PT.


Feels hopeless.


 Intake & Output











 01/20/20 01/21/20 01/22/20 01/23/20





 23:59 23:59 23:59 23:59


 


Intake Total 600 1200 480 260


 


Output Total 300 300 650 300


 


Balance 300 900 -170 -40


 


Weight 132 lb 6.4 oz 131 lb 11.2 oz  








 Last Vital Signs











Temp Pulse Resp BP Pulse Ox


 


 98.0 F   90   20   115/60   97 


 


 01/23/20 06:00  01/23/20 06:00  01/23/20 06:00  01/23/20 06:00  01/23/20 11:32








Active Medications





Acetaminophen (Tylenol -)  650 mg PO Q6H PRN


   PRN Reason: FEVER


Albuterol/Ipratropium (Duoneb -)  1 amp NEB RQID [facility]


   Last Admin: 01/23/20 11:31 Dose:  1 amp


Atorvastatin Calcium (Lipitor -)  20 mg PO HS [facility]


   Last Admin: 01/22/20 21:23 Dose:  20 mg


Azithromycin (Zithromax -)  250 mg PO DAILY [facility]


   Last Admin: 01/23/20 10:29 Dose:  250 mg


Budesonide/Formoterol Fumarate (Symbicort 160/4.5mcg -)  2 puff IH BID [facility]


   Last Admin: 01/23/20 10:29 Dose:  2 puff


Clonazepam (Klonopin -)  0.5 mg PO BID [facility]


   Last Admin: 01/23/20 10:27 Dose:  0.5 mg


Clopidogrel Bisulfate (Plavix -)  75 mg PO DAILY [facility]


   Last Admin: 01/23/20 10:28 Dose:  75 mg


Diltiazem HCl (Cardizem -)  30 mg PO Q6HPO [facility]


   Last Admin: 01/23/20 06:17 Dose:  30 mg


Enoxaparin Sodium (Lovenox -)  40 mg SQ DAILY [facility]


   Last Admin: 01/23/20 10:27 Dose:  40 mg


Folic Acid (Folic Acid -)  1 mg PO DAILY [facility]


   Last Admin: 01/23/20 10:27 Dose:  1 mg


Methotrexate (Mexate -)  7.5 mg PO We@1000 [facility]


   Last Admin: 01/22/20 10:43 Dose:  7.5 mg


Methylprednisolone Sodium Succinate (Solu-Medrol -)  40 mg IVPB Q8H-IV [facility]


Pantoprazole Sodium (Protonix -)  20 mg PO DAILY [facility]


   Last Admin: 01/23/20 10:28 Dose:  20 mg


Tiotropium Bromide (Spiriva Respimat)  2 puff IH DAILY [facility]


   Last Admin: 01/23/20 10:28 Dose:  2 puff














Constitutional: Yes: Mildly tachypneic on NIPPV support 


Eyes: Yes: Conjunctiva Clear, EOM Intact


HENT: Yes: Atraumatic, Normocephalic


Neck: Yes: Supple, Trachea Midline


Cardiovascular: Yes: Regular Rate and Rhythm


Respiratory: Yes: Poor Air Entry


...Clubbing: No


Gastrointestinal: Yes: Normal Bowel Sounds, Soft.  No: Tenderness


Edema: No


Neurological: Yes: Alert, Oriented


Labs: 


 Laboratory Results - last 24 hr











  01/20/20





  06:50


 


Procalcitonin  0.12 H











Problem List





- Problems


(1) Acute on chronic respiratory failure with hypoxia and hypercapnia


Code(s): J96.21 - ACUTE AND CHRONIC RESPIRATORY FAILURE WITH HYPOXIA; J96.22 - 

ACUTE AND CHRONIC RESPIRATORY FAILURE WITH HYPERCAPNIA   





(2) COPD exacerbation


Code(s): J44.1 - CHRONIC OBSTRUCTIVE PULMONARY DISEASE W (ACUTE) EXACERBATION   





Assessment/Plan


Acute on Chronic Hypoxic and Hypercapneic Respiratory Failure 


Acute COPD Exacerbation/End Stage COPD


LV Diastolic Dysfunction


Pulmonary HTN


CAD s/p CABG


HTN


Hyperlipidemia


Rheumatoid Arthritis





-  Wean IV Medrol  


-  inhaled bronchodilators


-  O2 to keep SpO2 >90%


-  must have NIPPV support at night and PRN during day


-  Zithromax  


-  DVT prophylaxis


-  Prognosis guarded


-  Noted for psychologist evaluation and Palliative Care evaluation 





Dr Ramesh

## 2020-01-23 NOTE — PN
Progress Note, Physician


Chief Complaint: 





Pt A&Ox3; sitting up in chair; no chest pain, status respiratory state. However

, earlier in am, tried to stand with assistance, but was too weak. This has 

made him feel "hopeless"


History of Present Illness: 





64 M with h/o HTN, CAD s/p CABG, HfpEF, Advanced COPD on Home O2, RA on 

Methotrexate, HIV, recent admission for COPD, presenting to ED for worsening 

SOB (pt blames this on a faulty Bipap machine) and cough. Pt denies F/C. Denies 

CP. Denies any worsening lower extremity edema.








- Current Medication List


Current Medications: 


Active Medications





Acetaminophen (Tylenol -)  650 mg PO Q6H PRN


   PRN Reason: FEVER


Albuterol/Ipratropium (Duoneb -)  1 amp NEB RQID [facility]


   Last Admin: 01/23/20 08:00 Dose:  1 amp


Atorvastatin Calcium (Lipitor -)  20 mg PO HS [facility]


   Last Admin: 01/22/20 21:23 Dose:  20 mg


Azithromycin (Zithromax -)  250 mg PO DAILY [facility]


   Last Admin: 01/23/20 10:29 Dose:  250 mg


Budesonide/Formoterol Fumarate (Symbicort 160/4.5mcg -)  2 puff IH BID [facility]


   Last Admin: 01/23/20 10:29 Dose:  2 puff


Clonazepam (Klonopin -)  0.5 mg PO BID [facility]


   Last Admin: 01/23/20 10:27 Dose:  0.5 mg


Clopidogrel Bisulfate (Plavix -)  75 mg PO DAILY [facility]


   Last Admin: 01/23/20 10:28 Dose:  75 mg


Diltiazem HCl (Cardizem -)  30 mg PO Q6HPO [facility]


   Last Admin: 01/23/20 06:17 Dose:  30 mg


Enoxaparin Sodium (Lovenox -)  40 mg SQ DAILY [facility]


   Last Admin: 01/23/20 10:27 Dose:  40 mg


Folic Acid (Folic Acid -)  1 mg PO DAILY [facility]


   Last Admin: 01/23/20 10:27 Dose:  1 mg


Methotrexate (Mexate -)  7.5 mg PO We@1000 [facility]


   Last Admin: 01/22/20 10:43 Dose:  7.5 mg


Methylprednisolone Sodium Succinate (Solu-Medrol -)  60 mg IVPB Q8H-IV [facility]


   Last Admin: 01/23/20 10:28 Dose:  60 mg


Pantoprazole Sodium (Protonix -)  20 mg PO DAILY [facility]


   Last Admin: 01/23/20 10:28 Dose:  20 mg


Tiotropium Bromide (Spiriva Respimat)  2 puff IH DAILY [facility]


   Last Admin: 01/23/20 10:28 Dose:  2 puff











- Objective


Vital Signs: 


 Vital Signs











Temperature  98.0 F   01/23/20 06:00


 


Pulse Rate  90   01/23/20 06:00


 


Respiratory Rate  20   01/23/20 06:00


 


Blood Pressure  115/60   01/23/20 06:00


 


O2 Sat by Pulse Oximetry (%)  99   01/22/20 21:00











Constitutional: Yes: Anxious


Eyes: Yes: WNL


HENT: Yes: Pharyngeal Erythema


Neck: Yes: WNL


Cardiovascular: Yes: S1, S2


Respiratory: Yes: Diminished, SOB


Gastrointestinal: Yes: Soft


...Rectal Exam: Yes: Deferred


Genitourinary: No: Anuria


Breast(s): Yes: WNL


Musculoskeletal: Yes: Muscle Weakness


Extremities: Yes: Cool


Edema: No


Peripheral Pulses WNL: Yes


Integumentary: Yes: WNL


Neurological: Yes: Alert, Oriented, Weakness


Psychiatric: Yes: Alert, Oriented, Other (depression)


Labs: 


 CBC, BMP





 01/22/20 06:40 





 01/22/20 06:40 











- ....Imaging


Chest X-ray: Image Reviewed


EKG: Image Reviewed





Problem List





- Problems


(1) Palliative care encounter


Code(s): Z51.5 - ENCOUNTER FOR PALLIATIVE CARE   





(2) Acute on chronic diastolic (congestive) heart failure


Assessment/Plan: 


on diltiazem


F/u BUN/Cr Is and Os, daily weight, electrolytes.


Code(s): I50.33 - ACUTE ON CHRONIC DIASTOLIC (CONGESTIVE) HEART FAILURE   





(3) Acute on chronic respiratory failure with hypoxia and hypercapnia


Assessment/Plan: 


Bronchodilators, O2, steroids per pulmonologist.


Code(s): J96.21 - ACUTE AND CHRONIC RESPIRATORY FAILURE WITH HYPOXIA; J96.22 - 

ACUTE AND CHRONIC RESPIRATORY FAILURE WITH HYPERCAPNIA   





(4) Anxiety and depression


Assessment/Plan: 


Pt request speaking with psychiatrist/psychologist.


Code(s): F41.9 - ANXIETY DISORDER, UNSPECIFIED; F32.9 - MAJOR DEPRESSIVE 

DISORDER, SINGLE EPISODE, UNSPECIFIED   





(5) CAD (coronary artery disease)


Assessment/Plan: 


ischemic inferior wall on stress MIBI 2014


LDL cholesteol 122 mg/dL on 12/2019.


Continue atorvastatin, and keep LDL <70 mg/dL.


On clopidogrel.


Code(s): I25.10 - ATHSCL HEART DISEASE OF NATIVE CORONARY ARTERY W/O ANG PCTRS 

  


Qualifiers: 


   Coronary Disease-Associated Artery/Lesion type: native artery   Native vs. 

transplanted heart: native heart   Associated angina: without angina   

Qualified Code(s): I25.10 - Atherosclerotic heart disease of native coronary 

artery without angina pectoris   





(6) HTN (hypertension)


Code(s): I10 - ESSENTIAL (PRIMARY) HYPERTENSION   


Qualifiers: 


   Hypertension type: essential hypertension   Qualified Code(s): I10 - 

Essential (primary) hypertension   





(7) History of coronary artery bypass graft


Code(s): Z95.1 - PRESENCE OF AORTOCORONARY BYPASS GRAFT   





(8) Hypercholesterolemia


Code(s): E78.0 - PURE HYPERCHOLESTEROLEMIA * DO NOT USE *   





(9) Depression


Assessment/Plan: 


Pt alert and oriented; morose.


When asked what he would like to do, he at one point said "lethal injection, so 

I won't bother other people".


He says "you wouldn't believe all the things going on in my mine, but I know 

you don't have time for it all". He readily agrees to speak with a psychologist.


He is on Klonopin.


He is also to have palliatve care discussion.


Code(s): F32.9 - MAJOR DEPRESSIVE DISORDER, SINGLE EPISODE, UNSPECIFIED

## 2020-01-24 LAB
ANION GAP SERPL CALC-SCNC: 2 MMOL/L (ref 8–16)
ANISOCYTOSIS BLD QL: (no result)
BASO STIPL BLD QL SMEAR: (no result)
BASOPHILS # BLD: 0.1 % (ref 0–2)
BUN SERPL-MCNC: 21.3 MG/DL (ref 7–18)
CALCIUM SERPL-MCNC: 9.1 MG/DL (ref 8.5–10.1)
CHLORIDE SERPL-SCNC: 99 MMOL/L (ref 98–107)
CO2 SERPL-SCNC: 38 MMOL/L (ref 21–32)
CREAT SERPL-MCNC: 0.6 MG/DL (ref 0.55–1.3)
DACRYOCYTES BLD QL SMEAR: (no result)
DEPRECATED RDW RBC AUTO: 17 % (ref 11.9–15.9)
EOSINOPHIL # BLD: 0 % (ref 0–4.5)
GLUCOSE SERPL-MCNC: 104 MG/DL (ref 74–106)
HCT VFR BLD CALC: 29.3 % (ref 35.4–49)
HGB BLD-MCNC: 9.8 GM/DL (ref 11.7–16.9)
LYMPHOCYTES # BLD: 0.8 % (ref 8–40)
MACROCYTES BLD QL: 0
MCH RBC QN AUTO: 30.8 PG (ref 25.7–33.7)
MCHC RBC AUTO-ENTMCNC: 33.4 G/DL (ref 32–35.9)
MCV RBC: 92.3 FL (ref 80–96)
MONOCYTES # BLD AUTO: 1.4 % (ref 3.8–10.2)
NEUTROPHILS # BLD: 97.7 % (ref 42.8–82.8)
OVALOCYTES BLD QL SMEAR: (no result)
PLATELET # BLD AUTO: 222 K/MM3 (ref 134–434)
PLATELET BLD QL SMEAR: NORMAL
PMV BLD: 7.3 FL (ref 7.5–11.1)
POTASSIUM SERPLBLD-SCNC: 5.2 MMOL/L (ref 3.5–5.1)
RBC # BLD AUTO: 3.18 M/MM3 (ref 4–5.6)
SODIUM SERPL-SCNC: 140 MMOL/L (ref 136–145)
WBC # BLD AUTO: 10.9 K/MM3 (ref 4–10)

## 2020-01-24 RX ADMIN — ATORVASTATIN CALCIUM SCH MG: 20 TABLET, FILM COATED ORAL at 21:24

## 2020-01-24 RX ADMIN — IPRATROPIUM BROMIDE AND ALBUTEROL SULFATE SCH AMP: .5; 3 SOLUTION RESPIRATORY (INHALATION) at 21:25

## 2020-01-24 RX ADMIN — DILTIAZEM HYDROCHLORIDE SCH MG: 30 TABLET, FILM COATED ORAL at 05:46

## 2020-01-24 RX ADMIN — BUDESONIDE AND FORMOTEROL FUMARATE DIHYDRATE SCH PUFF: 160; 4.5 AEROSOL RESPIRATORY (INHALATION) at 21:24

## 2020-01-24 RX ADMIN — METHYLPREDNISOLONE SODIUM SUCCINATE SCH MG: 40 INJECTION, POWDER, FOR SOLUTION INTRAMUSCULAR; INTRAVENOUS at 09:53

## 2020-01-24 RX ADMIN — IPRATROPIUM BROMIDE AND ALBUTEROL SULFATE SCH AMP: .5; 3 SOLUTION RESPIRATORY (INHALATION) at 08:07

## 2020-01-24 RX ADMIN — IPRATROPIUM BROMIDE AND ALBUTEROL SULFATE SCH AMP: .5; 3 SOLUTION RESPIRATORY (INHALATION) at 16:05

## 2020-01-24 RX ADMIN — DILTIAZEM HYDROCHLORIDE SCH MG: 30 TABLET, FILM COATED ORAL at 17:06

## 2020-01-24 RX ADMIN — ENOXAPARIN SODIUM SCH MG: 40 INJECTION SUBCUTANEOUS at 09:53

## 2020-01-24 RX ADMIN — IPRATROPIUM BROMIDE AND ALBUTEROL SULFATE SCH AMP: .5; 3 SOLUTION RESPIRATORY (INHALATION) at 12:09

## 2020-01-24 RX ADMIN — FOLIC ACID SCH MG: 1 TABLET ORAL at 09:54

## 2020-01-24 RX ADMIN — METHYLPREDNISOLONE SODIUM SUCCINATE SCH MG: 40 INJECTION, POWDER, FOR SOLUTION INTRAMUSCULAR; INTRAVENOUS at 01:51

## 2020-01-24 RX ADMIN — BUDESONIDE AND FORMOTEROL FUMARATE DIHYDRATE SCH PUFF: 160; 4.5 AEROSOL RESPIRATORY (INHALATION) at 09:54

## 2020-01-24 RX ADMIN — CLOPIDOGREL BISULFATE SCH MG: 75 TABLET, FILM COATED ORAL at 09:54

## 2020-01-24 RX ADMIN — METHYLPREDNISOLONE SODIUM SUCCINATE SCH MG: 40 INJECTION, POWDER, FOR SOLUTION INTRAMUSCULAR; INTRAVENOUS at 17:06

## 2020-01-24 RX ADMIN — AZITHROMYCIN SCH MG: 250 TABLET, FILM COATED ORAL at 09:53

## 2020-01-24 RX ADMIN — TIOTROPIUM BROMIDE INHALATION SPRAY SCH PUFF: 3.12 SPRAY, METERED RESPIRATORY (INHALATION) at 09:54

## 2020-01-24 RX ADMIN — PANTOPRAZOLE SODIUM SCH MG: 20 TABLET, DELAYED RELEASE ORAL at 09:53

## 2020-01-24 RX ADMIN — DILTIAZEM HYDROCHLORIDE SCH MG: 30 TABLET, FILM COATED ORAL at 12:02

## 2020-01-24 RX ADMIN — DILTIAZEM HYDROCHLORIDE SCH MG: 30 TABLET, FILM COATED ORAL at 00:10

## 2020-01-24 NOTE — PN
Progress Note (short form)





- Note


Progress Note: 


Awake and alert on NC O2.  Used NIPPV from 2AM until about 7AM. 


Appears clinically better today, less SOB. 


No acute events overnight. 


 Intake & Output











 01/21/20 01/22/20 01/23/20 01/24/20





 23:59 23:59 23:59 23:59


 


Intake Total 1200 480 890 10


 


Output Total 300 650 300 200


 


Balance 900 -170 590 -190


 


Weight 131 lb 11.2 oz   130 lb 3.2 oz








 Last Vital Signs











Temp Pulse Resp BP Pulse Ox


 


 98 F   90   16   130/75   96 


 


 01/24/20 05:43  01/24/20 05:43  01/24/20 05:43  01/24/20 05:43  01/24/20 08:07








Active Medications





Acetaminophen (Tylenol -)  650 mg PO Q6H PRN


   PRN Reason: FEVER


Albuterol/Ipratropium (Duoneb -)  1 amp NEB RQID [facility]


   Last Admin: 01/24/20 08:07 Dose:  1 amp


Atorvastatin Calcium (Lipitor -)  20 mg PO HS [facility]


   Last Admin: 01/23/20 22:00 Dose:  20 mg


Azithromycin (Zithromax -)  250 mg PO DAILY [facility]


   Last Admin: 01/24/20 09:53 Dose:  250 mg


Budesonide/Formoterol Fumarate (Symbicort 160/4.5mcg -)  2 puff IH BID [facility]


   Last Admin: 01/24/20 09:54 Dose:  2 puff


Clonazepam (Klonopin -)  0.5 mg PO BID [facility]


   Last Admin: 01/24/20 09:54 Dose:  0.5 mg


Clopidogrel Bisulfate (Plavix -)  75 mg PO DAILY [facility]


   Last Admin: 01/24/20 09:54 Dose:  75 mg


Diltiazem HCl (Cardizem -)  30 mg PO Q6HPO [facility]


   Last Admin: 01/24/20 05:46 Dose:  30 mg


Enoxaparin Sodium (Lovenox -)  40 mg SQ DAILY [facility]


   Last Admin: 01/24/20 09:53 Dose:  40 mg


Folic Acid (Folic Acid -)  1 mg PO DAILY [facility]


   Last Admin: 01/24/20 09:54 Dose:  1 mg


Methotrexate (Mexate -)  7.5 mg PO We@1000 [facility]


   Last Admin: 01/22/20 10:43 Dose:  7.5 mg


Methylprednisolone Sodium Succinate (Solu-Medrol -)  40 mg IVPB Q8H-IV [facility]


   Last Admin: 01/24/20 09:53 Dose:  40 mg


Pantoprazole Sodium (Protonix -)  20 mg PO DAILY [facility]


   Last Admin: 01/24/20 09:53 Dose:  20 mg


Tiotropium Bromide (Spiriva Respimat)  2 puff IH DAILY [facility]


   Last Admin: 01/24/20 09:54 Dose:  2 puff











Constitutional: Yes: Less tachypneic on NC O2  


Eyes: Yes: Conjunctiva Clear, EOM Intact


HENT: Yes: Atraumatic, Normocephalic


Neck: Yes: Supple, Trachea Midline


Cardiovascular: Yes: Regular Rate and Rhythm


Respiratory: Yes: Poor Air Entry


...Clubbing: No


Gastrointestinal: Yes: Normal Bowel Sounds, Soft.  No: Tenderness


Edema: No


Neurological: Yes: Alert, Oriented


Labs: 


  Laboratory Results - last 24 hr











  01/24/20 01/24/20





  05:58 05:58


 


WBC  10.9 H 


 


RBC  3.18 L 


 


Hgb  9.8 L 


 


Hct  29.3 L 


 


MCV  92.3 


 


MCH  30.8 


 


MCHC  33.4 


 


RDW  17.0 H 


 


Plt Count  222 


 


MPV  7.3 L 


 


Absolute Neuts (auto)  10.7 H 


 


Neutrophils %  97.7 H 


 


Neutrophils % (Manual)  97.0 H 


 


Band Neutrophils %  1.0 


 


Lymphocytes %  0.8 L 


 


Lymphocytes % (Manual)  2.0 L 


 


Monocytes %  1.4 L 


 


Monocytes % (Manual)  0 L 


 


Eosinophils %  0.0 


 


Eosinophils % (Manual)  0.0 


 


Basophils %  0.1 


 


Basophils % (Manual)  0.0 


 


Myelocytes % (Man)  0  D 


 


Promyelocytes % (Man)  0 


 


Blast Cells % (Manual)  0 


 


Nucleated RBC %  0 


 


Metamyelocytes  0 


 


Hypochromia  0 


 


Platelet Estimate  Normal 


 


Platelet Comment  Present 


 


Polychromasia  1+ 


 


Poikilocytosis  1+ 


 


Basophilic Stippling  1+ 


 


Anisocytosis  1+ 


 


Microcytosis  1+ 


 


Macrocytosis  0 


 


Tear Drop Cells  1+ 


 


Ovalocytes  1+ 


 


Sodium   140


 


Potassium   5.2 H


 


Chloride   99


 


Carbon Dioxide   38 H


 


Anion Gap   2 L


 


BUN   21.3 H


 


Creatinine   0.6


 


Est GFR (CKD-EPI)AfAm   124.89


 


Est GFR (CKD-EPI)NonAf   107.76


 


Random Glucose   104


 


Calcium   9.1











Problem List





- Problems


(1) Acute on chronic respiratory failure with hypoxia and hypercapnia


Code(s): J96.21 - ACUTE AND CHRONIC RESPIRATORY FAILURE WITH HYPOXIA; J96.22 - 

ACUTE AND CHRONIC RESPIRATORY FAILURE WITH HYPERCAPNIA   





(2) COPD exacerbation


Code(s): J44.1 - CHRONIC OBSTRUCTIVE PULMONARY DISEASE W (ACUTE) EXACERBATION   





Assessment/Plan


Acute on Chronic Hypoxic and Hypercapneic Respiratory Failure 


Acute COPD Exacerbation/End Stage COPD


LV Diastolic Dysfunction


Pulmonary HTN


CAD s/p CABG


HTN


Hyperlipidemia


Rheumatoid Arthritis





-  Will attempt wean IV Medrol tomorrow   


-  inhaled bronchodilators


-  O2 to keep SpO2 >90%


-  must have NIPPV support at night and PRN during day


-  Zithromax  


-  DVT prophylaxis


-  Prognosis guarded


-  Ongoing discussions for goals of care 





Dr Ramesh

## 2020-01-24 NOTE — PN
Progress Note, Physician


Chief Complaint: 





Patient remained stable less shortness of breath, feeling weak and hopeless





- Current Medication List


Current Medications: 


Active Medications





Acetaminophen (Tylenol -)  650 mg PO Q6H PRN


   PRN Reason: FEVER


Albuterol/Ipratropium (Duoneb -)  1 amp NEB RQID [facility]


   Last Admin: 01/23/20 20:08 Dose:  1 amp


Atorvastatin Calcium (Lipitor -)  20 mg PO HS [facility]


   Last Admin: 01/23/20 22:00 Dose:  20 mg


Azithromycin (Zithromax -)  250 mg PO DAILY [facility]


   Last Admin: 01/23/20 10:29 Dose:  250 mg


Budesonide/Formoterol Fumarate (Symbicort 160/4.5mcg -)  2 puff IH BID [facility]


   Last Admin: 01/23/20 22:00 Dose:  2 puff


Clonazepam (Klonopin -)  0.5 mg PO BID [facility]


   Last Admin: 01/23/20 22:00 Dose:  0.5 mg


Clopidogrel Bisulfate (Plavix -)  75 mg PO DAILY [facility]


   Last Admin: 01/23/20 10:28 Dose:  75 mg


Diltiazem HCl (Cardizem -)  30 mg PO Q6HPO [facility]


   Last Admin: 01/24/20 05:46 Dose:  30 mg


Enoxaparin Sodium (Lovenox -)  40 mg SQ DAILY [facility]


   Last Admin: 01/23/20 10:27 Dose:  40 mg


Folic Acid (Folic Acid -)  1 mg PO DAILY [facility]


   Last Admin: 01/23/20 10:27 Dose:  1 mg


Methotrexate (Mexate -)  7.5 mg PO We@1000 [facility]


   Last Admin: 01/22/20 10:43 Dose:  7.5 mg


Methylprednisolone Sodium Succinate (Solu-Medrol -)  40 mg IVPB Q8H-IV [facility]


   Last Admin: 01/24/20 01:51 Dose:  40 mg


Pantoprazole Sodium (Protonix -)  20 mg PO DAILY [facility]


   Last Admin: 01/23/20 10:28 Dose:  20 mg


Tiotropium Bromide (Spiriva Respimat)  2 puff IH DAILY [facility]


   Last Admin: 01/23/20 10:28 Dose:  2 puff











- Objective


Vital Signs: 


 Vital Signs











Temperature  98 F   01/24/20 05:43


 


Pulse Rate  90   01/24/20 05:43


 


Respiratory Rate  16   01/24/20 05:43


 


Blood Pressure  130/75   01/24/20 05:43


 


O2 Sat by Pulse Oximetry (%)  100   01/23/20 21:00








Middle aged sick looking man on BIPAP





HEENT: Mm moist, no anemia, PERRLA EOMI





NECK: No JVD No Bruit





CHEST: B/L minimal wheezes





CVS: S1S2 Tachycardia





ABD: Non tender Bs +





EXT: Trace edema





CNS: AOX3 Non Focal


Labs: 


 CBC, BMP





 01/24/20 05:58 





 01/24/20 05:58 











Problem List





- Problems


(1) Acute respiratory failure with hypoxia and hypercarbia


Assessment/Plan: 


Due to advanced COPD and non compliance with BIAPA, cont BIPAP, Pulmonary 

consult, on prednisone taper Duneb add Spiriva , on p.o. azithromycin follow-up 

pulmonary recommendations


Code(s): J96.01 - ACUTE RESPIRATORY FAILURE WITH HYPOXIA; J96.02 - ACUTE 

RESPIRATORY FAILURE WITH HYPERCAPNIA   





(2) COPD exacerbation


Assessment/Plan: 


cont IV asteroids taper  Nebs and BiPAP


Code(s): J44.1 - CHRONIC OBSTRUCTIVE PULMONARY DISEASE W (ACUTE) EXACERBATION   





(3) Respiratory acidosis


Assessment/Plan: 


Due to Hypercarbia now improved


Code(s): E87.2 - ACIDOSIS   





(4) Anxiety and depression


Assessment/Plan: 


Cont Home meds


Code(s): F41.9 - ANXIETY DISORDER, UNSPECIFIED; F32.9 - MAJOR DEPRESSIVE 

DISORDER, SINGLE EPISODE, UNSPECIFIED   





(5) Diastolic CHF


Assessment/Plan: 


F/U BMP PRN Lasix and Ramipril


Code(s): I50.30 - UNSPECIFIED DIASTOLIC (CONGESTIVE) HEART FAILURE   


Qualifiers: 


   Heart failure chronicity: unspecified   Qualified Code(s): I50.30 - 

Unspecified diastolic (congestive) heart failure   





(6) History of coronary artery bypass graft


Assessment/Plan: 


Compensated


Code(s): Z95.1 - PRESENCE OF AORTOCORONARY BYPASS GRAFT   





(7) Rheumatoid arthritis


Assessment/Plan: 


Cont Methotrexate wkly and Folic acid


Code(s): M06.9 - RHEUMATOID ARTHRITIS, UNSPECIFIED   





(8) Palliative care encounter


Assessment/Plan: 


For goals of care


Code(s): Z51.5 - ENCOUNTER FOR PALLIATIVE CARE

## 2020-01-25 RX ADMIN — PREDNISONE SCH: 10 TABLET ORAL at 13:51

## 2020-01-25 RX ADMIN — METHYLPREDNISOLONE SODIUM SUCCINATE SCH MG: 40 INJECTION, POWDER, FOR SOLUTION INTRAMUSCULAR; INTRAVENOUS at 10:15

## 2020-01-25 RX ADMIN — CLOPIDOGREL BISULFATE SCH MG: 75 TABLET, FILM COATED ORAL at 10:16

## 2020-01-25 RX ADMIN — ENOXAPARIN SODIUM SCH MG: 40 INJECTION SUBCUTANEOUS at 10:15

## 2020-01-25 RX ADMIN — IPRATROPIUM BROMIDE AND ALBUTEROL SULFATE SCH AMP: .5; 3 SOLUTION RESPIRATORY (INHALATION) at 16:06

## 2020-01-25 RX ADMIN — PANTOPRAZOLE SODIUM SCH MG: 20 TABLET, DELAYED RELEASE ORAL at 10:16

## 2020-01-25 RX ADMIN — DILTIAZEM HYDROCHLORIDE SCH MG: 30 TABLET, FILM COATED ORAL at 05:56

## 2020-01-25 RX ADMIN — IPRATROPIUM BROMIDE AND ALBUTEROL SULFATE SCH AMP: .5; 3 SOLUTION RESPIRATORY (INHALATION) at 20:40

## 2020-01-25 RX ADMIN — IPRATROPIUM BROMIDE AND ALBUTEROL SULFATE SCH AMP: .5; 3 SOLUTION RESPIRATORY (INHALATION) at 11:15

## 2020-01-25 RX ADMIN — BUDESONIDE AND FORMOTEROL FUMARATE DIHYDRATE SCH PUFF: 160; 4.5 AEROSOL RESPIRATORY (INHALATION) at 10:16

## 2020-01-25 RX ADMIN — METHYLPREDNISOLONE SODIUM SUCCINATE SCH MG: 40 INJECTION, POWDER, FOR SOLUTION INTRAMUSCULAR; INTRAVENOUS at 01:20

## 2020-01-25 RX ADMIN — DILTIAZEM HYDROCHLORIDE SCH MG: 30 TABLET, FILM COATED ORAL at 01:20

## 2020-01-25 RX ADMIN — FOLIC ACID SCH MG: 1 TABLET ORAL at 10:16

## 2020-01-25 RX ADMIN — DILTIAZEM HYDROCHLORIDE SCH MG: 30 TABLET, FILM COATED ORAL at 11:49

## 2020-01-25 RX ADMIN — BUDESONIDE AND FORMOTEROL FUMARATE DIHYDRATE SCH PUFF: 160; 4.5 AEROSOL RESPIRATORY (INHALATION) at 21:25

## 2020-01-25 RX ADMIN — ATORVASTATIN CALCIUM SCH MG: 20 TABLET, FILM COATED ORAL at 21:25

## 2020-01-25 RX ADMIN — IPRATROPIUM BROMIDE AND ALBUTEROL SULFATE SCH AMP: .5; 3 SOLUTION RESPIRATORY (INHALATION) at 07:35

## 2020-01-25 RX ADMIN — AZITHROMYCIN SCH MG: 250 TABLET, FILM COATED ORAL at 10:16

## 2020-01-25 RX ADMIN — DILTIAZEM HYDROCHLORIDE SCH MG: 30 TABLET, FILM COATED ORAL at 17:00

## 2020-01-25 RX ADMIN — TIOTROPIUM BROMIDE INHALATION SPRAY SCH PUFF: 3.12 SPRAY, METERED RESPIRATORY (INHALATION) at 10:16

## 2020-01-25 NOTE — PN
Progress Note (short form)





- Note


Progress Note: 








PULMONARY





Awake and alert on NC O2.  


Used NIPPV HS


 


VSS/AFEBRILE


Constitutional: Yes: Less tachypneic on NC O2  


Eyes: Yes: Conjunctiva Clear, EOM Intact


HENT: Yes: Atraumatic, Normocephalic


Neck: Yes: Supple, Trachea Midline


Cardiovascular: Yes: Regular Rate and Rhythm


Respiratory: Yes: Poor Air Entry


...Clubbing: No


Gastrointestinal: Yes: Normal Bowel Sounds, Soft.  No: Tenderness


Edema: No


Neurological: Yes: Alert, Oriented





Labs: NOTED


 





Acute on Chronic Hypoxic and Hypercapneic Respiratory Failure 


Acute COPD Exacerbation/End Stage COPD


LV Diastolic Dysfunction


Pulmonary HTN


CAD s/p CABG


HTN


Hyperlipidemia


Rheumatoid Arthritis





-  Changed to prednisone  


-  inhaled bronchodilators


-  O2 to keep SpO2 >90%


-  must have NIPPV support at night and PRN during day


-  Zithromax  


-  DVT prophylaxis


-  Prognosis guarded


-  Ongoing discussions for goals of care 





KARON SOLIZ MD

## 2020-01-25 NOTE — PN
Progress Note, Physician


Chief Complaint: 





Patient remained stable less shortness of breath, feeling weak and hopeless





- Current Medication List


Current Medications: 


Active Medications





Acetaminophen (Tylenol -)  650 mg PO Q6H PRN


   PRN Reason: FEVER


Albuterol/Ipratropium (Duoneb -)  1 amp NEB RQID [facility]


   Last Admin: 01/25/20 07:35 Dose:  1 amp


Atorvastatin Calcium (Lipitor -)  20 mg PO HS [facility]


   Last Admin: 01/24/20 21:24 Dose:  20 mg


Azithromycin (Zithromax -)  250 mg PO DAILY [facility]


   Last Admin: 01/24/20 09:53 Dose:  250 mg


Budesonide/Formoterol Fumarate (Symbicort 160/4.5mcg -)  2 puff IH BID [facility]


   Last Admin: 01/24/20 21:24 Dose:  2 puff


Clonazepam (Klonopin -)  0.5 mg PO BID [facility]


   Last Admin: 01/24/20 21:24 Dose:  0.5 mg


Clopidogrel Bisulfate (Plavix -)  75 mg PO DAILY [facility]


   Last Admin: 01/24/20 09:54 Dose:  75 mg


Diltiazem HCl (Cardizem -)  30 mg PO Q6HPO [facility]


   Last Admin: 01/25/20 05:56 Dose:  30 mg


Enoxaparin Sodium (Lovenox -)  40 mg SQ DAILY [facility]


   Last Admin: 01/24/20 09:53 Dose:  40 mg


Folic Acid (Folic Acid -)  1 mg PO DAILY [facility]


   Last Admin: 01/24/20 09:54 Dose:  1 mg


Methotrexate (Mexate -)  7.5 mg PO We@1000 [facility]


   Last Admin: 01/22/20 10:43 Dose:  7.5 mg


Methylprednisolone Sodium Succinate (Solu-Medrol -)  40 mg IVPB Q8H-IV [facility]


   Last Admin: 01/25/20 01:20 Dose:  40 mg


Pantoprazole Sodium (Protonix -)  20 mg PO DAILY [facility]


   Last Admin: 01/24/20 09:53 Dose:  20 mg


Tiotropium Bromide (Spiriva Respimat)  2 puff IH DAILY [facility]


   Last Admin: 01/24/20 09:54 Dose:  2 puff











- Objective


Vital Signs: 


 Vital Signs











Temperature  97.9 F   01/25/20 06:00


 


Pulse Rate  103 H  01/25/20 06:00


 


Respiratory Rate  22 H  01/24/20 10:00


 


Blood Pressure  127/90   01/25/20 06:00


 


O2 Sat by Pulse Oximetry (%)  97   01/25/20 07:30








Middle aged sick looking man on BIPAP





HEENT: Mm moist, no anemia, PERRLA EOMI





NECK: No JVD No Bruit





CHEST: B/L minimal wheezes





CVS: S1S2 Tachycardia





ABD: Non tender Bs +





EXT: Trace edema





CNS: AOX3 Non Focal


Labs: 


 CBC, BMP





 01/24/20 05:58 





 01/24/20 05:58 











Problem List





- Problems


(1) Acute respiratory failure with hypoxia and hypercarbia


Assessment/Plan: 


Due to advanced COPD and non compliance with BIAPA, cont BIPAP, Pulmonary 

consult, on prednisone taper Duneb add Spiriva  follow-up pulmonary 

recommendations


Code(s): J96.01 - ACUTE RESPIRATORY FAILURE WITH HYPOXIA; J96.02 - ACUTE 

RESPIRATORY FAILURE WITH HYPERCAPNIA   





(2) COPD exacerbation


Assessment/Plan: 


cont IV asteroids taper  Nebs and BiPAP


Code(s): J44.1 - CHRONIC OBSTRUCTIVE PULMONARY DISEASE W (ACUTE) EXACERBATION   





(3) Respiratory acidosis


Assessment/Plan: 


Due to Hypercarbia now improved


Code(s): E87.2 - ACIDOSIS   





(4) Anxiety and depression


Assessment/Plan: 


Cont Home meds


Code(s): F41.9 - ANXIETY DISORDER, UNSPECIFIED; F32.9 - MAJOR DEPRESSIVE 

DISORDER, SINGLE EPISODE, UNSPECIFIED   





(5) Diastolic CHF


Assessment/Plan: 


F/U BMP PRN Lasix and Ramipril


Code(s): I50.30 - UNSPECIFIED DIASTOLIC (CONGESTIVE) HEART FAILURE   


Qualifiers: 


   Heart failure chronicity: unspecified   Qualified Code(s): I50.30 - 

Unspecified diastolic (congestive) heart failure   





(6) History of coronary artery bypass graft


Assessment/Plan: 


Compensated


Code(s): Z95.1 - PRESENCE OF AORTOCORONARY BYPASS GRAFT   





(7) Rheumatoid arthritis


Assessment/Plan: 


Cont Methotrexate wkly and Folic acid


Code(s): M06.9 - RHEUMATOID ARTHRITIS, UNSPECIFIED   





(8) Palliative care encounter


Assessment/Plan: 


For goals of care


Code(s): Z51.5 - ENCOUNTER FOR PALLIATIVE CARE

## 2020-01-26 LAB
ANION GAP SERPL CALC-SCNC: 2 MMOL/L (ref 8–16)
ANISOCYTOSIS BLD QL: (no result)
BASO STIPL BLD QL SMEAR: (no result)
BASOPHILS # BLD: 0.1 % (ref 0–2)
BUN SERPL-MCNC: 16.2 MG/DL (ref 7–18)
CALCIUM SERPL-MCNC: 9.2 MG/DL (ref 8.5–10.1)
CHLORIDE SERPL-SCNC: 98 MMOL/L (ref 98–107)
CO2 SERPL-SCNC: 38 MMOL/L (ref 21–32)
CREAT SERPL-MCNC: 0.7 MG/DL (ref 0.55–1.3)
DACRYOCYTES BLD QL SMEAR: (no result)
DEPRECATED RDW RBC AUTO: 16.9 % (ref 11.9–15.9)
EOSINOPHIL # BLD: 0 % (ref 0–4.5)
GLUCOSE SERPL-MCNC: 131 MG/DL (ref 74–106)
HCT VFR BLD CALC: 29.4 % (ref 35.4–49)
HGB BLD-MCNC: 9.9 GM/DL (ref 11.7–16.9)
LYMPHOCYTES # BLD: 1.1 % (ref 8–40)
MACROCYTES BLD QL: (no result)
MCH RBC QN AUTO: 31.2 PG (ref 25.7–33.7)
MCHC RBC AUTO-ENTMCNC: 33.5 G/DL (ref 32–35.9)
MCV RBC: 93 FL (ref 80–96)
MONOCYTES # BLD AUTO: 2.3 % (ref 3.8–10.2)
NEUTROPHILS # BLD: 96.5 % (ref 42.8–82.8)
OVALOCYTES BLD QL SMEAR: (no result)
PLATELET # BLD AUTO: 219 K/MM3 (ref 134–434)
PLATELET BLD QL SMEAR: NORMAL
PMV BLD: 7.2 FL (ref 7.5–11.1)
POTASSIUM SERPLBLD-SCNC: 5.4 MMOL/L (ref 3.5–5.1)
RBC # BLD AUTO: 3.17 M/MM3 (ref 4–5.6)
SODIUM SERPL-SCNC: 138 MMOL/L (ref 136–145)
WBC # BLD AUTO: 12.2 K/MM3 (ref 4–10)

## 2020-01-26 RX ADMIN — DILTIAZEM HYDROCHLORIDE SCH MG: 30 TABLET, FILM COATED ORAL at 05:16

## 2020-01-26 RX ADMIN — IPRATROPIUM BROMIDE AND ALBUTEROL SULFATE SCH AMP: .5; 3 SOLUTION RESPIRATORY (INHALATION) at 07:30

## 2020-01-26 RX ADMIN — TIOTROPIUM BROMIDE INHALATION SPRAY SCH PUFF: 3.12 SPRAY, METERED RESPIRATORY (INHALATION) at 10:18

## 2020-01-26 RX ADMIN — DILTIAZEM HYDROCHLORIDE SCH MG: 30 TABLET, FILM COATED ORAL at 13:36

## 2020-01-26 RX ADMIN — BUDESONIDE AND FORMOTEROL FUMARATE DIHYDRATE SCH PUFF: 160; 4.5 AEROSOL RESPIRATORY (INHALATION) at 21:23

## 2020-01-26 RX ADMIN — BUDESONIDE AND FORMOTEROL FUMARATE DIHYDRATE SCH PUFF: 160; 4.5 AEROSOL RESPIRATORY (INHALATION) at 10:17

## 2020-01-26 RX ADMIN — DILTIAZEM HYDROCHLORIDE SCH MG: 30 TABLET, FILM COATED ORAL at 23:52

## 2020-01-26 RX ADMIN — ENOXAPARIN SODIUM SCH MG: 40 INJECTION SUBCUTANEOUS at 10:19

## 2020-01-26 RX ADMIN — AZITHROMYCIN SCH MG: 250 TABLET, FILM COATED ORAL at 10:17

## 2020-01-26 RX ADMIN — IPRATROPIUM BROMIDE AND ALBUTEROL SULFATE SCH AMP: .5; 3 SOLUTION RESPIRATORY (INHALATION) at 15:35

## 2020-01-26 RX ADMIN — DILTIAZEM HYDROCHLORIDE SCH MG: 30 TABLET, FILM COATED ORAL at 17:13

## 2020-01-26 RX ADMIN — FOLIC ACID SCH MG: 1 TABLET ORAL at 10:17

## 2020-01-26 RX ADMIN — DILTIAZEM HYDROCHLORIDE SCH MG: 30 TABLET, FILM COATED ORAL at 00:10

## 2020-01-26 RX ADMIN — IPRATROPIUM BROMIDE AND ALBUTEROL SULFATE SCH AMP: .5; 3 SOLUTION RESPIRATORY (INHALATION) at 20:37

## 2020-01-26 RX ADMIN — CLOPIDOGREL BISULFATE SCH MG: 75 TABLET, FILM COATED ORAL at 10:17

## 2020-01-26 RX ADMIN — PANTOPRAZOLE SODIUM SCH MG: 20 TABLET, DELAYED RELEASE ORAL at 10:17

## 2020-01-26 RX ADMIN — ATORVASTATIN CALCIUM SCH MG: 20 TABLET, FILM COATED ORAL at 21:23

## 2020-01-26 RX ADMIN — IPRATROPIUM BROMIDE AND ALBUTEROL SULFATE SCH AMP: .5; 3 SOLUTION RESPIRATORY (INHALATION) at 11:25

## 2020-01-26 RX ADMIN — PREDNISONE SCH MG: 10 TABLET ORAL at 10:17

## 2020-01-26 NOTE — PN
Progress Note, Physician


Chief Complaint: 





Patient remained stable less shortness of breath, feeling weak and exhausted





- Current Medication List


Current Medications: 


Active Medications





Acetaminophen (Tylenol -)  650 mg PO Q6H PRN


   PRN Reason: FEVER


Albuterol/Ipratropium (Duoneb -)  1 amp NEB RQID [facility]


   Last Admin: 01/26/20 11:25 Dose:  1 amp


Atorvastatin Calcium (Lipitor -)  20 mg PO HS [facility]


   Last Admin: 01/25/20 21:25 Dose:  20 mg


Azithromycin (Zithromax -)  250 mg PO DAILY [facility]


   Last Admin: 01/26/20 10:17 Dose:  250 mg


Budesonide/Formoterol Fumarate (Symbicort 160/4.5mcg -)  2 puff IH BID [facility]


   Last Admin: 01/26/20 10:17 Dose:  2 puff


Clonazepam (Klonopin -)  0.5 mg PO BID [facility]


   Last Admin: 01/26/20 10:17 Dose:  0.5 mg


Clopidogrel Bisulfate (Plavix -)  75 mg PO DAILY [facility]


   Last Admin: 01/26/20 10:17 Dose:  75 mg


Diltiazem HCl (Cardizem -)  30 mg PO Q6HPO [facility]


   Last Admin: 01/26/20 13:36 Dose:  30 mg


Enoxaparin Sodium (Lovenox -)  40 mg SQ DAILY [facility]


   Last Admin: 01/26/20 10:19 Dose:  40 mg


Folic Acid (Folic Acid -)  1 mg PO DAILY [facility]


   Last Admin: 01/26/20 10:17 Dose:  1 mg


Methotrexate (Mexate -)  7.5 mg PO We@1000 [facility]


   Last Admin: 01/22/20 10:43 Dose:  7.5 mg


Pantoprazole Sodium (Protonix -)  20 mg PO DAILY [facility]


   Last Admin: 01/26/20 10:17 Dose:  20 mg


Prednisone (Deltasone -)  30 mg PO DAILY [facility]


   Last Admin: 01/26/20 10:17 Dose:  30 mg


Tiotropium Bromide (Spiriva Respimat)  2 puff IH DAILY [facility]


   Last Admin: 01/26/20 10:18 Dose:  2 puff











- Objective


Vital Signs: 


 Vital Signs











Temperature  98.3 F   01/26/20 09:00


 


Pulse Rate  94 H  01/26/20 09:00


 


Respiratory Rate  20   01/26/20 09:00


 


Blood Pressure  122/74   01/26/20 09:00


 


O2 Sat by Pulse Oximetry (%)  95   01/26/20 11:35








Middle aged sick looking man on BIPAP





HEENT: Mm moist, no anemia, PERRLA EOMI





NECK: No JVD No Bruit





CHEST: B/L minimal wheezes





CVS: S1S2 Tachycardia





ABD: Non tender Bs +





EXT: Trace edema





CNS: AOX3 Non Focal


Labs: 


 CBC, BMP





 01/26/20 05:50 





 01/26/20 05:50 











Problem List





- Problems


(1) Acute respiratory failure with hypoxia and hypercarbia


Assessment/Plan: 


Due to advanced COPD and non compliance with BIAPA, cont BIPAP, Pulmonary 

consult, PO  prednisone 30 mg daily,  Duneb add Spiriva  follow-up pulmonary 

recommendations


Code(s): J96.01 - ACUTE RESPIRATORY FAILURE WITH HYPOXIA; J96.02 - ACUTE 

RESPIRATORY FAILURE WITH HYPERCAPNIA   





(2) COPD exacerbation


Assessment/Plan: 


cont IV asteroids taper  Nebs and BiPAP


Code(s): J44.1 - CHRONIC OBSTRUCTIVE PULMONARY DISEASE W (ACUTE) EXACERBATION   





(3) Respiratory acidosis


Assessment/Plan: 


Due to Hypercarbia now improved


Code(s): E87.2 - ACIDOSIS   





(4) Anxiety and depression


Assessment/Plan: 


Cont Home meds


Code(s): F41.9 - ANXIETY DISORDER, UNSPECIFIED; F32.9 - MAJOR DEPRESSIVE 

DISORDER, SINGLE EPISODE, UNSPECIFIED   





(5) Diastolic CHF


Assessment/Plan: 


F/U BMP PRN Lasix and Ramipril


Code(s): I50.30 - UNSPECIFIED DIASTOLIC (CONGESTIVE) HEART FAILURE   


Qualifiers: 


   Heart failure chronicity: unspecified   Qualified Code(s): I50.30 - 

Unspecified diastolic (congestive) heart failure   





(6) History of coronary artery bypass graft


Assessment/Plan: 


Compensated


Code(s): Z95.1 - PRESENCE OF AORTOCORONARY BYPASS GRAFT   





(7) Rheumatoid arthritis


Assessment/Plan: 


Cont Methotrexate wkly and Folic acid


Code(s): M06.9 - RHEUMATOID ARTHRITIS, UNSPECIFIED   





(8) Palliative care encounter


Assessment/Plan: 


For goals of care


Code(s): Z51.5 - ENCOUNTER FOR PALLIATIVE CARE

## 2020-01-27 LAB
ANION GAP SERPL CALC-SCNC: 3 MMOL/L (ref 8–16)
BUN SERPL-MCNC: 17.7 MG/DL (ref 7–18)
CALCIUM SERPL-MCNC: 9 MG/DL (ref 8.5–10.1)
CHLORIDE SERPL-SCNC: 96 MMOL/L (ref 98–107)
CO2 SERPL-SCNC: 38 MMOL/L (ref 21–32)
CREAT SERPL-MCNC: 0.6 MG/DL (ref 0.55–1.3)
GLUCOSE SERPL-MCNC: 88 MG/DL (ref 74–106)
POTASSIUM SERPLBLD-SCNC: 4.3 MMOL/L (ref 3.5–5.1)
SODIUM SERPL-SCNC: 137 MMOL/L (ref 136–145)

## 2020-01-27 RX ADMIN — BUDESONIDE AND FORMOTEROL FUMARATE DIHYDRATE SCH PUFF: 160; 4.5 AEROSOL RESPIRATORY (INHALATION) at 21:33

## 2020-01-27 RX ADMIN — PANTOPRAZOLE SODIUM SCH MG: 20 TABLET, DELAYED RELEASE ORAL at 09:20

## 2020-01-27 RX ADMIN — TIOTROPIUM BROMIDE INHALATION SPRAY SCH PUFF: 3.12 SPRAY, METERED RESPIRATORY (INHALATION) at 09:31

## 2020-01-27 RX ADMIN — DILTIAZEM HYDROCHLORIDE SCH MG: 30 TABLET, FILM COATED ORAL at 17:52

## 2020-01-27 RX ADMIN — IPRATROPIUM BROMIDE AND ALBUTEROL SULFATE SCH AMP: .5; 3 SOLUTION RESPIRATORY (INHALATION) at 20:30

## 2020-01-27 RX ADMIN — IPRATROPIUM BROMIDE AND ALBUTEROL SULFATE SCH AMP: .5; 3 SOLUTION RESPIRATORY (INHALATION) at 07:20

## 2020-01-27 RX ADMIN — IPRATROPIUM BROMIDE AND ALBUTEROL SULFATE SCH AMP: .5; 3 SOLUTION RESPIRATORY (INHALATION) at 11:40

## 2020-01-27 RX ADMIN — IPRATROPIUM BROMIDE AND ALBUTEROL SULFATE SCH AMP: .5; 3 SOLUTION RESPIRATORY (INHALATION) at 16:05

## 2020-01-27 RX ADMIN — DILTIAZEM HYDROCHLORIDE SCH MG: 30 TABLET, FILM COATED ORAL at 11:56

## 2020-01-27 RX ADMIN — AZITHROMYCIN SCH MG: 250 TABLET, FILM COATED ORAL at 09:20

## 2020-01-27 RX ADMIN — PREDNISONE SCH MG: 10 TABLET ORAL at 09:20

## 2020-01-27 RX ADMIN — FOLIC ACID SCH MG: 1 TABLET ORAL at 09:20

## 2020-01-27 RX ADMIN — BUDESONIDE AND FORMOTEROL FUMARATE DIHYDRATE SCH PUFF: 160; 4.5 AEROSOL RESPIRATORY (INHALATION) at 09:00

## 2020-01-27 RX ADMIN — ATORVASTATIN CALCIUM SCH MG: 20 TABLET, FILM COATED ORAL at 21:32

## 2020-01-27 RX ADMIN — DILTIAZEM HYDROCHLORIDE SCH MG: 30 TABLET, FILM COATED ORAL at 05:01

## 2020-01-27 RX ADMIN — ENOXAPARIN SODIUM SCH MG: 40 INJECTION SUBCUTANEOUS at 09:20

## 2020-01-27 RX ADMIN — CLOPIDOGREL BISULFATE SCH MG: 75 TABLET, FILM COATED ORAL at 09:20

## 2020-01-27 NOTE — PN
Progress Note, Physician


Chief Complaint: 





Patient remained stable less shortness of breath, feeling weak and exhausted





- Current Medication List


Current Medications: 


Active Medications





Acetaminophen (Tylenol -)  650 mg PO Q6H PRN


   PRN Reason: FEVER


Albuterol/Ipratropium (Duoneb -)  1 amp NEB RQID [facility]


   Last Admin: 01/26/20 20:37 Dose:  1 amp


Atorvastatin Calcium (Lipitor -)  20 mg PO HS [facility]


   Last Admin: 01/26/20 21:23 Dose:  20 mg


Azithromycin (Zithromax -)  250 mg PO DAILY [facility]


   Last Admin: 01/26/20 10:17 Dose:  250 mg


Budesonide/Formoterol Fumarate (Symbicort 160/4.5mcg -)  2 puff IH BID [facility]


   Last Admin: 01/26/20 21:23 Dose:  2 puff


Clonazepam (Klonopin -)  0.5 mg PO BID [facility]


   Last Admin: 01/26/20 21:23 Dose:  0.5 mg


Clopidogrel Bisulfate (Plavix -)  75 mg PO DAILY [facility]


   Last Admin: 01/26/20 10:17 Dose:  75 mg


Diltiazem HCl (Cardizem -)  30 mg PO Q6HPO [facility]


   Last Admin: 01/27/20 05:01 Dose:  30 mg


Enoxaparin Sodium (Lovenox -)  40 mg SQ DAILY [facility]


   Last Admin: 01/26/20 10:19 Dose:  40 mg


Folic Acid (Folic Acid -)  1 mg PO DAILY [facility]


   Last Admin: 01/26/20 10:17 Dose:  1 mg


Methotrexate (Mexate -)  7.5 mg PO We@1000 [facility]


   Last Admin: 01/22/20 10:43 Dose:  7.5 mg


Pantoprazole Sodium (Protonix -)  20 mg PO DAILY [facility]


   Last Admin: 01/26/20 10:17 Dose:  20 mg


Prednisone (Deltasone -)  30 mg PO DAILY [facility]


   Last Admin: 01/26/20 10:17 Dose:  30 mg


Tiotropium Bromide (Spiriva Respimat)  2 puff IH DAILY [facility]


   Last Admin: 01/26/20 10:18 Dose:  2 puff











- Objective


Vital Signs: 


 Vital Signs











Temperature  98.0 F   01/27/20 06:00


 


Pulse Rate  82   01/27/20 06:00


 


Respiratory Rate  20   01/27/20 06:00


 


Blood Pressure  108/69   01/27/20 06:00


 


O2 Sat by Pulse Oximetry (%)  98   01/27/20 03:20








Middle aged sick looking man on BIPAP





HEENT: Mm moist, no anemia, PERRLA EOMI





NECK: No JVD No Bruit





CHEST: B/L minimal wheezes





CVS: S1S2 Tachycardia





ABD: Non tender Bs +





EXT: Trace edema





CNS: AOX3 Non Focal


Labs: 


 CBC, BMP





 01/26/20 05:50 











Problem List





- Problems


(1) Acute respiratory failure with hypoxia and hypercarbia


Assessment/Plan: 


Due to advanced COPD and non compliance with BIAPA, cont BIPAP, Pulmonary 

consult, PO  prednisone 30 mg daily,  Duneb add Spiriva  follow-up pulmonary 

recommendations


Code(s): J96.01 - ACUTE RESPIRATORY FAILURE WITH HYPOXIA; J96.02 - ACUTE 

RESPIRATORY FAILURE WITH HYPERCAPNIA   





(2) COPD exacerbation


Assessment/Plan: 


cont IV asteroids taper  Nebs and BiPAP


Code(s): J44.1 - CHRONIC OBSTRUCTIVE PULMONARY DISEASE W (ACUTE) EXACERBATION   





(3) Respiratory acidosis


Assessment/Plan: 


Due to Hypercarbia now improved


Code(s): E87.2 - ACIDOSIS   





(4) Anxiety and depression


Assessment/Plan: 


Cont Home meds


Code(s): F41.9 - ANXIETY DISORDER, UNSPECIFIED; F32.9 - MAJOR DEPRESSIVE 

DISORDER, SINGLE EPISODE, UNSPECIFIED   





(5) Diastolic CHF


Assessment/Plan: 


F/U BMP PRN Lasix and Ramipril


Code(s): I50.30 - UNSPECIFIED DIASTOLIC (CONGESTIVE) HEART FAILURE   


Qualifiers: 


   Heart failure chronicity: unspecified   Qualified Code(s): I50.30 - 

Unspecified diastolic (congestive) heart failure   





(6) History of coronary artery bypass graft


Assessment/Plan: 


Compensated


Code(s): Z95.1 - PRESENCE OF AORTOCORONARY BYPASS GRAFT   





(7) Rheumatoid arthritis


Assessment/Plan: 


Cont Methotrexate wkly and Folic acid


Code(s): M06.9 - RHEUMATOID ARTHRITIS, UNSPECIFIED   





(8) Palliative care encounter


Assessment/Plan: 


For goals of care


Code(s): Z51.5 - ENCOUNTER FOR PALLIATIVE CARE

## 2020-01-27 NOTE — PN
Progress Note, Physician


History of Present Illness: 





64 yrs old man e advanced COPD , Pulmonary Fibrosis, Home O2 and BIPAP at Night 

and PRN, CAD s/p CABG, Rheumatoid arthritis, Pulmonary HTN<, HfpEF, recently 

discharged to [facility] on 1/14/2020 after prolonged hospitalization on Prednisone 

taper gradually improving, last night slept off BiPAP  patient was found 

confused and SOB on arrival to ED O2 sat reported low with PCO2 98 patient was 

put on BIPAP rpt O2 sat ABG shows PCO2 59 patient patient denies any chest pain

, fever or expectoration, feels weak saturating well on  BIAPAP





- Current Medication List


Current Medications: 


Active Medications





Acetaminophen (Tylenol -)  650 mg PO Q6H PRN


   PRN Reason: FEVER


Albuterol/Ipratropium (Duoneb -)  1 amp NEB RQID [facility]


   Last Admin: 01/27/20 07:20 Dose:  1 amp


Atorvastatin Calcium (Lipitor -)  20 mg PO HS [facility]


   Last Admin: 01/26/20 21:23 Dose:  20 mg


Azithromycin (Zithromax -)  250 mg PO DAILY [facility]


   Last Admin: 01/27/20 09:20 Dose:  250 mg


Budesonide/Formoterol Fumarate (Symbicort 160/4.5mcg -)  2 puff IH BID [facility]


   Last Admin: 01/27/20 09:00 Dose:  2 puff


Clonazepam (Klonopin -)  0.5 mg PO BID [facility]


   Last Admin: 01/27/20 09:20 Dose:  0.5 mg


Clopidogrel Bisulfate (Plavix -)  75 mg PO DAILY [facility]


   Last Admin: 01/27/20 09:20 Dose:  75 mg


Diltiazem HCl (Cardizem -)  30 mg PO Q6HPO [facility]


   Last Admin: 01/27/20 05:01 Dose:  30 mg


Enoxaparin Sodium (Lovenox -)  40 mg SQ DAILY [facility]


   Last Admin: 01/27/20 09:20 Dose:  40 mg


Folic Acid (Folic Acid -)  1 mg PO DAILY [facility]


   Last Admin: 01/27/20 09:20 Dose:  1 mg


Methotrexate (Mexate -)  7.5 mg PO We@1000 [facility]


   Last Admin: 01/22/20 10:43 Dose:  7.5 mg


Pantoprazole Sodium (Protonix -)  20 mg PO DAILY [facility]


   Last Admin: 01/27/20 09:20 Dose:  20 mg


Prednisone (Deltasone -)  30 mg PO DAILY [facility]


   Last Admin: 01/27/20 09:20 Dose:  30 mg


Tiotropium Bromide (Spiriva Respimat)  2 puff IH DAILY [facility]


   Last Admin: 01/27/20 09:31 Dose:  2 puff











- Objective


Vital Signs: 


 Vital Signs











Temperature  98 F   01/27/20 08:00


 


Pulse Rate  100 H  01/27/20 08:00


 


Respiratory Rate  20   01/27/20 08:00


 


Blood Pressure  102/67   01/27/20 08:00


 


O2 Sat by Pulse Oximetry (%)  97   01/27/20 08:14











Eyes: Yes: WNL, Conjunctiva Clear, EOM Intact


HENT: Yes: WNL, Atraumatic, Normocephalic


Neck: Yes: WNL, Supple, Trachea Midline


Cardiovascular: Yes: WNL, Regular Rate and Rhythm


Respiratory: Yes: Diminished


Gastrointestinal: Yes: WNL, Normal Bowel Sounds


Genitourinary: Yes: WNL


Musculoskeletal: Yes: WNL


Extremities: Yes: WNL


Edema: No


Integumentary: Yes: WNL


Neurological: Yes: WNL, Alert, Oriented


...Motor Strength: WNL


Psychiatric: Yes: WNL


Labs: 


 CBC, BMP





 01/26/20 05:50 





 01/27/20 06:40 











Problem List





- Problems


(1) Acute respiratory failure with hypoxia and hypercarbia


Code(s): J96.01 - ACUTE RESPIRATORY FAILURE WITH HYPOXIA; J96.02 - ACUTE 

RESPIRATORY FAILURE WITH HYPERCAPNIA   





(2) Respiratory acidosis


Code(s): E87.2 - ACIDOSIS   





(3) Abnormal LFTs


Code(s): R79.89 - OTHER SPECIFIED ABNORMAL FINDINGS OF BLOOD CHEMISTRY   





(4) Abscess of calf


Code(s): L02.419 - CUTANEOUS ABSCESS OF LIMB, UNSPECIFIED   





(5) Acute on chronic diastolic (congestive) heart failure


Code(s): I50.33 - ACUTE ON CHRONIC DIASTOLIC (CONGESTIVE) HEART FAILURE   





(6) Acute on chronic respiratory failure with hypoxemia


Code(s): J96.21 - ACUTE AND CHRONIC RESPIRATORY FAILURE WITH HYPOXIA   





(7) Acute on chronic respiratory failure with hypoxia and hypercapnia


Code(s): J96.21 - ACUTE AND CHRONIC RESPIRATORY FAILURE WITH HYPOXIA; J96.22 - 

ACUTE AND CHRONIC RESPIRATORY FAILURE WITH HYPERCAPNIA   





(8) Acute respiratory failure


Code(s): J96.00 - ACUTE RESPIRATORY FAILURE, UNSP W HYPOXIA OR HYPERCAPNIA   





(9) Anxiety and depression


Code(s): F41.9 - ANXIETY DISORDER, UNSPECIFIED; F32.9 - MAJOR DEPRESSIVE 

DISORDER, SINGLE EPISODE, UNSPECIFIED   





(10) CAD (coronary artery disease)


Code(s): I25.10 - ATHSCL HEART DISEASE OF NATIVE CORONARY ARTERY W/O ANG PCTRS 

  


Qualifiers: 


   Coronary Disease-Associated Artery/Lesion type: native artery   Native vs. 

transplanted heart: native heart   Associated angina: without angina   

Qualified Code(s): I25.10 - Atherosclerotic heart disease of native coronary 

artery without angina pectoris   





(11) COPD (chronic obstructive pulmonary disease)


Code(s): J44.9 - CHRONIC OBSTRUCTIVE PULMONARY DISEASE, UNSPECIFIED   


Qualifiers: 


   COPD type: emphysema   Emphysema type: unspecified   Qualified Code(s): 

J43.9 - Emphysema, unspecified   





(12) COPD exacerbation


Code(s): J44.1 - CHRONIC OBSTRUCTIVE PULMONARY DISEASE W (ACUTE) EXACERBATION   





(13) Chronic pain


Code(s): G89.29 - OTHER CHRONIC PAIN   





(14) Compression fracture of L1 lumbar vertebra


Code(s): S32.010A - WEDGE COMPRESSION FRACTURE OF FIRST LUMBAR VERTEBRA, INIT   


Qualifiers: 


   Encounter type: initial encounter 





(15) Congestive cardiac failure


Code(s): I50.9 - HEART FAILURE, UNSPECIFIED   





(16) Cough


Code(s): R05 - COUGH   





(17) Diastolic CHF


Code(s): I50.30 - UNSPECIFIED DIASTOLIC (CONGESTIVE) HEART FAILURE   


Qualifiers: 


   Heart failure chronicity: unspecified   Qualified Code(s): I50.30 - 

Unspecified diastolic (congestive) heart failure   





(18) Edema


Code(s): R60.9 - EDEMA, UNSPECIFIED   


Qualifiers: 


   Edema type: unspecified   Qualified Code(s): R60.9 - Edema, unspecified   





(19) Elevated WBC count


Code(s): D72.829 - ELEVATED WHITE BLOOD CELL COUNT, UNSPECIFIED   





(20) Elevated brain natriuretic peptide (BNP) level


Code(s): R79.89 - OTHER SPECIFIED ABNORMAL FINDINGS OF BLOOD CHEMISTRY   





(21) HTN (hypertension)


Code(s): I10 - ESSENTIAL (PRIMARY) HYPERTENSION   


Qualifiers: 


   Hypertension type: essential hypertension   Qualified Code(s): I10 - 

Essential (primary) hypertension   





(22) History of coronary artery bypass graft


Code(s): Z95.1 - PRESENCE OF AORTOCORONARY BYPASS GRAFT   





(23) History of percutaneous coronary intervention


Code(s): Z98.89 - OTHER SPECIFIED POSTPROCEDURAL STATES * DO NOT USE *   





(24) Hypercarbia


Code(s): R06.89 - OTHER ABNORMALITIES OF BREATHING   





(25) Hypercholesterolemia


Code(s): E78.0 - PURE HYPERCHOLESTEROLEMIA * DO NOT USE *   





(26) Hyperkalemia


Code(s): E87.5 - HYPERKALEMIA   





(27) Hyperkalemia


Code(s): E87.5 - HYPERKALEMIA   





(28) Inguinal hernia


Code(s): K40.90 - UNIL INGUINAL HERNIA, W/O OBST OR GANGR, NOT SPCF AS RECUR   





(29) Intractable pain


Code(s): R52 - PAIN, UNSPECIFIED   





(30) LLQ pain


Code(s): R10.32 - LEFT LOWER QUADRANT PAIN   





(31) Left groin pain


Code(s): R10.32 - LEFT LOWER QUADRANT PAIN   





(32) Leg pain


Code(s): M79.606 - PAIN IN LEG, UNSPECIFIED   





(33) Lower extremity edema


Code(s): R60.0 - LOCALIZED EDEMA   





(34) Lumbar disc herniation


Code(s): M51.26 - OTHER INTERVERTEBRAL DISC DISPLACEMENT, LUMBAR REGION   





(35) Nosebleed


Code(s): R04.0 - EPISTAXIS   





(36) PSVT (paroxysmal supraventricular tachycardia)


Code(s): I47.1 - SUPRAVENTRICULAR TACHYCARDIA   





(37) Pain and swelling of right lower leg


Code(s): M79.661 - PAIN IN RIGHT LOWER LEG; M79.89 - OTHER SPECIFIED SOFT 

TISSUE DISORDERS   





(38) Pneumonia


Code(s): J18.9 - PNEUMONIA, UNSPECIFIED ORGANISM   





(39) Radiculopathy


Code(s): M54.10 - RADICULOPATHY, SITE UNSPECIFIED   


Qualifiers: 


   Spinal region: lumbar   Qualified Code(s): M54.16 - Radiculopathy, lumbar 

region   





(40) Rheumatoid arthritis


Code(s): M06.9 - RHEUMATOID ARTHRITIS, UNSPECIFIED   





(41) Rheumatoid arthritis involving ankle with positive rheumatoid factor


Code(s): M05.779 - RHEU ARTHRIT W RHEU FACTOR OF UNSP ANK/FT W/O ORG/SYS INVOLV

   


Qualifiers: 


   Laterality: bilateral   Qualified Code(s): M05.771 - Rheumatoid arthritis 

with rheumatoid factor of right ankle and foot without organ or systems 

involvement; M05.772 - Rheumatoid arthritis with rheumatoid factor of left 

ankle and foot without organ or systems involvement   





(42) Rheumatoid arthritis of multiple sites without organ or system involvement 

with positive rheumatoid factor


Code(s): M05.79 - RHEU ARTHRITIS W RHEU FACTOR MULT SITE W/O ORG/SYS INVOLV   





(43) Sinus tachycardia


Code(s): R00.0 - TACHYCARDIA, UNSPECIFIED   





Assessment/Plan








- Problems


(1) Palliative care encounter


Code(s): Z51.5 - ENCOUNTER FOR PALLIATIVE CARE   





(2) Acute on chronic diastolic (congestive) heart failure


Assessment/Plan: 


on diltiazem


F/u BUN/Cr Is and Os, daily weight, electrolytes.


Code(s): I50.33 - ACUTE ON CHRONIC DIASTOLIC (CONGESTIVE) HEART FAILURE   





(3) Acute on chronic respiratory failure with hypoxia and hypercapnia


Assessment/Plan: 


Bronchodilators, O2, steroids per pulmonologist.


Code(s): J96.21 - ACUTE AND CHRONIC RESPIRATORY FAILURE WITH HYPOXIA; J96.22 - 

ACUTE AND CHRONIC RESPIRATORY FAILURE WITH HYPERCAPNIA   





(4) Anxiety and depression


Assessment/Plan: 


Pt request speaking with psychiatrist/psychologist.


Code(s): F41.9 - ANXIETY DISORDER, UNSPECIFIED; F32.9 - MAJOR DEPRESSIVE 

DISORDER, SINGLE EPISODE, UNSPECIFIED   





(5) CAD (coronary artery disease)


Assessment/Plan: 


ischemic inferior wall on stress MIBI 2014


LDL cholesteol 122 mg/dL on 12/2019.


Continue atorvastatin, and keep LDL <70 mg/dL.


On clopidogrel.


Code(s): I25.10 - ATHSCL HEART DISEASE OF NATIVE CORONARY ARTERY W/O ANG PCTRS 

  


Qualifiers: 


   Coronary Disease-Associated Artery/Lesion type: native artery   Native vs. 

transplanted heart: native heart   Associated angina: without angina   

Qualified Code(s): I25.10 - Atherosclerotic heart disease of native coronary 

artery without angina pectoris   





(6) HTN (hypertension)


Code(s): I10 - ESSENTIAL (PRIMARY) HYPERTENSION   


Qualifiers: 


   Hypertension type: essential hypertension   Qualified Code(s): I10 - 

Essential (primary) hypertension   





(7) History of coronary artery bypass graft


Code(s): Z95.1 - PRESENCE OF AORTOCORONARY BYPASS GRAFT   





(8) Hypercholesterolemia


Code(s): E78.0 - PURE HYPERCHOLESTEROLEMIA * DO NOT USE *   





(9) Depression


Assessment/Plan: 





Code(s): F32.9 - MAJOR DEPRESSIVE DISORDER, SINGLE EPISODE, UNSPECIFIED

## 2020-01-27 NOTE — PN
Progress Note (short form)





- Note


Progress Note: 





PULMONARY





Remains on BiPAP. Alert, awake, c/o diarrhea from medication.





 Vital Signs











 Period  Temp  Pulse  Resp  BP Sys/Baer  Pulse Ox


 


 Last 24 Hr  97.3 F-98.4 F    20-22  102-121/57-69  97-98











Gen:  mildly tachypneic on BiPAP


Heart: RRR


Lung: decreased breath sounds at the bases


Abd: soft, nontender


Ext: no edema





 CBC, BMP





 01/26/20 05:50 





 01/27/20 06:40 





Active Medications





Acetaminophen (Tylenol -)  650 mg PO Q6H PRN


   PRN Reason: FEVER


Albuterol/Ipratropium (Duoneb -)  1 amp NEB RQID [facility]


   Last Admin: 01/27/20 11:40 Dose:  1 amp


Atorvastatin Calcium (Lipitor -)  20 mg PO HS [facility]


   Last Admin: 01/26/20 21:23 Dose:  20 mg


Azithromycin (Zithromax -)  250 mg PO DAILY [facility]


   Last Admin: 01/27/20 09:20 Dose:  250 mg


Budesonide/Formoterol Fumarate (Symbicort 160/4.5mcg -)  2 puff IH BID [facility]


   Last Admin: 01/27/20 09:00 Dose:  2 puff


Clonazepam (Klonopin -)  0.5 mg PO BID [facility]


   Last Admin: 01/27/20 09:20 Dose:  0.5 mg


Clopidogrel Bisulfate (Plavix -)  75 mg PO DAILY [facility]


   Last Admin: 01/27/20 09:20 Dose:  75 mg


Diltiazem HCl (Cardizem -)  30 mg PO Q6HPO [facility]


   Last Admin: 01/27/20 11:56 Dose:  30 mg


Enoxaparin Sodium (Lovenox -)  40 mg SQ DAILY [facility]


   Last Admin: 01/27/20 09:20 Dose:  40 mg


Folic Acid (Folic Acid -)  1 mg PO DAILY [facility]


   Last Admin: 01/27/20 09:20 Dose:  1 mg


Methotrexate (Mexate -)  7.5 mg PO We@1000 [facility]


   Last Admin: 01/22/20 10:43 Dose:  7.5 mg


Pantoprazole Sodium (Protonix -)  20 mg PO DAILY [facility]


   Last Admin: 01/27/20 09:20 Dose:  20 mg


Prednisone (Deltasone -)  30 mg PO DAILY [facility]


   Last Admin: 01/27/20 09:20 Dose:  30 mg


Tiotropium Bromide (Spiriva Respimat)  2 puff IH DAILY [facility]


   Last Admin: 01/27/20 09:31 Dose:  2 puff








A/P


Acute on Chronic Hypoxic and Hypercapneic Respiratory Failure 


Acute COPD Exacerbation/End Stage COPD


LV Diastolic Dysfunction


Pulmonary HTN


CAD s/p CABG


HTN


Hyperlipidemia


Rheumatoid Arthritis





-  slow prednisone taper


-  inhaled bronchodilators


-  O2 to keep SpO2 >90%


-  must have BiPAP at night and PRN during day


-  DVT prophylaxis








Problem List





- Problems


(1) Acute on chronic respiratory failure with hypoxia and hypercapnia


Code(s): J96.21 - ACUTE AND CHRONIC RESPIRATORY FAILURE WITH HYPOXIA; J96.22 - 

ACUTE AND CHRONIC RESPIRATORY FAILURE WITH HYPERCAPNIA   





(2) COPD exacerbation


Code(s): J44.1 - CHRONIC OBSTRUCTIVE PULMONARY DISEASE W (ACUTE) EXACERBATION

## 2020-01-28 VITALS — HEART RATE: 85 BPM | SYSTOLIC BLOOD PRESSURE: 108 MMHG | DIASTOLIC BLOOD PRESSURE: 66 MMHG | TEMPERATURE: 98 F

## 2020-01-28 RX ADMIN — IPRATROPIUM BROMIDE AND ALBUTEROL SULFATE SCH AMP: .5; 3 SOLUTION RESPIRATORY (INHALATION) at 08:24

## 2020-01-28 RX ADMIN — CLOPIDOGREL BISULFATE SCH MG: 75 TABLET, FILM COATED ORAL at 09:07

## 2020-01-28 RX ADMIN — PANTOPRAZOLE SODIUM SCH MG: 20 TABLET, DELAYED RELEASE ORAL at 09:08

## 2020-01-28 RX ADMIN — ENOXAPARIN SODIUM SCH MG: 40 INJECTION SUBCUTANEOUS at 09:08

## 2020-01-28 RX ADMIN — DILTIAZEM HYDROCHLORIDE SCH MG: 30 TABLET, FILM COATED ORAL at 02:44

## 2020-01-28 RX ADMIN — TIOTROPIUM BROMIDE INHALATION SPRAY SCH PUFF: 3.12 SPRAY, METERED RESPIRATORY (INHALATION) at 09:08

## 2020-01-28 RX ADMIN — PREDNISONE SCH MG: 10 TABLET ORAL at 09:07

## 2020-01-28 RX ADMIN — DILTIAZEM HYDROCHLORIDE SCH MG: 30 TABLET, FILM COATED ORAL at 07:30

## 2020-01-28 RX ADMIN — AZITHROMYCIN SCH MG: 250 TABLET, FILM COATED ORAL at 09:08

## 2020-01-28 RX ADMIN — BUDESONIDE AND FORMOTEROL FUMARATE DIHYDRATE SCH PUFF: 160; 4.5 AEROSOL RESPIRATORY (INHALATION) at 09:08

## 2020-01-28 RX ADMIN — FOLIC ACID SCH MG: 1 TABLET ORAL at 09:08

## 2020-01-28 NOTE — PN
Progress Note (short form)





- Note


Progress Note: 





The patient was seen briefly. He still was feeling depredate assistance ssed 

and frustrated about his declining health. The patient still needs paperwork to 

be completed with assistance in order to be considered for he lung transplant. 

He is apparently being transferred to [facility].





Problem List





- Problems


(1) Major depress dis, severe


Code(s): F32.2 - MAJOR DEPRESSV DISORD, SINGLE EPSD, SEV W/O PSYCH FEATURES   





(2) Anxiety about health


Code(s): F41.8 - OTHER SPECIFIED ANXIETY DISORDERS

## 2020-01-28 NOTE — DS
Physical Examination


Vital Signs: 


 Vital Signs











Temperature  98 F   01/28/20 08:47


 


Pulse Rate  85   01/28/20 08:47


 


Respiratory Rate  20   01/28/20 08:47


 


Blood Pressure  108/66   01/28/20 08:47


 


O2 Sat by Pulse Oximetry (%)  96   01/28/20 00:30











Labs: 


 CBC, BMP





 01/26/20 05:50 





 01/27/20 06:40 











Discharge Summary


Problems reviewed: Yes


Reason For Visit: ACUTE ON CHRONIC RESPIRATORY FAILURE WITH HYPOXIA


Current Active Problems





Acute respiratory failure with hypoxia and hypercarbia (Acute)


Anxiety about health (Acute)


Depression (Acute)


Major depress dis, severe (Acute)


Palliative care encounter (Acute)


Respiratory acidosis (Acute)








Hospital Course: 





64 yrs old man e advanced COPD , Pulmonary Fibrosis, Home O2 and BIPAP at Night 

and PRN, CAD s/p CABG, Rheumatoid arthritis, Pulmonary HTN, HfpEF, patient was 

admitted with severe respiratory status as is she  slept off BiPAP due to hot 

weather, patient was found confused and SOB on arrival to ED O2 sat reported 

low with PCO2 98 patient was put on BIPAP rpt O2 sat ABG shows PCO2 59 patient 

patient denies any chest pain, fever or expectoration, feels weak saturating 

well on  BIAPAP, during the course of hospitalization initially put on IV Solu-

Medrol and IV antibiotics and then switched to p.o. prednisone, patient is 

gradually improving require pulmonary rehabilitation is being transferred to 

[place] for further management.


Condition: Guarded





- Instructions


Diet, Activity, Other Instructions: 


Low-salt low-cholesterol diet


Daily weight


Patient needs 


BiPAP at night


Patient needs slow tapering of prednisone


Prednisone 30 mg daily for 2 days


Prednisone 20 mg daily for 3 days


Prednisone 15 mg daily for 3 days


Prednisone 10 mg daily for 3 days


Referrals: 


Keanu Whitaker MD [Primary Care Provider] - 1 Month


Disposition: SKILLED NURSING FACILITY





- Home Medications


Comprehensive Discharge Medication List: 


Ambulatory Orders





Clopidogrel Bisulfate [Plavix] 75 mg PO DAILY 11/28/18 


Folic Acid 1 mg PO DAILY 11/28/18 


Furosemide [Lasix] 40 mg PO DAILY 11/28/18 


Methotrexate [Mexate -] 7.5 mg PO Q7D 11/28/18 


Atorvastatin Calcium 20 mg PO HS 01/01/20 


Fluticasone/Umeclidin/Vilanter [Trelegy Ellipta 100-62.5-25] 1 each  ASDIR 01/ 01/20 


Albuterol 2.5/Ipratropium 0.5 [Duoneb -] 1 amp NEB RQID  amp 01/14/20 


Clonazepam 0.5 mg PO BID #60 tablet MDD 2 01/14/20 


Diltiazem [Cardizem -] 30 mg PO Q6HPO  tablet 01/14/20 


Enoxaparin [Lovenox -] 40 mg SQ DAILY  disp.syrin 01/14/20 


predniSONE [Deltasone -] 30 mg PO DAILY  tablet 01/14/20 


Acetaminophen [Tylenol .Regular Strength -] 650 mg PO Q6H PRN 01/19/20 


Omeprazole 20 mg PO DAILY 01/19/20 


Budesonide/Formeterol Fumarate [SYMBICORT 160/4.5mcg -] 2 puff IH BID  inhaler 

01/28/20 


Tiotropium Bromide [Spiriva Respimat] 2 puff IH DAILY  inhaler 01/28/20 


predniSONE [Deltasone -] 30 mg PO DAILY  tablet 01/28/20 








Prescription Drug Monitoring Program (I-STOP) results: I-STOP reviewed and no 

issues identified

## 2020-06-06 ENCOUNTER — HOSPITAL ENCOUNTER (INPATIENT)
Dept: HOSPITAL 74 - JER | Age: 65
LOS: 4 days | Discharge: HOME | DRG: 189 | End: 2020-06-10
Attending: INTERNAL MEDICINE | Admitting: INTERNAL MEDICINE
Payer: COMMERCIAL

## 2020-06-06 VITALS — BODY MASS INDEX: 21.6 KG/M2

## 2020-06-06 DIAGNOSIS — K86.1: ICD-10-CM

## 2020-06-06 DIAGNOSIS — J96.21: Primary | ICD-10-CM

## 2020-06-06 DIAGNOSIS — J44.1: ICD-10-CM

## 2020-06-06 DIAGNOSIS — D72.829: ICD-10-CM

## 2020-06-06 DIAGNOSIS — S32.050A: ICD-10-CM

## 2020-06-06 DIAGNOSIS — M06.9: ICD-10-CM

## 2020-06-06 DIAGNOSIS — Z95.5: ICD-10-CM

## 2020-06-06 DIAGNOSIS — I25.10: ICD-10-CM

## 2020-06-06 DIAGNOSIS — E78.5: ICD-10-CM

## 2020-06-06 DIAGNOSIS — J45.901: ICD-10-CM

## 2020-06-06 DIAGNOSIS — F41.8: ICD-10-CM

## 2020-06-06 DIAGNOSIS — Z95.1: ICD-10-CM

## 2020-06-06 LAB
ALBUMIN SERPL-MCNC: 4 G/DL (ref 3.4–5)
ALP SERPL-CCNC: 112 U/L (ref 45–117)
ALT SERPL-CCNC: 21 U/L (ref 13–61)
ANION GAP SERPL CALC-SCNC: 7 MMOL/L (ref 8–16)
APTT BLD: 32.6 SECONDS (ref 25.2–36.5)
AST SERPL-CCNC: 17 U/L (ref 15–37)
BASE EXCESS BLDV CALC-SCNC: 3.1 MMOL/L (ref -2–2)
BILIRUB DIRECT SERPL-MCNC: 243 U/L (ref 87–246)
BILIRUB SERPL-MCNC: 1.1 MG/DL (ref 0.2–1)
BNP SERPL-MCNC: 113.1 PG/ML (ref 5–125)
BUN SERPL-MCNC: 16.8 MG/DL (ref 7–18)
CALCIUM SERPL-MCNC: 9.8 MG/DL (ref 8.5–10.1)
CHLORIDE SERPL-SCNC: 98 MMOL/L (ref 98–107)
CO2 SERPL-SCNC: 32 MMOL/L (ref 21–32)
COLOR UR: YELLOW
CREAT SERPL-MCNC: 1 MG/DL (ref 0.55–1.3)
DEPRECATED RDW RBC AUTO: 15.1 % (ref 11.9–15.9)
GLUCOSE SERPL-MCNC: 101 MG/DL (ref 74–106)
HCT VFR BLD CALC: 41.6 % (ref 35.4–49)
HGB BLD-MCNC: 13.7 GM/DL (ref 11.7–16.9)
INR BLD: 0.97 (ref 0.83–1.09)
LIPASE SERPL-CCNC: 68 U/L (ref 73–393)
MAGNESIUM SERPL-MCNC: 2.3 MG/DL (ref 1.8–2.4)
MCH RBC QN AUTO: 29 PG (ref 25.7–33.7)
MCHC RBC AUTO-ENTMCNC: 32.8 G/DL (ref 32–35.9)
MCV RBC: 88.5 FL (ref 80–96)
PH BLDV: 7.31 [PH] (ref 7.31–7.41)
PHOSPHATE SERPL-MCNC: 5 MG/DL (ref 2.5–4.9)
PLATELET # BLD AUTO: 468 K/MM3 (ref 134–434)
PMV BLD: 7.7 FL (ref 7.5–11.1)
POTASSIUM SERPLBLD-SCNC: 5.1 MMOL/L (ref 3.5–5.1)
PROT SERPL-MCNC: 7.8 G/DL (ref 6.4–8.2)
PT PNL PPP: 11.4 SEC (ref 9.7–13)
RBC # BLD AUTO: 4.71 M/MM3 (ref 4–5.6)
SODIUM SERPL-SCNC: 136 MMOL/L (ref 136–145)
VENOUS PC02: 63.1 MMHG (ref 38–52)
VENOUS PO2: < 49 MMHG (ref 28–48)
WBC # BLD AUTO: 18.3 K/MM3 (ref 4–10)

## 2020-06-06 PROCEDURE — U0003 INFECTIOUS AGENT DETECTION BY NUCLEIC ACID (DNA OR RNA); SEVERE ACUTE RESPIRATORY SYNDROME CORONAVIRUS 2 (SARS-COV-2) (CORONAVIRUS DISEASE [COVID-19]), AMPLIFIED PROBE TECHNIQUE, MAKING USE OF HIGH THROUGHPUT TECHNOLOGIES AS DESCRIBED BY CMS-2020-01-R: HCPCS

## 2020-06-06 RX ADMIN — SODIUM CHLORIDE, POTASSIUM CHLORIDE, SODIUM LACTATE AND CALCIUM CHLORIDE SCH MLS/HR: 600; 310; 30; 20 INJECTION, SOLUTION INTRAVENOUS at 21:36

## 2020-06-07 LAB
APPEARANCE UR: CLEAR
BACTERIA # UR AUTO: 29.6 /UL (ref 0–1359)
BILIRUB UR STRIP.AUTO-MCNC: NEGATIVE MG/DL
CASTS URNS QL MICRO: 0.64 /UL (ref 0–3.1)
EPITH CASTS URNS QL MICRO: 3.9 /UL (ref 0–25.1)
KETONES UR QL STRIP: (no result)
LEUKOCYTE ESTERASE UR QL STRIP.AUTO: NEGATIVE
NITRITE UR QL STRIP: NEGATIVE
PH UR: 5 [PH] (ref 5–8)
PROT UR QL STRIP: (no result)
PROT UR QL STRIP: NEGATIVE
RBC # BLD AUTO: 4.2 /UL (ref 0–23.9)
SP GR UR: 1.08 (ref 1.01–1.03)
UROBILINOGEN UR STRIP-MCNC: 1 MG/DL (ref 0.2–1)
WBC # UR AUTO: 6.1 /UL (ref 0–25.8)

## 2020-06-07 RX ADMIN — CEFTRIAXONE SCH MLS/HR: 1 INJECTION, POWDER, FOR SOLUTION INTRAMUSCULAR; INTRAVENOUS at 13:08

## 2020-06-07 RX ADMIN — SODIUM CHLORIDE, POTASSIUM CHLORIDE, SODIUM LACTATE AND CALCIUM CHLORIDE SCH MLS/HR: 600; 310; 30; 20 INJECTION, SOLUTION INTRAVENOUS at 04:32

## 2020-06-07 RX ADMIN — BUDESONIDE AND FORMOTEROL FUMARATE DIHYDRATE SCH PUFF: 160; 4.5 AEROSOL RESPIRATORY (INHALATION) at 09:31

## 2020-06-07 RX ADMIN — FUROSEMIDE SCH MG: 40 TABLET ORAL at 09:30

## 2020-06-07 RX ADMIN — METHYLPREDNISOLONE SODIUM SUCCINATE SCH MG: 40 INJECTION, POWDER, FOR SOLUTION INTRAMUSCULAR; INTRAVENOUS at 17:11

## 2020-06-07 RX ADMIN — CLOPIDOGREL BISULFATE SCH MG: 75 TABLET, FILM COATED ORAL at 09:30

## 2020-06-07 RX ADMIN — BUDESONIDE AND FORMOTEROL FUMARATE DIHYDRATE SCH PUFF: 160; 4.5 AEROSOL RESPIRATORY (INHALATION) at 21:59

## 2020-06-07 RX ADMIN — IPRATROPIUM BROMIDE AND ALBUTEROL SULFATE SCH AMP: .5; 3 SOLUTION RESPIRATORY (INHALATION) at 20:13

## 2020-06-07 RX ADMIN — FOLIC ACID SCH MG: 1 TABLET ORAL at 09:30

## 2020-06-07 RX ADMIN — TIOTROPIUM BROMIDE INHALATION SPRAY SCH PUFF: 3.12 SPRAY, METERED RESPIRATORY (INHALATION) at 09:31

## 2020-06-07 RX ADMIN — ATORVASTATIN CALCIUM SCH MG: 20 TABLET, FILM COATED ORAL at 21:59

## 2020-06-08 RX ADMIN — METHYLPREDNISOLONE SODIUM SUCCINATE SCH MG: 40 INJECTION, POWDER, FOR SOLUTION INTRAMUSCULAR; INTRAVENOUS at 01:10

## 2020-06-08 RX ADMIN — IPRATROPIUM BROMIDE AND ALBUTEROL SULFATE SCH AMP: .5; 3 SOLUTION RESPIRATORY (INHALATION) at 17:59

## 2020-06-08 RX ADMIN — FOLIC ACID SCH MG: 1 TABLET ORAL at 09:25

## 2020-06-08 RX ADMIN — BUDESONIDE AND FORMOTEROL FUMARATE DIHYDRATE SCH PUFF: 160; 4.5 AEROSOL RESPIRATORY (INHALATION) at 09:26

## 2020-06-08 RX ADMIN — IPRATROPIUM BROMIDE AND ALBUTEROL SULFATE SCH: .5; 3 SOLUTION RESPIRATORY (INHALATION) at 08:05

## 2020-06-08 RX ADMIN — IPRATROPIUM BROMIDE AND ALBUTEROL SULFATE SCH AMP: .5; 3 SOLUTION RESPIRATORY (INHALATION) at 20:17

## 2020-06-08 RX ADMIN — CEFTRIAXONE SCH MLS/HR: 1 INJECTION, POWDER, FOR SOLUTION INTRAMUSCULAR; INTRAVENOUS at 09:25

## 2020-06-08 RX ADMIN — METHYLPREDNISOLONE SODIUM SUCCINATE SCH MG: 40 INJECTION, POWDER, FOR SOLUTION INTRAMUSCULAR; INTRAVENOUS at 17:23

## 2020-06-08 RX ADMIN — FUROSEMIDE SCH MG: 40 TABLET ORAL at 09:25

## 2020-06-08 RX ADMIN — TIOTROPIUM BROMIDE INHALATION SPRAY SCH PUFF: 3.12 SPRAY, METERED RESPIRATORY (INHALATION) at 09:25

## 2020-06-08 RX ADMIN — BUDESONIDE AND FORMOTEROL FUMARATE DIHYDRATE SCH PUFF: 160; 4.5 AEROSOL RESPIRATORY (INHALATION) at 21:28

## 2020-06-08 RX ADMIN — ACETAMINOPHEN AND CODEINE PHOSPHATE PRN TAB: 300; 30 TABLET ORAL at 16:50

## 2020-06-08 RX ADMIN — ACETAMINOPHEN AND CODEINE PHOSPHATE PRN TAB: 300; 30 TABLET ORAL at 09:24

## 2020-06-08 RX ADMIN — IPRATROPIUM BROMIDE AND ALBUTEROL SULFATE SCH: .5; 3 SOLUTION RESPIRATORY (INHALATION) at 12:05

## 2020-06-08 RX ADMIN — CLOPIDOGREL BISULFATE SCH MG: 75 TABLET, FILM COATED ORAL at 09:25

## 2020-06-08 RX ADMIN — METHYLPREDNISOLONE SODIUM SUCCINATE SCH MG: 40 INJECTION, POWDER, FOR SOLUTION INTRAMUSCULAR; INTRAVENOUS at 09:23

## 2020-06-08 RX ADMIN — ATORVASTATIN CALCIUM SCH MG: 20 TABLET, FILM COATED ORAL at 21:29

## 2020-06-09 RX ADMIN — METHYLPREDNISOLONE SODIUM SUCCINATE SCH MG: 40 INJECTION, POWDER, FOR SOLUTION INTRAMUSCULAR; INTRAVENOUS at 09:21

## 2020-06-09 RX ADMIN — TIOTROPIUM BROMIDE INHALATION SPRAY SCH PUFF: 3.12 SPRAY, METERED RESPIRATORY (INHALATION) at 09:24

## 2020-06-09 RX ADMIN — ACETAMINOPHEN AND CODEINE PHOSPHATE PRN TAB: 300; 30 TABLET ORAL at 16:32

## 2020-06-09 RX ADMIN — CEFTRIAXONE SCH MLS/HR: 1 INJECTION, POWDER, FOR SOLUTION INTRAMUSCULAR; INTRAVENOUS at 09:21

## 2020-06-09 RX ADMIN — IPRATROPIUM BROMIDE AND ALBUTEROL SULFATE SCH AMP: .5; 3 SOLUTION RESPIRATORY (INHALATION) at 08:00

## 2020-06-09 RX ADMIN — ATORVASTATIN CALCIUM SCH MG: 20 TABLET, FILM COATED ORAL at 21:40

## 2020-06-09 RX ADMIN — ACETAMINOPHEN AND CODEINE PHOSPHATE PRN TAB: 300; 30 TABLET ORAL at 09:23

## 2020-06-09 RX ADMIN — FUROSEMIDE SCH MG: 40 TABLET ORAL at 09:21

## 2020-06-09 RX ADMIN — CLOPIDOGREL BISULFATE SCH MG: 75 TABLET, FILM COATED ORAL at 09:21

## 2020-06-09 RX ADMIN — IPRATROPIUM BROMIDE AND ALBUTEROL SULFATE SCH AMP: .5; 3 SOLUTION RESPIRATORY (INHALATION) at 11:35

## 2020-06-09 RX ADMIN — METHYLPREDNISOLONE SODIUM SUCCINATE SCH MG: 40 INJECTION, POWDER, FOR SOLUTION INTRAMUSCULAR; INTRAVENOUS at 02:37

## 2020-06-09 RX ADMIN — FOLIC ACID SCH MG: 1 TABLET ORAL at 09:21

## 2020-06-09 RX ADMIN — METHYLPREDNISOLONE SODIUM SUCCINATE SCH MG: 40 INJECTION, POWDER, FOR SOLUTION INTRAMUSCULAR; INTRAVENOUS at 17:12

## 2020-06-09 RX ADMIN — BUDESONIDE AND FORMOTEROL FUMARATE DIHYDRATE SCH PUFF: 160; 4.5 AEROSOL RESPIRATORY (INHALATION) at 09:24

## 2020-06-09 RX ADMIN — BUDESONIDE AND FORMOTEROL FUMARATE DIHYDRATE SCH PUFF: 160; 4.5 AEROSOL RESPIRATORY (INHALATION) at 21:40

## 2020-06-09 RX ADMIN — IPRATROPIUM BROMIDE AND ALBUTEROL SULFATE SCH AMP: .5; 3 SOLUTION RESPIRATORY (INHALATION) at 20:25

## 2020-06-09 RX ADMIN — IPRATROPIUM BROMIDE AND ALBUTEROL SULFATE SCH AMP: .5; 3 SOLUTION RESPIRATORY (INHALATION) at 15:12

## 2020-06-10 VITALS — DIASTOLIC BLOOD PRESSURE: 73 MMHG | SYSTOLIC BLOOD PRESSURE: 114 MMHG | HEART RATE: 97 BPM | TEMPERATURE: 97.4 F

## 2020-06-10 RX ADMIN — CEFTRIAXONE SCH MLS/HR: 1 INJECTION, POWDER, FOR SOLUTION INTRAMUSCULAR; INTRAVENOUS at 09:36

## 2020-06-10 RX ADMIN — BUDESONIDE AND FORMOTEROL FUMARATE DIHYDRATE SCH PUFF: 160; 4.5 AEROSOL RESPIRATORY (INHALATION) at 09:36

## 2020-06-10 RX ADMIN — METHYLPREDNISOLONE SODIUM SUCCINATE SCH MG: 40 INJECTION, POWDER, FOR SOLUTION INTRAMUSCULAR; INTRAVENOUS at 02:44

## 2020-06-10 RX ADMIN — IPRATROPIUM BROMIDE AND ALBUTEROL SULFATE SCH AMP: .5; 3 SOLUTION RESPIRATORY (INHALATION) at 08:00

## 2020-06-10 RX ADMIN — ACETAMINOPHEN AND CODEINE PHOSPHATE PRN TAB: 300; 30 TABLET ORAL at 13:22

## 2020-06-10 RX ADMIN — IPRATROPIUM BROMIDE AND ALBUTEROL SULFATE SCH AMP: .5; 3 SOLUTION RESPIRATORY (INHALATION) at 11:57

## 2020-06-10 RX ADMIN — TIOTROPIUM BROMIDE INHALATION SPRAY SCH PUFF: 3.12 SPRAY, METERED RESPIRATORY (INHALATION) at 09:36

## 2020-06-10 RX ADMIN — ACETAMINOPHEN AND CODEINE PHOSPHATE PRN TAB: 300; 30 TABLET ORAL at 07:46

## 2020-06-10 RX ADMIN — CLOPIDOGREL BISULFATE SCH MG: 75 TABLET, FILM COATED ORAL at 09:36

## 2020-06-10 RX ADMIN — METHYLPREDNISOLONE SODIUM SUCCINATE SCH MG: 40 INJECTION, POWDER, FOR SOLUTION INTRAMUSCULAR; INTRAVENOUS at 09:36

## 2020-06-10 RX ADMIN — FOLIC ACID SCH MG: 1 TABLET ORAL at 09:36

## 2020-06-10 RX ADMIN — FUROSEMIDE SCH MG: 40 TABLET ORAL at 09:36

## 2023-07-29 NOTE — PN
Progress Note, Physician


Chief Complaint: 


Still c/o SOB 





- Current Medication List


Current Medications: 


Active Medications





Acetaminophen (Tylenol -)  650 mg PO Q4H PRN


   PRN Reason: FEVER


Albuterol Sulfate (Ventolin Hfa Inhaler -)  2 puff IH Q4H PRN


   PRN Reason: SHORT OF BREATH/WHEEZING


   Last Admin: 01/10/20 12:40 Dose:  2 puff


Albuterol/Ipratropium (Duoneb -)  1 amp NEB RQID [facility]


   Last Admin: 01/13/20 20:55 Dose:  1 amp


Atorvastatin Calcium (Lipitor -)  20 mg PO HS [facility]


   Last Admin: 01/13/20 22:41 Dose:  20 mg


Budesonide/Formoterol Fumarate (Symbicort 160/4.5mcg -)  2 puff IH BID [facility]


   Last Admin: 01/13/20 22:41 Dose:  2 puff


Cefuroxime Axetil (Ceftin -)  500 mg PO BIDWM [facility]


   Last Admin: 01/13/20 18:51 Dose:  500 mg


Clonazepam (Klonopin -)  0.5 mg PO BID [facility]


   Last Admin: 01/13/20 22:41 Dose:  0.5 mg


Diltiazem HCl (Cardizem -)  30 mg PO Q6HPO [facility]


   Last Admin: 01/14/20 06:29 Dose:  30 mg


Enoxaparin Sodium (Lovenox -)  40 mg SQ DAILY [facility]


   Last Admin: 01/13/20 09:11 Dose:  40 mg


Folic Acid (Folic Acid -)  1 mg PO DAILY [facility]


   Last Admin: 01/13/20 09:10 Dose:  1 mg


Methotrexate (Mexate -)  15 mg PO Q7D@1000 [facility]


   Last Admin: 01/08/20 09:36 Dose:  15 mg


Pantoprazole Sodium (Protonix -)  20 mg PO DAILY [facility]


   Last Admin: 01/13/20 09:10 Dose:  20 mg


Prednisone (Deltasone -)  30 mg PO DAILY [facility]


   Last Admin: 01/13/20 09:10 Dose:  30 mg


Ramipril (Altace -)  5 mg PO DAILY [facility]


   Last Admin: 01/13/20 09:10 Dose:  5 mg


Tiotropium Bromide (Spiriva Respimat)  2 puff IH DAILY [facility]


   Last Admin: 01/13/20 09:12 Dose:  2 puff











- Objective


Vital Signs: 


 Vital Signs











Temperature  97.5 F L  01/14/20 06:25


 


Pulse Rate  107 H  01/14/20 06:25


 


Respiratory Rate  20   01/14/20 06:25


 


Blood Pressure  100/62   01/14/20 06:25


 


O2 Sat by Pulse Oximetry (%)  95   01/13/20 23:00








General: Young male in mild respiratory distress.


HEENT; mucous membranes moist, no anemia, no jaundice, PERRLA, no nystagmus


Neck: No JVD, supple, no bruit, thyroid palpably normal, normal carotid 

pulsations.


Chest: Nontender, bilateral wheezing.


CVS: S1-S2 regular 


no murmur/gallop/rub


Abdomen: Nondistended, soft, bowel sounds present.


Extremities: Trace edema.,  No calf  tenderness, pulses present


CNS: AO X3 , no gross motor sensory deficit


Labs: 


 CBC, BMP





 01/13/20 12:12 





 01/13/20 12:12 





 INR, PTT











INR  0.93  (0.83-1.09)   01/01/20  03:20    














Problem List





- Problems


(1) Acute on chronic respiratory failure with hypoxemia


Code(s): J96.21 - ACUTE AND CHRONIC RESPIRATORY FAILURE WITH HYPOXIA   





(2) Pneumonia


Code(s): J18.9 - PNEUMONIA, UNSPECIFIED ORGANISM   





(3) COPD exacerbation


Code(s): J44.1 - CHRONIC OBSTRUCTIVE PULMONARY DISEASE W (ACUTE) EXACERBATION   





(4) Diastolic CHF


Code(s): I50.30 - UNSPECIFIED DIASTOLIC (CONGESTIVE) HEART FAILURE   


Qualifiers: 


   Heart failure chronicity: unspecified   Qualified Code(s): I50.30 - 

Unspecified diastolic (congestive) heart failure   





(5) Hypercholesterolemia


Code(s): E78.0 - PURE HYPERCHOLESTEROLEMIA * DO NOT USE *   





(6) CAD (coronary artery disease)


Code(s): I25.10 - ATHSCL HEART DISEASE OF NATIVE CORONARY ARTERY W/O ANG PCTRS 

  





(7) Rheumatoid arthritis


Code(s): M06.9 - RHEUMATOID ARTHRITIS, UNSPECIFIED   





(8) Hypercarbia


Code(s): R06.89 - OTHER ABNORMALITIES OF BREATHING negative...

## 2025-06-12 NOTE — PN
Progress Note (short form)





- Note


Progress Note: 





PULMONARY





Breathing slowly improving. Less cough and wheezing.





 Vital Signs











 Period  Temp  Pulse  Resp  BP Sys/Baer  Pulse Ox


 


 Last 24 Hr  97.5 F-98.2 F  105-111  20-22  /53-65  95-97











Gen:  less tachypneic with speaking


Heart: RRR


Lung: distant breath sounds


Abd: soft, nontender


Ext: no edema





 CBC, BMP





 01/14/20 11:06 





 01/14/20 11:06 





Active Medications





Acetaminophen (Tylenol -)  650 mg PO Q4H PRN


   PRN Reason: FEVER


Albuterol Sulfate (Ventolin Hfa Inhaler -)  2 puff IH Q4H PRN


   PRN Reason: SHORT OF BREATH/WHEEZING


   Last Admin: 01/10/20 12:40 Dose:  2 puff


Albuterol/Ipratropium (Duoneb -)  1 amp NEB RQID [facility]


   Last Admin: 01/14/20 09:17 Dose:  1 amp


Atorvastatin Calcium (Lipitor -)  40 mg PO [facility]


Budesonide/Formoterol Fumarate (Symbicort 160/4.5mcg -)  2 puff IH BID [facility]


   Last Admin: 01/14/20 10:51 Dose:  2 puff


Cefuroxime Axetil (Ceftin -)  500 mg PO BIDWM [facility]


   Last Admin: 01/14/20 10:50 Dose:  500 mg


Clonazepam (Klonopin -)  0.5 mg PO BID [facility]


   Last Admin: 01/14/20 10:50 Dose:  0.5 mg


Diltiazem HCl (Cardizem -)  30 mg PO Q6HPO [facility]


   Last Admin: 01/14/20 06:29 Dose:  30 mg


Enoxaparin Sodium (Lovenox -)  40 mg SQ DAILY [facility]


   Last Admin: 01/14/20 10:50 Dose:  40 mg


Folic Acid (Folic Acid -)  1 mg PO DAILY [facility]


   Last Admin: 01/14/20 10:50 Dose:  1 mg


Methotrexate (Mexate -)  15 mg PO Q7D@1000 [facility]


   Last Admin: 01/08/20 09:36 Dose:  15 mg


Pantoprazole Sodium (Protonix -)  20 mg PO DAILY [facility]


   Last Admin: 01/14/20 10:50 Dose:  20 mg


Prednisone (Deltasone -)  30 mg PO DAILY [facility]


   Last Admin: 01/14/20 10:50 Dose:  30 mg


Ramipril (Altace -)  5 mg PO DAILY [facility]


   Last Admin: 01/14/20 10:50 Dose:  5 mg


Tiotropium Bromide (Spiriva Respimat)  2 puff IH DAILY [facility]


   Last Admin: 01/14/20 10:51 Dose:  2 puff











A/P


Acute on Chronic Hypoxic and Hypercapneic Respiratory Failure improving


Acute COPD Exacerbation


Pneumonia


Rheumatoid Arthritis


CAD s/p CABG


Lung Nodule


LV Diastolic Dysfunction


Pulmonary HTN


HTN


Hyperlipidemia





-  slow prednisone taper


-  inhaled bronchodilators


-  complete antibiotics


-  O2 to keep SpO2 >90%


-  BiPAP at night and as needed to assist in work of breathing


-  DVT prophylaxis Cigarettes skin: free of pressure injuries per nursing flow sheets